# Patient Record
Sex: FEMALE | Race: WHITE | Employment: FULL TIME | URBAN - METROPOLITAN AREA
[De-identification: names, ages, dates, MRNs, and addresses within clinical notes are randomized per-mention and may not be internally consistent; named-entity substitution may affect disease eponyms.]

---

## 2017-05-04 ENCOUNTER — OFFICE VISIT (OUTPATIENT)
Dept: INTERNAL MEDICINE CLINIC | Age: 57
End: 2017-05-04

## 2017-05-04 VITALS
RESPIRATION RATE: 16 BRPM | DIASTOLIC BLOOD PRESSURE: 76 MMHG | TEMPERATURE: 97.9 F | HEART RATE: 77 BPM | HEIGHT: 71 IN | WEIGHT: 205 LBS | SYSTOLIC BLOOD PRESSURE: 134 MMHG | BODY MASS INDEX: 28.7 KG/M2 | OXYGEN SATURATION: 97 %

## 2017-05-04 DIAGNOSIS — I95.9 HYPOTENSION, UNSPECIFIED HYPOTENSION TYPE: Primary | ICD-10-CM

## 2017-05-04 DIAGNOSIS — G60.9 IDIOPATHIC PERIPHERAL NEUROPATHY: ICD-10-CM

## 2017-05-04 DIAGNOSIS — I95.9 HYPOTENSION, UNSPECIFIED HYPOTENSION TYPE: ICD-10-CM

## 2017-05-04 DIAGNOSIS — F98.8 ADD (ATTENTION DEFICIT DISORDER): ICD-10-CM

## 2017-05-04 RX ORDER — LANOLIN ALCOHOL/MO/W.PET/CERES
CREAM (GRAM) TOPICAL DAILY
COMMUNITY
End: 2022-03-30

## 2017-05-04 NOTE — MR AVS SNAPSHOT
Visit Information Date & Time Provider Department Dept. Phone Encounter #  
 5/4/2017 12:45 PM Edvin Garcia MD [facility] INTERNAL MEDICINE OF [place] [id number] Upcoming Health Maintenance Date Due Hepatitis C Screening 1960 DTaP/Tdap/Td series (1 - Tdap) 3/14/1983 PAP AKA CERVICAL CYTOLOGY 3/14/1983 FOBT Q 1 YEAR AGE 50-75 3/14/2012 INFLUENZA AGE 9 TO ADULT 8/1/2017 BREAST CANCER SCRN MAMMOGRAM 8/15/2018 Allergies as of 5/4/2017  Review Complete On: 5/4/2017 By: Kristina Durbin LPN Severity Noted Reaction Type Reactions Celebrex [Celecoxib]  07/24/2014    Hives Sulfa (Sulfonamide Antibiotics)  09/09/2015    Other (comments) Patient states she is not allergic Current Immunizations  Never Reviewed Name Date  
 TB Skin Test (PPD) 8/29/2013, 2/27/2007 Not reviewed this visit You Were Diagnosed With   
  
 Codes Comments Hypotension, unspecified hypotension type    -  Primary ICD-10-CM: I95.9 ICD-9-CM: 693. 9 Vitals BP Pulse Temp Resp Height(growth percentile) 134/76 (BP 1 Location: Left arm, BP Patient Position: Sitting) 77 97.9 °F (36.6 °C) (Tympanic) 16 5' 11\" (1.803 m) Weight(growth percentile) SpO2 BMI OB Status Smoking Status 205 lb (93 kg) 97% 28.59 kg/m2 Postmenopausal Never Smoker Vitals History BMI and BSA Data Body Mass Index Body Surface Area 28.59 kg/m 2 2.16 m 2 Preferred Pharmacy Pharmacy Name Phone [phone], [address] AT [address] [phone] Your Updated Medication List  
  
   
This list is accurate as of: 5/4/17  2:16 PM.  Always use your most recent med list.  
  
  
  
  
 diclofenac 1 % Gel Commonly known as:  VOLTAREN  
APPLY 4 GRAMS TO AFFECTED AREA FOUR TIMES DAILY  
  
 multivitamin tablet Commonly known as:  ONE A DAY Take 1 Tab by mouth daily. VITAMIN B-1 100 mg tablet Generic drug:  thiamine Take  by mouth daily. VITAMIN B-12 1,000 mcg tablet Generic drug:  cyanocobalamin Take 1,000 mcg by mouth daily. VITAMIN D3 1,000 unit tablet Generic drug:  cholecalciferol Take  by mouth daily. To-Do List   
 05/04/2017 Lab:  CORTISOL   
  
 05/04/2017 Lab:  METABOLIC PANEL, COMPREHENSIVE [facility]! Dear Lore More: Thank you for requesting a [employer] account. Our records indicate that you already have an active [employer]fl[employer] account. You can access your account anytime at [email or web address] Employee Benefit [email or web address]) Did you know that you can access your hospital and ER discharge instructions at any time in [employer]? You can also review all of your test results from your hospital stay or ER visit. Additional Information If you have questions, please visit the Frequently Asked Questions section of the [employer] website at [email or web address])/. Remember, [employer] is NOT to be used for urgent needs. For medical emergencies, dial 911. Now available from your iPhone and Android! Please provide this summary of care documentation to your next provider. Your primary care clinician is listed as Barb Reece. If you have any questions after today's visit, please call [phone].

## 2017-05-04 NOTE — PROGRESS NOTES
1. Have you been to the ER, urgent care clinic since your last visit? Hospitalized since your last visit? No    2. Have you seen or consulted any other health care providers outside of the [address] [place] [address] since your last visit? Include any pap smears or colon screening.  No

## 2017-05-05 LAB
ALBUMIN SERPL-MCNC: 4.4 G/DL (ref 3.5–5.5)
ALBUMIN/GLOB SERPL: 2 {RATIO} (ref 1.2–2.2)
ALP SERPL-CCNC: 69 IU/L (ref 39–117)
ALT SERPL-CCNC: 32 IU/L (ref 0–32)
AST SERPL-CCNC: 44 IU/L (ref 0–40)
BILIRUB SERPL-MCNC: 0.4 MG/DL (ref 0–1.2)
BUN SERPL-MCNC: 20 MG/DL (ref 6–24)
BUN/CREAT SERPL: 22 (ref 9–23)
CALCIUM SERPL-MCNC: 9.7 MG/DL (ref 8.7–10.2)
CHLORIDE SERPL-SCNC: 102 MMOL/L (ref 96–106)
CO2 SERPL-SCNC: 25 MMOL/L (ref 18–29)
CORTIS SERPL-MCNC: 13.7 UG/DL
CREAT SERPL-MCNC: 0.91 MG/DL (ref 0.57–1)
GLOBULIN SER CALC-MCNC: 2.2 G/DL (ref 1.5–4.5)
GLUCOSE SERPL-MCNC: 133 MG/DL (ref 65–99)
POTASSIUM SERPL-SCNC: 4.1 MMOL/L (ref 3.5–5.2)
PROT SERPL-MCNC: 6.6 G/DL (ref 6–8.5)
SODIUM SERPL-SCNC: 143 MMOL/L (ref 134–144)
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
## 2017-05-12 DIAGNOSIS — G60.9 IDIOPATHIC PERIPHERAL NEUROPATHY: Primary | ICD-10-CM

## 2017-08-09 ENCOUNTER — CLINICAL SUPPORT (OUTPATIENT)
Dept: INTERNAL MEDICINE CLINIC | Age: 57
End: 2017-08-09

## 2017-08-09 DIAGNOSIS — Z11.1 PPD SCREENING TEST: Primary | ICD-10-CM

## 2017-08-09 NOTE — PROGRESS NOTES
Patient came in to office for a ppd test.allergies reviewed. Ppd test given in left arm . instructed to come back to office Friday to have it read.  Patient left office ambulatory

## 2017-08-16 ENCOUNTER — HOSPITAL ENCOUNTER (OUTPATIENT)
Dept: MAMMOGRAPHY | Age: 57
Discharge: HOME | End: 2017-08-16
Attending: INTERNAL MEDICINE
Payer: COMMERCIAL

## 2017-08-16 DIAGNOSIS — Z12.31 VISIT FOR SCREENING MAMMOGRAM: ICD-10-CM

## 2017-08-16 PROCEDURE — 77063 BREAST TOMOSYNTHESIS BI: CPT

## 2017-08-18 ENCOUNTER — LAB ONLY (OUTPATIENT)
Dept: INTERNAL MEDICINE CLINIC | Age: 57
End: 2017-08-18

## 2017-08-18 DIAGNOSIS — G60.9 IDIOPATHIC PERIPHERAL NEUROPATHY: ICD-10-CM

## 2017-08-18 DIAGNOSIS — Z00.00 ANNUAL PHYSICAL EXAM: Primary | ICD-10-CM

## 2017-08-18 DIAGNOSIS — R63.5 WEIGHT GAIN: ICD-10-CM

## 2017-08-21 ENCOUNTER — OFFICE VISIT (OUTPATIENT)
Dept: INTERNAL MEDICINE CLINIC | Age: 57
End: 2017-08-21

## 2017-08-21 VITALS
HEART RATE: 71 BPM | OXYGEN SATURATION: 98 % | DIASTOLIC BLOOD PRESSURE: 84 MMHG | WEIGHT: 205 LBS | SYSTOLIC BLOOD PRESSURE: 124 MMHG | RESPIRATION RATE: 16 BRPM | TEMPERATURE: 99.1 F | BODY MASS INDEX: 28.7 KG/M2 | HEIGHT: 71 IN

## 2017-08-21 DIAGNOSIS — G60.9 IDIOPATHIC PERIPHERAL NEUROPATHY: ICD-10-CM

## 2017-08-21 DIAGNOSIS — Z00.00 ANNUAL PHYSICAL EXAM: Primary | ICD-10-CM

## 2017-08-21 DIAGNOSIS — M79.89 LEG SWELLING: ICD-10-CM

## 2017-08-21 NOTE — PROGRESS NOTES
1. Have you been to the ER, urgent care clinic since your last visit? Hospitalized since your last visit? No    2. Have you seen or consulted any other health care providers outside of the [address] [place] [address] since your last visit? Include any pap smears or colon screening.  Dr. Bowden Duty - GYN

## 2017-08-21 NOTE — PATIENT INSTRUCTIONS
Health Maintenance Due   Topic Date Due    Hepatitis C Test  1960    DTaP/Tdap/Td  (1 - Tdap) 03/14/1983    Cervical Cancer Screening  03/14/1983    Stool testing for trace blood  03/14/2012    Flu Vaccine  08/01/2017

## 2017-08-22 LAB
ALBUMIN SERPL-MCNC: 4.3 G/DL (ref 3.5–5.5)
ALBUMIN/GLOB SERPL: 1.7 {RATIO} (ref 1.2–2.2)
ALP SERPL-CCNC: 68 IU/L (ref 39–117)
ALT SERPL-CCNC: 15 IU/L (ref 0–32)
AST SERPL-CCNC: 17 IU/L (ref 0–40)
BASOPHILS # BLD AUTO: 0 X10E3/UL (ref 0–0.2)
BASOPHILS NFR BLD AUTO: 0 %
BILIRUB SERPL-MCNC: 0.6 MG/DL (ref 0–1.2)
BUN SERPL-MCNC: 33 MG/DL (ref 6–24)
BUN/CREAT SERPL: 29 (ref 9–23)
CALCIUM SERPL-MCNC: 9.3 MG/DL (ref 8.7–10.2)
CHLORIDE SERPL-SCNC: 100 MMOL/L (ref 96–106)
CHOLEST SERPL-MCNC: 215 MG/DL (ref 100–199)
CO2 SERPL-SCNC: 26 MMOL/L (ref 18–29)
CREAT SERPL-MCNC: 1.13 MG/DL (ref 0.57–1)
EOSINOPHIL # BLD AUTO: 0.1 X10E3/UL (ref 0–0.4)
EOSINOPHIL NFR BLD AUTO: 2 %
ERYTHROCYTE [DISTWIDTH] IN BLOOD BY AUTOMATED COUNT: 13.2 % (ref 12.3–15.4)
GLOBULIN SER CALC-MCNC: 2.5 G/DL (ref 1.5–4.5)
GLUCOSE SERPL-MCNC: 85 MG/DL (ref 65–99)
HCT VFR BLD AUTO: 41.3 % (ref 34–46.6)
HDLC SERPL-MCNC: 74 MG/DL
HGB BLD-MCNC: 13.4 G/DL (ref 11.1–15.9)
IMM GRANULOCYTES # BLD: 0 X10E3/UL (ref 0–0.1)
IMM GRANULOCYTES NFR BLD: 0 %
LDLC SERPL CALC-MCNC: 127 MG/DL (ref 0–99)
LYMPHOCYTES # BLD AUTO: 1.6 X10E3/UL (ref 0.7–3.1)
LYMPHOCYTES NFR BLD AUTO: 33 %
MCH RBC QN AUTO: 30.5 PG (ref 26.6–33)
MCHC RBC AUTO-ENTMCNC: 32.4 G/DL (ref 31.5–35.7)
MCV RBC AUTO: 94 FL (ref 79–97)
MONOCYTES # BLD AUTO: 0.4 X10E3/UL (ref 0.1–0.9)
MONOCYTES NFR BLD AUTO: 9 %
NEUTROPHILS # BLD AUTO: 2.7 X10E3/UL (ref 1.4–7)
NEUTROPHILS NFR BLD AUTO: 56 %
PLATELET # BLD AUTO: 216 X10E3/UL (ref 150–379)
POTASSIUM SERPL-SCNC: 4.5 MMOL/L (ref 3.5–5.2)
PROT SERPL-MCNC: 6.8 G/DL (ref 6–8.5)
RBC # BLD AUTO: 4.39 X10E6/UL (ref 3.77–5.28)
SODIUM SERPL-SCNC: 141 MMOL/L (ref 134–144)
SPECIMEN STATUS REPORT, ROLRST: NORMAL
TRIGL SERPL-MCNC: 70 MG/DL (ref 0–149)
TSH SERPL DL<=0.005 MIU/L-ACNC: 1.38 UIU/ML (ref 0.45–4.5)
VIT B1 BLD-SCNC: 252.7 NMOL/L (ref 66.5–200)
VIT B12 SERPL-MCNC: >2000 PG/ML (ref 211–946)
VLDLC SERPL CALC-MCNC: 14 MG/DL (ref 5–40)
WBC # BLD AUTO: 4.9 X10E3/UL (ref 3.4–10.8)

## 2017-08-24 PROBLEM — M79.89 LEG SWELLING: Status: ACTIVE | Noted: 2017-08-24

## 2017-08-24 NOTE — PROGRESS NOTES
The patient presents to the office today for a check up    HPI    The patient presents to the office today for a check up. The patient remains on vitamins for a peripheral neuropathy. The patient has continued complaints of numness with tingling in her feet. This is unchanged. The patient notes some tightness and swelling in her ankles. She has been taking in a lot of fluids for her biking. Review of Systems   Respiratory: Negative for shortness of breath. Cardiovascular: Positive for leg swelling. Allergies   Allergen Reactions    Celebrex [Celecoxib] Hives    Sulfa (Sulfonamide Antibiotics) Other (comments)     Patient states she is not allergic       Current Outpatient Prescriptions   Medication Sig Dispense Refill    thiamine (VITAMIN B-1) 100 mg tablet Take  by mouth daily.  diclofenac (VOLTAREN) 1 % gel APPLY 4 GRAMS TO AFFECTED AREA FOUR TIMES DAILY 100 g 0    cholecalciferol (VITAMIN D3) 1,000 unit tablet Take  by mouth daily.  cyanocobalamin (VITAMIN B-12) 1,000 mcg tablet Take 1,000 mcg by mouth daily.  multivitamin (ONE A DAY) tablet Take 1 Tab by mouth daily. Past Medical History:   Diagnosis Date    Attention deficit disorder without mention of hyperactivity     DUB (dysfunctional uterine bleeding)     Generalized osteoarthrosis, involving multiple sites     Grief reaction     Mother  recently - Celexa    Memory changes     Adderall for this    Unspecified tinnitus        Past Surgical History:   Procedure Laterality Date    HX KNEE ARTHROSCOPY Right     Meniscus repair    HX KNEE ARTHROSCOPY Right     Removed piece of bone (FB)    HX KNEE ARTHROSCOPY Right     ACL repair       Social History     Social History    Marital status: UNKNOWN     Spouse name: N/A    Number of children: N/A    Years of education: N/A     Occupational History    Not on file.      Social History Main Topics    Smoking status: Never Smoker    Smokeless tobacco: Never Used    Alcohol use No    Drug use: No    Sexual activity: Not Currently     Other Topics Concern    Not on file     Social History Narrative       Patient does have an advanced directive on file    Visit Vitals    /84 (BP 1 Location: Left arm, BP Patient Position: Sitting)    Pulse 71    Temp 99.1 °F (37.3 °C) (Tympanic)    Resp 16    Ht 5' 11\" (1.803 m)    Wt 205 lb (93 kg)    SpO2 98%    BMI 28.59 kg/m2       Physical Exam   No Cervical Lymphadenopathy  No Supraclavicular Lymphadenopathy  Thyroid is Normal  Lungs are clear to ausculation and percussion  Heart:  S1 S2 are normal, No gallops, No mummers  No Carotid Bruits  Abdomen:  Normal Bowel Sounds. No tenderness. No masses. No Hepatomegaly or Splenomegly  LE:  Strong Pedal Pulses. 1+ Edema    BMI:  Slightly high but some of weight is fluid    Lab Only on 08/18/2017   Component Date Value Ref Range Status    WBC 08/18/2017 4.9  3.4 - 10.8 x10E3/uL Final    RBC 08/18/2017 4.39  3.77 - 5.28 x10E6/uL Final    HGB 08/18/2017 13.4  11.1 - 15.9 g/dL Final    HCT 08/18/2017 41.3  34.0 - 46.6 % Final    MCV 08/18/2017 94  79 - 97 fL Final    MCH 08/18/2017 30.5  26.6 - 33.0 pg Final    MCHC 08/18/2017 32.4  31.5 - 35.7 g/dL Final    RDW 08/18/2017 13.2  12.3 - 15.4 % Final    PLATELET 79/70/4812 677  150 - 379 x10E3/uL Final    NEUTROPHILS 08/18/2017 56  % Final    Lymphocytes 08/18/2017 33  % Final    MONOCYTES 08/18/2017 9  % Final    EOSINOPHILS 08/18/2017 2  % Final    BASOPHILS 08/18/2017 0  % Final    ABS. NEUTROPHILS 08/18/2017 2.7  1.4 - 7.0 x10E3/uL Final    Abs Lymphocytes 08/18/2017 1.6  0.7 - 3.1 x10E3/uL Final    ABS. MONOCYTES 08/18/2017 0.4  0.1 - 0.9 x10E3/uL Final    ABS. EOSINOPHILS 08/18/2017 0.1  0.0 - 0.4 x10E3/uL Final    ABS. BASOPHILS 08/18/2017 0.0  0.0 - 0.2 x10E3/uL Final    IMMATURE GRANULOCYTES 08/18/2017 0  % Final    ABS. IMM.  GRANS. 08/18/2017 0.0  0.0 - 0.1 x10E3/uL Final    Glucose 08/18/2017 85  65 - 99 mg/dL Final    BUN 08/18/2017 33* 6 - 24 mg/dL Final    Creatinine 08/18/2017 1.13* 0.57 - 1.00 mg/dL Final    GFR est non-AA 08/18/2017 55* >59 mL/min/1.73 Final    GFR est AA 08/18/2017 63  >59 mL/min/1.73 Final    BUN/Creatinine ratio 08/18/2017 29* 9 - 23 Final    Sodium 08/18/2017 141  134 - 144 mmol/L Final    Potassium 08/18/2017 4.5  3.5 - 5.2 mmol/L Final    Chloride 08/18/2017 100  96 - 106 mmol/L Final    CO2 08/18/2017 26  18 - 29 mmol/L Final    Calcium 08/18/2017 9.3  8.7 - 10.2 mg/dL Final    Protein, total 08/18/2017 6.8  6.0 - 8.5 g/dL Final    Albumin 08/18/2017 4.3  3.5 - 5.5 g/dL Final    GLOBULIN, TOTAL 08/18/2017 2.5  1.5 - 4.5 g/dL Final    A-G Ratio 08/18/2017 1.7  1.2 - 2.2 Final    Bilirubin, total 08/18/2017 0.6  0.0 - 1.2 mg/dL Final    Alk. phosphatase 08/18/2017 68  39 - 117 IU/L Final    AST (SGOT) 08/18/2017 17  0 - 40 IU/L Final    ALT (SGPT) 08/18/2017 15  0 - 32 IU/L Final    Cholesterol, total 08/18/2017 215* 100 - 199 mg/dL Final    Triglyceride 08/18/2017 70  0 - 149 mg/dL Final    HDL Cholesterol 08/18/2017 74  >39 mg/dL Final    VLDL, calculated 08/18/2017 14  5 - 40 mg/dL Final    LDL, calculated 08/18/2017 127* 0 - 99 mg/dL Final    Vitamin B1 08/18/2017 252.7* 66.5 - 200.0 nmol/L Final    TSH 08/18/2017 1.380  0.450 - 4.500 uIU/mL Final    Vitamin B12 08/18/2017 >2000* 211 - 946 pg/mL Final    SPECIMEN STATUS REPORT 08/18/2017 COMMENT   Final    Comment: One Specimen Identifier  One Specimen Identifier  The specimen received included only one patient identifier on the  primary collection container. Our laboratory accrediting agency  states \"All primary specimen containers must be labeled with 2  identifiers at the time of collection. \"         . No results found for any visits on 08/21/17. Assessment / Plan      ICD-10-CM ICD-9-CM    1. Annual physical exam Z00.00 V70.0    2.  Idiopathic peripheral neuropathy G60.9 356.9 3. Leg swelling M79.89 729.81      Discussed issues of fluid and salt use  I advised the patient to continue good health habits  she was advised to continue her maintenance medications    Follow-up Disposition: Not on File    I asked Ruth Ann Teixeira if she has any questions and I answered the questions.   Ruth Ann Teixeira states that she understands the treatment plan and agrees with the treatment plan

## 2017-08-25 LAB — VIT B6 SERPL-MCNC: 13.3 UG/L (ref 2–32.8)

## 2017-08-31 ENCOUNTER — OFFICE VISIT (OUTPATIENT)
Dept: INTERNAL MEDICINE CLINIC | Age: 57
End: 2017-08-31

## 2017-08-31 VITALS
HEART RATE: 63 BPM | TEMPERATURE: 98.8 F | HEIGHT: 71 IN | RESPIRATION RATE: 16 BRPM | WEIGHT: 213 LBS | BODY MASS INDEX: 29.82 KG/M2 | SYSTOLIC BLOOD PRESSURE: 150 MMHG | DIASTOLIC BLOOD PRESSURE: 98 MMHG | OXYGEN SATURATION: 98 %

## 2017-08-31 DIAGNOSIS — J02.9 SORE THROAT: ICD-10-CM

## 2017-08-31 DIAGNOSIS — R19.4 BOWEL HABIT CHANGES: Primary | ICD-10-CM

## 2017-08-31 LAB
S PYO AG THROAT QL: NEGATIVE
VALID INTERNAL CONTROL?: YES

## 2017-08-31 RX ORDER — DICLOFENAC SODIUM 10 MG/G
4 GEL TOPICAL 4 TIMES DAILY
Qty: 100 G | Refills: 0 | Status: SHIPPED | OUTPATIENT
Start: 2017-08-31 | End: 2017-12-22 | Stop reason: SDUPTHER

## 2017-08-31 RX ORDER — AMOXICILLIN AND CLAVULANATE POTASSIUM 500; 125 MG/1; MG/1
1 TABLET, FILM COATED ORAL 2 TIMES DAILY
Qty: 20 TAB | Refills: 0 | Status: SHIPPED | OUTPATIENT
Start: 2017-08-31 | End: 2017-09-10

## 2017-08-31 NOTE — PROGRESS NOTES
Jono Ridley is a 56 y.o. female presenting today for Sore Throat (x1 1/2 weeks)  . HPI:  Jono Ridley presents to the office today for sore throat x 1.5 weeks. Patient denies nasal congestion, cough, fever or chills. Pateint is also complaining of increased episodes of diarrhea after meals. She noted her diarrhea always occurred after eating food with MSG but now the episodes are more frequent and with various meals and food choices. Review of Systems   HENT: Positive for sore throat. Negative for congestion, ear pain and sinus pain. Respiratory: Negative for cough and hemoptysis. Cardiovascular: Negative for chest pain and palpitations. Gastrointestinal: Positive for diarrhea. Negative for abdominal pain. Allergies   Allergen Reactions    Celebrex [Celecoxib] Hives    Sulfa (Sulfonamide Antibiotics) Other (comments)     Patient states she is not allergic       Current Outpatient Prescriptions   Medication Sig Dispense Refill    diclofenac (VOLTAREN) 1 % gel Apply 4 g to affected area four (4) times daily. 100 g 0    amoxicillin-clavulanate (AUGMENTIN) 500-125 mg per tablet Take 1 Tab by mouth two (2) times a day for 10 days. 20 Tab 0    thiamine (VITAMIN B-1) 100 mg tablet Take  by mouth daily.  cholecalciferol (VITAMIN D3) 1,000 unit tablet Take  by mouth daily.  cyanocobalamin (VITAMIN B-12) 1,000 mcg tablet Take 1,000 mcg by mouth daily.  multivitamin (ONE A DAY) tablet Take 1 Tab by mouth daily.          Past Medical History:   Diagnosis Date    Attention deficit disorder without mention of hyperactivity     DUB (dysfunctional uterine bleeding)     Generalized osteoarthrosis, involving multiple sites     Grief reaction     Mother  recently - Celexa    Memory changes     Adderall for this    Unspecified tinnitus        Past Surgical History:   Procedure Laterality Date    HX KNEE ARTHROSCOPY Right     Meniscus repair    HX KNEE ARTHROSCOPY Right Removed piece of bone (FB)    HX KNEE ARTHROSCOPY Right     ACL repair       Social History     Social History    Marital status: UNKNOWN     Spouse name: N/A    Number of children: N/A    Years of education: N/A     Occupational History    Not on file. Social History Main Topics    Smoking status: Never Smoker    Smokeless tobacco: Never Used    Alcohol use No    Drug use: No    Sexual activity: Not Currently     Other Topics Concern    Not on file     Social History Narrative       Patient does not have an advanced directive on file    Vitals:    08/31/17 1455   BP: (!) 150/98   Pulse: 63   Resp: 16   Temp: 98.8 °F (37.1 °C)   TempSrc: Tympanic   SpO2: 98%   Weight: 213 lb (96.6 kg)   Height: 5' 11\" (1.803 m)   PainSc:   2   PainLoc: Throat       Physical Exam   Constitutional: No distress. HENT:   Mouth/Throat: Oropharynx is clear and moist. No oropharyngeal exudate. Cardiovascular: Normal rate, regular rhythm and normal heart sounds. No murmur heard. Pulmonary/Chest: Effort normal and breath sounds normal.   Abdominal: Soft. Nursing note and vitals reviewed.       Office Visit on 08/31/2017   Component Date Value Ref Range Status    VALID INTERNAL CONTROL POC 08/31/2017 Yes   Final    Group A Strep Ag 08/31/2017 Negative  Negative Final   Lab Only on 08/18/2017   Component Date Value Ref Range Status    WBC 08/18/2017 4.9  3.4 - 10.8 x10E3/uL Final    RBC 08/18/2017 4.39  3.77 - 5.28 x10E6/uL Final    HGB 08/18/2017 13.4  11.1 - 15.9 g/dL Final    HCT 08/18/2017 41.3  34.0 - 46.6 % Final    MCV 08/18/2017 94  79 - 97 fL Final    MCH 08/18/2017 30.5  26.6 - 33.0 pg Final    MCHC 08/18/2017 32.4  31.5 - 35.7 g/dL Final    RDW 08/18/2017 13.2  12.3 - 15.4 % Final    PLATELET 74/26/5458 988  150 - 379 x10E3/uL Final    NEUTROPHILS 08/18/2017 56  % Final    Lymphocytes 08/18/2017 33  % Final    MONOCYTES 08/18/2017 9  % Final    EOSINOPHILS 08/18/2017 2  % Final    BASOPHILS 08/18/2017 0  % Final    ABS. NEUTROPHILS 08/18/2017 2.7  1.4 - 7.0 x10E3/uL Final    Abs Lymphocytes 08/18/2017 1.6  0.7 - 3.1 x10E3/uL Final    ABS. MONOCYTES 08/18/2017 0.4  0.1 - 0.9 x10E3/uL Final    ABS. EOSINOPHILS 08/18/2017 0.1  0.0 - 0.4 x10E3/uL Final    ABS. BASOPHILS 08/18/2017 0.0  0.0 - 0.2 x10E3/uL Final    IMMATURE GRANULOCYTES 08/18/2017 0  % Final    ABS. IMM. GRANS. 08/18/2017 0.0  0.0 - 0.1 x10E3/uL Final    Glucose 08/18/2017 85  65 - 99 mg/dL Final    BUN 08/18/2017 33* 6 - 24 mg/dL Final    Creatinine 08/18/2017 1.13* 0.57 - 1.00 mg/dL Final    GFR est non-AA 08/18/2017 55* >59 mL/min/1.73 Final    GFR est AA 08/18/2017 63  >59 mL/min/1.73 Final    BUN/Creatinine ratio 08/18/2017 29* 9 - 23 Final    Sodium 08/18/2017 141  134 - 144 mmol/L Final    Potassium 08/18/2017 4.5  3.5 - 5.2 mmol/L Final    Chloride 08/18/2017 100  96 - 106 mmol/L Final    CO2 08/18/2017 26  18 - 29 mmol/L Final    Calcium 08/18/2017 9.3  8.7 - 10.2 mg/dL Final    Protein, total 08/18/2017 6.8  6.0 - 8.5 g/dL Final    Albumin 08/18/2017 4.3  3.5 - 5.5 g/dL Final    GLOBULIN, TOTAL 08/18/2017 2.5  1.5 - 4.5 g/dL Final    A-G Ratio 08/18/2017 1.7  1.2 - 2.2 Final    Bilirubin, total 08/18/2017 0.6  0.0 - 1.2 mg/dL Final    Alk.  phosphatase 08/18/2017 68  39 - 117 IU/L Final    AST (SGOT) 08/18/2017 17  0 - 40 IU/L Final    ALT (SGPT) 08/18/2017 15  0 - 32 IU/L Final    Cholesterol, total 08/18/2017 215* 100 - 199 mg/dL Final    Triglyceride 08/18/2017 70  0 - 149 mg/dL Final    HDL Cholesterol 08/18/2017 74  >39 mg/dL Final    VLDL, calculated 08/18/2017 14  5 - 40 mg/dL Final    LDL, calculated 08/18/2017 127* 0 - 99 mg/dL Final    Vitamin B1 08/18/2017 252.7* 66.5 - 200.0 nmol/L Final    TSH 08/18/2017 1.380  0.450 - 4.500 uIU/mL Final    Vitamin B12 08/18/2017 >2000* 211 - 946 pg/mL Final    SPECIMEN STATUS REPORT 08/18/2017 COMMENT   Final    Comment: One Specimen Identifier  One Specimen Identifier  The specimen received included only one patient identifier on the  primary collection container. Our laboratory accrediting agency  states \"All primary specimen containers must be labeled with 2  identifiers at the time of collection. \"      Vitamin B6 08/21/2017 13.3  2.0 - 32.8 ug/L Final       .  Results for orders placed or performed in visit on 08/31/17   AMB POC RAPID STREP A   Result Value Ref Range    VALID INTERNAL CONTROL POC Yes     Group A Strep Ag Negative Negative       Assessment / Plan:      ICD-10-CM ICD-9-CM    1. Bowel habit changes R19.4 787.99 REFERRAL TO GASTROENTEROLOGY   2. Sore throat J02.9 462 AMB POC RAPID STREP A      amoxicillin-clavulanate (AUGMENTIN) 500-125 mg per tablet     Patient noted sore throat was improving. Patient given printed rx to fill if symptoms consistent or pain increased  Rapid Strep negative  Referral to GI  F/u prn    Follow-up Disposition:  Return if symptoms worsen or fail to improve. I asked the patient if she  had any questions and answered her  questions.   The patient stated that she understands the treatment plan and agrees with the treatment plan

## 2017-08-31 NOTE — PROGRESS NOTES
Patient presents for   Chief Complaint   Patient presents with    Sore Throat     x1 1/2 weeks     Fall risk assessment was not indicated. Depression screening was not indicated Follow up questions were not indicated. 1. Have you been to the ER, urgent care clinic since your last visit? Hospitalized since your last visit? No    2. Have you seen or consulted any other health care providers outside of the [address] [place] [address] since your last visit? Include any pap smears or colon screening.  No     POC rapid strep performed per provider order, provider made aware of results

## 2017-09-14 ENCOUNTER — OFFICE VISIT (OUTPATIENT)
Dept: INTERNAL MEDICINE CLINIC | Age: 57
End: 2017-09-14

## 2017-09-14 VITALS
HEART RATE: 62 BPM | OXYGEN SATURATION: 99 % | WEIGHT: 212 LBS | SYSTOLIC BLOOD PRESSURE: 140 MMHG | TEMPERATURE: 98.7 F | RESPIRATION RATE: 16 BRPM | HEIGHT: 71 IN | DIASTOLIC BLOOD PRESSURE: 80 MMHG | BODY MASS INDEX: 29.68 KG/M2

## 2017-09-14 DIAGNOSIS — R06.09 DYSPNEA ON EXERTION: ICD-10-CM

## 2017-09-14 DIAGNOSIS — L73.9 FOLLICULITIS OF PERINEUM: Primary | ICD-10-CM

## 2017-09-14 DIAGNOSIS — R60.0 BILATERAL LEG EDEMA: ICD-10-CM

## 2017-09-14 DIAGNOSIS — R63.5 WEIGHT GAIN: ICD-10-CM

## 2017-09-14 RX ORDER — CEPHALEXIN 250 MG/1
250 CAPSULE ORAL 4 TIMES DAILY
Qty: 40 CAP | Refills: 0 | Status: SHIPPED | OUTPATIENT
Start: 2017-09-14 | End: 2017-09-24

## 2017-09-14 NOTE — PROGRESS NOTES
Lilo Barcenas is a 56 y.o. female presenting today for Mass (pelvic region x2 weeks)  . HPI:  Lilo Barcenas presents to the office today for pelvic mass x 2 weeks with pustula drainage. She noted the site is tender to touch. She is negative for fever. The patient is also concerned with her lower extremity edema, weight gain and dyspnea when climbing stairs. She is negative for chest pain or palpitations. Review of Systems   Respiratory: Positive for shortness of breath (climbing stairs). Negative for cough. Cardiovascular: Positive for leg swelling. Negative for chest pain and palpitations. Allergies   Allergen Reactions    Celebrex [Celecoxib] Hives    Sulfa (Sulfonamide Antibiotics) Other (comments)     Patient states she is not allergic       Current Outpatient Prescriptions   Medication Sig Dispense Refill    cephALEXin (KEFLEX) 250 mg capsule Take 1 Cap by mouth four (4) times daily for 10 days. 40 Cap 0    diclofenac (VOLTAREN) 1 % gel Apply 4 g to affected area four (4) times daily. 100 g 0    thiamine (VITAMIN B-1) 100 mg tablet Take  by mouth daily.  cholecalciferol (VITAMIN D3) 1,000 unit tablet Take  by mouth daily.  multivitamin (ONE A DAY) tablet Take 1 Tab by mouth daily.  cyanocobalamin (VITAMIN B-12) 1,000 mcg tablet Take 1,000 mcg by mouth daily.          Past Medical History:   Diagnosis Date    Attention deficit disorder without mention of hyperactivity     DUB (dysfunctional uterine bleeding)     Generalized osteoarthrosis, involving multiple sites     Grief reaction     Mother  recently - Celexa    Memory changes     Adderall for this    Unspecified tinnitus        Past Surgical History:   Procedure Laterality Date    HX KNEE ARTHROSCOPY Right     Meniscus repair    HX KNEE ARTHROSCOPY Right     Removed piece of bone (FB)    HX KNEE ARTHROSCOPY Right     ACL repair       Social History     Social History    Marital status: UNKNOWN Spouse name: N/A    Number of children: N/A    Years of education: N/A     Occupational History    Not on file. Social History Main Topics    Smoking status: Never Smoker    Smokeless tobacco: Never Used    Alcohol use No    Drug use: No    Sexual activity: Not Currently     Other Topics Concern    Not on file     Social History Narrative       Patient does not have an advanced directive on file    Vitals:    09/14/17 1528   BP: 140/80   Pulse: 62   Resp: 16   Temp: 98.7 °F (37.1 °C)   TempSrc: Tympanic   SpO2: 99%   Weight: 212 lb (96.2 kg)   Height: 5' 11\" (1.803 m)   PainSc:   0 - No pain       Physical Exam   Constitutional: No distress. Cardiovascular: Normal rate, regular rhythm and normal heart sounds. Pulmonary/Chest: Effort normal and breath sounds normal. She has no rales. Genitourinary:   Genitourinary Comments: Perineum noted with inflamed follicular. No discharge noted   Nursing note and vitals reviewed. Office Visit on 08/31/2017   Component Date Value Ref Range Status    VALID INTERNAL CONTROL POC 08/31/2017 Yes   Final    Group A Strep Ag 08/31/2017 Negative  Negative Final   Lab Only on 08/18/2017   Component Date Value Ref Range Status    WBC 08/18/2017 4.9  3.4 - 10.8 x10E3/uL Final    RBC 08/18/2017 4.39  3.77 - 5.28 x10E6/uL Final    HGB 08/18/2017 13.4  11.1 - 15.9 g/dL Final    HCT 08/18/2017 41.3  34.0 - 46.6 % Final    MCV 08/18/2017 94  79 - 97 fL Final    MCH 08/18/2017 30.5  26.6 - 33.0 pg Final    MCHC 08/18/2017 32.4  31.5 - 35.7 g/dL Final    RDW 08/18/2017 13.2  12.3 - 15.4 % Final    PLATELET 78/45/6477 336  150 - 379 x10E3/uL Final    NEUTROPHILS 08/18/2017 56  % Final    Lymphocytes 08/18/2017 33  % Final    MONOCYTES 08/18/2017 9  % Final    EOSINOPHILS 08/18/2017 2  % Final    BASOPHILS 08/18/2017 0  % Final    ABS. NEUTROPHILS 08/18/2017 2.7  1.4 - 7.0 x10E3/uL Final    Abs Lymphocytes 08/18/2017 1.6  0.7 - 3.1 x10E3/uL Final    ABS. MONOCYTES 08/18/2017 0.4  0.1 - 0.9 x10E3/uL Final    ABS. EOSINOPHILS 08/18/2017 0.1  0.0 - 0.4 x10E3/uL Final    ABS. BASOPHILS 08/18/2017 0.0  0.0 - 0.2 x10E3/uL Final    IMMATURE GRANULOCYTES 08/18/2017 0  % Final    ABS. IMM. GRANS. 08/18/2017 0.0  0.0 - 0.1 x10E3/uL Final    Glucose 08/18/2017 85  65 - 99 mg/dL Final    BUN 08/18/2017 33* 6 - 24 mg/dL Final    Creatinine 08/18/2017 1.13* 0.57 - 1.00 mg/dL Final    GFR est non-AA 08/18/2017 55* >59 mL/min/1.73 Final    GFR est AA 08/18/2017 63  >59 mL/min/1.73 Final    BUN/Creatinine ratio 08/18/2017 29* 9 - 23 Final    Sodium 08/18/2017 141  134 - 144 mmol/L Final    Potassium 08/18/2017 4.5  3.5 - 5.2 mmol/L Final    Chloride 08/18/2017 100  96 - 106 mmol/L Final    CO2 08/18/2017 26  18 - 29 mmol/L Final    Calcium 08/18/2017 9.3  8.7 - 10.2 mg/dL Final    Protein, total 08/18/2017 6.8  6.0 - 8.5 g/dL Final    Albumin 08/18/2017 4.3  3.5 - 5.5 g/dL Final    GLOBULIN, TOTAL 08/18/2017 2.5  1.5 - 4.5 g/dL Final    A-G Ratio 08/18/2017 1.7  1.2 - 2.2 Final    Bilirubin, total 08/18/2017 0.6  0.0 - 1.2 mg/dL Final    Alk. phosphatase 08/18/2017 68  39 - 117 IU/L Final    AST (SGOT) 08/18/2017 17  0 - 40 IU/L Final    ALT (SGPT) 08/18/2017 15  0 - 32 IU/L Final    Cholesterol, total 08/18/2017 215* 100 - 199 mg/dL Final    Triglyceride 08/18/2017 70  0 - 149 mg/dL Final    HDL Cholesterol 08/18/2017 74  >39 mg/dL Final    VLDL, calculated 08/18/2017 14  5 - 40 mg/dL Final    LDL, calculated 08/18/2017 127* 0 - 99 mg/dL Final    Vitamin B1 08/18/2017 252.7* 66.5 - 200.0 nmol/L Final    TSH 08/18/2017 1.380  0.450 - 4.500 uIU/mL Final    Vitamin B12 08/18/2017 >2000* 211 - 946 pg/mL Final    SPECIMEN STATUS REPORT 08/18/2017 COMMENT   Final    Comment: One Specimen Identifier  One Specimen Identifier  The specimen received included only one patient identifier on the  primary collection container.   Our laboratory accrediting agency  states \"All primary specimen containers must be labeled with 2  identifiers at the time of collection. \"      Vitamin B6 08/21/2017 13.3  2.0 - 32.8 ug/L Final       .No results found for any visits on 09/14/17. Assessment / Plan:      ICD-10-CM ICD-9-CM    1. Folliculitis of perineum L73.9 704.8 cephALEXin (KEFLEX) 250 mg capsule   2. Bilateral leg edema R60.0 782.3 2D ECHO COMPLETE ADULT (TTE) W OR WO CONTR   3. Dyspnea on exertion R06.09 786.09 2D ECHO COMPLETE ADULT (TTE) W OR WO CONTR   4. Weight gain R63.5 783.1 2D ECHO COMPLETE ADULT (TTE) W OR WO CONTR     Keflex rx  F/u in 2 weeks  2D Echo ordered      Follow-up Disposition:  Return in about 10 days (around 9/24/2017). I asked the patient if she  had any questions and answered her  questions.   The patient stated that she understands the treatment plan and agrees with the treatment plan

## 2017-09-14 NOTE — MR AVS SNAPSHOT
Visit Information Date & Time Provider Department Dept. Phone Encounter #  
 9/14/2017  3:30 PM Sasha Padgett NP [facility] INTERNAL MEDICINE OF [address] [id number] Upcoming Health Maintenance Date Due Hepatitis C Screening 1960 DTaP/Tdap/Td series (1 - Tdap) 3/14/1983 PAP AKA CERVICAL CYTOLOGY 3/14/1983 FOBT Q 1 YEAR AGE 50-75 3/14/2012 INFLUENZA AGE 9 TO ADULT 8/1/2017 BREAST CANCER SCRN MAMMOGRAM 8/16/2019 Allergies as of 9/14/2017  Review Complete On: 9/14/2017 By: Sasha Padgett NP Severity Noted Reaction Type Reactions Celebrex [Celecoxib]  07/24/2014    Hives Sulfa (Sulfonamide Antibiotics)  09/09/2015    Other (comments) Patient states she is not allergic Current Immunizations  Never Reviewed Name Date  
 TB Skin Test (PPD) 8/29/2013, 2/27/2007 TB Skin Test (PPD) Intradermal 8/9/2017 Not reviewed this visit You Were Diagnosed With   
  
 Codes Comments Folliculitis of perineum    -  Primary ICD-10-CM: L73.9 ICD-9-CM: 704.8 Bilateral leg edema     ICD-10-CM: R60.0 ICD-9-CM: 054. 3 Dyspnea on exertion     ICD-10-CM: R06.09 
ICD-9-CM: 786.09 Weight gain     ICD-10-CM: R63.5 ICD-9-CM: 783.1 Vitals BP Pulse Temp Resp Height(growth percentile) 140/80 (BP 1 Location: Left arm, BP Patient Position: Sitting) 62 98.7 °F (37.1 °C) (Tympanic) 16 5' 11\" (1.803 m) Weight(growth percentile) SpO2 BMI OB Status Smoking Status 212 lb (96.2 kg) 99% 29.57 kg/m2 Postmenopausal Never Smoker BMI and BSA Data Body Mass Index Body Surface Area  
 29.57 kg/m 2 2.2 m 2 Preferred Pharmacy Pharm[facility] Name Phone [facility] [phone], [address] AT [address] [phone] Your Updated Medication List  
  
   
This list is accurate as of: 9/14/17  4:46 PM.  Always use your most recent med list.  
  
  
  
  
 cephALEXin 250 mg capsule Commonly known as:  [facility] Take 1 Cap by mouth four (4) times daily for 10 days. diclofenac 1 % Gel Commonly known as:  VOLTAREN Apply 4 g to affected area four (4) times daily. multivitamin tablet Commonly known as:  ONE A DAY Take 1 Tab by mouth daily. VITAMIN B-1 100 mg tablet Generic drug:  thiamine Take  by mouth daily. VITAMIN B-12 1,000 mcg tablet Generic drug:  cyanocobalamin Take 1,000 mcg by mouth daily. VITAMIN D3 1,000 unit tablet Generic drug:  cholecalciferol Take  by mouth daily. Prescriptions Sent to Pharmacy Refills  
 cephALEXin (KEFLEX) 250 mg capsule 0 Sig: Take 1 Cap by mouth four (4) times daily for 10 days. Class: Normal  
 Pharmacy: [email or web address] [address] [place] [address] AT [address] Ph #: [phone] Route: Oral  
  
To-Do List   
 09/14/2017 ECHO:  2D ECHO COMPLETE ADULT (TTE) W OR WO CONTR Introducing [facility]! Dear Claire Opitz: Thank you for requesting a [employer] account. Our records indicate that you already have an active [employer] account. You can access your account anytime at [email or web address]. Swag Of The Month/[employer] Did you know that you can access your hospital and ER discharge instructions at any time in [employer]? You can also review all of your test results from your hospital stay or ER visit. Additional Information If you have questions, please visit the Frequently Asked Questions section of the [employer] website at [email or web address] Remember, [employer] is NOT to be used for urgent needs. For medical emergencies, dial 911. Now available from your iPhone and Android! Please provide this summary of care documentation to your next provider. Your primary care clinician is listed as Mark[facility]. If you have any questions after today's visit, please call [phone].

## 2017-09-14 NOTE — PROGRESS NOTES
Patient presents for   Chief Complaint   Patient presents with    Mass     pelvic region x2 weeks     Fall risk assessment was not indicated. Depression screening was not indicated Follow up questions were not indicated. 1. Have you been to the ER, urgent care clinic since your last visit? Hospitalized since your last visit? No    2. Have you seen or consulted any other health care providers outside of the [address] [place] [address] since your last visit? Include any pap smears or colon screening.  No

## 2017-09-27 ENCOUNTER — HOSPITAL ENCOUNTER (OUTPATIENT)
Dept: NON INVASIVE DIAGNOSTICS | Age: 57
Discharge: HOME | End: 2017-09-27
Attending: NURSE PRACTITIONER
Payer: COMMERCIAL

## 2017-09-27 ENCOUNTER — DOCUMENTATION ONLY (OUTPATIENT)
Dept: CARDIOLOGY | Age: 57
End: 2017-09-27

## 2017-09-27 DIAGNOSIS — R06.09 DYSPNEA ON EXERTION: ICD-10-CM

## 2017-09-27 DIAGNOSIS — R63.5 WEIGHT GAIN: ICD-10-CM

## 2017-09-27 DIAGNOSIS — R60.0 BILATERAL LEG EDEMA: ICD-10-CM

## 2017-09-27 PROCEDURE — 93306 TTE W/DOPPLER COMPLETE: CPT

## 2017-09-27 NOTE — PROGRESS NOTES
Patient assessed since at time of echo, found to have A.fib, controlled rate. I discussed with her that she should see a cardiologist formally and she told me she is seeing Dr. Randy Ibanez tomorrow. If Dr. Randy Ibanez agrees, we would be more than happy to see patient in the office for a formal assessment of new A.fib. Patient denies a history of A.fib. She has only felt fatigued, no chest pain, syncope.

## 2017-09-28 ENCOUNTER — OFFICE VISIT (OUTPATIENT)
Dept: INTERNAL MEDICINE CLINIC | Age: 57
End: 2017-09-28

## 2017-09-28 VITALS
HEIGHT: 71 IN | SYSTOLIC BLOOD PRESSURE: 120 MMHG | OXYGEN SATURATION: 99 % | DIASTOLIC BLOOD PRESSURE: 84 MMHG | HEART RATE: 58 BPM | BODY MASS INDEX: 29.4 KG/M2 | TEMPERATURE: 98.8 F | RESPIRATION RATE: 16 BRPM | WEIGHT: 210 LBS

## 2017-09-28 DIAGNOSIS — R60.9 PERIPHERAL EDEMA: ICD-10-CM

## 2017-09-28 DIAGNOSIS — I49.9 IRREGULAR CARDIAC RHYTHM: Primary | ICD-10-CM

## 2017-09-28 NOTE — PROGRESS NOTES
1. Have you been to the ER, urgent care clinic since your last visit? Hospitalized since your last visit? No    2. Have you seen or consulted any other health care providers outside of the [address] [place] [address] since your last visit? Include any pap smears or colon screening.  No

## 2017-09-28 NOTE — PATIENT INSTRUCTIONS
Health Maintenance Due   Topic    Hepatitis C Screening     DTaP/Tdap/Td series (1 - Tdap)    PAP AKA CERVICAL CYTOLOGY     FOBT Q 1 YEAR AGE 50-75     INFLUENZA AGE 9 TO ADULT

## 2017-09-28 NOTE — PROGRESS NOTES
Fabricio Nieves is a 56 y.o. female presenting today for Results  . HPI:  Fabricio Nieves presents to the office today for follow-up on folliculitis of perineum and ECHO results. Patient noted the inflamed follicle has resolved. She went for her Echo and was told during the exam her rhythm was concerning for atrial fibrillation. Patient denied any chest pain or palpitation. ECHO results were reviewed with the patient. Review of Systems   Respiratory: Negative for cough. Shortness of breath: occasional.    Cardiovascular: Positive for leg swelling. Negative for chest pain and palpitations. Allergies   Allergen Reactions    Celebrex [Celecoxib] Hives    Sulfa (Sulfonamide Antibiotics) Other (comments)     Patient states she is not allergic       Current Outpatient Prescriptions   Medication Sig Dispense Refill    diclofenac (VOLTAREN) 1 % gel Apply 4 g to affected area four (4) times daily. 100 g 0    thiamine (VITAMIN B-1) 100 mg tablet Take  by mouth daily.  cholecalciferol (VITAMIN D3) 1,000 unit tablet Take  by mouth daily.  cyanocobalamin (VITAMIN B-12) 1,000 mcg tablet Take 1,000 mcg by mouth daily.  multivitamin (ONE A DAY) tablet Take 1 Tab by mouth daily. Past Medical History:   Diagnosis Date    Attention deficit disorder without mention of hyperactivity     DUB (dysfunctional uterine bleeding)     Generalized osteoarthrosis, involving multiple sites     Grief reaction     Mother  recently - Celexa    Memory changes     Adderall for this    Unspecified tinnitus        Past Surgical History:   Procedure Laterality Date    HX KNEE ARTHROSCOPY Right     Meniscus repair    HX KNEE ARTHROSCOPY Right     Removed piece of bone (FB)    HX KNEE ARTHROSCOPY Right     ACL repair       Social History     Social History    Marital status: UNKNOWN     Spouse name: N/A    Number of children: N/A    Years of education: N/A     Occupational History    Not on file. Social History Main Topics    Smoking status: Never Smoker    Smokeless tobacco: Never Used    Alcohol use No    Drug use: No    Sexual activity: Not Currently     Other Topics Concern    Not on file     Social History Narrative       Patient does not have an advanced directive on file    Vitals:    09/28/17 1549   BP: 120/84   Pulse: (!) 58   Resp: 16   Temp: 98.8 °F (37.1 °C)   TempSrc: Tympanic   SpO2: 99%   Weight: 210 lb (95.3 kg)   Height: 5' 11\" (1.803 m)   PainSc:   0 - No pain       Physical Exam   Constitutional: No distress. Cardiovascular: Regular rhythm and normal heart sounds. Bradycardia present. Pulmonary/Chest: Effort normal and breath sounds normal.   Genitourinary:   Genitourinary Comments: Vulvar without follicultis   Musculoskeletal: She exhibits edema. Office Visit on 08/31/2017   Component Date Value Ref Range Status    VALID INTERNAL CONTROL POC 08/31/2017 Yes   Final    Group A Strep Ag 08/31/2017 Negative  Negative Final   Lab Only on 08/18/2017   Component Date Value Ref Range Status    WBC 08/18/2017 4.9  3.4 - 10.8 x10E3/uL Final    RBC 08/18/2017 4.39  3.77 - 5.28 x10E6/uL Final    HGB 08/18/2017 13.4  11.1 - 15.9 g/dL Final    HCT 08/18/2017 41.3  34.0 - 46.6 % Final    MCV 08/18/2017 94  79 - 97 fL Final    MCH 08/18/2017 30.5  26.6 - 33.0 pg Final    MCHC 08/18/2017 32.4  31.5 - 35.7 g/dL Final    RDW 08/18/2017 13.2  12.3 - 15.4 % Final    PLATELET 27/42/4454 427  150 - 379 x10E3/uL Final    NEUTROPHILS 08/18/2017 56  % Final    Lymphocytes 08/18/2017 33  % Final    MONOCYTES 08/18/2017 9  % Final    EOSINOPHILS 08/18/2017 2  % Final    BASOPHILS 08/18/2017 0  % Final    ABS. NEUTROPHILS 08/18/2017 2.7  1.4 - 7.0 x10E3/uL Final    Abs Lymphocytes 08/18/2017 1.6  0.7 - 3.1 x10E3/uL Final    ABS. MONOCYTES 08/18/2017 0.4  0.1 - 0.9 x10E3/uL Final    ABS. EOSINOPHILS 08/18/2017 0.1  0.0 - 0.4 x10E3/uL Final    ABS.  BASOPHILS 08/18/2017 0.0 0.0 - 0.2 x10E3/uL Final    IMMATURE GRANULOCYTES 08/18/2017 0  % Final    ABS. IMM. GRANS. 08/18/2017 0.0  0.0 - 0.1 x10E3/uL Final    Glucose 08/18/2017 85  65 - 99 mg/dL Final    BUN 08/18/2017 33* 6 - 24 mg/dL Final    Creatinine 08/18/2017 1.13* 0.57 - 1.00 mg/dL Final    GFR est non-AA 08/18/2017 55* >59 mL/min/1.73 Final    GFR est AA 08/18/2017 63  >59 mL/min/1.73 Final    BUN/Creatinine ratio 08/18/2017 29* 9 - 23 Final    Sodium 08/18/2017 141  134 - 144 mmol/L Final    Potassium 08/18/2017 4.5  3.5 - 5.2 mmol/L Final    Chloride 08/18/2017 100  96 - 106 mmol/L Final    CO2 08/18/2017 26  18 - 29 mmol/L Final    Calcium 08/18/2017 9.3  8.7 - 10.2 mg/dL Final    Protein, total 08/18/2017 6.8  6.0 - 8.5 g/dL Final    Albumin 08/18/2017 4.3  3.5 - 5.5 g/dL Final    GLOBULIN, TOTAL 08/18/2017 2.5  1.5 - 4.5 g/dL Final    A-G Ratio 08/18/2017 1.7  1.2 - 2.2 Final    Bilirubin, total 08/18/2017 0.6  0.0 - 1.2 mg/dL Final    Alk. phosphatase 08/18/2017 68  39 - 117 IU/L Final    AST (SGOT) 08/18/2017 17  0 - 40 IU/L Final    ALT (SGPT) 08/18/2017 15  0 - 32 IU/L Final    Cholesterol, total 08/18/2017 215* 100 - 199 mg/dL Final    Triglyceride 08/18/2017 70  0 - 149 mg/dL Final    HDL Cholesterol 08/18/2017 74  >39 mg/dL Final    VLDL, calculated 08/18/2017 14  5 - 40 mg/dL Final    LDL, calculated 08/18/2017 127* 0 - 99 mg/dL Final    Vitamin B1 08/18/2017 252.7* 66.5 - 200.0 nmol/L Final    TSH 08/18/2017 1.380  0.450 - 4.500 uIU/mL Final    Vitamin B12 08/18/2017 >2000* 211 - 946 pg/mL Final    SPECIMEN STATUS REPORT 08/18/2017 COMMENT   Final    Comment: One Specimen Identifier  One Specimen Identifier  The specimen received included only one patient identifier on the  primary collection container. Our laboratory accrediting agency  states \"All primary specimen containers must be labeled with 2  identifiers at the time of collection. \"      Vitamin B6 08/21/2017 13.3  2.0 - 32.8 ug/L Final       .No results found for any visits on 09/28/17. Assessment / Plan:      ICD-10-CM ICD-9-CM    1. Irregular cardiac rhythm I49.9 427.9 AMB POC EKG ROUTINE W/ 12 LEADS, INTER & REP      ECG HOLTER MONITOR, UP TO 48 HRS   2. Peripheral edema R60.9 782.3              Follow-up Disposition:  Return in about 1 week (around 10/5/2017), or if symptoms worsen or fail to improve. I asked the patient if she  had any questions and answered her  questions.   The patient stated that she understands the treatment plan and agrees with the treatment plan

## 2017-10-02 ENCOUNTER — TELEPHONE (OUTPATIENT)
Dept: INTERNAL MEDICINE CLINIC | Age: 57
End: 2017-10-02

## 2017-10-03 ENCOUNTER — HOSPITAL ENCOUNTER (OUTPATIENT)
Dept: NON INVASIVE DIAGNOSTICS | Age: 57
Discharge: HOME | End: 2017-10-03
Attending: NURSE PRACTITIONER
Payer: COMMERCIAL

## 2017-10-03 DIAGNOSIS — I49.9 IRREGULAR CARDIAC RHYTHM: ICD-10-CM

## 2017-10-03 PROCEDURE — 93225 XTRNL ECG REC<48 HRS REC: CPT

## 2017-10-05 ENCOUNTER — TELEPHONE (OUTPATIENT)
Dept: CARDIOLOGY CLINIC | Age: 57
End: 2017-10-05

## 2017-10-05 NOTE — TELEPHONE ENCOUNTER
LMOM (3rd time) for patient to contact our office if she is interested in setting up a NP appointment. Letter sent. Message  Received: 3 days ago       Sharath RONQUILLO Dermanis  Shyla Persons Dermanis       Caller: Unspecified (1 week ago)                     LMOM again for patient to contact office if she wishes to set up NP appointment.            Previous Messages       ----- Message -----   Blue White From: Augusto Flores      Sent: 9/29/2017  11:19 AM        To: Augusto Flores      LMOM for patient to contact office to set up a NP appointment . .. If that it what she and Dr. Jamar Huntley agreed on.     ----- Message -----      From: Gilda Gooden MD      Sent: 9/27/2017   3:34 PM        To: Augusto Flores     ----- Message from Gilda Gooden MD sent at 9/27/2017  3:34 PM EDT -----   Can we get referral from Dr. Jamar Huntley for new a.fib found today during echo?  Patient can see any provider in the office within the next week.    thx

## 2017-10-12 RX ORDER — METOPROLOL SUCCINATE 50 MG/1
TABLET, EXTENDED RELEASE ORAL
Qty: 30 TAB | Refills: 2 | Status: SHIPPED | OUTPATIENT
Start: 2017-10-12 | End: 2017-11-28

## 2017-10-13 ENCOUNTER — TELEPHONE (OUTPATIENT)
Dept: INTERNAL MEDICINE CLINIC | Age: 57
End: 2017-10-13

## 2017-10-13 DIAGNOSIS — I48.0 PAROXYSMAL ATRIAL FIBRILLATION (HCC): Primary | ICD-10-CM

## 2017-10-13 NOTE — TELEPHONE ENCOUNTER
New onset atrial fibrillation - intermittent. Discussed with the patient.   Will refer to cardiology

## 2017-10-16 ENCOUNTER — TELEPHONE (OUTPATIENT)
Dept: CARDIOLOGY CLINIC | Age: 57
End: 2017-10-16

## 2017-10-16 NOTE — TELEPHONE ENCOUNTER
Patient called in concerning the appointment that she has scheduled with Dr. Melia Cardoso on 10/19/17. She states that she just started a new job in [place] and was not sure how long the appointment with him will take . Armando Pierce So she had to take the whole day off. She is asking that Dr. Melia Cardoso look over her chart and try to get any testing that he may want done  . .. Also to be on the 19th . Armando Pierce So that she will not have to take more time off from work. I told her that I was not sure that would be possible . Armando Pierce But that I would relay the message to his nurse. She would like someone to call her about whether he is willing to do it or not.

## 2017-10-19 ENCOUNTER — OFFICE VISIT (OUTPATIENT)
Dept: CARDIOLOGY CLINIC | Age: 57
End: 2017-10-19

## 2017-10-19 VITALS
DIASTOLIC BLOOD PRESSURE: 76 MMHG | WEIGHT: 209 LBS | SYSTOLIC BLOOD PRESSURE: 148 MMHG | HEIGHT: 71 IN | BODY MASS INDEX: 29.26 KG/M2 | OXYGEN SATURATION: 98 % | HEART RATE: 58 BPM

## 2017-10-19 DIAGNOSIS — G47.30 SLEEP APNEA, UNSPECIFIED TYPE: ICD-10-CM

## 2017-10-19 DIAGNOSIS — R06.09 DYSPNEA ON EXERTION: ICD-10-CM

## 2017-10-19 DIAGNOSIS — M79.89 LEG SWELLING: ICD-10-CM

## 2017-10-19 DIAGNOSIS — I48.91 NEW ONSET ATRIAL FIBRILLATION (HCC): Primary | ICD-10-CM

## 2017-10-19 NOTE — PROGRESS NOTES
History of Present Illness: The patient is a 51-year-old female referred by Dr. Donna Long for new atrial fibrillation. She has noticed increasing edema, which prompted an echocardiogram.  She was found to have atrial fibrillation during the examination. 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 She has not had any syncope. No chest pain. She gets a little shortness of breath from time to time, but again, her major complaint is leg swelling. She was told that she may be a snorer. She has never been evaluated for sleep apnea. She is here to discuss options. She has been started on Toprol, as well as Xarelto. Impression and Plan:      1. New diagnosis of paroxysmal atrial fibrillation with symptoms of swelling and occasional dyspnea. 2. Quite active, exercising regularly and riding a bike. 3. Possible sleep apnea with heavy snoring. 4. Recent echocardiogram with normal function and mild left atrial enlargement. 5. Dyslipidemia. 6. BMI of 29. At this point, I would obtain a sleep study. I discussed the diagnosis of atrial fibrillation at length, including rate control vs. rhythm control vs. possible ablation. At this point, she is leaning more toward ablation as she has done extensive research. This will tentatively be scheduled for the end of November. In the interim, I am going to evaluate with a pharmacological cardiac nuclear stress test and start her on flecainide 50 mg twice per day as long as there is no ischemia. She does have a tachy-brokos component and it will be difficult to control her rates without a pacemaker. We will try to avoid that if at all possible. All questions were answered.         Total care time spent in reviewing the case, multiple EMR databases, physician notes, procedure notes, examining the patient, and documentation, is 60 minutes.      Discussed in details with patient in the office. All questions were answered to their full satisfaction. Risk, benefit and alternatives were discussed. More than 50% time spent in counseling and coordination of care. Past Medical History:   Diagnosis Date    Attention deficit disorder without mention of hyperactivity     DUB (dysfunctional uterine bleeding)     Generalized osteoarthrosis, involving multiple sites     Grief reaction     Mother  recently - Celexa    Memory changes     Adderall for this    Unspecified tinnitus        Current Outpatient Prescriptions   Medication Sig Dispense Refill    rivaroxaban (XARELTO) 20 mg tab tablet 1 tablet daily 30 Tab 2    metoprolol succinate (TOPROL-XL) 50 mg XL tablet 1 tablet daily 30 Tab 2    diclofenac (VOLTAREN) 1 % gel Apply 4 g to affected area four (4) times daily. 100 g 0    thiamine (VITAMIN B-1) 100 mg tablet Take  by mouth daily.  cholecalciferol (VITAMIN D3) 1,000 unit tablet Take  by mouth daily.  cyanocobalamin (VITAMIN B-12) 1,000 mcg tablet Take 1,000 mcg by mouth daily.  multivitamin (ONE A DAY) tablet Take 1 Tab by mouth daily. Social History   reports that she has never smoked. She has never used smokeless tobacco.   reports that she does not drink alcohol. Family History  family history includes Diabetes in her maternal grandmother and mother; Heart Disease in her father and mother; Hypertension in her father and mother. Review of Systems  Except as stated above include:  Constitutional: Negative for fever, chills and malaise/fatigue. HEENT: No congestion or recent URI. Gastrointestinal: No nausea, vomiting, abdominal pain, bloody stools. Pulmonary:  Negative except as stated above. Cardiac:  Negative except as stated above. Musculoskeletal: Negative except as stated above. Neurological:  No localized symptoms.   Skin:  Negative except as stated above.  Psych:  No mood swings. Endocrine:  No heat/cold intolerance. PHYSICAL EXAM  BP Readings from Last 3 Encounters:   10/19/17 148/76   09/28/17 120/84   09/14/17 140/80     Pulse Readings from Last 3 Encounters:   10/19/17 (!) 58   09/28/17 (!) 58   09/14/17 62     Wt Readings from Last 3 Encounters:   10/19/17 94.8 kg (209 lb)   09/28/17 95.3 kg (210 lb)   09/14/17 96.2 kg (212 lb)     General:   Well developed, well groomed. Head/Neck:   No jugular venous distention     No carotid bruits. No evidence of xanthelasma. Lungs:   No respiratory distress. Clear bilaterally. Heart:    Mikle Fuelling, regular. Normal S1/S2. Palpation of heart with normal point of maximum impulse. No significant murmurs, rubs or gallops. Abdomen:   Soft and nontender. No palpable abdominal mass or bruits. Extremities:   Intact peripheral pulses. + edema. Neurological:   Alert and oriented to person, place, time. No focal neurological deficit visually.     Blood Pressure Metric:  Normal

## 2017-10-19 NOTE — PROGRESS NOTES
1. Have you been to the ER, urgent care clinic since your last visit? Hospitalized since your last visit? No     2. Have you seen or consulted any other health care providers outside of the [address] [place] [address] since your last visit? Include any pap smears or colon screening.   No

## 2017-10-23 DIAGNOSIS — Z91.89 AT RISK FOR SLEEP APNEA: Primary | ICD-10-CM

## 2017-10-25 ENCOUNTER — HOSPITAL ENCOUNTER (OUTPATIENT)
Dept: NON INVASIVE DIAGNOSTICS | Age: 57
Discharge: HOME | End: 2017-10-25
Attending: INTERNAL MEDICINE
Payer: COMMERCIAL

## 2017-10-25 DIAGNOSIS — I48.91 NEW ONSET ATRIAL FIBRILLATION (HCC): ICD-10-CM

## 2017-10-25 DIAGNOSIS — R06.09 DYSPNEA ON EXERTION: ICD-10-CM

## 2017-10-25 LAB
ATTENDING PHYSICIAN, CST07: NORMAL
DIAGNOSIS, 93000: NORMAL
DUKE TM SCORE RESULT, CST14: NORMAL
DUKE TREADMILL SCORE, CST13: NORMAL
ECG INTERP BEFORE EX, CST11: NORMAL
ECG INTERP DURING EX, CST12: NORMAL
FUNCTIONAL CAPACITY, CST17: NORMAL
KNOWN CARDIAC CONDITION, CST08: NORMAL
MAX. DIASTOLIC BP, CST04: 70 MMHG
MAX. HEART RATE, CST05: 176 BPM
MAX. SYSTOLIC BP, CST03: 148 MMHG
OVERALL BP RESPONSE TO EXERCISE, CST16: NORMAL
OVERALL HR RESPONSE TO EXERCISE, CST15: NORMAL
PEAK EX METS, CST10: 7 METS
PROTOCOL NAME, CST01: NORMAL
TEST INDICATION, CST09: NORMAL

## 2017-10-25 PROCEDURE — A9500 TC99M SESTAMIBI: HCPCS

## 2017-10-25 PROCEDURE — 78452 HT MUSCLE IMAGE SPECT MULT: CPT | Performed by: INTERNAL MEDICINE

## 2017-10-25 PROCEDURE — 93017 CV STRESS TEST TRACING ONLY: CPT | Performed by: INTERNAL MEDICINE

## 2017-10-25 RX ORDER — SODIUM CHLORIDE 0.9 % (FLUSH) 0.9 %
10 SYRINGE (ML) INJECTION AS NEEDED
Status: COMPLETED | OUTPATIENT
Start: 2017-10-25 | End: 2017-10-25

## 2017-10-25 RX ORDER — SODIUM CHLORIDE 9 MG/ML
100 INJECTION, SOLUTION INTRAVENOUS ONCE
Status: DISPENSED | OUTPATIENT
Start: 2017-10-25 | End: 2017-10-25

## 2017-10-25 RX ADMIN — Medication 10 ML: at 08:00

## 2017-10-25 NOTE — PROGRESS NOTES
Patient was injected with 58.36 millicuries 54WBT Sestamibi on 10/25/17 at 0800. Patient was injected with 02.1 millicuries 78TBR Sestamibi on 10/25/17 at 0940. Patient's armbands were removed and placed in shred-it box.     Patient had a Nuclear Treadmill Stress Test.

## 2017-10-26 DIAGNOSIS — G47.30 SLEEP APNEA, UNSPECIFIED TYPE: Primary | ICD-10-CM

## 2017-10-26 NOTE — PROCEDURES
[address] STRESS    Name:  Melita Reyes  MR#:  [id number]  :  1960  Account #:  [de-identified]  Date of Adm:  10/25/2017  Date of Service:  10/25/2017      ORDERING PHYSICIAN: Dr. Randy Florentino. INDICATIONS: Atrial fibrillation, history of family history of coronary  artery disease. Resting heart rate was 64, blood pressure 122/80. Baseline EKG  showed atrial fibrillation with nonspecific ST abnormality. EXERCISE PORTION: The patient exercised using standard Julio  protocol for 6 minutes and 0 seconds, achieving a maximum heart of  164 beats per minute corresponding to 99% of maximum predicted  heart rate. Maximum blood pressure achieved was 148/70. Total  workload was 7.0 METS. The patient had appropriate heart and blood  pressure response to exertion. She remained in atrial fibrillation  throughout the test. She continued to have the nonspecific ST segment  changes, making this a nondiagnostic EKG response to exercise. PERFUSION IMAGING: The patient received intravenous technetium-  99m sestamibi 10.4 mg intravenously per protocol via the left arm IV  for rest images and 32.9 mCi at the same IV an hour and a half later  after she exercised for stress images. Tomographic views of left  ventricle obtained and compared. Cavity size was normal during both  rest and stress imaging. There is no transient ischemic dilatation  present. There is fairly uniform radiotracer uptake throughout the left  anterior myocardium during both stress and rest imaging with no  significant perfusion imaging abnormalities concerning for ischemia or infarction. Gated analysis showed  normal LV size, normal systolic function, normal ejection fraction  calculated at 64%. CONCLUSIONS:  1. Normal and low risk exercise nuclear stress test.  2. No perfusion imaging abnormalities concerning for ischemia or  infarction.   3. Normal left ventricular size and systolic function, ejection fraction  calculated at 64%.         Rich Monte MD     / [facility], [facility].  D:  10/25/2017   17:28  T:  10/26/2017   06:14  Job #:  [id number]

## 2017-11-02 ENCOUNTER — HOSPITAL ENCOUNTER (EMERGENCY)
Age: 57
Discharge: HOME | End: 2017-11-02
Attending: EMERGENCY MEDICINE
Payer: COMMERCIAL

## 2017-11-02 ENCOUNTER — HOSPITAL ENCOUNTER (OUTPATIENT)
Dept: CT IMAGING | Age: 57
Discharge: HOME | End: 2017-11-02
Attending: INTERNAL MEDICINE
Payer: COMMERCIAL

## 2017-11-02 ENCOUNTER — TELEPHONE (OUTPATIENT)
Dept: CARDIOLOGY CLINIC | Age: 57
End: 2017-11-02

## 2017-11-02 VITALS
BODY MASS INDEX: 28.7 KG/M2 | RESPIRATION RATE: 18 BRPM | DIASTOLIC BLOOD PRESSURE: 76 MMHG | OXYGEN SATURATION: 90 % | HEART RATE: 53 BPM | HEIGHT: 71 IN | WEIGHT: 205 LBS | TEMPERATURE: 97.9 F | SYSTOLIC BLOOD PRESSURE: 138 MMHG

## 2017-11-02 DIAGNOSIS — I48.91 NEW ONSET ATRIAL FIBRILLATION (HCC): ICD-10-CM

## 2017-11-02 DIAGNOSIS — R03.0 ELEVATED BLOOD PRESSURE READING: ICD-10-CM

## 2017-11-02 DIAGNOSIS — T78.40XA ALLERGIC REACTION, INITIAL ENCOUNTER: Primary | ICD-10-CM

## 2017-11-02 LAB — CREAT UR-MCNC: 1.1 MG/DL (ref 0.6–1.3)

## 2017-11-02 PROCEDURE — 74011250636 HC RX REV CODE- 250/636: Performed by: NURSE PRACTITIONER

## 2017-11-02 PROCEDURE — 96375 TX/PRO/DX INJ NEW DRUG ADDON: CPT

## 2017-11-02 PROCEDURE — 96374 THER/PROPH/DIAG INJ IV PUSH: CPT

## 2017-11-02 PROCEDURE — 74011636320 HC RX REV CODE- 636/320: Performed by: INTERNAL MEDICINE

## 2017-11-02 PROCEDURE — 74011000250 HC RX REV CODE- 250: Performed by: NURSE PRACTITIONER

## 2017-11-02 PROCEDURE — 96361 HYDRATE IV INFUSION ADD-ON: CPT

## 2017-11-02 PROCEDURE — 82565 ASSAY OF CREATININE: CPT

## 2017-11-02 PROCEDURE — 99282 EMERGENCY DEPT VISIT SF MDM: CPT

## 2017-11-02 RX ORDER — DIPHENHYDRAMINE HCL 25 MG
25 CAPSULE ORAL
Qty: 12 CAP | Refills: 0 | Status: SHIPPED | OUTPATIENT
Start: 2017-11-02 | End: 2017-11-12

## 2017-11-02 RX ORDER — PREDNISONE 10 MG/1
TABLET ORAL
Qty: 21 TAB | Refills: 0 | Status: SHIPPED | OUTPATIENT
Start: 2017-11-03 | End: 2017-11-03 | Stop reason: ALTCHOICE

## 2017-11-02 RX ORDER — DIPHENHYDRAMINE HYDROCHLORIDE 50 MG/ML
25 INJECTION, SOLUTION INTRAMUSCULAR; INTRAVENOUS
Status: COMPLETED | OUTPATIENT
Start: 2017-11-02 | End: 2017-11-02

## 2017-11-02 RX ORDER — FAMOTIDINE 10 MG/ML
20 INJECTION INTRAVENOUS
Status: COMPLETED | OUTPATIENT
Start: 2017-11-02 | End: 2017-11-02

## 2017-11-02 RX ORDER — FAMOTIDINE 20 MG/1
20 TABLET, FILM COATED ORAL 2 TIMES DAILY
Qty: 10 TAB | Refills: 0 | Status: SHIPPED | OUTPATIENT
Start: 2017-11-02 | End: 2017-11-04 | Stop reason: ALTCHOICE

## 2017-11-02 RX ADMIN — DIPHENHYDRAMINE HYDROCHLORIDE 25 MG: 50 INJECTION, SOLUTION INTRAMUSCULAR; INTRAVENOUS at 16:02

## 2017-11-02 RX ADMIN — FAMOTIDINE 20 MG: 10 INJECTION, SOLUTION INTRAVENOUS at 15:57

## 2017-11-02 RX ADMIN — IOPAMIDOL 20 ML: 755 INJECTION, SOLUTION INTRAVENOUS at 16:00

## 2017-11-02 RX ADMIN — SODIUM CHLORIDE 1000 ML: 900 INJECTION, SOLUTION INTRAVENOUS at 15:57

## 2017-11-02 RX ADMIN — METHYLPREDNISOLONE SODIUM SUCCINATE 125 MG: 125 INJECTION, POWDER, FOR SOLUTION INTRAMUSCULAR; INTRAVENOUS at 15:57

## 2017-11-02 NOTE — ED NOTES
Hourly rounding completed:     Patient resting comfortably. Son at bedside. VS stable. Call bell within reach. Gave patient some diet gingerale per her request.  No other needs at this time. Awaiting further provider orders.

## 2017-11-02 NOTE — TELEPHONE ENCOUNTER
Received a phone call from [facility] in Radiology at [facility] that patient had an allergic reaction to contrast given for CT of Heart Vein Mapping. [facility] states patient was only given a small amount of the contrast and she developed hives around her neck and chest. Patient is currently in the ED. Dr. Ulysses Peraza made aware.  Dora Romo LPN

## 2017-11-02 NOTE — DISCHARGE INSTRUCTIONS
Allergic Reaction: Care Instructions  Your Care Instructions    An allergic reaction is an excessive response from your immune system to a medicine, chemical, food, insect bite, or other substance. A reaction can range from mild to life-threatening. Some people have a mild rash, hives, and itching or stomach cramps. In severe reactions, swelling of your tongue and throat can close up your airway so that you cannot breathe. Follow-up care is a key part of your treatment and safety. Be sure to make and go to all appointments, and call your doctor if you are having problems. It's also a good idea to know your test results and keep a list of the medicines you take. How can you care for yourself at home? · If you know what caused your allergic reaction, be sure to avoid it. Your allergy may become more severe each time you have a reaction. · Take an over-the-counter antihistamine, such as cetirizine (Zyrtec) or loratadine (Claritin), to treat mild symptoms. Read and follow directions on the label. Some antihistamines can make you feel sleepy. Do not give antihistamines to a child unless you have checked with your doctor first. Mild symptoms include sneezing or an itchy or runny nose; an itchy mouth; a few hives or mild itching; and mild nausea or stomach discomfort. · Do not scratch hives or a rash. Put a cold, moist towel on them or take cool baths to relieve itching. Put ice packs on hives, swelling, or insect stings for 10 to 15 minutes at a time. Put a thin cloth between the ice pack and your skin. Do not take hot baths or showers. They will make the itching worse. · Your doctor may prescribe a shot of epinephrine to carry with you in case you have a severe reaction. Learn how to give yourself the shot and keep it with you at all times. Make sure it is not . · Go to the emergency room every time you have a severe reaction, even if you have used your shot of epinephrine and are feeling better. Symptoms can come back after a shot. · Wear medical alert jewelry that lists your allergies. You can buy this at most drugstores. · If your child has a severe allergy, make sure that his or her teachers, babysitters, coaches, and other caregivers know about the allergy. They should have an epinephrine shot, know how and when to give it, and have a plan to take your child to the hospital.  When should you call for help? Give an epinephrine shot if:  ? · You think you are having a severe allergic reaction. ? · You have symptoms in more than one body area, such as mild nausea and an itchy mouth. ? After giving an epinephrine shot call 911, even if you feel better. ?Call 911 if:  ? · You have symptoms of a severe allergic reaction. These may include:  ¨ Sudden raised, red areas (hives) all over your body. ¨ Swelling of the throat, mouth, lips, or tongue. ¨ Trouble breathing. ¨ Passing out (losing consciousness). Or you may feel very lightheaded or suddenly feel weak, confused, or restless. ? · You have been given an epinephrine shot, even if you feel better. ?Call your doctor now or seek immediate medical care if:  ? · You have symptoms of an allergic reaction, such as:  ¨ A rash or hives (raised, red areas on the skin). ¨ Itching. ¨ Swelling. ¨ Belly pain, nausea, or vomiting. ? Watch closely for changes in your health, and be sure to contact your doctor if:  ? · You do not get better as expected. Where can you learn more? Go to http://edison-dean.info/. Enter K010 in the search box to learn more about \"Allergic Reaction: Care Instructions. \"  Current as of: September 29, 2016  Content Version: 11.4  © 1377-1643 [employer]. Care instructions adapted under license by [employer] (which disclaims liability or warranty for this information).  If you have questions about a medical condition or this instruction, always ask your healthcare professional. [email or web address], [facility] disclaims any warranty or liability for your use of this information. Elevated Blood Pressure: Care Instructions  Your Care Instructions    Blood pressure is a measure of how hard the blood pushes against the walls of your arteries. It's normal for blood pressure to go up and down throughout the day. But if it stays up over time, you have high blood pressure. Two numbers tell you your blood pressure. The first number is the systolic pressure. It shows how hard the blood pushes when your heart is pumping. The second number is the diastolic pressure. It shows how hard the blood pushes between heartbeats, when your heart is relaxed and filling with blood. An ideal blood pressure in adults is less than 120/80 (say \"120 over 80\"). High blood pressure is 140/90 or higher. You have high blood pressure if your top number is 140 or higher or your bottom number is 90 or higher, or both. The main test for high blood pressure is simple, fast, and painless. To diagnose high blood pressure, your doctor will test your blood pressure at different times. After testing your blood pressure, your doctor may ask you to test it again when you are home. If you are diagnosed with high blood pressure, you can work with your doctor to make a long-term plan to manage it. Follow-up care is a key part of your treatment and safety. Be sure to make and go to all appointments, and call your doctor if you are having problems. It's also a good idea to know your test results and keep a list of the medicines you take. How can you care for yourself at home? · Do not smoke. Smoking increases your risk for heart attack and stroke. If you need help quitting, talk to your doctor about stop-smoking programs and medicines. These can increase your chances of quitting for good. · Stay at a healthy weight. · Try to limit how much sodium you eat to less than 2,300 milligrams (mg) a day.  Your doctor may ask you to try to eat less than 1,500 mg a day. · Be physically active. Get at least 30 minutes of exercise on most days of the week. Walking is a good choice. You also may want to do other activities, such as running, swimming, cycling, or playing tennis or team sports. · Avoid or limit alcohol. Talk to your doctor about whether you can drink any alcohol. · Eat plenty of fruits, vegetables, and low-fat dairy products. Eat less saturated and total fats. · Learn how to check your blood pressure at home. When should you call for help? Call your doctor now or seek immediate medical care if:  ? · Your blood pressure is much higher than normal (such as 180/110 or higher). ? · You think high blood pressure is causing symptoms such as:  ¨ Severe headache. ¨ Blurry vision. ? Watch closely for changes in your health, and be sure to contact your doctor if:  ? · You do not get better as expected. Where can you learn more? Go to http://edison-dean.info/. Enter V221 in the search box to learn more about \"Elevated Blood Pressure: Care Instructions. \"  Current as of: September 21, 2016  Content Version: 11.4  © 6617-0748 [employer]. Care instructions adapted under license by [employer] (which disclaims liability or warranty for this information). If you have questions about a medical condition or this instruction, always ask your healthcare professional. [address] any warranty or liability for your use of this information. [employer] Activation    Thank you for requesting access to [employer]. Please follow the instructions below to securely access and download your online medical record. [employer] allows you to send messages to your doctor, view your test results, renew your prescriptions, schedule appointments, and more. How Do I Sign Up? 1. In your internet browser, go to [email or web address]  2. Click on the First Time User? Click Here link in the Sign In box. You will be redirect to the New Member Sign Up page. 3. Enter your Wildfangt Access Code exactly as it appears below. You will not need to use this code after youve completed the sign-up process. If you do not sign up before the expiration date, you must request a new code. MyChart Access Code: Activation code not generated  Current CloudOn Status: Active (This is the date your MyChart access code will )    4. Enter the last four digits of your Social Security Number (xxxx) and Date of Birth (mm/dd/yyyy) as indicated and click Submit. You will be taken to the next sign-up page. 5. Create a Wildfangt ID. This will be your CloudOn login ID and cannot be changed, so think of one that is secure and easy to remember. 6. Create a CloudOn password. You can change your password at any time. 7. Enter your Password Reset Question and Answer. This can be used at a later time if you forget your password. 8. Enter your e-mail address. You will receive e-mail notification when new information is available in [address] 9. Click Sign Up. You can now view and download portions of your medical record. 10. Click the Download Summary menu link to download a portable copy of your medical information. Additional Information    If you have questions, please visit the Frequently Asked Questions section of the CloudOn website at [email or web address] Remember, CloudOn is NOT to be used for urgent needs. For medical emergencies, dial 911.

## 2017-11-02 NOTE — ED NOTES
Lakhwinder Perez is a 56 y.o. female that was discharged in stable condition. The patients diagnosis, condition and treatment were explained to  patient and aftercare instructions were given. The patient verbalized understanding. Patient armband removed and shredded.

## 2017-11-02 NOTE — ED PROVIDER NOTES
HPI Comments: Libby Nuñez. [place] is a 56year old female with a PMHX Memory Changes, DUB, Attention Deficit Disorder without mention of hyperactivity, Unspecified Tinnitus, A-fib, and Osteoarthritis arrived to ER for medical evaluation. Patient reports her Cardiologist ordered for her to have a CT of heart due to A-Fib. Patient verbalizes when she got the IV dye she immediately broke out in hives and began to itch. Onset of symptoms started at 15:35 today. Patient denies sore throat, difficulty breathing, wheezing, Cp,SOB. Patient denies headache, dizziness, abdominal pain, N/V/D, flank pain, back pain, urinary sx's, or any other concerns. Patient is a 56 y.o. female presenting with allergic reaction. The history is provided by the patient. 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     DUB (dysfunctional uterine bleeding)     Generalized osteoarthrosis, involving multiple sites     Grief reaction     Mother  recently - Celexa    Memory changes     Adderall for this    Unspecified tinnitus        Past Surgical History:   Procedure Laterality Date    HX KNEE ARTHROSCOPY Right     Meniscus repair    HX KNEE ARTHROSCOPY Right     Removed piece of bone (FB)    HX KNEE ARTHROSCOPY Right     ACL repair         Family History:   Problem Relation Age of Onset    Diabetes Mother     Heart Disease Mother     Hypertension Mother     Heart Disease Father     Hypertension Father     Diabetes Maternal Grandmother        Social History     Social History    Marital status:      Spouse name: N/A    Number of children: N/A    Years of education: N/A     Occupational History    Not on file.      Social History Main Topics    Smoking status: Never Smoker    Smokeless tobacco: Never Used    Alcohol use No    Drug use: No    Sexual activity: Not Currently     Other Topics Concern    Not on file     Social History Narrative         ALLERGIES: Celebrex [celecoxib]; Iodinated contrast- oral and iv dye; and Sulfa (sulfonamide antibiotics)    Review of Systems   Constitutional: Negative. HENT: Negative. Respiratory: Negative. Cardiovascular: Negative. Gastrointestinal: Negative. Genitourinary: Negative. Musculoskeletal: Negative. Skin: Positive for rash. C/O itching, hives   Neurological: Negative. Vitals:    11/02/17 1552   BP: 148/78   Pulse: (!) 53   Resp: 18   Temp: 97.9 °F (36.6 °C)   SpO2: 100%   Weight: 93 kg (205 lb)   Height: 5' 11\" (1.803 m)            Physical Exam   Constitutional: She is oriented to person, place, and time. She appears well-developed and well-nourished. No distress. HENT:   Right Ear: Hearing, tympanic membrane, external ear and ear canal normal.   Left Ear: Hearing, tympanic membrane, external ear and ear canal normal.   Nose: Nose normal.   Mouth/Throat: Uvula is midline, oropharynx is clear and moist and mucous membranes are normal. No uvula swelling. No oropharyngeal exudate. No facial edema   Neck: Normal range of motion. Neck supple. Cardiovascular: Normal rate, regular rhythm and normal heart sounds. Exam reveals no gallop and no friction rub. No murmur heard. Pulmonary/Chest: Effort normal and breath sounds normal. No respiratory distress. She has no wheezes. She has no rales. She exhibits no tenderness. Abdominal: Soft. Bowel sounds are normal. She exhibits no distension and no mass. There is no tenderness. There is no rebound and no guarding. Musculoskeletal: Normal range of motion. Neurological: She is alert and oriented to person, place, and time. Skin: Skin is warm and dry. She is not diaphoretic. Psychiatric: She has a normal mood and affect. Her behavior is normal. Judgment and thought content normal.   Nursing note and vitals reviewed.        MDM  Number of Diagnoses or Management Options  Allergic reaction, initial encounter:   Elevated blood pressure reading:   Diagnosis management comments: DDX:    Anaphylaxis  Allergic Reaction to Medication    Clinical Impression/Plan:    16:15 PM:  Patient stable condition. No further urticaria to upper back. Mild urticaria to neck. 17:20 PM:  Patient stable,she verbalizes feeling much better. Denies further itching. Very mild urticaria to neck. Will prescribe Prednisone, Pepcid, and Benadryl. Encourage patient to avoid IV DYE. Follow up with cardiologist and PCP in 2-4 days. Follow up with PCP for re-evaluation of elevated blood pressure. Case has been discussed with Dr. Andrés Valderrama. Dr. Andrés Valderrama has cleared patient to be d/c home. Return to ER immediately for any worsening symptoms or concerns. Patient verbalizes d/c instructions.         Amount and/or Complexity of Data Reviewed  Review and summarize past medical records: yes  Discuss the patient with other providers: yes    Risk of Complications, Morbidity, and/or Mortality  Presenting problems: moderate  Diagnostic procedures: moderate  Management options: moderate      ED Course       Procedures

## 2017-11-03 ENCOUNTER — OFFICE VISIT (OUTPATIENT)
Dept: INTERNAL MEDICINE CLINIC | Age: 57
End: 2017-11-03

## 2017-11-03 ENCOUNTER — TELEPHONE (OUTPATIENT)
Dept: CARDIOLOGY CLINIC | Age: 57
End: 2017-11-03

## 2017-11-03 VITALS
BODY MASS INDEX: 29.26 KG/M2 | DIASTOLIC BLOOD PRESSURE: 80 MMHG | WEIGHT: 209 LBS | RESPIRATION RATE: 16 BRPM | HEIGHT: 71 IN | OXYGEN SATURATION: 98 % | TEMPERATURE: 99.1 F | HEART RATE: 59 BPM | SYSTOLIC BLOOD PRESSURE: 142 MMHG

## 2017-11-03 DIAGNOSIS — T50.8X5A ALLERGIC REACTION TO CONTRAST MATERIAL, INITIAL ENCOUNTER: Primary | ICD-10-CM

## 2017-11-03 DIAGNOSIS — I48.0 INTERMITTENT ATRIAL FIBRILLATION (HCC): ICD-10-CM

## 2017-11-03 DIAGNOSIS — G60.9 IDIOPATHIC PERIPHERAL NEUROPATHY: ICD-10-CM

## 2017-11-03 DIAGNOSIS — Z91.89 AT RISK FOR SLEEP APNEA: ICD-10-CM

## 2017-11-03 RX ORDER — PREDNISONE 20 MG/1
TABLET ORAL
Qty: 8 TAB | Refills: 0 | Status: SHIPPED | OUTPATIENT
Start: 2017-11-03 | End: 2017-11-17

## 2017-11-03 NOTE — TELEPHONE ENCOUNTER
LMOM for patient to call to get appt. scheduled for 11/9/17. From: Wilbur Chaudhry   Sent: Friday, November 03, 2017 1:28 PM  To: Babs Draper Subject: Re: question your pt    Im not sure, ill have to review her echo again and will think about it over the weekend, will make decision next week. Schedule her to see me next thursday to discuss, thx  Sent from my iPhone    On Nov 3, 2017, at 12:25 PM, [email or web address] wrote:  What do you want to do with Mrs Natalie Mcmanus since she had a reaction to IVP dye during the CT for venous mapping prior to afib ablation. She is calling asking what is next.

## 2017-11-03 NOTE — MR AVS SNAPSHOT
Visit Information Date & Time Provider Department Dept. Phone Encounter #  
 11/3/2017  1:00 PM Etienne Peguero MD [facility]IC[facility] OF [place] [phone] [id number] Your Appointments 11/14/2017  3:30 PM  
PRE PROCEDURE with [facility] [place] ([address]) Appt Note: PRATIK/AFib Ablation on 11/21 [place] [id number] [address] [address] [phone] Zhu Bettina  
  
    
 11/17/2017  2:15 PM  
PRE OP with Mariana Lopez NP  
[facility] OF [place] ([address]) Appt Note: Dr Pamelia Fleischer [address] [facility], [address] [facility] [address]  
  
   
 [address] [facility], [address] [facility] [address] Upcoming Health Maintenance Date Due Hepatitis C Screening 1960 DTaP/Tdap/Td series (1 - Tdap) 3/14/1983 PAP AKA CERVICAL CYTOLOGY 3/14/1983 FOBT Q 1 YEAR AGE 50-75 3/14/2012 INFLUENZA AGE 9 TO ADULT 8/1/2017 BREAST CANCER SCRN MAMMOGRAM 8/16/2019 Allergies as of 11/3/2017  Review Complete On: 11/3/2017 By: Carlin Gonzáles LPN Severity Noted Reaction Type Reactions Celebrex [Celecoxib]  07/24/2014    Hives Iodinated Contrast- Oral And Iv Dye  11/02/2017    Hives Patient was in CT for a cardiac scan. A test bolus of 20cc was given and patient broke out into hives immediately after. Cancelled the rest of the exam and escorted the patient to the ED for furhter evaluation. Sulfa (Sulfonamide Antibiotics)  09/09/2015    Other (comments) Patient states she is not allergic Current Immunizations  Never Reviewed Name Date  
 TB Skin Test (PPD) 8/29/2013, 2/27/2007 TB Skin Test (PPD) Intradermal 8/9/2017 Not reviewed this visit Vitals BP Pulse Temp Resp Height(growth percentile)  142/80 (BP 1 Location: Left arm, BP Patient Position: Sitting) (!) 59 99.1 °F (37.3 °C) (Tympanic) 16 5' 11\" (1.803 m) Weight(growth percentile) SpO2 BMI OB Status Smoking Status 209 lb (94.8 kg) 98% 29.15 kg/m2 Postmenopausal Never Smoker BMI and BSA Data Body Mass Index Body Surface Area  
 29.15 kg/m 2 2.18 m 2 Preferred Pharmacy Pharmacy Name Phone [id number] [place], [address] AT [address] [phone] Your Updated Medication List  
  
   
This list is accurate as of: 11/3/17  3:49 PM.  Always use your most recent med list.  
  
  
  
  
 diclofenac 1 % Gel Commonly known as:  VOLTAREN Apply 4 g to affected area four (4) times daily. diphenhydrAMINE 25 mg capsule Commonly known as:  BENADRYL Take 1 Cap by mouth every six (6) hours as needed for up to 10 days. famotidine 20 mg tablet Commonly known as:  PEPCID Take 1 Tab by mouth two (2) times a day for 5 days. flecainide 50 mg tablet Commonly known as:  TAMBOCOR Take 1 Tab by mouth two (2) times a day. metoprolol succinate 50 mg XL tablet Commonly known as:  TOPROL-XL  
1 tablet daily  
  
 multivitamin tablet Commonly known as:  ONE A DAY Take 1 Tab by mouth daily. predniSONE 20 mg tablet Commonly known as:  DELTASONE  
2 tabs daily x 2 days, 1 tab daily x 2 days, 1/2 tab daily x 4 days  
  
 rivaroxaban 20 mg Tab tablet Commonly known as:  XARELTO  
1 tablet daily VITAMIN B-1 100 mg tablet Generic drug:  thiamine Take  by mouth daily. VITAMIN B-12 1,000 mcg tablet Generic drug:  cyanocobalamin Take 1,000 mcg by mouth daily. VITAMIN D3 1,000 unit tablet Generic drug:  cholecalciferol Take  by mouth daily. Prescriptions Sent to Pharmacy Refills  
 predniSONE (DELTASONE) 20 mg tablet 0 Si tabs daily x 2 days, 1 tab daily x 2 days, 1/2 tab daily x 4 days  Class: Normal  
 Pharmacy: [facility] [address]  #: [phone] To-Do List   
 11/21/2017 8:00 AM  
  Appointment with [facility] ANESTHESIA 2; [facility] CATH SPECIAL PROCEDURE ROOM; [facility] CATH HOLDING; [facility] [address] [facility] [address] at [facility] NON-INVASIVE CARD ([phone]) Patient Instructions Health Maintenance Due Topic Date Due  
 Hepatitis C Test  1960  DTaP/Tdap/Td  (1 - Tdap) 03/14/1983  Cervical Cancer Screening  03/14/1983  Stool testing for trace blood  03/14/2012  Flu Vaccine  08/01/2017 Introducing \[facility]\"" & [facility] SERVICES! Dear Kevin Saavedra: Thank you for requesting a [employer] account. Our records indicate that you already have an active [employer] account. You can access your account anytime at [email or web address] Did you know that you can access your hospital and ER discharge instructions at any time in [employer]? You can also review all of your test results from your hospital stay or ER visit. Additional Information If you have questions, please visit the Frequently Asked Questions section of the [employer] website at [email or web address] Remember, [employer] is NOT to be used for urgent needs. For medical emergencies, dial 911. Now available from your iPhone and Android! Please provide this summary of care documentation to your next provider. Your primary care clinician is listed as Nataliia Smith. If you have any questions after today's visit, please call [phone].

## 2017-11-03 NOTE — PROGRESS NOTES
1. Have you been to the ER, urgent care clinic since your last visit? Hospitalized since your last visit? HV - Allergic reaction to IV dye    2. Have you seen or consulted any other health care providers outside of the [address] [place] [address] since your last visit? Include any pap smears or colon screening.  No

## 2017-11-04 PROBLEM — I48.0 INTERMITTENT ATRIAL FIBRILLATION (HCC): Status: ACTIVE | Noted: 2017-11-04

## 2017-11-04 NOTE — PROGRESS NOTES
The patient presents to the office today with the chief complaint of allergic reaction    HPI    The patient is undergoing an evaluation for intermittent atrial fibrillation. As part of the evaluation a heart CT was performed. The patient was given IV contrast.  She developed itching - both on her face and body. She then developed hives in several areas of her body. She was seen in the ER - given steroids and Benadryl. The symptoms improved. The patient was given Prednisone, Benadryl, and H2 Blocker for the allergic reaction. She has not started the medications yet. The patient complains of flushing of her face and paresthesia. The patient is going to begin a work up for sleep apnea      Review of Systems   Respiratory: Negative for shortness of breath. Cardiovascular: Negative for chest pain and leg swelling. Allergies   Allergen Reactions    Celebrex [Celecoxib] Hives    Iodinated Contrast- Oral And Iv Dye Hives     Patient was in CT for a cardiac scan. A test bolus of 20cc was given and patient broke out into hives immediately after. Cancelled the rest of the exam and escorted the patient to the ED for furhter evaluation.  Sulfa (Sulfonamide Antibiotics) Other (comments)     Patient states she is not allergic       Current Outpatient Prescriptions   Medication Sig Dispense Refill    predniSONE (DELTASONE) 20 mg tablet 2 tabs daily x 2 days, 1 tab daily x 2 days, 1/2 tab daily x 4 days 8 Tab 0    diphenhydrAMINE (BENADRYL) 25 mg capsule Take 1 Cap by mouth every six (6) hours as needed for up to 10 days. 12 Cap 0    flecainide (TAMBOCOR) 50 mg tablet Take 1 Tab by mouth two (2) times a day. 60 Tab 6    rivaroxaban (XARELTO) 20 mg tab tablet 1 tablet daily 30 Tab 6    metoprolol succinate (TOPROL-XL) 50 mg XL tablet 1 tablet daily 30 Tab 2    diclofenac (VOLTAREN) 1 % gel Apply 4 g to affected area four (4) times daily.  100 g 0    thiamine (VITAMIN B-1) 100 mg tablet Take  by mouth daily.      cholecalciferol (VITAMIN D3) 1,000 unit tablet Take  by mouth daily.  cyanocobalamin (VITAMIN B-12) 1,000 mcg tablet Take 1,000 mcg by mouth daily.  multivitamin (ONE A DAY) tablet Take 1 Tab by mouth daily. Past Medical History:   Diagnosis Date    Atrial fibrillation [facility])     Attention deficit disorder without mention of hyperactivity     DUB (dysfunctional uterine bleeding)     Generalized osteoarthrosis, involving multiple sites     Grief reaction     Mother  recently - Celexa    Memory changes     Adderall for this    Unspecified tinnitus        Past Surgical History:   Procedure Laterality Date    HX KNEE ARTHROSCOPY Right     Meniscus repair    HX KNEE ARTHROSCOPY Right     Removed piece of bone (FB)    HX KNEE ARTHROSCOPY Right     ACL repair       Social History     Social History    Marital status:      Spouse name: N/A    Number of children: N/A    Years of education: N/A     Occupational History    Not on file. Social History Main Topics    Smoking status: Never Smoker    Smokeless tobacco: Never Used    Alcohol use No    Drug use: No    Sexual activity: Not Currently     Other Topics Concern    Not on file     Social History Narrative       Patient does have an advanced directive on file    Visit Vitals    /80 (BP 1 Location: Left arm, BP Patient Position: Sitting)    Pulse (!) 59    Temp 99.1 °F (37.3 °C) (Tympanic)    Resp 16    Ht 5' 11\" (1.803 m)    Wt 209 lb (94.8 kg)    SpO2 98%    BMI 29.15 kg/m2       Physical Exam   Skin:  Face was erythematous, scattered hives on her lower torso  No Cervical Lymphadenopathy  No Supraclavicular Lymphadenopathy  Thyroid is Normal  Lungs are normal to percussion. Clear to auscultation   Heart:  S1 S2 are normal, No gallops, No mummers  No Carotid Bruits  Abdomen:  Normal Bowel Sounds. No tenderness. No masses. No Hepatomegaly or Splenomegly  LE:  Strong Pedal Pulses.   No Edema      BMI:  BMI is high but it was not addressed during this visit due to an acute illness        Hospital Outpatient Visit on 11/02/2017   Component Date Value Ref Range Status    Creatinine, POC 11/02/2017 1.1  0.6 - 1.3 MG/DL Final    GFRAA, POC 11/02/2017 >60  >60 ml/min/1.73m2 Final    GFRNA, POC 11/02/2017 52* >60 ml/min/1.73m2 Final    Comment: Estimated GFR is calculated using the IDMS-traceable Modification of Diet in Renal Disease (MDRD) Study equation, reported for both  Americans (GFRAA) and non- Americans (GFRNA), and normalized to 1.73m2 body surface area. The physician must decide which value applies to the patient. The MDRD study equation should only be used in individuals age 25 or older. It has not been validated for the following: pregnant women, patients with serious comorbid conditions, or on certain medications, or persons with extremes of body size, muscle mass, or nutritional status. Hospital Outpatient Visit on 10/25/2017   Component Date Value Ref Range Status    Test indication 10/25/2017 AFIB   Preliminary    Functional capacity 10/25/2017 Normal   Preliminary    ECG Interp. Before Exercise 10/25/2017    Preliminary                    Value:afib  Rightward Axis  NS ST  Changes      ECG Interp. During Exercise 10/25/2017 none   Preliminary    Max. Systolic BP 58/70/0501 891  mmHg Preliminary    Max. Diastolic BP 33/45/4100 70  mmHg Preliminary    Max.  Heart rate 10/25/2017 176  BPM Preliminary    Peak Ex METs 10/25/2017 7.0  METS Preliminary    Protocol name 10/25/2017 [facility]              Preliminary   Office Visit on 08/31/2017   Component Date Value Ref Range Status    VALID INTERNAL CONTROL POC 08/31/2017 Yes   Final    Group A Strep Ag 08/31/2017 Negative  Negative Final   Lab Only on 08/18/2017   Component Date Value Ref Range Status    WBC 08/18/2017 4.9  3.4 - 10.8 x10E3/uL Final    RBC 08/18/2017 4.39  3.77 - 5.28 x10E6/uL Final    HGB 08/18/2017 13.4  11.1 - 15.9 g/dL Final    HCT 08/18/2017 41.3  34.0 - 46.6 % Final    MCV 08/18/2017 94  79 - 97 fL Final    MCH 08/18/2017 30.5  26.6 - 33.0 pg Final    MCHC 08/18/2017 32.4  31.5 - 35.7 g/dL Final    RDW 08/18/2017 13.2  12.3 - 15.4 % Final    PLATELET 16/82/3309 886  150 - 379 x10E3/uL Final    NEUTROPHILS 08/18/2017 56  % Final    Lymphocytes 08/18/2017 33  % Final    MONOCYTES 08/18/2017 9  % Final    EOSINOPHILS 08/18/2017 2  % Final    BASOPHILS 08/18/2017 0  % Final    ABS. NEUTROPHILS 08/18/2017 2.7  1.4 - 7.0 x10E3/uL Final    Abs Lymphocytes 08/18/2017 1.6  0.7 - 3.1 x10E3/uL Final    ABS. MONOCYTES 08/18/2017 0.4  0.1 - 0.9 x10E3/uL Final    ABS. EOSINOPHILS 08/18/2017 0.1  0.0 - 0.4 x10E3/uL Final    ABS. BASOPHILS 08/18/2017 0.0  0.0 - 0.2 x10E3/uL Final    IMMATURE GRANULOCYTES 08/18/2017 0  % Final    ABS. IMM. GRANS. 08/18/2017 0.0  0.0 - 0.1 x10E3/uL Final    Glucose 08/18/2017 85  65 - 99 mg/dL Final    BUN 08/18/2017 33* 6 - 24 mg/dL Final    Creatinine 08/18/2017 1.13* 0.57 - 1.00 mg/dL Final    GFR est non-AA 08/18/2017 55* >59 mL/min/1.73 Final    GFR est AA 08/18/2017 63  >59 mL/min/1.73 Final    BUN/Creatinine ratio 08/18/2017 29* 9 - 23 Final    Sodium 08/18/2017 141  134 - 144 mmol/L Final    Potassium 08/18/2017 4.5  3.5 - 5.2 mmol/L Final    Chloride 08/18/2017 100  96 - 106 mmol/L Final    CO2 08/18/2017 26  18 - 29 mmol/L Final    Calcium 08/18/2017 9.3  8.7 - 10.2 mg/dL Final    Protein, total 08/18/2017 6.8  6.0 - 8.5 g/dL Final    Albumin 08/18/2017 4.3  3.5 - 5.5 g/dL Final    GLOBULIN, TOTAL 08/18/2017 2.5  1.5 - 4.5 g/dL Final    A-G Ratio 08/18/2017 1.7  1.2 - 2.2 Final    Bilirubin, total 08/18/2017 0.6  0.0 - 1.2 mg/dL Final    Alk.  phosphatase 08/18/2017 68  39 - 117 IU/L Final    AST (SGOT) 08/18/2017 17  0 - 40 IU/L Final    ALT (SGPT) 08/18/2017 15  0 - 32 IU/L Final    Cholesterol, total 08/18/2017 215* 100 - 199 mg/dL Final    Triglyceride 08/18/2017 70  0 - 149 mg/dL Final    HDL Cholesterol 08/18/2017 74  >39 mg/dL Final    VLDL, calculated 08/18/2017 14  5 - 40 mg/dL Final    LDL, calculated 08/18/2017 127* 0 - 99 mg/dL Final    Vitamin B1 08/18/2017 252.7* 66.5 - 200.0 nmol/L Final    TSH 08/18/2017 1.380  0.450 - 4.500 uIU/mL Final    Vitamin B12 08/18/2017 >2000* 211 - 946 pg/mL Final    SPECIMEN STATUS REPORT 08/18/2017 COMMENT   Final    Comment: One Specimen Identifier  One Specimen Identifier  The specimen received included only one patient identifier on the  primary collection container. Our laboratory accrediting agency  states \"All primary specimen containers must be labeled with 2  identifiers at the time of collection. \"      Vitamin B6 08/21/2017 13.3  2.0 - 32.8 ug/L Final       .No results found for any visits on 11/03/17. Assessment / Plan      ICD-10-CM ICD-9-CM    1. Allergic reaction to contrast material, initial encounter T50.8X5A 995.27    2. At risk for sleep apnea Z91.89 V49.89    3. Idiopathic peripheral neuropathy G60.9 356.9    4. Intermittent atrial fibrillation (HCC) I48.0 427.31      Prednisone - new prescription  PRN Benadryl  she was advised to continue her maintenance medications  Sleep Study      Follow-up Disposition:  Return in about 4 months (around 3/3/2018).

## 2017-11-09 ENCOUNTER — OFFICE VISIT (OUTPATIENT)
Dept: CARDIOLOGY CLINIC | Age: 57
End: 2017-11-09

## 2017-11-09 DIAGNOSIS — G47.30 SLEEP APNEA, UNSPECIFIED TYPE: ICD-10-CM

## 2017-11-09 DIAGNOSIS — R60.0 BILATERAL LEG EDEMA: ICD-10-CM

## 2017-11-09 DIAGNOSIS — I50.32 DIASTOLIC CHF, CHRONIC (HCC): Primary | ICD-10-CM

## 2017-11-09 DIAGNOSIS — I48.0 INTERMITTENT ATRIAL FIBRILLATION (HCC): ICD-10-CM

## 2017-11-09 DIAGNOSIS — R06.09 DYSPNEA ON EXERTION: ICD-10-CM

## 2017-11-09 NOTE — PROGRESS NOTES
1. Have you been to the ER, urgent care clinic since your last visit? Hospitalized since your last visit? No    2. Have you seen or consulted any other health care providers outside of the [address] [place] [address] since your last visit? Include any pap smears or colon screening.  No

## 2017-11-09 NOTE — PROGRESS NOTES
History of Present Illness: The patient is a 82-year-old female here for followup. She was initially referred by Dr. Donna Long for new atrial fibrillation. This was diagnosed in the setting of increased edema, which prompted an echocardiogram and she was found to have atrial fibrillation. 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 She did not have any syncope. I was planning atrial fibrillation ablation and she received IV contrast for the CT scan, but she had a significant reaction, including hives, and it was, therefore, not completed. She gets a little short of breath at times, but she is actually quite active and rides her bike regularly (up to 60 miles). She would like to have a goal of riding 100 miles in April. She is scheduled to get her sleep study on November 12th. She is accompanied by her family. Impression and Plan:      1. New diagnosis of paroxysmal atrial fibrillation with symptoms of swelling and occasional dyspnea. She was at 30% by recent monitor. 2. Underlying sick sinus syndrome limiting up-titration of AV blocking agents. 3. Recent initiation of flecainide. 4. Quite active, exercising regularly and riding her bike up to  miles. 5. Possible sleep apnea with heavy snoring. Sleep study is scheduled for November 12th.                  6. IV contrast allergy with hives. 7. Recent echocardiogram with normal function and mild left atrial enlargement. 8. Dyslipidemia. 9. BMI of 29. At this point, the patient is scheduled to get her sleep study, which needs to be done. She was started on low-dose flecainide but she has not noticed any significant change.   She has resting bradycardia limiting up-titration of AV blockers and she has wildly fluctuating rates for her atrial fibrillation. I discussed proceeding with the atrial fibrillation ablation point-by-point and using the 3D mapping system, as she does have an IV contrast allergy. We will plan to perform this on .  She is already on Xarelto. We discussed the long-term ability to try to get off of medications, but given her underlying tachy-brooks syndrome and unknown pulmonary vein anatomy, I could not give her an answer at this point. All questions were answered. Past Medical History:   Diagnosis Date    Atrial fibrillation ([address]     Attention deficit disorder without mention of hyperactivity     DUB (dysfunctional uterine bleeding)     Generalized osteoarthrosis, involving multiple sites     Grief reaction     Mother  recently - Celexa    Memory changes     Adderall for this    Unspecified tinnitus        Current Outpatient Prescriptions   Medication Sig Dispense Refill    predniSONE (DELTASONE) 20 mg tablet 2 tabs daily x 2 days, 1 tab daily x 2 days, 1/2 tab daily x 4 days 8 Tab 0    diphenhydrAMINE (BENADRYL) 25 mg capsule Take 1 Cap by mouth every six (6) hours as needed for up to 10 days. 12 Cap 0    flecainide (TAMBOCOR) 50 mg tablet Take 1 Tab by mouth two (2) times a day. 60 Tab 6    rivaroxaban (XARELTO) 20 mg tab tablet 1 tablet daily 30 Tab 6    metoprolol succinate (TOPROL-XL) 50 mg XL tablet 1 tablet daily 30 Tab 2    diclofenac (VOLTAREN) 1 % gel Apply 4 g to affected area four (4) times daily. 100 g 0    thiamine (VITAMIN B-1) 100 mg tablet Take  by mouth daily.  cholecalciferol (VITAMIN D3) 1,000 unit tablet Take  by mouth daily.  cyanocobalamin (VITAMIN B-12) 1,000 mcg tablet Take 1,000 mcg by mouth daily.  multivitamin (ONE A DAY) tablet Take 1 Tab by mouth daily. Social History   reports that she has never smoked. She has never used smokeless tobacco.   reports that she does not drink alcohol.     Family History  family history includes Diabetes in her maternal grandmother and mother; Heart Disease in her father and mother; Hypertension in her father and mother. Review of Systems  Except as stated above include:  Constitutional: Negative for fever, chills and malaise/fatigue. HEENT: No congestion or recent URI. Gastrointestinal: No nausea, vomiting, abdominal pain, bloody stools. Pulmonary:  Negative except as stated above. Cardiac:  Negative except as stated above. Musculoskeletal: Negative except as stated above. Neurological:  No localized symptoms. Skin:  Negative except as stated above. Psych:  No mood swings. Endocrine:  No heat/cold intolerance. PHYSICAL EXAM  BP Readings from Last 3 Encounters:   11/03/17 142/80   11/02/17 138/76   10/19/17 148/76     Pulse Readings from Last 3 Encounters:   11/03/17 (!) 59   11/02/17 (!) 53   10/19/17 (!) 58     Wt Readings from Last 3 Encounters:   11/03/17 94.8 kg (209 lb)   11/02/17 93 kg (205 lb)   10/19/17 94.8 kg (209 lb)     General:   Well developed, well groomed. Head/Neck:   No jugular venous distention     No carotid bruits. No evidence of xanthelasma. Lungs:   No respiratory distress. Clear bilaterally. Heart:    [place], regular. Normal S1/S2. Palpation of heart with normal point of maximum impulse. No significant murmurs, rubs or gallops. Abdomen:   Soft and nontender. No palpable abdominal mass or bruits. Extremities:   Intact peripheral pulses. Mild edema. Neurological:   Alert and oriented to person, place, time. No focal neurological deficit visually.     Blood Pressure Metric:  controlled

## 2017-11-09 NOTE — MR AVS SNAPSHOT
Visit Information Date & Time Provider Department Dept. Phone Encounter #  
 11/9/2017  2:20 PM Diallo John MD [facility] [id number] Your Appointments 11/14/2017  3:30 PM  
PRE PROCEDURE with [facility] ([facility]) Appt Note: PRATIK/AFib Ablation on 11/21 Tim Gooden [phone] Marko Dunbar  
  
    
 11/17/2017  2:15 PM  
PRE OP with Brian Preston NP  
[facility]IC[facility] OF [place] ([facility]) Appt Note: Dr Vaughn Salinas [address], [address] [facility] [address]  
  
   
 [address] [facility], [address] [facility] [address] Upcoming Health Maintenance Date Due Hepatitis C Screening 1960 DTaP/Tdap/Td series (1 - Tdap) 3/14/1983 PAP AKA CERVICAL CYTOLOGY 3/14/1983 FOBT Q 1 YEAR AGE 50-75 3/14/2012 Influenza Age 5 to Adult 8/1/2017 BREAST CANCER SCRN MAMMOGRAM 8/16/2019 Allergies as of 11/9/2017  Review Complete On: 11/9/2017 By: Diallo John MD  
  
 Severity Noted Reaction Type Reactions Celebrex [Celecoxib]  07/24/2014    Hives Iodinated Contrast- Oral And Iv Dye  11/02/2017    Hives Patient was in CT for a cardiac scan. A test bolus of 20cc was given and patient broke out into hives immediately after. Cancelled the rest of the exam and escorted the patient to the ED for furhter evaluation. Sulfa (Sulfonamide Antibiotics)  09/09/2015    Other (comments) Patient states she is not allergic Current Immunizations  Never Reviewed Name Date  
 TB Skin Test (PPD) 8/29/2013, 2/27/2007 TB Skin Test (PPD) Intradermal 8/9/2017 Not reviewed this visit You Were Diagnosed With   
  
 Codes Comments Intermittent atrial fibrillation (HCC)    -  Primary ICD-10-CM: I48.0 ICD-9-CM: 427.31   
 Sleep apnea, unspecified type     ICD-10-CM: G47.30 ICD-9-CM: 780.57 Dyspnea on exertion     ICD-10-CM: R06.09 
ICD-9-CM: 786.09 Vitals OB Status Smoking Status Postmenopausal Never Smoker Preferred Pharmacy Pharmacy Name Phone [phone] [address], [address] AT [address] [phone] Your Updated Medication List  
  
   
This list is accurate as of: 11/9/17  3:10 PM.  Always use your most recent med list.  
  
  
  
  
 diclofenac 1 % Gel Commonly known as:  VOLTAREN Apply 4 g to affected area four (4) times daily. diphenhydrAMINE 25 mg capsule Commonly known as:  BENADRYL Take 1 Cap by mouth every six (6) hours as needed for up to 10 days. flecainide 50 mg tablet Commonly known as:  TAMBOCOR Take 1 Tab by mouth two (2) times a day. metoprolol succinate 50 mg XL tablet Commonly known as:  TOPROL-XL  
1 tablet daily  
  
 multivitamin tablet Commonly known as:  ONE A DAY Take 1 Tab by mouth daily. predniSONE 20 mg tablet Commonly known as:  DELTASONE  
2 tabs daily x 2 days, 1 tab daily x 2 days, 1/2 tab daily x 4 days  
  
 rivaroxaban 20 mg Tab tablet Commonly known as:  XARELTO  
1 tablet daily VITAMIN B-1 100 mg tablet Generic drug:  thiamine Take  by mouth daily. VITAMIN B-12 1,000 mcg tablet Generic drug:  cyanocobalamin Take 1,000 mcg by mouth daily. VITAMIN D3 1,000 unit tablet Generic drug:  cholecalciferol Take  by mouth daily. We Performed the Following AMB POC EKG ROUTINE W/ 12 LEADS, INTER & REP [38699 CPT(R)] To-Do List   
 11/14/2017 Lab:  CBC WITH AUTOMATED DIFF   
  
 11/14/2017 Lab:  METABOLIC PANEL, COMPREHENSIVE   
  
 11/14/2017   Lab:  PROTHROMBIN TIME + INR   
  
 11/21/2017 8:00 AM  
  Appointment with [facility] ANESTHESIA 2; William King 1277; [facility] CATH HOLDING; [facility] [address] LAB RM 1 at [facility] NON-INVASIVE CARD ([phone]) Patient Instructions [facility] Patient  EP Instructions 1. You are scheduled to have a PRATIK and Possible Afib Ablation on  November 21, 2017 , at 0800 am.    Please check in at 0700 am. 
 
2. Please go to DR[facility] and park in the outpatient parking lot that is located around to the back of the hospital and enter through the [employer]. Once you enter through the [facility] check in with the  there. The  will either give you directions or assist you in getting to the cath holding area. 3.         You are not to eat or drink anything after midnight the morning of the  
            procedure. 4. Please continue to take your medications with a small sip of water on the morning of the procedure with the following exceptions:  Hold Xarelto starting November 19, 2017. 5. If you are diabetic, do not take your insulin/sugar pill the morning of the procedure. 6. We encourage families to wait in the waiting room on the first floor while the procedure is being done. The Doctor will come out and talk with you as soon as the procedure is over. 7. There is the possibility that you may spend the night in the hospital, depending on the results of the procedure. This will be determined after the procedure is done. 8.   If you or your family have any questions, please call our office Monday-Friday 9:00am  
      -4:30 pm , at [phone], and ask to speak to one of the nurses. Introducing hospitals & HEALTH SERVICES! Dear Jose Estes: Thank you for requesting a [employer] account. Our records indicate that you already have an active [employer]imity account. You can access your account anytime at [email or web address] Did you know that you can access your hospital and ER discharge instructions at any time in [employer]? You can also review all of your test results from your hospital stay or ER visit. Additional Information If you have questions, please visit the Frequently Asked Questions section of the [employer] website at [email or web address] Remember, [employer] is NOT to be used for urgent needs. For medical emergencies, dial 911. Now available from your iPhone and Android! Please provide this summary of care documentation to your next provider. Your primary care clinician is listed as Delmer Segovia. If you have any questions after today's visit, please call [phone].

## 2017-11-09 NOTE — PATIENT INSTRUCTIONS
[facility]          Patient  EP Instructions                  1. You are scheduled to have a PRATIK and Possible Afib Ablation on  November 21, 2017 , at 0800 am.    Please check in at 0700 am.    2. Please go to [facility] and chito in the outpatient parking lot that is located around to the back of the hospital and enter through the [place]. Once you enter through the [facility] check in with the  there. The  will either give you directions or assist you in getting to the cath holding area. 3.  [x]       You are not to eat or drink anything after midnight the morning of the               procedure. 4. Please continue to take your medications with a small sip of water on the morning of the procedure with the following exceptions:  Hold Xarelto starting November 19, 2017. 5. If you are diabetic, do not take your insulin/sugar pill the morning of the procedure. 6. We encourage families to wait in the waiting room on the first floor while the procedure is being done. The Doctor will come out and talk with you as soon as the procedure is over. 7. There is the possibility that you may spend the night in the hospital, depending on the results of the procedure. This will be determined after the procedure is done. 8.   If you or your family have any questions, please call our office Monday-Friday 9:00am         -4:30 pm , at [phone], and ask to speak to one of the nurses.

## 2017-11-17 ENCOUNTER — HOSPITAL ENCOUNTER (OUTPATIENT)
Dept: PREADMISSION TESTING | Age: 57
Discharge: HOME | End: 2017-11-17
Payer: COMMERCIAL

## 2017-11-17 ENCOUNTER — OFFICE VISIT (OUTPATIENT)
Dept: INTERNAL MEDICINE CLINIC | Age: 57
End: 2017-11-17

## 2017-11-17 VITALS
HEART RATE: 60 BPM | TEMPERATURE: 98.7 F | SYSTOLIC BLOOD PRESSURE: 120 MMHG | DIASTOLIC BLOOD PRESSURE: 68 MMHG | HEIGHT: 71 IN | OXYGEN SATURATION: 99 % | BODY MASS INDEX: 29.26 KG/M2 | RESPIRATION RATE: 16 BRPM | WEIGHT: 209 LBS

## 2017-11-17 DIAGNOSIS — G47.30 SLEEP APNEA, UNSPECIFIED TYPE: ICD-10-CM

## 2017-11-17 DIAGNOSIS — Z79.01 ON ANTICOAGULANT THERAPY: ICD-10-CM

## 2017-11-17 DIAGNOSIS — R06.09 DYSPNEA ON EXERTION: ICD-10-CM

## 2017-11-17 DIAGNOSIS — Z01.818 PREOPERATIVE EXAMINATION: Primary | ICD-10-CM

## 2017-11-17 DIAGNOSIS — I48.0 INTERMITTENT ATRIAL FIBRILLATION (HCC): ICD-10-CM

## 2017-11-17 DIAGNOSIS — I89.0 LYMPHEDEMA OF BOTH LOWER EXTREMITIES: ICD-10-CM

## 2017-11-17 LAB
ALBUMIN SERPL-MCNC: 3.6 G/DL (ref 3.4–5)
ALBUMIN/GLOB SERPL: 1.1 {RATIO} (ref 0.8–1.7)
ALP SERPL-CCNC: 66 U/L (ref 45–117)
ALT SERPL-CCNC: 25 U/L (ref 13–56)
ANION GAP SERPL CALC-SCNC: 6 MMOL/L (ref 3–18)
AST SERPL-CCNC: 18 U/L (ref 15–37)
BASOPHILS # BLD: 0 K/UL (ref 0–0.06)
BASOPHILS NFR BLD: 0 % (ref 0–2)
BILIRUB SERPL-MCNC: 0.6 MG/DL (ref 0.2–1)
BUN SERPL-MCNC: 20 MG/DL (ref 7–18)
BUN/CREAT SERPL: 21 (ref 12–20)
CALCIUM SERPL-MCNC: 9 MG/DL (ref 8.5–10.1)
CHLORIDE SERPL-SCNC: 102 MMOL/L (ref 100–108)
CO2 SERPL-SCNC: 32 MMOL/L (ref 21–32)
CREAT SERPL-MCNC: 0.96 MG/DL (ref 0.6–1.3)
DIFFERENTIAL METHOD BLD: ABNORMAL
EOSINOPHIL # BLD: 0.2 K/UL (ref 0–0.4)
EOSINOPHIL NFR BLD: 4 % (ref 0–5)
ERYTHROCYTE [DISTWIDTH] IN BLOOD BY AUTOMATED COUNT: 13.3 % (ref 11.6–14.5)
GLOBULIN SER CALC-MCNC: 3.4 G/DL (ref 2–4)
GLUCOSE SERPL-MCNC: 74 MG/DL (ref 74–99)
HCT VFR BLD AUTO: 40.2 % (ref 35–45)
HGB BLD-MCNC: 12.9 G/DL (ref 12–16)
INR PPP: 1.2 (ref 0.8–1.2)
LYMPHOCYTES # BLD: 1.6 K/UL (ref 0.9–3.6)
LYMPHOCYTES NFR BLD: 24 % (ref 21–52)
MCH RBC QN AUTO: 30.4 PG (ref 24–34)
MCHC RBC AUTO-ENTMCNC: 32.1 G/DL (ref 31–37)
MCV RBC AUTO: 94.6 FL (ref 74–97)
MONOCYTES # BLD: 0.6 K/UL (ref 0.05–1.2)
MONOCYTES NFR BLD: 9 % (ref 3–10)
NEUTS SEG # BLD: 4.1 K/UL (ref 1.8–8)
NEUTS SEG NFR BLD: 63 % (ref 40–73)
PLATELET # BLD AUTO: 140 K/UL (ref 135–420)
PMV BLD AUTO: 12.3 FL (ref 9.2–11.8)
POTASSIUM SERPL-SCNC: 4 MMOL/L (ref 3.5–5.5)
PROT SERPL-MCNC: 7 G/DL (ref 6.4–8.2)
PROTHROMBIN TIME: 14.9 SEC (ref 11.5–15.2)
RBC # BLD AUTO: 4.25 M/UL (ref 4.2–5.3)
SODIUM SERPL-SCNC: 140 MMOL/L (ref 136–145)
WBC # BLD AUTO: 6.6 K/UL (ref 4.6–13.2)

## 2017-11-17 PROCEDURE — 85610 PROTHROMBIN TIME: CPT | Performed by: INTERNAL MEDICINE

## 2017-11-17 PROCEDURE — 36415 COLL VENOUS BLD VENIPUNCTURE: CPT | Performed by: INTERNAL MEDICINE

## 2017-11-17 PROCEDURE — 85025 COMPLETE CBC W/AUTO DIFF WBC: CPT | Performed by: INTERNAL MEDICINE

## 2017-11-17 PROCEDURE — 80053 COMPREHEN METABOLIC PANEL: CPT | Performed by: INTERNAL MEDICINE

## 2017-11-17 NOTE — PATIENT INSTRUCTIONS
Body Mass Index: Care Instructions  Your Care Instructions    Body mass index (BMI) can help you see if your weight is raising your risk for health problems. It uses a formula to compare how much you weigh with how tall you are. · A BMI lower than 18.5 is considered underweight. · A BMI between 18.5 and 24.9 is considered healthy. · A BMI between 25 and 29.9 is considered overweight. A BMI of 30 or higher is considered obese. If your BMI is in the normal range, it means that you have a lower risk for weight-related health problems. If your BMI is in the overweight or obese range, you may be at increased risk for weight-related health problems, such as high blood pressure, heart disease, stroke, arthritis or joint pain, and diabetes. If your BMI is in the underweight range, you may be at increased risk for health problems such as fatigue, lower protection (immunity) against illness, muscle loss, bone loss, hair loss, and hormone problems. BMI is just one measure of your risk for weight-related health problems. You may be at higher risk for health problems if you are not active, you eat an unhealthy diet, or you drink too much alcohol or use tobacco products. Follow-up care is a key part of your treatment and safety. Be sure to make and go to all appointments, and call your doctor if you are having problems. It's also a good idea to know your test results and keep a list of the medicines you take. How can you care for yourself at home? · Practice healthy eating habits. This includes eating plenty of fruits, vegetables, whole grains, lean protein, and low-fat dairy. · If your doctor recommends it, get more exercise. Walking is a good choice. Bit by bit, increase the amount you walk every day. Try for at least 30 minutes on most days of the week. · Do not smoke. Smoking can increase your risk for health problems. If you need help quitting, talk to your doctor about stop-smoking programs and medicines. These can increase your chances of quitting for good. · Limit alcohol to 2 drinks a day for men and 1 drink a day for women. Too much alcohol can cause health problems. If you have a BMI higher than 25  · Your doctor may do other tests to check your risk for weight-related health problems. This may include measuring the distance around your waist. A waist measurement of more than 40 inches in men or 35 inches in women can increase the risk of weight-related health problems. · Talk with your doctor about steps you can take to stay healthy or improve your health. You may need to make lifestyle changes to lose weight and stay healthy, such as changing your diet and getting regular exercise. If you have a BMI lower than 18.5  · Your doctor may do other tests to check your risk for health problems. · Talk with your doctor about steps you can take to stay healthy or improve your health. You may need to make lifestyle changes to gain or maintain weight and stay healthy, such as getting more healthy foods in your diet and doing exercises to build muscle. Where can you learn more? Go to http://edison-dean.info/. Enter S176 in the search box to learn more about \"Body Mass Index: Care Instructions. \"  Current as of: October 13, 2016  Content Version: 11.4  © 8605-5738 Healthwise, Incorporated. Care instructions adapted under license by [employer] (which disclaims liability or warranty for this information). If you have questions about a medical condition or this instruction, always ask your healthcare professional. [address] any warranty or liability for your use of this information.

## 2017-11-17 NOTE — PROGRESS NOTES
Jessica Tobar is a 56 y.o. female presenting today for Pre-op Exam (PRATIK and Possible Afib Ablation)  . HPI:  Jessica Tobar presents to the office today for preoperative examination. Patient is scheduled for Transesophageal Echocardiogram and Possible Atrial Ablation. Patient denies any chest pain, palpitations or dyspnea. Review of Systems   Constitutional: Positive for malaise/fatigue. Negative for fever. HENT: Negative for congestion. Respiratory: Negative for cough, sputum production and shortness of breath. Cardiovascular: Positive for leg swelling. Negative for chest pain and palpitations. Gastrointestinal: Negative for abdominal pain, nausea and vomiting. Genitourinary: Negative for dysuria, frequency and urgency. Neurological: Positive for headaches. Allergies   Allergen Reactions    Celebrex [Celecoxib] Hives    Iodinated Contrast- Oral And Iv Dye Hives     Patient was in CT for a cardiac scan. A test bolus of 20cc was given and patient broke out into hives immediately after. Cancelled the rest of the exam and escorted the patient to the ED for furhter evaluation.  Sulfa (Sulfonamide Antibiotics) Other (comments)     Patient states she is not allergic       Current Outpatient Prescriptions   Medication Sig Dispense Refill    flecainide (TAMBOCOR) 50 mg tablet Take 1 Tab by mouth two (2) times a day. 60 Tab 6    rivaroxaban (XARELTO) 20 mg tab tablet 1 tablet daily 30 Tab 6    metoprolol succinate (TOPROL-XL) 50 mg XL tablet 1 tablet daily 30 Tab 2    diclofenac (VOLTAREN) 1 % gel Apply 4 g to affected area four (4) times daily. 100 g 0    thiamine (VITAMIN B-1) 100 mg tablet Take  by mouth daily.  cholecalciferol (VITAMIN D3) 1,000 unit tablet Take  by mouth daily.  cyanocobalamin (VITAMIN B-12) 1,000 mcg tablet Take 1,000 mcg by mouth daily.  multivitamin (ONE A DAY) tablet Take 1 Tab by mouth daily.          Past Medical History:   Diagnosis Date    Atrial fibrillation [facility])     Attention deficit disorder without mention of hyperactivity     DUB (dysfunctional uterine bleeding)     Generalized osteoarthrosis, involving multiple sites     Grief reaction     Mother  recently - Celexa    Memory changes     Adderall for this    Unspecified tinnitus        Past Surgical History:   Procedure Laterality Date    HX KNEE ARTHROSCOPY Right     Meniscus repair    HX KNEE ARTHROSCOPY Right     Removed piece of bone (FB)    HX KNEE ARTHROSCOPY Right     ACL repair       Social History     Social History    Marital status:      Spouse name: N/A    Number of children: N/A    Years of education: N/A     Occupational History    Not on file. Social History Main Topics    Smoking status: Never Smoker    Smokeless tobacco: Never Used    Alcohol use No    Drug use: No    Sexual activity: Not Currently     Other Topics Concern    Not on file     Social History Narrative       Patient does not have an advanced directive on file    Vitals:    17 1428   BP: 120/68   Pulse: 60   Resp: 16   Temp: 98.7 °F (37.1 °C)   TempSrc: Tympanic   SpO2: 99%   Weight: 209 lb (94.8 kg)   Height: 5' 11\" (1.803 m)   PainSc:   7   PainLoc: Foot       Physical Exam   Constitutional: No distress. HENT:   Head: Normocephalic. Cardiovascular: Normal rate, regular rhythm and normal heart sounds. Pulmonary/Chest: Effort normal and breath sounds normal.   Abdominal: Soft. Bowel sounds are normal.   Musculoskeletal: Normal range of motion. She exhibits edema (lymphedema). Lymphadenopathy:     She has no cervical adenopathy. Neurological: She is alert. Gait normal.   Skin: Skin is warm. Nursing note and vitals reviewed.       Hospital Outpatient Visit on 2017   Component Date Value Ref Range Status    Creatinine, POC 2017 1.1  0.6 - 1.3 MG/DL Final    GFRAA, POC 2017 >60  >60 ml/min/1.73m2 Final    GFRNA, POC 2017 52* >60 ml/min/1.73m2 Final    Comment: Estimated GFR is calculated using the IDMS-traceable Modification of Diet in Renal Disease (MDRD) Study equation, reported for both  Americans (GFRAA) and non- Americans (GFRNA), and normalized to 1.73m2 body surface area. The physician must decide which value applies to the patient. The MDRD study equation should only be used in individuals age 25 or older. It has not been validated for the following: pregnant women, patients with serious comorbid conditions, or on certain medications, or persons with extremes of body size, muscle mass, or nutritional status. Hospital Outpatient Visit on 10/25/2017   Component Date Value Ref Range Status    Test indication 10/25/2017 AFIB   Preliminary    Functional capacity 10/25/2017 Normal   Preliminary    ECG Interp. Before Exercise 10/25/2017    Preliminary                    Value:afib  Rightward Axis  NS ST  Changes      ECG Interp. During Exercise 10/25/2017 none   Preliminary    Max. Systolic BP 73/04/0269 047  mmHg Preliminary    Max. Diastolic BP 49/51/3409 70  mmHg Preliminary    Max.  Heart rate 10/25/2017 176  BPM Preliminary    Peak Ex METs 10/25/2017 7.0  METS Preliminary    Protocol name 10/25/2017 [facility]              Preliminary   Office Visit on 08/31/2017   Component Date Value Ref Range Status    VALID INTERNAL CONTROL POC 08/31/2017 Yes   Final    Group A Strep Ag 08/31/2017 Negative  Negative Final   Lab Only on 08/18/2017   Component Date Value Ref Range Status    WBC 08/18/2017 4.9  3.4 - 10.8 x10E3/uL Final    RBC 08/18/2017 4.39  3.77 - 5.28 x10E6/uL Final    HGB 08/18/2017 13.4  11.1 - 15.9 g/dL Final    HCT 08/18/2017 41.3  34.0 - 46.6 % Final    MCV 08/18/2017 94  79 - 97 fL Final    MCH 08/18/2017 30.5  26.6 - 33.0 pg Final    MCHC 08/18/2017 32.4  31.5 - 35.7 g/dL Final    RDW 08/18/2017 13.2  12.3 - 15.4 % Final    PLATELET 07/31/6357 788  150 - 379 x10E3/uL Final    NEUTROPHILS 08/18/2017 56  % Final    Lymphocytes 08/18/2017 33  % Final    MONOCYTES 08/18/2017 9  % Final    EOSINOPHILS 08/18/2017 2  % Final    BASOPHILS 08/18/2017 0  % Final    ABS. NEUTROPHILS 08/18/2017 2.7  1.4 - 7.0 x10E3/uL Final    Abs Lymphocytes 08/18/2017 1.6  0.7 - 3.1 x10E3/uL Final    ABS. MONOCYTES 08/18/2017 0.4  0.1 - 0.9 x10E3/uL Final    ABS. EOSINOPHILS 08/18/2017 0.1  0.0 - 0.4 x10E3/uL Final    ABS. BASOPHILS 08/18/2017 0.0  0.0 - 0.2 x10E3/uL Final    IMMATURE GRANULOCYTES 08/18/2017 0  % Final    ABS. IMM. GRANS. 08/18/2017 0.0  0.0 - 0.1 x10E3/uL Final    Glucose 08/18/2017 85  65 - 99 mg/dL Final    BUN 08/18/2017 33* 6 - 24 mg/dL Final    Creatinine 08/18/2017 1.13* 0.57 - 1.00 mg/dL Final    GFR est non-AA 08/18/2017 55* >59 mL/min/1.73 Final    GFR est AA 08/18/2017 63  >59 mL/min/1.73 Final    BUN/Creatinine ratio 08/18/2017 29* 9 - 23 Final    Sodium 08/18/2017 141  134 - 144 mmol/L Final    Potassium 08/18/2017 4.5  3.5 - 5.2 mmol/L Final    Chloride 08/18/2017 100  96 - 106 mmol/L Final    CO2 08/18/2017 26  18 - 29 mmol/L Final    Calcium 08/18/2017 9.3  8.7 - 10.2 mg/dL Final    Protein, total 08/18/2017 6.8  6.0 - 8.5 g/dL Final    Albumin 08/18/2017 4.3  3.5 - 5.5 g/dL Final    GLOBULIN, TOTAL 08/18/2017 2.5  1.5 - 4.5 g/dL Final    A-G Ratio 08/18/2017 1.7  1.2 - 2.2 Final    Bilirubin, total 08/18/2017 0.6  0.0 - 1.2 mg/dL Final    Alk.  phosphatase 08/18/2017 68  39 - 117 IU/L Final    AST (SGOT) 08/18/2017 17  0 - 40 IU/L Final    ALT (SGPT) 08/18/2017 15  0 - 32 IU/L Final    Cholesterol, total 08/18/2017 215* 100 - 199 mg/dL Final    Triglyceride 08/18/2017 70  0 - 149 mg/dL Final    HDL Cholesterol 08/18/2017 74  >39 mg/dL Final    VLDL, calculated 08/18/2017 14  5 - 40 mg/dL Final    LDL, calculated 08/18/2017 127* 0 - 99 mg/dL Final    Vitamin B1 08/18/2017 252.7* 66.5 - 200.0 nmol/L Final    TSH 08/18/2017 1.380  0.450 - 4.500 uIU/mL Final  Vitamin B12 08/18/2017 >2000* 211 - 946 pg/mL Final    SPECIMEN STATUS REPORT 08/18/2017 COMMENT   Final    Comment: One Specimen Identifier  One Specimen Identifier  The specimen received included only one patient identifier on the  primary collection container. Our laboratory accrediting agency  states \"All primary specimen containers must be labeled with 2  identifiers at the time of collection. \"      Vitamin B6 08/21/2017 13.3  2.0 - 32.8 ug/L Final       .No results found for any visits on 11/17/17. Assessment / Plan:      ICD-10-CM ICD-9-CM    1. Preoperative examination Z01.818 V72.84    2. Intermittent atrial fibrillation (HCC) I48.0 427.31    3. Lymphedema of both lower extremities I89.0 457.1    4. On anticoagulant therapy Z79.01 V58.61      Patient is optimized for Transesophageal Echocardiogram and Possible Atrial Ablation  Blood pressure- controlled  Lymphedema- improved  Patient noted she will hold Xarelto on 11/19/2017    Follow-up Disposition:  Return if symptoms worsen or fail to improve. I asked the patient if she  had any questions and answered her  questions. The patient stated that she understands the treatment plan and agrees with the treatment plan    Discussed the patient's BMI with her. The BMI follow up plan is as follows:     dietary management education, guidance, and counseling  encourage exercise  monitor weight      An After Visit Summary was printed and given to the patient.

## 2017-11-20 NOTE — H&P
Seen & examined. See full H&P/notes for details. Plan PRATIK & atrial fibrillation ablation with transeptal puncture x 2, using radiofrequency, point-by-point. I re-explained procedure to friend/family at bedside with patient present and discussed risks including 1% of death, stroke, perforation, bleeding. All questions answered. Risks included but are not limited to pain, infection, bleeding, stroke, perforation, valve damage, nerve damage, diaphragmatic paralysis, rupture of esophagus and possible fistula from heart to esophagus, deep venous thrombosis, emergent open heart surgery, and death both during procedure and post-operatively. History of Present Illness: The patient is a 53-year-old female here for followup. She was initially referred by Dr. Walt Garcia for new atrial fibrillation. This was diagnosed in the setting of increased edema, which prompted an echocardiogram and she was found to have atrial fibrillation. 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 She did not have any syncope. I was planning atrial fibrillation ablation and she received IV contrast for the CT scan, but she had a significant reaction, including hives, and it was, therefore, not completed. She gets a little short of breath at times, but she is actually quite active and rides her bike regularly (up to 60 miles). She would like to have a goal of riding 100 miles in April. She is scheduled to get her sleep study on November 12th. She is accompanied by her family.       Impression and Plan:      1. New diagnosis of paroxysmal atrial fibrillation with symptoms of swelling and occasional dyspnea. She was at 30% by recent monitor. 2. Underlying sick sinus syndrome limiting up-titration of AV blocking agents. 3. Recent initiation of flecainide.                    4. Quite active, exercising regularly and riding her bike up to  miles. 5. Possible sleep apnea with heavy snoring. Sleep study is scheduled for .                  6. IV contrast allergy with hives. 7. Recent echocardiogram with normal function and mild left atrial enlargement. 8. Dyslipidemia. 9. BMI of 29.       At this point, the patient is scheduled to get her sleep study, which needs to be done. She was started on low-dose flecainide but she has not noticed any significant change. She has resting bradycardia limiting up-titration of AV blockers and she has wildly fluctuating rates for her atrial fibrillation. I discussed proceeding with the atrial fibrillation ablation point-by-point and using the 3D mapping system, as she does have an IV contrast allergy. We will plan to perform this on .  She is already on Xarelto. We discussed the long-term ability to try to get off of medications, but given her underlying tachy-brooks syndrome and unknown pulmonary vein anatomy, I could not give her an answer at this point. All questions were answered.                 Past Medical History:   Diagnosis Date    Atrial fibrillation (HCC)      Attention deficit disorder without mention of hyperactivity      DUB (dysfunctional uterine bleeding)      Generalized osteoarthrosis, involving multiple sites      Grief reaction       Mother  recently - Celexa    Memory changes       Adderall for this    Unspecified tinnitus                  Current Outpatient Prescriptions   Medication Sig Dispense Refill    predniSONE (DELTASONE) 20 mg tablet 2 tabs daily x 2 days, 1 tab daily x 2 days, 1/2 tab daily x 4 days 8 Tab 0    diphenhydrAMINE (BENADRYL) 25 mg capsule Take 1 Cap by mouth every six (6) hours as needed for up to 10 days. 12 Cap 0    flecainide (TAMBOCOR) 50 mg tablet Take 1 Tab by mouth two (2) times a day.  60 Tab 6    rivaroxaban (XARELTO) 20 mg tab tablet 1 tablet daily 30 Tab 6    metoprolol succinate (TOPROL-XL) 50 mg XL tablet 1 tablet daily 30 Tab 2    diclofenac (VOLTAREN) 1 % gel Apply 4 g to affected area four (4) times daily. 100 g 0    thiamine (VITAMIN B-1) 100 mg tablet Take  by mouth daily.        cholecalciferol (VITAMIN D3) 1,000 unit tablet Take  by mouth daily.        cyanocobalamin (VITAMIN B-12) 1,000 mcg tablet Take 1,000 mcg by mouth daily.        multivitamin (ONE A DAY) tablet Take 1 Tab by mouth daily.             Social History   reports that she has never smoked. She has never used smokeless tobacco.   reports that she does not drink alcohol.     Family History  family history includes Diabetes in her maternal grandmother and mother; Heart Disease in her father and mother; Hypertension in her father and mother.     Review of Systems  Except as stated above include:  Constitutional: Negative for fever, chills and malaise/fatigue. HEENT: No congestion or recent URI. Gastrointestinal: No nausea, vomiting, abdominal pain, bloody stools. Pulmonary:  Negative except as stated above. Cardiac:  Negative except as stated above. Musculoskeletal: Negative except as stated above. Neurological:  No localized symptoms. Skin:  Negative except as stated above. Psych:  No mood swings. Endocrine:  No heat/cold intolerance.     PHYSICAL EXAM      BP Readings from Last 3 Encounters:   11/03/17 142/80   11/02/17 138/76   10/19/17 148/76          Pulse Readings from Last 3 Encounters:   11/03/17 (!) 59   11/02/17 (!) 53   10/19/17 (!) 58          Wt Readings from Last 3 Encounters:   11/03/17 94.8 kg (209 lb)   11/02/17 93 kg (205 lb)   10/19/17 94.8 kg (209 lb)      General:                    Well developed, well groomed. Head/Neck:               No jugular venous distention                                               No carotid bruits. No evidence of xanthelasma.   Lungs: No respiratory distress. Clear bilaterally. Heart:                                              Davonna Gabi, regular. Normal S1/S2. Palpation of heart with normal point of maximum impulse. No significant murmurs, rubs or gallops. Abdomen:                  Soft and nontender. No palpable abdominal mass or bruits. Extremities:               Intact peripheral pulses. Mild edema. Neurological:             Alert and oriented to person, place, time.                                                 No focal neurological deficit visually.     Blood Pressure Metric:  controlled         Electronically signed by Toby Hung MD at 11/09/17 0497

## 2017-11-21 ENCOUNTER — HOSPITAL ENCOUNTER (OUTPATIENT)
Dept: NON INVASIVE DIAGNOSTICS | Age: 57
Setting detail: OBSERVATION
Discharge: HOME | End: 2017-11-22
Attending: INTERNAL MEDICINE | Admitting: INTERNAL MEDICINE
Payer: COMMERCIAL

## 2017-11-21 ENCOUNTER — ANESTHESIA EVENT (OUTPATIENT)
Dept: CARDIAC CATH/INVASIVE PROCEDURES | Age: 57
End: 2017-11-21
Payer: COMMERCIAL

## 2017-11-21 ENCOUNTER — ANESTHESIA (OUTPATIENT)
Dept: CARDIAC CATH/INVASIVE PROCEDURES | Age: 57
End: 2017-11-21
Payer: COMMERCIAL

## 2017-11-21 PROBLEM — Z98.890 S/P ABLATION OF ATRIAL FIBRILLATION: Status: ACTIVE | Noted: 2017-11-21

## 2017-11-21 PROBLEM — I48.91 ATRIAL FIBRILLATION (HCC): Status: ACTIVE | Noted: 2017-11-21

## 2017-11-21 PROBLEM — Z86.79 S/P ABLATION OPERATION FOR ARRHYTHMIA: Status: ACTIVE | Noted: 2017-11-21

## 2017-11-21 PROBLEM — Z86.79 S/P ABLATION OF ATRIAL FIBRILLATION: Status: ACTIVE | Noted: 2017-11-21

## 2017-11-21 PROBLEM — Z98.890 S/P ABLATION OPERATION FOR ARRHYTHMIA: Status: ACTIVE | Noted: 2017-11-21

## 2017-11-21 LAB
ACT BLD: 136 SECS (ref 79–138)
ACT BLD: 219 SECS (ref 79–138)
ACT BLD: 230 SECS (ref 79–138)
ACT BLD: 318 SECS (ref 79–138)
ACT BLD: 318 SECS (ref 79–138)
ATRIAL RATE: 79 BPM
CALCULATED P AXIS, ECG09: 63 DEGREES
CALCULATED R AXIS, ECG10: -58 DEGREES
CALCULATED T AXIS, ECG11: 60 DEGREES
DIAGNOSIS, 93000: NORMAL
P-R INTERVAL, ECG05: 210 MS
Q-T INTERVAL, ECG07: 404 MS
QRS DURATION, ECG06: 94 MS
QTC CALCULATION (BEZET), ECG08: 463 MS
VENTRICULAR RATE, ECG03: 79 BPM

## 2017-11-21 PROCEDURE — 93662 INTRACARDIAC ECG (ICE): CPT

## 2017-11-21 PROCEDURE — 77030011640 HC PAD GRND REM COVD -A

## 2017-11-21 PROCEDURE — 99218 HC RM OBSERVATION: CPT

## 2017-11-21 PROCEDURE — 77030020506 HC NDL TRNSPTL NRG BAYL -F

## 2017-11-21 PROCEDURE — 77030027107 HC PTCH EXT REF CARTO3 J&J -F

## 2017-11-21 PROCEDURE — 76060000038 HC ANESTHESIA 3.5 TO 4 HR

## 2017-11-21 PROCEDURE — C1759 CATH, INTRA ECHOCARDIOGRAPHY: HCPCS

## 2017-11-21 PROCEDURE — 74011000250 HC RX REV CODE- 250

## 2017-11-21 PROCEDURE — C1887 CATHETER, GUIDING: HCPCS

## 2017-11-21 PROCEDURE — 74011250636 HC RX REV CODE- 250/636

## 2017-11-21 PROCEDURE — G0378 HOSPITAL OBSERVATION PER HR: HCPCS

## 2017-11-21 PROCEDURE — 96374 THER/PROPH/DIAG INJ IV PUSH: CPT

## 2017-11-21 PROCEDURE — 93613 INTRACARDIAC EPHYS 3D MAPG: CPT

## 2017-11-21 PROCEDURE — C1893 INTRO/SHEATH, FIXED,NON-PEEL: HCPCS

## 2017-11-21 PROCEDURE — C1730 CATH, EP, 19 OR FEW ELECT: HCPCS

## 2017-11-21 PROCEDURE — 77030026438 HC STYL ET INTUB CARD -A: Performed by: ANESTHESIOLOGY

## 2017-11-21 PROCEDURE — 77030019896 HC KT ARTERIAL LN TELE -B

## 2017-11-21 PROCEDURE — 77030002996 HC SUT SLK J&J -A

## 2017-11-21 PROCEDURE — 77030019700

## 2017-11-21 PROCEDURE — C1894 INTRO/SHEATH, NON-LASER: HCPCS

## 2017-11-21 PROCEDURE — 77030005401 HC CATH RAD ARRO -A: Performed by: ANESTHESIOLOGY

## 2017-11-21 PROCEDURE — 74011250637 HC RX REV CODE- 250/637: Performed by: INTERNAL MEDICINE

## 2017-11-21 PROCEDURE — 77030008683 HC TU ET CUF COVD -A: Performed by: ANESTHESIOLOGY

## 2017-11-21 PROCEDURE — 77030013797 HC KT TRNSDUC PRSSR EDWD -A

## 2017-11-21 PROCEDURE — 85347 COAGULATION TIME ACTIVATED: CPT

## 2017-11-21 PROCEDURE — C1769 GUIDE WIRE: HCPCS

## 2017-11-21 PROCEDURE — 93656 COMPRE EP EVAL ABLTJ ATR FIB: CPT

## 2017-11-21 PROCEDURE — 77030018729 HC ELECTRD DEFIB PAD CARD -B

## 2017-11-21 PROCEDURE — 93005 ELECTROCARDIOGRAM TRACING: CPT

## 2017-11-21 PROCEDURE — 77030016570 HC BLNKT BAIR HGGR 3M -B

## 2017-11-21 PROCEDURE — C1732 CATH, EP, DIAG/ABL, 3D/VECT: HCPCS

## 2017-11-21 PROCEDURE — 74011250636 HC RX REV CODE- 250/636: Performed by: INTERNAL MEDICINE

## 2017-11-21 PROCEDURE — 77030005515 HC CATH URETH FOL14 BARD -B

## 2017-11-21 PROCEDURE — 74011000250 HC RX REV CODE- 250: Performed by: INTERNAL MEDICINE

## 2017-11-21 RX ORDER — MIDAZOLAM HYDROCHLORIDE 1 MG/ML
INJECTION, SOLUTION INTRAMUSCULAR; INTRAVENOUS AS NEEDED
Status: DISCONTINUED | OUTPATIENT
Start: 2017-11-21 | End: 2017-11-21 | Stop reason: HOSPADM

## 2017-11-21 RX ORDER — ZOLPIDEM TARTRATE 5 MG/1
5 TABLET ORAL
Status: DISCONTINUED | OUTPATIENT
Start: 2017-11-21 | End: 2017-11-22 | Stop reason: HOSPADM

## 2017-11-21 RX ORDER — OXYCODONE AND ACETAMINOPHEN 5; 325 MG/1; MG/1
1 TABLET ORAL
Status: DISCONTINUED | OUTPATIENT
Start: 2017-11-21 | End: 2017-11-22 | Stop reason: HOSPADM

## 2017-11-21 RX ORDER — SODIUM CHLORIDE 0.9 % (FLUSH) 0.9 %
5-10 SYRINGE (ML) INJECTION AS NEEDED
Status: DISCONTINUED | OUTPATIENT
Start: 2017-11-21 | End: 2017-11-21

## 2017-11-21 RX ORDER — FENTANYL CITRATE 50 UG/ML
25 INJECTION, SOLUTION INTRAMUSCULAR; INTRAVENOUS
Status: DISCONTINUED | OUTPATIENT
Start: 2017-11-21 | End: 2017-11-21

## 2017-11-21 RX ORDER — DICLOFENAC SODIUM 10 MG/G
4 GEL TOPICAL 4 TIMES DAILY
Status: DISCONTINUED | OUTPATIENT
Start: 2017-11-21 | End: 2017-11-22 | Stop reason: HOSPADM

## 2017-11-21 RX ORDER — SODIUM CHLORIDE, SODIUM LACTATE, POTASSIUM CHLORIDE, CALCIUM CHLORIDE 600; 310; 30; 20 MG/100ML; MG/100ML; MG/100ML; MG/100ML
INJECTION, SOLUTION INTRAVENOUS
Status: DISCONTINUED | OUTPATIENT
Start: 2017-11-21 | End: 2017-11-21 | Stop reason: HOSPADM

## 2017-11-21 RX ORDER — FENTANYL CITRATE 50 UG/ML
25 INJECTION, SOLUTION INTRAMUSCULAR; INTRAVENOUS
Status: DISCONTINUED | OUTPATIENT
Start: 2017-11-21 | End: 2017-11-21 | Stop reason: HOSPADM

## 2017-11-21 RX ORDER — HEPARIN SODIUM 200 [USP'U]/100ML
500 INJECTION, SOLUTION INTRAVENOUS ONCE
Status: COMPLETED | OUTPATIENT
Start: 2017-11-21 | End: 2017-11-21

## 2017-11-21 RX ORDER — THERA TABS 400 MCG
1 TAB ORAL DAILY
Status: DISCONTINUED | OUTPATIENT
Start: 2017-11-22 | End: 2017-11-22 | Stop reason: HOSPADM

## 2017-11-21 RX ORDER — PROTAMINE SULFATE 10 MG/ML
50 INJECTION, SOLUTION INTRAVENOUS
Status: COMPLETED | OUTPATIENT
Start: 2017-11-21 | End: 2017-11-21

## 2017-11-21 RX ORDER — HEPARIN SODIUM 1000 [USP'U]/ML
INJECTION, SOLUTION INTRAVENOUS; SUBCUTANEOUS AS NEEDED
Status: DISCONTINUED | OUTPATIENT
Start: 2017-11-21 | End: 2017-11-21 | Stop reason: HOSPADM

## 2017-11-21 RX ORDER — SODIUM CHLORIDE 900 MG/100ML
INJECTION INTRAVENOUS
Status: DISPENSED
Start: 2017-11-21 | End: 2017-11-22

## 2017-11-21 RX ORDER — SUCCINYLCHOLINE CHLORIDE 20 MG/ML
INJECTION INTRAMUSCULAR; INTRAVENOUS AS NEEDED
Status: DISCONTINUED | OUTPATIENT
Start: 2017-11-21 | End: 2017-11-21 | Stop reason: HOSPADM

## 2017-11-21 RX ORDER — MELATONIN
1000 DAILY
Status: DISCONTINUED | OUTPATIENT
Start: 2017-11-22 | End: 2017-11-22 | Stop reason: HOSPADM

## 2017-11-21 RX ORDER — SODIUM CHLORIDE 0.9 % (FLUSH) 0.9 %
5-10 SYRINGE (ML) INJECTION EVERY 8 HOURS
Status: DISCONTINUED | OUTPATIENT
Start: 2017-11-21 | End: 2017-11-22 | Stop reason: HOSPADM

## 2017-11-21 RX ORDER — ACETAMINOPHEN 325 MG/1
650 TABLET ORAL
Status: DISCONTINUED | OUTPATIENT
Start: 2017-11-21 | End: 2017-11-22 | Stop reason: HOSPADM

## 2017-11-21 RX ORDER — FENTANYL CITRATE 50 UG/ML
INJECTION, SOLUTION INTRAMUSCULAR; INTRAVENOUS AS NEEDED
Status: DISCONTINUED | OUTPATIENT
Start: 2017-11-21 | End: 2017-11-21 | Stop reason: HOSPADM

## 2017-11-21 RX ORDER — PROPOFOL 10 MG/ML
INJECTION, EMULSION INTRAVENOUS AS NEEDED
Status: DISCONTINUED | OUTPATIENT
Start: 2017-11-21 | End: 2017-11-21 | Stop reason: HOSPADM

## 2017-11-21 RX ORDER — ONDANSETRON 2 MG/ML
8 INJECTION INTRAMUSCULAR; INTRAVENOUS
Status: DISCONTINUED | OUTPATIENT
Start: 2017-11-21 | End: 2017-11-22 | Stop reason: HOSPADM

## 2017-11-21 RX ORDER — DIPHENHYDRAMINE HYDROCHLORIDE 50 MG/ML
12.5 INJECTION, SOLUTION INTRAMUSCULAR; INTRAVENOUS
Status: DISCONTINUED | OUTPATIENT
Start: 2017-11-21 | End: 2017-11-21

## 2017-11-21 RX ORDER — SODIUM CHLORIDE, SODIUM LACTATE, POTASSIUM CHLORIDE, CALCIUM CHLORIDE 600; 310; 30; 20 MG/100ML; MG/100ML; MG/100ML; MG/100ML
75 INJECTION, SOLUTION INTRAVENOUS CONTINUOUS
Status: DISCONTINUED | OUTPATIENT
Start: 2017-11-21 | End: 2017-11-21

## 2017-11-21 RX ORDER — ONDANSETRON 2 MG/ML
4 INJECTION INTRAMUSCULAR; INTRAVENOUS ONCE
Status: DISCONTINUED | OUTPATIENT
Start: 2017-11-21 | End: 2017-11-21

## 2017-11-21 RX ORDER — ONDANSETRON 2 MG/ML
INJECTION INTRAMUSCULAR; INTRAVENOUS AS NEEDED
Status: DISCONTINUED | OUTPATIENT
Start: 2017-11-21 | End: 2017-11-21 | Stop reason: HOSPADM

## 2017-11-21 RX ORDER — LIDOCAINE HYDROCHLORIDE 10 MG/ML
1-90 INJECTION, SOLUTION EPIDURAL; INFILTRATION; INTRACAUDAL; PERINEURAL
Status: DISCONTINUED | OUTPATIENT
Start: 2017-11-21 | End: 2017-11-21 | Stop reason: HOSPADM

## 2017-11-21 RX ORDER — LANOLIN ALCOHOL/MO/W.PET/CERES
1000 CREAM (GRAM) TOPICAL DAILY
Status: DISCONTINUED | OUTPATIENT
Start: 2017-11-22 | End: 2017-11-22 | Stop reason: HOSPADM

## 2017-11-21 RX ORDER — NALBUPHINE HYDROCHLORIDE 10 MG/ML
5 INJECTION, SOLUTION INTRAMUSCULAR; INTRAVENOUS; SUBCUTANEOUS
Status: DISCONTINUED | OUTPATIENT
Start: 2017-11-21 | End: 2017-11-21

## 2017-11-21 RX ORDER — SODIUM CHLORIDE 0.9 % (FLUSH) 0.9 %
5-10 SYRINGE (ML) INJECTION AS NEEDED
Status: DISCONTINUED | OUTPATIENT
Start: 2017-11-21 | End: 2017-11-22 | Stop reason: HOSPADM

## 2017-11-21 RX ORDER — LANOLIN ALCOHOL/MO/W.PET/CERES
100 CREAM (GRAM) TOPICAL DAILY
Status: DISCONTINUED | OUTPATIENT
Start: 2017-11-22 | End: 2017-11-22 | Stop reason: HOSPADM

## 2017-11-21 RX ORDER — FLECAINIDE ACETATE 100 MG/1
50 TABLET ORAL 2 TIMES DAILY
Status: DISCONTINUED | OUTPATIENT
Start: 2017-11-21 | End: 2017-11-22 | Stop reason: HOSPADM

## 2017-11-21 RX ORDER — LIDOCAINE HYDROCHLORIDE 20 MG/ML
INJECTION, SOLUTION EPIDURAL; INFILTRATION; INTRACAUDAL; PERINEURAL AS NEEDED
Status: DISCONTINUED | OUTPATIENT
Start: 2017-11-21 | End: 2017-11-21 | Stop reason: HOSPADM

## 2017-11-21 RX ORDER — PROTAMINE SULFATE 10 MG/ML
INJECTION, SOLUTION INTRAVENOUS AS NEEDED
Status: DISCONTINUED | OUTPATIENT
Start: 2017-11-21 | End: 2017-11-21 | Stop reason: HOSPADM

## 2017-11-21 RX ORDER — METOPROLOL SUCCINATE 50 MG/1
50 TABLET, EXTENDED RELEASE ORAL DAILY
Status: DISCONTINUED | OUTPATIENT
Start: 2017-11-22 | End: 2017-11-22 | Stop reason: HOSPADM

## 2017-11-21 RX ADMIN — PROPOFOL 80 MG: 10 INJECTION, EMULSION INTRAVENOUS at 08:15

## 2017-11-21 RX ADMIN — SUCCINYLCHOLINE CHLORIDE 100 MG: 20 INJECTION INTRAMUSCULAR; INTRAVENOUS at 08:15

## 2017-11-21 RX ADMIN — Medication 10 ML: at 22:55

## 2017-11-21 RX ADMIN — PROTAMINE SULFATE 50 MG: 10 INJECTION, SOLUTION INTRAVENOUS at 13:55

## 2017-11-21 RX ADMIN — FENTANYL CITRATE 25 MCG: 50 INJECTION INTRAMUSCULAR; INTRAVENOUS at 14:40

## 2017-11-21 RX ADMIN — HEPARIN SODIUM 1000 UNITS: 200 INJECTION, SOLUTION INTRAVENOUS at 08:37

## 2017-11-21 RX ADMIN — RIVAROXABAN 20 MG: 20 TABLET, FILM COATED ORAL at 18:01

## 2017-11-21 RX ADMIN — SODIUM CHLORIDE, SODIUM LACTATE, POTASSIUM CHLORIDE, CALCIUM CHLORIDE: 600; 310; 30; 20 INJECTION, SOLUTION INTRAVENOUS at 07:58

## 2017-11-21 RX ADMIN — LIDOCAINE HYDROCHLORIDE 20 ML: 10 INJECTION, SOLUTION EPIDURAL; INFILTRATION; INTRACAUDAL; PERINEURAL at 08:55

## 2017-11-21 RX ADMIN — HEPARIN SODIUM 15000 UNITS: 1000 INJECTION, SOLUTION INTRAVENOUS; SUBCUTANEOUS at 09:54

## 2017-11-21 RX ADMIN — HEPARIN SODIUM 8000 UNITS: 1000 INJECTION, SOLUTION INTRAVENOUS; SUBCUTANEOUS at 10:23

## 2017-11-21 RX ADMIN — FLECAINIDE ACETATE 50 MG: 100 TABLET ORAL at 18:01

## 2017-11-21 RX ADMIN — HEPARIN SODIUM 1000 UNITS: 200 INJECTION, SOLUTION INTRAVENOUS at 08:38

## 2017-11-21 RX ADMIN — FENTANYL CITRATE 100 MCG: 50 INJECTION, SOLUTION INTRAMUSCULAR; INTRAVENOUS at 09:14

## 2017-11-21 RX ADMIN — SODIUM CHLORIDE, SODIUM LACTATE, POTASSIUM CHLORIDE, CALCIUM CHLORIDE: 600; 310; 30; 20 INJECTION, SOLUTION INTRAVENOUS at 09:29

## 2017-11-21 RX ADMIN — LIDOCAINE HYDROCHLORIDE 20 MG: 20 INJECTION, SOLUTION EPIDURAL; INFILTRATION; INTRACAUDAL; PERINEURAL at 08:15

## 2017-11-21 RX ADMIN — FENTANYL CITRATE 200 MCG: 50 INJECTION, SOLUTION INTRAMUSCULAR; INTRAVENOUS at 08:15

## 2017-11-21 RX ADMIN — PROTAMINE SULFATE 50 MG: 10 INJECTION, SOLUTION INTRAVENOUS at 11:30

## 2017-11-21 RX ADMIN — ONDANSETRON 4 MG: 2 INJECTION INTRAMUSCULAR; INTRAVENOUS at 08:15

## 2017-11-21 RX ADMIN — MIDAZOLAM HYDROCHLORIDE 2 MG: 1 INJECTION, SOLUTION INTRAMUSCULAR; INTRAVENOUS at 08:04

## 2017-11-21 RX ADMIN — HEPARIN SODIUM 5000 UNITS: 1000 INJECTION, SOLUTION INTRAVENOUS; SUBCUTANEOUS at 10:11

## 2017-11-21 NOTE — ROUTINE PROCESS
1150: Pt received from EP Lab, alert and oriented x4 denies pain. 6 & 8 Fr venous sheaths, c/d/i to right femoral vein. 4Fr arterial sheath c/d/i to right femoral artery. 7 Fr and 11 Fr sheath c/d/i to left femoral vein. Pt's ACT in lab at 1108 was 318; delisa Alvarez,  stated that 50 mg of protamine was administered at 1130. Repeat ACT at 1202 was 230. Family at bedside, call light within reach. Pt verbalizes understanding of immobolizing legs and maintaining supine position to prevent bleeding. Will continue to monitor. 1300:   1400: Administered protamine per Dr. Tom Mccarthy order. Bedside report given to Francis Klein RN.

## 2017-11-21 NOTE — PROCEDURES
PROCEDURE   1. Cardiac electrophysiology study. 2. Atrial fibrillation radiofrequency ablation using a left-sided wide circumferential pulmonary vein isolation and Smart-Touch ablation catheter. 3. Transseptal puncture. 4. Intracardiac echocardiography. 5. Three-dimensional electroanatomical map of the left atrium using CARTO, including CartoSound. .  6. Continuous arterial monitoring with placement of right femoral arterial   sheath. 7. Pacing and sensing from the right atrium and left atrium via the coronary sinus. 8. Placement of coronary sinus and His catheters. 9. Direct current cardioversion. 10. General endotracheal intubation with anesthesia. 11. Continuous esophageal temperature monitoring. DIAGNOSES   1. New diagnosis of paroxysmal atrial fibrillation with symptoms of swelling and occasional dyspnea.  She was at 30% by recent monitor.                   2. Underlying sick sinus syndrome limiting up-titration of AV blocking agents.                  3. Recent initiation of flecainide.                   4. Quite active, exercising regularly and riding her bike up to  miles.                  5. Possible sleep apnea with heavy snoring.  Sleep study is scheduled for November 12th.                  6. IV contrast allergy with hives.                  7. Recent echocardiogram with normal function and mild left atrial enlargement.                8. Dyslipidemia.                 9. BMI of 29.      IV Contrast:  None  EBL:  15 cc  Fluoroscopy:  23 mGray  IVF:  1600 cc    PROCEDURE: After informed consent was obtained, the patient was brought to electrophysiology lab in a fasting and nonsedated state. The patient was given general anesthesia and intubated. Both groins were prepped and draped in the usual sterile fashion. Anesthesia attempted to place left radial A-line, was unsuccessful. In the right femoral artery was inserted a 4-[place]ese sheath for continuous arterial monitoring.  In the right femoral vein was inserted a 7/8 [place] sheath. In the left femoral vein was inserted an 11/6 Western Liya sheath. An intracardiographic catheter was placed through the 11-[place] sheath into the right atrium with continuous monitoring. A decapolar deflectable catheter was placed in the coronary sinus. A CRD-2 catheter was placed along the HIS. Initial rhythm was sinus to sinus bradycardia with an AA and RR interval of 1200 ms. HV interval (unable) ms. MA interval 180 msec ms. QRS duration 90 ms. A [place] sheath was placed over a wire into the SVC. Under fluoroscopic and intraechocardiographic guidance, a [place] needle was placed in the OULU sheath and the septum punctured. Heparin with goal ACT greater than 300, was given immediately after puncture and confirmation of no bleeding or pericardial effusion. ACT was monitored during the case. The patient did develop bradycardia down to heart rate 30's soon after intubation and during catheter manipulation, finally resolving. Over a long Amplatz super stiff wire was placed through the OULU sheath and exchanged for a Mobi sheath. A Biosense [place] D/F SmartTouch irrigation ablation catheter was placed through a lamp sheath after ACT was greater than 300. A 3-D map of the left atrium was created with Carto mapping system along with CartoSound. Radiofrequency ablation was performed circumferentially around each set of veins with isolation confirmed by placing the ablation catheter in each vein and differential pacing. There was  no evidence of pericardial effusion postprocedure. The long Mobi sheath was downsized to a short 10 [place] sheath. All catheters were removed. The patient was given protamine and awoke without difficulty or focal neurological deficits. She left the lab in stable condition after removal of all catheters.     IMPRESSION:   -Successful atrial fibrillation ablation using wide circumferential pulmonary vein isolation.   -Restart Xarelto 2 hours after sheath removal.  -I will continue previous antiarrhythmics, including flecainide, at this time. -Monitor overnight and plan discharge tomorrow.  -If recurs, will consider epicardial A.fib ablation by Dr. Mulugeta Salcedo.  -Patient was prone to bradycardia during procedure. Low threshold for pacemaker if needed. Thank you kindly for allowing me to participate in this patient's care. Please do not hesitate to contact me if any questions.

## 2017-11-21 NOTE — ANESTHESIA PREPROCEDURE EVALUATION
Anesthetic History   No history of anesthetic complications            Review of Systems / Medical History  Patient summary reviewed and pertinent labs reviewed    Pulmonary  Within defined limits                 Neuro/Psych   Within defined limits           Cardiovascular            Dysrhythmias : atrial fibrillation      Exercise tolerance: >4 METS     GI/Hepatic/Renal  Within defined limits              Endo/Other  Within defined limits           Other Findings   Comments:   Risk Factors for Postoperative nausea/vomiting:       History of postoperative nausea/vomiting? NO       Female? YES       Motion sickness? NO       Intended opioid administration for postoperative analgesia? NO      Smoking Abstinence  Current Smoker? NO  Elective Surgery? YES  Seen preoperatively by anesthesiologist or proxy prior to day of surgery? YES  Pt abstained from smoking 24 hours prior to anesthesia?  N/A         Physical Exam    Airway  Mallampati: II  TM Distance: 4 - 6 cm  Neck ROM: normal range of motion   Mouth opening: Normal     Cardiovascular  Regular rate and rhythm,  S1 and S2 normal,  no murmur, click, rub, or gallop  Rhythm: regular  Rate: normal         Dental    Dentition: Lower dentition intact and Upper dentition intact     Pulmonary  Breath sounds clear to auscultation               Abdominal  GI exam deferred       Other Findings            Anesthetic Plan    ASA: 3  Anesthesia type: general    Monitoring Plan: Arterial line      Induction: Intravenous  Anesthetic plan and risks discussed with: Patient

## 2017-11-21 NOTE — IP AVS SNAPSHOT
Jaky Stiles 
 
 
 [address] [facility] [address] 
[phone] Patient: Christina Elizondo MRN: [id number] My Medications TAKE these medications as instructed Instructions Each Dose to Equal  
 Morning Noon Evening Bedtime  
 diclofenac 1 % Gel Commonly known as:  VOLTAREN Your last dose was: Your next dose is:    
   
   
 Apply 4 g to affected area four (4) times daily. 4 g  
    
   
   
   
  
 metoprolol succinate 50 mg XL tablet Commonly known as:  TOPROL-XL Your last dose was: Your next dose is:    
   
   
 1 tablet daily  
     
   
   
   
  
 multivitamin tablet Commonly known as:  ONE A DAY Your last dose was: Your next dose is: Take 1 Tab by mouth daily. 1 Tab  
    
   
   
   
  
 rivaroxaban 20 mg Tab tablet Commonly known as:  Nadeen Lees Your last dose was: Your next dose is:    
   
   
 1 tablet daily VITAMIN B-1 100 mg tablet Generic drug:  thiamine Your last dose was: Your next dose is: Take  by mouth daily. VITAMIN B-12 1,000 mcg tablet Generic drug:  cyanocobalamin Your last dose was: Your next dose is: Take 1,000 mcg by mouth daily. 1000 mcg VITAMIN D3 1,000 unit tablet Generic drug:  cholecalciferol Your last dose was: Your next dose is: Take  by mouth daily. ASK your physician about these medications Instructions Each Dose to Equal  
 Morning Noon Evening Bedtime  
 flecainide 50 mg tablet Commonly known as:  TAMBOCOR Your last dose was: Your next dose is: Take 1 Tab by mouth two (2) times a day.   
 50 mg

## 2017-11-21 NOTE — PROGRESS NOTES
1315:  TRANSFER - IN REPORT:    Verbal report received from MATT Nixon(name) on Javy Soliman  being received from [facility](unit) for routine progression of care      Report consisted of patients Situation, Background, Assessment and   Recommendations(SBAR). Information from the following report(s) SBAR, Kardex, Procedure Summary, Intake/Output, MAR, Accordion, Recent Results, Cardiac Rhythm NSR. and Alarm Parameters  was reviewed with the receiving nurse. Opportunity for questions and clarification was provided. Assessment completed upon patients arrival to unit and care assumed. 1600:  Received pt into CVT SD room 2314 from cardiac cath holding s/p PRATIK and Ablation. Complete admit assessment performed. AAOx4. NSR. VSS. Denies pain, sob or discomforts. Reinforced post cath instructions and bedrest until 1900; pt verbalizes understanding all info. Bilateral groins with dressings clean, dry, intact, no oozing, no hematoma. Pedal pulses palpable, pulses marked. Sons in to visit. 1930:  Status without acute change. Bedside and Verbal shift change report given to MATT Hoskins (oncoming nurse) by Ferdinand Carlson RN (offgoing nurse). Report included the following information SBAR, Kardex, Procedure Summary, Intake/Output, MAR, Accordion, Recent Results, Cardiac Rhythm NSR. and Alarm Parameters .

## 2017-11-21 NOTE — DISCHARGE INSTRUCTIONS
Atrial Fibrillation: Care Instructions  Your Care Instructions    Atrial fibrillation is an irregular and often fast heartbeat. Treating this condition is important for several reasons. It can cause blood clots, which can travel from your heart to your brain and cause a stroke. If you have a fast heartbeat, you may feel lightheaded, dizzy, and weak. An irregular heartbeat can also increase your risk for heart failure. Atrial fibrillation is often the result of another heart condition, such as high blood pressure or coronary artery disease. Making changes to improve your heart condition will help you stay healthy and active. Follow-up care is a key part of your treatment and safety. Be sure to make and go to all appointments, and call your doctor if you are having problems. It's also a good idea to know your test results and keep a list of the medicines you take. How can you care for yourself at home? Medicines  ? · Take your medicines exactly as prescribed. Call your doctor if you think you are having a problem with your medicine. You will get more details on the specific medicines your doctor prescribes. ? · If your doctor has given you a blood thinner to prevent a stroke, be sure you get instructions about how to take your medicine safely. Blood thinners can cause serious bleeding problems. ? · Do not take any vitamins, over-the-counter drugs, or herbal products without talking to your doctor first.   ? Lifestyle changes  ? · Do not smoke. Smoking can increase your chance of a stroke and heart attack. If you need help quitting, talk to your doctor about stop-smoking programs and medicines. These can increase your chances of quitting for good. ? · Eat a heart-healthy diet. ? · Stay at a healthy weight. Lose weight if you need to.   ? · Limit alcohol to 2 drinks a day for men and 1 drink a day for women. Too much alcohol can cause health problems. ? · Avoid colds and flu.  Get a pneumococcal vaccine shot. If you have had one before, ask your doctor whether you need another dose. Get a flu shot every year. If you must be around people with colds or flu, wash your hands often. Activity  ? · If your doctor recommends it, get more exercise. Walking is a good choice. Bit by bit, increase the amount you walk every day. Try for at least 30 minutes on most days of the week. You also may want to swim, bike, or do other activities. Your doctor may suggest that you join a cardiac rehabilitation program so that you can have help increasing your physical activity safely. ? · Start light exercise if your doctor says it is okay. Even a small amount will help you get stronger, have more energy, and manage stress. Walking is an easy way to get exercise. Start out by walking a little more than you did in the hospital. Gradually increase the amount you walk. ? · When you exercise, watch for signs that your heart is working too hard. You are pushing too hard if you cannot talk while you are exercising. If you become short of breath or dizzy or have chest pain, sit down and rest immediately. ? · Check your pulse regularly. Place two fingers on the artery at the palm side of your wrist, in line with your thumb. If your heartbeat seems uneven or fast, talk to your doctor. When should you call for help? Call 911 anytime you think you may need emergency care. For example, call if:  ? · You have symptoms of a heart attack. These may include:  ¨ Chest pain or pressure, or a strange feeling in the chest.  ¨ Sweating. ¨ Shortness of breath. ¨ Nausea or vomiting. ¨ Pain, pressure, or a strange feeling in the back, neck, jaw, or upper belly or in one or both shoulders or arms. ¨ Lightheadedness or sudden weakness. ¨ A fast or irregular heartbeat. After you call 911, the  may tell you to chew 1 adult-strength or 2 to 4 low-dose aspirin. Wait for an ambulance. Do not try to drive yourself.    ? · You have symptoms of a stroke. These may include:  ¨ Sudden numbness, tingling, weakness, or loss of movement in your face, arm, or leg, especially on only one side of your body. ¨ Sudden vision changes. ¨ Sudden trouble speaking. ¨ Sudden confusion or trouble understanding simple statements. ¨ Sudden problems with walking or balance. ¨ A sudden, severe headache that is different from past headaches. ? · You passed out (lost consciousness). ?Call your doctor now or seek immediate medical care if:  ? · You have new or increased shortness of breath. ? · You feel dizzy or lightheaded, or you feel like you may faint. ? · Your heart rate becomes irregular. ? · You can feel your heart flutter in your chest or skip heartbeats. Tell your doctor if these symptoms are new or worse. ? Watch closely for changes in your health, and be sure to contact your doctor if you have any problems. Where can you learn more? Go to http://edisonVitrinadean.info/. Enter U020 in the search box to learn more about \"Atrial Fibrillation: Care Instructions. \"  Current as of: September 21, 2016  Content Version: 11.4  © 9198-4388 [employer]. Care instructions adapted under license by [employer] (which disclaims liability or warranty for this information). If you have questions about a medical condition or this instruction, always ask your healthcare professional. [address] any warranty or liability for your use of this information. Chemical Cardioversion: Care Instructions  Your Care Instructions    Cardioversion resets your heart's rhythm to its normal pattern. It treats heart rhythm problems like atrial fibrillation or supraventricular tachycardia. Chemical cardioversion uses rhythm-control medicines to reset your heart. They can also help keep your heart in a normal rhythm after it has been reset. You may take this medicine as pills.  Or you may get it in your arm through a tube called an IV. If you have an IV, it will be done in the hospital. If you use the pills, you might start them in the hospital. Or you might start the pills at home. Your doctor may ask you to take other medicines before your cardioversion. They can help keep blood clots from forming. And they can prevent the heart-rate problem from coming back. Sometimes the heart rate doesn't go back to normal. Or it may reset for a while and then go back to an uneven rate. If this happens, you may need electrical cardioversion. Follow-up care is a key part of your treatment and safety. Be sure to make and go to all appointments, and call your doctor if you are having problems. It's also a good idea to know your test results and keep a list of the medicines you take. How can you care for yourself at home? · Talk to your doctor about the benefits and risks of these medicines. They might cause serious side effects. Your doctor will want to see you often. Be sure to go to all of your doctor visits. · Take your medicines exactly as prescribed. Call your doctor if you think you are having a problem with your medicine. · Check with your doctor or pharmacist before you use any other medicines. This includes over-the-counter medicines. Make sure your doctor knows all of the medicines, vitamins, herbal products, and supplements you take. Taking some medicines together can cause problems. When should you call for help? Call 911 anytime you think you may need emergency care. For example, call if:  ? · You passed out (lost consciousness). ? · You have chest pain or pressure. This may occur with:  ¨ Sweating. ¨ Shortness of breath. ¨ Nausea or vomiting. ¨ Pain, pressure, or a strange feeling in your back, neck, jaw, or upper belly or in one or both shoulders or arms. ¨ Lightheadedness or sudden weakness. ¨ A fast or irregular heartbeat. ? After you call 911, the  may tell you to chew 1 adult-strength or 2 to 4 low-dose aspirin. Wait for an ambulance. Do not try to drive yourself. ? · You have symptoms of a stroke. These may include:  ¨ Sudden numbness, tingling, weakness, or loss of movement in your face, arm, or leg, especially on only one side of your body. ¨ Sudden vision changes. ¨ Sudden trouble speaking. ¨ Sudden confusion or trouble understanding simple statements. ¨ Sudden problems with walking or balance. ¨ A sudden, severe headache that is different from past headaches. ?Call your doctor now or seek immediate medical care if:  ? · You are dizzy or lightheaded, or you feel like you may faint. ? · You have a fast or irregular heartbeat. ? Watch closely for changes in your health, and be sure to contact your doctor if you are having any problems. Where can you learn more? Go to http://edisonEdusondean.info/. Enter L671 in the search box to learn more about \"Chemical Cardioversion: Care Instructions. \"  Current as of: September 21, 2016  Content Version: 11.4  © 5090-0775 [employer]. Care instructions adapted under license by [employer] (which disclaims liability or warranty for this information). If you have questions about a medical condition or this instruction, always ask your healthcare professional. [address] any warranty or liability for your use of this information. Disposition:  When discharged to home will need follow-up in my office 4 weeks. Please call my office at [phone] if any questions or concerns. Restrictions:  No driving for at least 24 hours after surgery. No heavy lifting > 10 lbs or prolonged standing, walking, or running x 1 week. OK to shower today over incision and remove dressing. Monitor groin for any signs of bleeding and please contact office at [phone] if any issues.   There may be some mild bruising which is not unusual.  If any new symptoms develop, such as chest pain, dyspnea, dizziness, please contact office at [phone] immediately. ABLATION DISCHARGE INSTRUCTIONS    It is normal to feel tired the first couple days. Take it easy and follow the physicians instructions. CHECK THE CATHETER INSERTION SITE DAILY:  You may shower 24 hours after the procedure, remove the bandage during showering. Wash with soap and water and pat dry. Gentle cleaning of the site with soap and water is sufficient, cover with a dry clean dressing or bandage. Do not apply creams or powders to the area. Do not sit in a bathtub or pool of water for 7 days or until wound has completely healed. Temporary bruising and discomfort is normal and may last a few weeks. You may have a  formation of a small lump at the site which may last up to 6 weeks. CALL THE PHYSICIAN:  If the site becomes red, swollen or feels warm to the touch  If there is bleeding or drainage or if there is unusual pain at the groin or down the leg. If there is any bleeding, lie down, apply pressure or have someone apply pressure with a clean cloth until the bleeding stops. If the bleeding continues, call 911 to be transported to the hospital.  DO NOT DRIVE YOURSELF, [facility]. Activity:      For the first 24-48 hours or as instructed by the physician:  No lifting, pushing or pulling over 10 pounds and no straining the insertion site. Do not life grocery bags or the garbage can, do not run the vacuum  or  for 7 days. Start with short walks as in the hospital and gradually increase as tolerated each day. It is recommended to walk 30 minutes 5-7 days per week. Follow your physicians instructions on activity. Avoid walking outside in extremes of heat or cold. Walk inside when it is cold and windy or hot and humid. Things to keep in mind:  No driving for at least 24 hours, or as designated by your physician.   Limit the number of times you go up and down the stairs  Take rests and pace yourself with activity. Be careful and do not strain with bowel movements. Medications: Take all medications as prescribed  Call your physician if you have any questions  Keep an updated list of your medications with you at all times and give a list to your physician and pharmacist    Signs and Symptoms:  Be cautious of symptoms of angina or recurrent symptoms such as chest discomfort, unusual shortness of breath or fatigue, palpitations. After Care: Follow up with your physician as instructed. Follow a heart healthy diet with proper portion control, daily stress management, daily exercise, blood pressure and cholesterol control , and smoking cessation.

## 2017-11-21 NOTE — DISCHARGE SUMMARY
[facility]  -  Progress Note      Discharge Summary     Patient: Worley Holter MRN: [id number]  SSN: [id number]    YOB: 1960  Age: 56 y.o. Sex: female       Admit Date: 11/21/2017    Discharge Date: 11/21/2017      Admission Diagnoses: I48.91  Atrial fibrillation ([facility]  S/P ablation of atrial fibrillation    Discharge Diagnoses:   Problem List as of 11/21/2017  Date Reviewed: 11/17/2017          Codes Class Noted - Resolved    Atrial fibrillation ([facility] ICD-10-CM: I48.91  ICD-9-CM: 427.31  11/21/2017 - Present        * (Principal)S/P ablation operation for arrhythmia ICD-10-CM: Z98.890, Z86.79  ICD-9-CM: V45.89  11/21/2017 - Present    Overview Signed 11/21/2017 11:45 AM by MD STEPHANY Harveyfib ablation 11/21/17             S/P ablation of atrial fibrillation ICD-10-CM: Z98.890, Z86.79  ICD-9-CM: V45.89  11/21/2017 - Present        Intermittent atrial fibrillation ([facility] ICD-10-CM: I48.0  ICD-9-CM: 427.31  11/4/2017 - Present        Leg swelling ICD-10-CM: M79.89  ICD-9-CM: 729.81  8/24/2017 - Present        Idiopathic peripheral neuropathy ICD-10-CM: G60.9  ICD-9-CM: 356.9  6/4/2016 - Present        ADD (attention deficit disorder) ICD-10-CM: F98.8  ICD-9-CM: 314.00  12/26/2015 - Present        At risk for sleep apnea ICD-10-CM: Z91.89  ICD-9-CM: V49.89  12/26/2015 - Present        Generalized osteoarthrosis, involving multiple sites ICD-10-CM: M15.9  ICD-9-CM: 715.09  Unknown - Present        Unspecified tinnitus ICD-10-CM: H93.19  ICD-9-CM: 388.30  Unknown - Present               Discharge Condition: stable    Hospital Course:  Pt presented for elective atrial fibrillation ablation with Dr Kayleigh Stroud. Patient had successful ablation. She was observed overnight and remained stable. In NSR this morning. No chest pain or palpitations or dizziness. Pt was restarted on Xarelto last night.  She will continue this along with her other prior to admission medications, including Flecainide. DIAGNOSES   1. New diagnosis of paroxysmal atrial fibrillation with symptoms of swelling and occasional dyspnea.  She was at 30% by recent monitor.                   2. Underlying sick sinus syndrome limiting up-titration of AV blocking agents.                  3. Recent initiation of flecainide.                   4. Quite active, exercising regularly and riding her bike up to  miles.                  5. Possible sleep apnea with heavy snoring.  Sleep study is scheduled for November 12th.                  6. IV contrast allergy with hives.                  7. Recent echocardiogram with normal function and mild left atrial enlargement.                8. Dyslipidemia.                 9. BMI of 29.      Consults: none    Significant Diagnostic Studies:   PROCEDURE   1. Cardiac electrophysiology study. 2. Atrial fibrillation radiofrequency ablation using a left-sided wide circumferential pulmonary vein isolation and Smart-Touch ablation catheter. 3. Transseptal puncture. 4. Intracardiac echocardiography. 5. Three-dimensional electroanatomical map of the left atrium using CARTO, including CartoSound. .  6. Continuous arterial monitoring with placement of right femoral arterial   sheath. 7. Pacing and sensing from the right atrium and left atrium via the coronary sinus. 8. Placement of coronary sinus and His catheters. 9. Direct current cardioversion. 10. General endotracheal intubation with anesthesia. 11. Continuous esophageal temperature monitoring. Disposition: home    Discharge Medications:   Current Discharge Medication List      CONTINUE these medications which have NOT CHANGED    Details   flecainide (TAMBOCOR) 50 mg tablet Take 1 Tab by mouth two (2) times a day. Qty: 60 Tab, Refills: 6      metoprolol succinate (TOPROL-XL) 50 mg XL tablet 1 tablet daily  Qty: 30 Tab, Refills: 2      thiamine (VITAMIN B-1) 100 mg tablet Take  by mouth daily.       cholecalciferol (VITAMIN D3) 1,000 unit tablet Take  by mouth daily. cyanocobalamin (VITAMIN B-12) 1,000 mcg tablet Take 1,000 mcg by mouth daily. multivitamin (ONE A DAY) tablet Take 1 Tab by mouth daily. rivaroxaban (XARELTO) 20 mg tab tablet 1 tablet daily  Qty: 30 Tab, Refills: 6      diclofenac (VOLTAREN) 1 % gel Apply 4 g to affected area four (4) times daily. Qty: 100 g, Refills: 0             Activity: No driving for at least 24 hours after surgery. No heavy lifting > 10 lbs or prolonged standing, walking, or running x 1 week. Diet: cardiac    Wound Care: OK to shower today over incision and remove dressing. Monitor groin for any signs of bleeding and please contact office at [phone] if any issues. There may be some mild bruising which is not unusual.    Follow-up Appointments   Procedures    FOLLOW UP VISIT Appointment in: One Month Follow up with Dr Cathy Seo in 3-4 weeks. Follow up with Dr Cathy Seo in 3-4 weeks.      Standing Status:   Standing     Number of Occurrences:   1     Order Specific Question:   Appointment in     Answer:   One Month       Signed By: Nazia Puga     November 21, 2017

## 2017-11-21 NOTE — ROUTINE PROCESS
TRANSFER - OUT REPORT:    Verbal report given to Kevin Maurer RN (name) on Rosita Flores  being transferred to [facility] ([facility]) for routine progression of care       Report consisted of patients Situation, Background, Assessment and   Recommendations(SBAR). Information from the following report(s) SBAR, Kardex, Procedure Summary and MAR was reviewed with the receiving nurse. Lines:   Peripheral IV 11/21/17 Right Antecubital (Active)   Site Assessment Clean, dry, & intact 11/21/2017  7:21 AM   Phlebitis Assessment 0 11/21/2017  7:21 AM   Dressing Status Clean, dry, & intact 11/21/2017  7:21 AM   Dressing Type Transparent 11/21/2017  7:21 AM   Hub Color/Line Status Pink;Patent; Flushed 11/21/2017  7:21 AM   Alcohol Cap Used No 11/21/2017  7:21 AM       Peripheral IV 11/21/17 Left Antecubital (Active)   Site Assessment Clean, dry, & intact 11/21/2017  7:21 AM   Phlebitis Assessment 0 11/21/2017  7:21 AM   Infiltration Assessment 0 11/21/2017  7:21 AM   Dressing Status Clean, dry, & intact 11/21/2017  7:21 AM   Dressing Type Transparent 11/21/2017  7:21 AM   Hub Color/Line Status Blue;Flushed;Patent 11/21/2017  7:21 AM   Alcohol Cap Used No 11/21/2017  7:21 AM        Opportunity for questions and clarification was provided.       Patient transported with:   [employer]

## 2017-11-21 NOTE — ANESTHESIA POSTPROCEDURE EVALUATION
Post-Anesthesia Evaluation and Assessment    Patient: Dontae Longoria MRN: [id number]  SSN: [id number]    YOB: 1960  Age: 56 y.o. Sex: female       Cardiovascular Function/Vital Signs  Visit Vitals    /84    Pulse 76    Temp 36.8 °C (98.3 °F)    Resp 11    Ht 5' 11\" (1.803 m)    Wt 93 kg (205 lb)    SpO2 96%    BMI 28.59 kg/m2       Patient is status post general anesthesia for * No procedures listed *. Nausea/Vomiting: None    Postoperative hydration reviewed and adequate. Pain:  Pain Scale 1: Numeric (0 - 10) (11/21/17 1150)  Pain Intensity 1: 0 (11/21/17 1150)   Managed    Neurological Status: At baseline    Mental Status and Level of Consciousness: Alert and oriented     Pulmonary Status:   O2 Device: Room air (11/21/17 1150)   Adequate oxygenation and airway patent    Complications related to anesthesia: None    Post-anesthesia assessment completed.  No concerns      Signed By: Guera Farias CRNA     November 21, 2017

## 2017-11-21 NOTE — IP AVS SNAPSHOT
Summary of Care Report The Summary of Care report has been created to help improve care coordination. Users with access to [email or web address] or [facility] [address] (Web-based application) may access additional patient information including the Discharge Summary. If you are not currently a [address] user and need more information, please call the number listed below in the [place] [address] section and ask to be connected with Medical Records. Facility Information Name Address Phone [place] [id number] [facility] [address] [phone] Patient Information Patient Name Sex  Cliff Hare ([id number]) Female 1960 Discharge Information Admitting Provider Service Area Unit David Burgess MD / [facility] / [phone] Discharge Provider Discharge Date/Time Discharge Disposition Destination (none) 2017 Morning (Pending) AHR (none) Patient Language Language ENGLISH [13] Hospital Problems as of 2017  Reviewed: 2017  2:53 PM by Mariana Lopez NP Class Noted - Resolved Last Modified POA Active Problems Atrial fibrillation ([address]  2017 - Present 2017 by David Burgess MD Unknown Entered by David Burgess MD  
  * (Principal)S/P ablation operation for arrhythmia  2017 - Present 2017 by David Burgess MD Unknown Entered by aDvid Burgess MD  
  Overview Signed 2017 11:45 AM by David Burgess MD  
   A.fib ablation 17 S/P ablation of atrial fibrillation  2017 - Present 2017 by David Burgess MD Unknown Entered by David Burgess MD  
  
Non-Hospital Problems as of 2017  Reviewed: 2017  2:53 PM by Mariana Lopez NP Class Noted - Resolved Last Modified Active Problems Generalized osteoarthrosis, involving multiple sites  Unknown - Present 9/9/2015 by Yulisa Melgoza Entered by Yulisa Melgoza Unspecified tinnitus  Unknown - Present 9/9/2015 by Yulisa Melgoza Entered by Yulisa Melgoza ADD (attention deficit disorder)  12/26/2015 - Present 12/26/2015 by Rubén Fajardo MD  
  Entered by Rubén Fajardo MD  
  At risk for sleep apnea  12/26/2015 - Present 12/26/2015 by Rubén Fajardo MD  
  Entered by Rubén Fajardo MD  
  Idiopathic peripheral neuropathy  6/4/2016 - Present 6/4/2016 by Rubén Fajardo MD  
  Entered by Rubén Fajardo MD  
  Leg swelling  8/24/2017 - Present 8/24/2017 by Rubén Fajardo MD  
  Entered by Rubén Fajardo MD  
  Intermittent atrial fibrillation [facility])  11/4/2017 - Present 11/4/2017 by Rubén Fajardo MD  
  Entered by Rubén Fajardo MD  
  
You are allergic to the following Allergen Reactions Celebrex (Celecoxib) Hives Iodinated Contrast- Oral And Iv Dye Hives Patient was in CT for a cardiac scan. A test bolus of 20cc was given and patient broke out into hives immediately after. Cancelled the rest of the exam and escorted the patient to the ED for furhter evaluation. Sulfa (Sulfonamide Antibiotics) Other (comments) Patient states she is not allergic Current Discharge Medication List  
  
CONTINUE these medications which have NOT CHANGED Dose & Instructions Dispensing Information Comments  
 diclofenac 1 % Gel Commonly known as:  VOLTAREN Dose:  4 g Apply 4 g to affected area four (4) times daily. Quantity:  100 g Refills:  0  
   
 metoprolol succinate 50 mg XL tablet Commonly known as:  TOPROL-XL  
 1 tablet daily Quantity:  30 Tab Refills:  2  
   
 multivitamin tablet Commonly known as:  ONE A DAY Dose:  1 Tab Take 1 Tab by mouth daily. Refills:  0  
   
 rivaroxaban 20 mg Tab tablet Commonly known as:  XARELTO  
 1 tablet daily Quantity:  30 Tab Refills:  6 VITAMIN B-1 100 mg tablet Generic drug:  thiamine Take  by mouth daily. Refills:  0  
   
 VITAMIN B-12 1,000 mcg tablet Generic drug:  cyanocobalamin Dose:  1000 mcg Take 1,000 mcg by mouth daily. Refills:  0  
   
 VITAMIN D3 1,000 unit tablet Generic drug:  cholecalciferol Take  by mouth daily. Refills:  0 ASK your doctor about these medications Dose & Instructions Dispensing Information Comments  
 flecainide 50 mg tablet Commonly known as:  TAMBOCOR Dose:  50 mg Take 1 Tab by mouth two (2) times a day. Quantity:  60 Tab Refills:  6 Current Immunizations Name Date  
 TB Skin Test (PPD) 8/29/2013, 2/27/2007 TB Skin Test (PPD) Intradermal 8/9/2017 Surgery Information ID Date/Time Status Primary Surgeon All Procedures Location [id number] 11/21/2017 Complete   ZZANESTHESIA SO DAISY [facility] - DO NOT SCHEDULE Follow-up Information Follow up With Details Comments Contact Info Damaris Bryson MD On 11/28/2017 @ 2:15 [address] [address] 
[phone] Basil Evans MD On 12/28/2017 @ 4 o,clock [address] [facility] [address] [facility] [address] [place] [address] 
[phone] Discharge Instructions Atrial Fibrillation: Care Instructions Your Care Instructions Atrial fibrillation is an irregular and often fast heartbeat. Treating this condition is important for several reasons. It can cause blood clots, which can travel from your heart to your brain and cause a stroke. If you have a fast heartbeat, you may feel lightheaded, dizzy, and weak. An irregular heartbeat can also increase your risk for heart failure. Atrial fibrillation is often the result of another heart condition, such as high blood pressure or coronary artery disease. Making changes to improve your heart condition will help you stay healthy and active. Follow-up care is a key part of your treatment and safety. Be sure to make and go to all appointments, and call your doctor if you are having problems. It's also a good idea to know your test results and keep a list of the medicines you take. How can you care for yourself at home? Medicines ? · Take your medicines exactly as prescribed. Call your doctor if you think you are having a problem with your medicine. You will get more details on the specific medicines your doctor prescribes. ? · If your doctor has given you a blood thinner to prevent a stroke, be sure you get instructions about how to take your medicine safely. Blood thinners can cause serious bleeding problems. ? · Do not take any vitamins, over-the-counter drugs, or herbal products without talking to your doctor first. ? Lifestyle changes ? · Do not smoke. Smoking can increase your chance of a stroke and heart attack. If you need help quitting, talk to your doctor about stop-smoking programs and medicines. These can increase your chances of quitting for good. ? · Eat a heart-healthy diet. ? · Stay at a healthy weight. Lose weight if you need to.  
? · Limit alcohol to 2 drinks a day for men and 1 drink a day for women. Too much alcohol can cause health problems. ? · Avoid colds and flu. Get a pneumococcal vaccine shot. If you have had one before, ask your doctor whether you need another dose. Get a flu shot every year. If you must be around people with colds or flu, wash your hands often. Activity ? · If your doctor recommends it, get more exercise. Walking is a good choice. Bit by bit, increase the amount you walk every day. Try for at least 30 minutes on most days of the week. You also may want to swim, bike, or do other activities. Your doctor may suggest that you join a cardiac rehabilitation program so that you can have help increasing your physical activity safely. ? · Start light exercise if your doctor says it is okay. Even a small amount will help you get stronger, have more energy, and manage stress. Walking is an easy way to get exercise. Start out by walking a little more than you did in the hospital. Gradually increase the amount you walk. ? · When you exercise, watch for signs that your heart is working too hard. You are pushing too hard if you cannot talk while you are exercising. If you become short of breath or dizzy or have chest pain, sit down and rest immediately. ? · Check your pulse regularly. Place two fingers on the artery at the palm side of your wrist, in line with your thumb. If your heartbeat seems uneven or fast, talk to your doctor. When should you call for help? Call 911 anytime you think you may need emergency care. For example, call if: 
? · You have symptoms of a heart attack. These may include: ¨ Chest pain or pressure, or a strange feeling in the chest. 
¨ Sweating. ¨ Shortness of breath. ¨ Nausea or vomiting. ¨ Pain, pressure, or a strange feeling in the back, neck, jaw, or upper belly or in one or both shoulders or arms. ¨ Lightheadedness or sudden weakness. ¨ A fast or irregular heartbeat. After you call 911, the  may tell you to chew 1 adult-strength or 2 to 4 low-dose aspirin. Wait for an ambulance. Do not try to drive yourself. ? · You have symptoms of a stroke. These may include: 
¨ Sudden numbness, tingling, weakness, or loss of movement in your face, arm, or leg, especially on only one side of your body. ¨ Sudden vision changes. ¨ Sudden trouble speaking. ¨ Sudden confusion or trouble understanding simple statements. ¨ Sudden problems with walking or balance. ¨ A sudden, severe headache that is different from past headaches. ? · You passed out (lost consciousness). ?Call your doctor now or seek immediate medical care if: 
? · You have new or increased shortness of breath. ? · You feel dizzy or lightheaded, or you feel like you may faint. ? · Your heart rate becomes irregular. ? · You can feel your heart flutter in your chest or skip heartbeats. Tell your doctor if these symptoms are new or worse. ? Watch closely for changes in your health, and be sure to contact your doctor if you have any problems. Where can you learn more? Go to http://edison-dean.info/. Enter U020 in the search box to learn more about \"Atrial Fibrillation: Care Instructions. \" Current as of: September 21, 2016 Content Version: 11.4 © 1570-8615 [employer]. Care instructions adapted under license by [employer] (which disclaims liability or warranty for this information). If you have questions about a medical condition or this instruction, always ask your healthcare professional. [address] any warranty or liability for your use of this information. Chemical Cardioversion: Care Instructions Your Care Instructions Cardioversion resets your heart's rhythm to its normal pattern. It treats heart rhythm problems like atrial fibrillation or supraventricular tachycardia. Chemical cardioversion uses rhythm-control medicines to reset your heart. They can also help keep your heart in a normal rhythm after it has been reset. You may take this medicine as pills. Or you may get it in your arm through a tube called an IV. If you have an IV, it will be done in the hospital. If you use the pills, you might start them in the hospital. Or you might start the pills at home. Your doctor may ask you to take other medicines before your cardioversion. They can help keep blood clots from forming. And they can prevent the heart-rate problem from coming back. Sometimes the heart rate doesn't go back to normal. Or it may reset for a while and then go back to an uneven rate. If this happens, you may need electrical cardioversion. Follow-up care is a key part of your treatment and safety. Be sure to make and go to all appointments, and call your doctor if you are having problems. It's also a good idea to know your test results and keep a list of the medicines you take. How can you care for yourself at home? · Talk to your doctor about the benefits and risks of these medicines. They might cause serious side effects. Your doctor will want to see you often. Be sure to go to all of your doctor visits. · Take your medicines exactly as prescribed. Call your doctor if you think you are having a problem with your medicine. · Check with your doctor or pharmacist before you use any other medicines. This includes over-the-counter medicines. Make sure your doctor knows all of the medicines, vitamins, herbal products, and supplements you take. Taking some medicines together can cause problems. When should you call for help? Call 911 anytime you think you may need emergency care. For example, call if: 
? · You passed out (lost consciousness). ? · You have chest pain or pressure. This may occur with: ¨ Sweating. ¨ Shortness of breath. ¨ Nausea or vomiting. ¨ Pain, pressure, or a strange feeling in your back, neck, jaw, or upper belly or in one or both shoulders or arms. ¨ Lightheadedness or sudden weakness. ¨ A fast or irregular heartbeat. ? After you call 911, the  may tell you to chew 1 adult-strength or 2 to 4 low-dose aspirin. Wait for an ambulance. Do not try to drive yourself. ? · You have symptoms of a stroke. These may include: 
¨ Sudden numbness, tingling, weakness, or loss of movement in your face, arm, or leg, especially on only one side of your body. ¨ Sudden vision changes. ¨ Sudden trouble speaking. ¨ Sudden confusion or trouble understanding simple statements. ¨ Sudden problems with walking or balance. ¨ A sudden, severe headache that is different from past headaches. ?Call your doctor now or seek immediate medical care if: 
? · You are dizzy or lightheaded, or you feel like you may faint. ? · You have a fast or irregular heartbeat. ? Watch closely for changes in your health, and be sure to contact your doctor if you are having any problems. Where can you learn more? Go to http://edison-dean.info/. Enter W009 in the search box to learn more about \"Chemical Cardioversion: Care Instructions. \" Current as of: September 21, 2016 Content Version: 11.4 © 6391-8624 [employer]. Care instructions adapted under license by [facility] (which disclaims liability or warranty for this information). If you have questions about a medical condition or this instruction, always ask your healthcare professional. [address] any warranty or liability for your use of this information. Disposition: When discharged to home will need follow-up in my office 4 weeks. Please call my office at [phone] if any questions or concerns. Restrictions: 
No driving for at least 24 hours after surgery. No heavy lifting > 10 lbs or prolonged standing, walking, or running x 1 week. OK to shower today over incision and remove dressing. Monitor groin for any signs of bleeding and please contact office at [phone] if any issues. There may be some mild bruising which is not unusual. 
If any new symptoms develop, such as chest pain, dyspnea, dizziness, please contact office at [phone] immediately. ABLATION DISCHARGE INSTRUCTIONS It is normal to feel tired the first couple days. Take it easy and follow the physicians instructions. CHECK THE CATHETER INSERTION SITE DAILY: 
You may shower 24 hours after the procedure, remove the bandage during showering. Wash with soap and water and pat dry.  
Gentle cleaning of the site with soap and water is sufficient, cover with a dry clean dressing or bandage. Do not apply creams or powders to the area. Do not sit in a bathtub or pool of water for 7 days or until wound has completely healed. Temporary bruising and discomfort is normal and may last a few weeks. You may have a  formation of a small lump at the site which may last up to 6 weeks. CALL THE PHYSICIAN: If the site becomes red, swollen or feels warm to the touch If there is bleeding or drainage or if there is unusual pain at the groin or down the leg. If there is any bleeding, lie down, apply pressure or have someone apply pressure with a clean cloth until the bleeding stops. If the bleeding continues, call 911 to be transported to the hospital. 
DO  [facility] [address] Activity: For the first 24-48 hours or as instructed by the physician: No lifting, pushing or pulling over 10 pounds and no straining the insertion site. Do not life grocery bags or the garbage can, do not run the vacuum  or  for 7 days. Start with short walks as in the hospital and gradually increase as tolerated each day. It is recommended to walk 30 minutes 5-7 days per week. Follow your physicians instructions on activity. Avoid walking outside in extremes of heat or cold. Walk inside when it is cold and windy or hot and humid. Things to keep in mind: 
No driving for at least 24 hours, or as designated by your physician. Limit the number of times you go up and down the stairs Take rests and pace yourself with activity. Be careful and do not strain with bowel movements. Medications: Take all medications as prescribed Call your physician if you have any questions Keep an updated list of your medications with you at all times and give a list to your physician and pharmacist 
 
Signs and Symptoms: 
Be cautious of symptoms of angina or recurrent symptoms such as chest discomfort, unusual shortness of breath or fatigue, palpitations. After Care: Follow up with your physician as instructed. Follow a heart healthy diet with proper portion control, daily stress management, daily exercise, blood pressure and cholesterol control , and smoking cessation. Chart Review Routing History Recipient Method Report Sent By Fernanda Felix MD  
Phone: [phone] In [facility] EKG CUSTOM REPORT Pablo Henao MD [[id number]] 9/27/2017  3:52 PM 9/27/2017 Feliz Peres NP Phone: [phone] In [facility] EKG CUSTOM REPORT Pablo Henao MD [[id number]] 9/27/2017  3:52 PM 9/27/2017 Feliz Peres NP Phone: [phone] In [facility] EKG CUSTOM REPORT Wiley Valerio [[id number]] 10/10/2017 11:00 AM 10/3/2017 Carlos Enrique Felix MD  
Phone: [phone]  In [facility] [[id number]] 10/12/2017  3:52 PM

## 2017-11-21 NOTE — IP AVS SNAPSHOT
[address] [address] 
[phone] Patient: Simeon Garcia MRN: [id number] SFR:[id number] About your hospitalization You were admitted on:  November 21, 2017 You last received care in the:  [facility] 2 CV STEPDOWN You were discharged on:  November 22, 2017 Why you were hospitalized Your primary diagnosis was:  S/P Ablation Operation For Arrhythmia Your diagnoses also included:  Atrial Fibrillation (Hcc), S/P Ablation Of Atrial Fibrillation Things You Need To Do (next 8 weeks) Tuesday Nov 28, 2017 HOSPITAL FOLLOW-UP with Dariana New NP at  2:15 PM  
Where:  [facility] ([address]) Follow up with Olive Bell MD  
@ 2:15 Phone:  [phone] Where:  [address], [address] [facility] Thursday Dec 28, 2017 POST HOSPITAL with Hadley Diamond MD at  4:00 PM  
Where:  [facility] ([address] [place] [address]) Discharge Orders None A check jonelle indicates which time of day the medication should be taken. My Medications TAKE these medications as instructed Instructions Each Dose to Equal  
 Morning Noon Evening Bedtime  
 diclofenac 1 % Gel Commonly known as:  VOLTAREN Your last dose was: Your next dose is:    
   
   
 Apply 4 g to affected area four (4) times daily. 4 g  
    
   
   
   
  
 metoprolol succinate 50 mg XL tablet Commonly known as:  TOPROL-XL Your last dose was: Your next dose is:    
   
   
 1 tablet daily  
     
   
   
   
  
 multivitamin tablet Commonly known as:  ONE A DAY Your last dose was: Your next dose is: Take 1 Tab by mouth daily. 1 Tab  
    
   
   
   
  
 rivaroxaban 20 mg Tab tablet Commonly known as:  Rogena Crigler Your last dose was: Your next dose is: 1 tablet daily VITAMIN B-1 100 mg tablet Generic drug:  thiamine Your last dose was: Your next dose is: Take  by mouth daily. VITAMIN B-12 1,000 mcg tablet Generic drug:  cyanocobalamin Your last dose was: Your next dose is: Take 1,000 mcg by mouth daily. 1000 mcg VITAMIN D3 1,000 unit tablet Generic drug:  cholecalciferol Your last dose was: Your next dose is: Take  by mouth daily. ASK your physician about these medications Instructions Each Dose to Equal  
 Morning Noon Evening Bedtime  
 flecainide 50 mg tablet Commonly known as:  TAMBOCOR Your last dose was: Your next dose is: Take 1 Tab by mouth two (2) times a day. 50 mg Discharge Instructions Atrial Fibrillation: Care Instructions Your Care Instructions Atrial fibrillation is an irregular and often fast heartbeat. Treating this condition is important for several reasons. It can cause blood clots, which can travel from your heart to your brain and cause a stroke. If you have a fast heartbeat, you may feel lightheaded, dizzy, and weak. An irregular heartbeat can also increase your risk for heart failure. Atrial fibrillation is often the result of another heart condition, such as high blood pressure or coronary artery disease. Making changes to improve your heart condition will help you stay healthy and active. Follow-up care is a key part of your treatment and safety. Be sure to make and go to all appointments, and call your doctor if you are having problems. It's also a good idea to know your test results and keep a list of the medicines you take. How can you care for yourself at home? Medicines ? · Take your medicines exactly as prescribed.  Call your doctor if you think you are having a problem with your medicine. You will get more details on the specific medicines your doctor prescribes. ? · If your doctor has given you a blood thinner to prevent a stroke, be sure you get instructions about how to take your medicine safely. Blood thinners can cause serious bleeding problems. ? · Do not take any vitamins, over-the-counter drugs, or herbal products without talking to your doctor first. ? Lifestyle changes ? · Do not smoke. Smoking can increase your chance of a stroke and heart attack. If you need help quitting, talk to your doctor about stop-smoking programs and medicines. These can increase your chances of quitting for good. ? · Eat a heart-healthy diet. ? · Stay at a healthy weight. Lose weight if you need to.  
? · Limit alcohol to 2 drinks a day for men and 1 drink a day for women. Too much alcohol can cause health problems. ? · Avoid colds and flu. Get a pneumococcal vaccine shot. If you have had one before, ask your doctor whether you need another dose. Get a flu shot every year. If you must be around people with colds or flu, wash your hands often. Activity ? · If your doctor recommends it, get more exercise. Walking is a good choice. Bit by bit, increase the amount you walk every day. Try for at least 30 minutes on most days of the week. You also may want to swim, bike, or do other activities. Your doctor may suggest that you join a cardiac rehabilitation program so that you can have help increasing your physical activity safely. ? · Start light exercise if your doctor says it is okay. Even a small amount will help you get stronger, have more energy, and manage stress. Walking is an easy way to get exercise. Start out by walking a little more than you did in the hospital. Gradually increase the amount you walk. ? · When you exercise, watch for signs that your heart is working too hard.  You are pushing too hard if you cannot talk while you are exercising. If you become short of breath or dizzy or have chest pain, sit down and rest immediately. ? · Check your pulse regularly. Place two fingers on the artery at the palm side of your wrist, in line with your thumb. If your heartbeat seems uneven or fast, talk to your doctor. When should you call for help? Call 911 anytime you think you may need emergency care. For example, call if: 
? · You have symptoms of a heart attack. These may include: ¨ Chest pain or pressure, or a strange feeling in the chest. 
¨ Sweating. ¨ Shortness of breath. ¨ Nausea or vomiting. ¨ Pain, pressure, or a strange feeling in the back, neck, jaw, or upper belly or in one or both shoulders or arms. ¨ Lightheadedness or sudden weakness. ¨ A fast or irregular heartbeat. After you call 911, the  may tell you to chew 1 adult-strength or 2 to 4 low-dose aspirin. Wait for an ambulance. Do not try to drive yourself. ? · You have symptoms of a stroke. These may include: 
¨ Sudden numbness, tingling, weakness, or loss of movement in your face, arm, or leg, especially on only one side of your body. ¨ Sudden vision changes. ¨ Sudden trouble speaking. ¨ Sudden confusion or trouble understanding simple statements. ¨ Sudden problems with walking or balance. ¨ A sudden, severe headache that is different from past headaches. ? · You passed out (lost consciousness). ?Call your doctor now or seek immediate medical care if: 
? · You have new or increased shortness of breath. ? · You feel dizzy or lightheaded, or you feel like you may faint. ? · Your heart rate becomes irregular. ? · You can feel your heart flutter in your chest or skip heartbeats. Tell your doctor if these symptoms are new or worse. ? Watch closely for changes in your health, and be sure to contact your doctor if you have any problems. Where can you learn more? Go to http://dane.info/. Enter U020 in the search box to learn more about \"Atrial Fibrillation: Care Instructions. \" Current as of: September 21, 2016 Content Version: 11.4 © 9632-0643 [employer]der. Care instructions adapted under license by [facility] (which disclaims liability or warranty for this information). If you have questions about a medical condition or this instruction, always ask your healthcare professional. [address] any warranty or liability for your use of this information. Chemical Cardioversion: Care Instructions Your Care Instructions Cardioversion resets your heart's rhythm to its normal pattern. It treats heart rhythm problems like atrial fibrillation or supraventricular tachycardia. Chemical cardioversion uses rhythm-control medicines to reset your heart. They can also help keep your heart in a normal rhythm after it has been reset. You may take this medicine as pills. Or you may get it in your arm through a tube called an IV. If you have an IV, it will be done in the hospital. If you use the pills, you might start them in the hospital. Or you might start the pills at home. Your doctor may ask you to take other medicines before your cardioversion. They can help keep blood clots from forming. And they can prevent the heart-rate problem from coming back. Sometimes the heart rate doesn't go back to normal. Or it may reset for a while and then go back to an uneven rate. If this happens, you may need electrical cardioversion. Follow-up care is a key part of your treatment and safety. Be sure to make and go to all appointments, and call your doctor if you are having problems. It's also a good idea to know your test results and keep a list of the medicines you take. How can you care for yourself at home? · Talk to your doctor about the benefits and risks of these medicines. They might cause serious side effects.  Your doctor will want to see you often. Be sure to go to all of your doctor visits. · Take your medicines exactly as prescribed. Call your doctor if you think you are having a problem with your medicine. · Check with your doctor or pharmacist before you use any other medicines. This includes over-the-counter medicines. Make sure your doctor knows all of the medicines, vitamins, herbal products, and supplements you take. Taking some medicines together can cause problems. When should you call for help? Call 911 anytime you think you may need emergency care. For example, call if: 
? · You passed out (lost consciousness). ? · You have chest pain or pressure. This may occur with: ¨ Sweating. ¨ Shortness of breath. ¨ Nausea or vomiting. ¨ Pain, pressure, or a strange feeling in your back, neck, jaw, or upper belly or in one or both shoulders or arms. ¨ Lightheadedness or sudden weakness. ¨ A fast or irregular heartbeat. ? After you call 911, the  may tell you to chew 1 adult-strength or 2 to 4 low-dose aspirin. Wait for an ambulance. Do not try to drive yourself. ? · You have symptoms of a stroke. These may include: 
¨ Sudden numbness, tingling, weakness, or loss of movement in your face, arm, or leg, especially on only one side of your body. ¨ Sudden vision changes. ¨ Sudden trouble speaking. ¨ Sudden confusion or trouble understanding simple statements. ¨ Sudden problems with walking or balance. ¨ A sudden, severe headache that is different from past headaches. ?Call your doctor now or seek immediate medical care if: 
? · You are dizzy or lightheaded, or you feel like you may faint. ? · You have a fast or irregular heartbeat. ? Watch closely for changes in your health, and be sure to contact your doctor if you are having any problems. Where can you learn more? Go to http://edison-dean.info/. Enter Z765 in the search box to learn more about \"Chemical Cardioversion: Care Instructions. \" 
 Current as of: September 21, 2016 Content Version: 11.4 © 7368-6393 Envestnet. Care instructions adapted under license by [employer] (which disclaims liability or warranty for this information). If you have questions about a medical condition or this instruction, always ask your healthcare professional. [address] any warranty or liability for your use of this information. Disposition: When discharged to home will need follow-up in my office 4 weeks. Please call my office at [phone] if any questions or concerns. Restrictions: 
No driving for at least 24 hours after surgery. No heavy lifting > 10 lbs or prolonged standing, walking, or running x 1 week. OK to shower today over incision and remove dressing. Monitor groin for any signs of bleeding and please contact office at [phone] if any issues. There may be some mild bruising which is not unusual. 
If any new symptoms develop, such as chest pain, dyspnea, dizziness, please contact office at [phone] immediately. ABLATION DISCHARGE INSTRUCTIONS It is normal to feel tired the first couple days. Take it easy and follow the physicians instructions. CHECK THE CATHETER INSERTION SITE DAILY: 
You may shower 24 hours after the procedure, remove the bandage during showering. Wash with soap and water and pat dry. Gentle cleaning of the site with soap and water is sufficient, cover with a dry clean dressing or bandage. Do not apply creams or powders to the area. Do not sit in a bathtub or pool of water for 7 days or until wound has completely healed. Temporary bruising and discomfort is normal and may last a few weeks. You may have a  formation of a small lump at the site which may last up to 6 weeks. CALL THE PHYSICIAN: If the site becomes red, swollen or feels warm to the touch If there is bleeding or drainage or if there is unusual pain at the groin or down the leg. If there is any bleeding, lie down, apply pressure or have someone apply pressure with a clean cloth until the bleeding stops. If the bleeding continues, call 911 to be transported to the hospital. 
DO  [address] Activity: For the first 24-48 hours or as instructed by the physician: No lifting, pushing or pulling over 10 pounds and no straining the insertion site. Do not life grocery bags or the garbage can, do not run the vacuum  or  for 7 days. Start with short walks as in the hospital and gradually increase as tolerated each day. It is recommended to walk 30 minutes 5-7 days per week. Follow your physicians instructions on activity. Avoid walking outside in extremes of heat or cold. Walk inside when it is cold and windy or hot and humid. Things to keep in mind: 
No driving for at least 24 hours, or as designated by your physician. Limit the number of times you go up and down the stairs Take rests and pace yourself with activity. Be careful and do not strain with bowel movements. Medications: Take all medications as prescribed Call your physician if you have any questions Keep an updated list of your medications with you at all times and give a list to your physician and pharmacist 
 
Signs and Symptoms: 
Be cautious of symptoms of angina or recurrent symptoms such as chest discomfort, unusual shortness of breath or fatigue, palpitations. After Care: Follow up with your physician as instructed. Follow a heart healthy diet with proper portion control, daily stress management, daily exercise, blood pressure and cholesterol control , and smoking cessation. [employer] Announcement We are excited to announce that we are making your provider's discharge notes available to you in [facility].   You will see these notes when they are completed and signed by the physician that discharged you from your recent hospital stay. If you have any questions or concerns about any information you see in Secret [employer], please call the Health Information Department where you were seen or reach out to your Primary Care Provider for more information about your plan of care. Introducing [facility]! Dear Raf Gonzalez: Thank you for requesting a [employer] Rec[employer] account. Our records indicate that you already have an active [employer] account. You can access your account anytime at [email or web address] Did you know that you can access your hospital and ER discharge instructions at any time in Secret Recipe? You can also review all of your test results from your hospital stay or ER visit. Additional Information If you have questions, please visit the Frequently Asked Questions section of the Secret Recipe website at [email or web address] Remember, Secret Recipe is NOT to be used for urgent needs. For medical emergencies, dial 911. Now available from your iPhone and Android! Providers Seen During Your Hospitalization Provider Specialty Primary office phone Dontae Anderson MD Cardiology [phone] Your Primary Care Physician (PCP) Primary Care Physician Office Phone Office Fax [address], [address] [place] [address] 472 [phone] You are allergic to the following Allergen Reactions Celebrex (Celecoxib) Hives Iodinated Contrast- Oral And Iv Dye Hives Patient was in CT for a cardiac scan. A test bolus of 20cc was given and patient broke out into hives immediately after. Cancelled the rest of the exam and escorted the patient to the ED for furhter evaluation. Sulfa (Sulfonamide Antibiotics) Other (comments) Patient states she is not allergic Recent Documentation Height Weight Breastfeeding? BMI OB Status Smoking Status 1.803 m 95.4 kg No 29.33 kg/m2 Postmenopausal Never Smoker Emergency Contacts Name Discharge Info Relation Home Work Mobile 1 [facility] Robbie CAREGIVER [3] Daughter [21]   [phone] Patient Belongings The following personal items are in your possession at time of discharge: 
  Dental Appliances: None  Visual Aid: None      Home Medications: None   Jewelry: None  Clothing: None    Other Valuables: None  Personal Items Sent to Safe: none Please provide this summary of care documentation to your next provider. Signatures-by signing, you are acknowledging that this After Visit Summary has been reviewed with you and you have received a copy. Patient Signature:  ____________________________________________________________ Date:  ____________________________________________________________  
  
[facility] Provider Signature:  ____________________________________________________________ Date:  ____________________________________________________________

## 2017-11-21 NOTE — PROGRESS NOTES
Right FAS x  and FVS x 2, aspirated and pulled @ 1425. Pressure held for 20 min. Sterile hemostatic dressing applied. No bleeding or swelling. Left FVS x 2 aspirated and pulled @ 1450. Pressure held for 20 min. Sterile hemostatic dressing applied. No bleeding or swelling. Safety instructions reviewed with the patient.

## 2017-11-21 NOTE — ROUTINE PROCESS
TRANSFER - IN REPORT:    Verbal report received from Nick Haq RN (name) on Zita Mahoney  being received from [facility] ([facility]) for routine progression of care      Report consisted of patients Situation, Background, Assessment and   Recommendations(SBAR). Information from the following report(s) SBAR, Kardex, Procedure Summary and MAR was reviewed with the receiving nurse. Opportunity for questions and clarification was provided. Assessment completed upon patients arrival to unit and care assumed.

## 2017-11-22 ENCOUNTER — TELEPHONE (OUTPATIENT)
Dept: CARDIOLOGY CLINIC | Age: 57
End: 2017-11-22

## 2017-11-22 VITALS
TEMPERATURE: 97.9 F | RESPIRATION RATE: 21 BRPM | SYSTOLIC BLOOD PRESSURE: 128 MMHG | BODY MASS INDEX: 29.44 KG/M2 | HEIGHT: 71 IN | WEIGHT: 210.3 LBS | DIASTOLIC BLOOD PRESSURE: 80 MMHG | OXYGEN SATURATION: 96 % | HEART RATE: 75 BPM

## 2017-11-22 LAB
ACT BLD: 235 SECS (ref 79–138)
ACT BLD: 252 SECS (ref 79–138)

## 2017-11-22 PROCEDURE — 74011250637 HC RX REV CODE- 250/637: Performed by: INTERNAL MEDICINE

## 2017-11-22 PROCEDURE — G0378 HOSPITAL OBSERVATION PER HR: HCPCS

## 2017-11-22 PROCEDURE — 99218 HC RM OBSERVATION: CPT

## 2017-11-22 RX ADMIN — METOPROLOL SUCCINATE 50 MG: 50 TABLET, EXTENDED RELEASE ORAL at 08:59

## 2017-11-22 RX ADMIN — Medication 10 ML: at 07:00

## 2017-11-22 RX ADMIN — Medication 100 MG: at 09:00

## 2017-11-22 RX ADMIN — ACETAMINOPHEN 650 MG: 325 TABLET ORAL at 08:59

## 2017-11-22 RX ADMIN — CYANOCOBALAMIN TAB 1000 MCG 1000 MCG: 1000 TAB at 08:59

## 2017-11-22 RX ADMIN — CHOLECALCIFEROL TAB 25 MCG (1000 UNIT) 1000 UNITS: 25 TAB at 09:00

## 2017-11-22 RX ADMIN — THERA TABS 1 TABLET: TAB at 08:59

## 2017-11-22 NOTE — PROGRESS NOTES
Care Management Interventions  PCP Verified by CM: Yes  Current Support Network: Lives Alone, Family Lives Nearby  Confirm Follow Up Transport: Family  Discharge Location  Discharge Placement: Home     Pt is a 56year old female admitted for atrial fibrillation, S/P ablation of atrial fibrillation. Pt is alert and oriented in room. Pt's plan is to return home and family will assist her with transportation. Pt is independent with ADLs and ambulation. She denies any needs for DMEs at this time.

## 2017-11-22 NOTE — PROGRESS NOTES
I have reviewed discharge instructions with the patient and the patient verbalized understanding. Armband and labels shredded at this time.

## 2017-11-22 NOTE — PROGRESS NOTES
conducted an initial consultation and Spiritual Assessment for Antoinette Santiago, who is a 56 y.o.,female. Patients Primary Language is: [place]. According to the patients EMR Restoration Affiliation is: Rodolfo Luis.     The reason the Patient came to the hospital is:   Patient Active Problem List    Diagnosis Date Noted    Atrial fibrillation ([facility] [address] 11/21/2017    S/P ablation operation for arrhythmia 11/21/2017    S/P ablation of atrial fibrillation 11/21/2017    Intermittent atrial fibrillation ([facility] [address] 11/04/2017    Leg swelling 08/24/2017    Idiopathic peripheral neuropathy 06/04/2016    ADD (attention deficit disorder) 12/26/2015    At risk for sleep apnea 12/26/2015    Generalized osteoarthrosis, involving multiple sites     Unspecified tinnitus         The  provided the following Interventions:  Initiated a relationship of care and support. Explored issues of candie, belief, spirituality and Catholic/ritual needs while hospitalized. Listened empathically kashif of the patient that her A-Fib is corrected and she can go home for holiday. Provided information about Spiritual Care Services. Offered prayer and assurance of continued prayers on patient's behalf. The following outcomes where achieved:  Patient shared limited information about both their medical narrative and spiritual journey/beliefs.  confirmed Patient's Restoration Affiliation: Lyssa Haynes: Rev. Varghese Burgess  Patient processed feeling about current hospitalization. Patient expressed gratitude for 's visit. Assessment:  Patient does not have any Catholic/cultural needs that will affect patients preferences in health care. There are no spiritual or Catholic issues which require intervention at this time. Plan:  Chaplains will continue to follow and will provide pastoral care on an as needed/requested basis.    recommends bedside caregivers page  on duty if patient shows signs of acute spiritual or emotional distress.       Shanda Snow, [address] [place] [address]  Spiritual Care  [phone]

## 2017-11-22 NOTE — TELEPHONE ENCOUNTER
Patient called today states she was discharged from the hospital today and Dr Jermaine Muhammad instructed her to continue taking her Flecanide 50 mg twice a day, but the nurse told her not to take Flecanide. Verbal order and read back per Pablo Henao MD   Continue taking Flecanide 50 mg twice a day   Along with the rest of her medications. Make sure pt is taking the Xarelto 20 mg daily and the importance of taking the meds. Patient aware and verbalized understanding.   Joselin Rubin

## 2017-11-25 ENCOUNTER — TELEPHONE (OUTPATIENT)
Dept: INTERNAL MEDICINE CLINIC | Age: 57
End: 2017-11-25

## 2017-11-26 NOTE — TELEPHONE ENCOUNTER
The patient has been hypotensive all day with systolic BP in the 85 range at times. The patient is mildly nauseated but she has not other complaints. The pulse was 109 this AM and running 78 tonight - regular rhythm. I advised the patient to hold the Metoprolol and Flecanide until her BP increases.

## 2017-11-27 ENCOUNTER — OFFICE VISIT (OUTPATIENT)
Dept: INTERNAL MEDICINE CLINIC | Age: 57
End: 2017-11-27

## 2017-11-27 VITALS — SYSTOLIC BLOOD PRESSURE: 122 MMHG | DIASTOLIC BLOOD PRESSURE: 72 MMHG

## 2017-11-27 DIAGNOSIS — I48.91 ATRIAL FIBRILLATION, UNSPECIFIED TYPE (HCC): ICD-10-CM

## 2017-11-27 DIAGNOSIS — Z86.79 S/P ABLATION OF ATRIAL FIBRILLATION: ICD-10-CM

## 2017-11-27 DIAGNOSIS — Z98.890 S/P ABLATION OF ATRIAL FIBRILLATION: ICD-10-CM

## 2017-11-27 DIAGNOSIS — I48.91 ATRIAL FIBRILLATION, UNSPECIFIED TYPE (HCC): Primary | ICD-10-CM

## 2017-11-27 DIAGNOSIS — I95.89 OTHER SPECIFIED HYPOTENSION: ICD-10-CM

## 2017-11-27 RX ORDER — METOPROLOL TARTRATE 25 MG/1
TABLET, FILM COATED ORAL
Qty: 60 TAB | Refills: 2 | Status: SHIPPED | OUTPATIENT
Start: 2017-11-27 | End: 2017-11-28

## 2017-11-27 RX ORDER — DIGOXIN 125 MCG
TABLET ORAL
Qty: 30 TAB | Refills: 1 | Status: SHIPPED | OUTPATIENT
Start: 2017-11-27 | End: 2018-01-11 | Stop reason: SDUPTHER

## 2017-11-28 ENCOUNTER — HOSPITAL ENCOUNTER (EMERGENCY)
Age: 57
Discharge: HOME | End: 2017-11-28
Attending: EMERGENCY MEDICINE
Payer: COMMERCIAL

## 2017-11-28 ENCOUNTER — APPOINTMENT (OUTPATIENT)
Dept: CT IMAGING | Age: 57
End: 2017-11-28
Attending: EMERGENCY MEDICINE
Payer: COMMERCIAL

## 2017-11-28 ENCOUNTER — APPOINTMENT (OUTPATIENT)
Dept: GENERAL RADIOLOGY | Age: 57
End: 2017-11-28
Attending: EMERGENCY MEDICINE
Payer: COMMERCIAL

## 2017-11-28 ENCOUNTER — CLINICAL SUPPORT (OUTPATIENT)
Dept: CARDIOLOGY CLINIC | Age: 57
End: 2017-11-28

## 2017-11-28 VITALS
SYSTOLIC BLOOD PRESSURE: 72 MMHG | DIASTOLIC BLOOD PRESSURE: 50 MMHG | BODY MASS INDEX: 27.48 KG/M2 | OXYGEN SATURATION: 96 % | HEART RATE: 94 BPM | WEIGHT: 197 LBS

## 2017-11-28 VITALS
RESPIRATION RATE: 20 BRPM | BODY MASS INDEX: 27.48 KG/M2 | HEART RATE: 85 BPM | DIASTOLIC BLOOD PRESSURE: 61 MMHG | OXYGEN SATURATION: 99 % | TEMPERATURE: 99 F | WEIGHT: 197 LBS | SYSTOLIC BLOOD PRESSURE: 107 MMHG

## 2017-11-28 DIAGNOSIS — I48.91 NEW ONSET ATRIAL FIBRILLATION (HCC): Primary | ICD-10-CM

## 2017-11-28 DIAGNOSIS — I48.91 ATRIAL FIBRILLATION, UNSPECIFIED TYPE (HCC): ICD-10-CM

## 2017-11-28 DIAGNOSIS — N17.9 ACUTE KIDNEY INJURY (HCC): ICD-10-CM

## 2017-11-28 DIAGNOSIS — E86.0 DEHYDRATION: Primary | ICD-10-CM

## 2017-11-28 LAB
A-G RATIO,AGRAT: 1.3 RATIO (ref 1.1–2.6)
ALBUMIN SERPL-MCNC: 2.8 G/DL (ref 3.4–5)
ALBUMIN SERPL-MCNC: 3.8 G/DL (ref 3.5–5)
ALBUMIN/GLOB SERPL: 0.6 {RATIO} (ref 0.8–1.7)
ALP SERPL-CCNC: 73 U/L (ref 45–117)
ALP SERPL-CCNC: 74 U/L (ref 25–115)
ALT SERPL-CCNC: 12 U/L (ref 5–40)
ALT SERPL-CCNC: 25 U/L (ref 13–56)
ANION GAP SERPL CALC-SCNC: 12 MMOL/L (ref 3–18)
ANION GAP SERPL CALC-SCNC: 17 MMOL/L
AST SERPL W P-5'-P-CCNC: 18 U/L (ref 10–37)
AST SERPL-CCNC: 21 U/L (ref 15–37)
ATRIAL RATE: 357 BPM
BASOPHILS # BLD: 0 K/UL (ref 0–0.06)
BASOPHILS NFR BLD: 0 % (ref 0–2)
BILIRUB SERPL-MCNC: 0.6 MG/DL (ref 0.2–1.2)
BILIRUB SERPL-MCNC: 1.1 MG/DL (ref 0.2–1)
BUN SERPL-MCNC: 22 MG/DL (ref 6–22)
BUN SERPL-MCNC: 23 MG/DL (ref 7–18)
BUN/CREAT SERPL: 16 (ref 12–20)
BURR CELLS-DIF,2045: ABNORMAL
CALCIUM SERPL-MCNC: 8.9 MG/DL (ref 8.4–10.5)
CALCIUM SERPL-MCNC: 9.2 MG/DL (ref 8.5–10.1)
CALCULATED R AXIS, ECG10: -53 DEGREES
CALCULATED T AXIS, ECG11: 59 DEGREES
CHLORIDE SERPL-SCNC: 93 MMOL/L (ref 98–110)
CHLORIDE SERPL-SCNC: 95 MMOL/L (ref 100–108)
CO2 SERPL-SCNC: 24 MMOL/L (ref 20–32)
CO2 SERPL-SCNC: 25 MMOL/L (ref 21–32)
CREAT SERPL-MCNC: 1 MG/DL (ref 0.5–1.2)
CREAT SERPL-MCNC: 1.42 MG/DL (ref 0.6–1.3)
DIAGNOSIS, 93000: NORMAL
DIFFERENTIAL METHOD BLD: ABNORMAL
EOSINOPHIL # BLD: 0 K/UL (ref 0–0.4)
EOSINOPHIL NFR BLD: 0 % (ref 0–5)
ERYTHROCYTE [DISTWIDTH] IN BLOOD BY AUTOMATED COUNT: 13.1 % (ref 11.6–14.5)
ERYTHROCYTE [DISTWIDTH] IN BLOOD BY AUTOMATED COUNT: 13.9 % (ref 10–16)
GFRAA, 66117: >60
GFRNA, 66118: 55
GLOBULIN SER CALC-MCNC: 4.4 G/DL (ref 2–4)
GLOBULIN,GLOB: 2.9 G/DL (ref 2–4)
GLUCOSE SERPL-MCNC: 140 MG/DL (ref 74–99)
GLUCOSE SERPL-MCNC: 158 MG/DL (ref 70–99)
HCT VFR BLD AUTO: 39.2 % (ref 35.1–48)
HCT VFR BLD AUTO: 39.4 % (ref 35–45)
HGB BLD-MCNC: 12.4 G/DL (ref 11.7–16)
HGB BLD-MCNC: 12.7 G/DL (ref 12–16)
LYMPHOCYTES # BLD: 1.4 K/UL (ref 0.9–3.6)
LYMPHOCYTES C MAN (DIFF), 1065: 10 % (ref 27–45)
LYMPHOCYTES NFR BLD: 12 % (ref 21–52)
LYMPHS ABS-DIF,2105: 1.1 K/UL (ref 1–4.8)
MACROCYTOSIS,MACRO: ABNORMAL
MAGNESIUM SERPL-MCNC: 1.7 MG/DL (ref 1.6–2.6)
MCH RBC QN AUTO: 29.7 PG (ref 24–34)
MCH RBC QN AUTO: 30 PG (ref 26–34)
MCHC RBC AUTO-ENTMCNC: 32 G/DL (ref 32–36)
MCHC RBC AUTO-ENTMCNC: 32.2 G/DL (ref 31–37)
MCV RBC AUTO: 92.3 FL (ref 74–97)
MCV RBC AUTO: 96 FL (ref 80–95)
MONOCYTES # BLD: 2.3 K/UL (ref 0.05–1.2)
MONOCYTES ABS-DIF,2141: 2.2 K/UL (ref 0.1–0.9)
MONOCYTES C MAN (DIFF), 1066: 22 % (ref 3–9)
MONOCYTES NFR BLD: 20 % (ref 3–10)
NEUTROPHILS ABS,2156: 7.1 K/UL (ref 1.8–7.7)
NEUTROPHILS C MAN (DIFF), 1064: 68 % (ref 40–75)
NEUTS SEG # BLD: 7.7 K/UL (ref 1.8–8)
NEUTS SEG NFR BLD: 68 % (ref 40–73)
NORMOCHROMIC CELLAVISION, 1078: ABNORMAL
PLATELET # BLD AUTO: 175 K/UL (ref 140–440)
PLATELET # BLD AUTO: 206 K/UL (ref 135–420)
PLATELET COMMENTS,PCOM: ABNORMAL
PMV BLD AUTO: 11.2 FL (ref 9.2–11.8)
PMV BLD AUTO: 12.1 FL (ref 6–10.8)
POTASSIUM SERPL-SCNC: 3.5 MMOL/L (ref 3.5–5.5)
POTASSIUM SERPL-SCNC: 3.5 MMOL/L (ref 3.5–5.5)
PROT SERPL-MCNC: 6.7 G/DL (ref 6.4–8.3)
PROT SERPL-MCNC: 7.2 G/DL (ref 6.4–8.2)
Q-T INTERVAL, ECG07: 360 MS
QRS DURATION, ECG06: 84 MS
QTC CALCULATION (BEZET), ECG08: 452 MS
RBC # BLD AUTO: 4.08 M/UL (ref 3.8–5.2)
RBC # BLD AUTO: 4.27 M/UL (ref 4.2–5.3)
RBC MORPH BLD: ABNORMAL
SMEAR EVAL, 1131: ABNORMAL
SODIUM SERPL-SCNC: 132 MMOL/L (ref 136–145)
SODIUM SERPL-SCNC: 134 MMOL/L (ref 133–145)
TROPONIN I SERPL-MCNC: <0.02 NG/ML (ref 0–0.06)
TSH SERPL DL<=0.005 MIU/L-ACNC: 0.66 MCU/ML (ref 0.27–4.2)
VENTRICULAR RATE, ECG03: 95 BPM
WBC # BLD AUTO: 11.4 K/UL (ref 4.6–13.2)
WBC # BLD AUTO: 9.8 K/UL (ref 4–11)

## 2017-11-28 PROCEDURE — 93005 ELECTROCARDIOGRAM TRACING: CPT

## 2017-11-28 PROCEDURE — 71020 XR CHEST PA LAT: CPT

## 2017-11-28 PROCEDURE — 80053 COMPREHEN METABOLIC PANEL: CPT | Performed by: EMERGENCY MEDICINE

## 2017-11-28 PROCEDURE — 70450 CT HEAD/BRAIN W/O DYE: CPT

## 2017-11-28 PROCEDURE — 74011250636 HC RX REV CODE- 250/636: Performed by: EMERGENCY MEDICINE

## 2017-11-28 PROCEDURE — 96360 HYDRATION IV INFUSION INIT: CPT

## 2017-11-28 PROCEDURE — 83735 ASSAY OF MAGNESIUM: CPT | Performed by: EMERGENCY MEDICINE

## 2017-11-28 PROCEDURE — 96361 HYDRATE IV INFUSION ADD-ON: CPT

## 2017-11-28 PROCEDURE — 99284 EMERGENCY DEPT VISIT MOD MDM: CPT

## 2017-11-28 PROCEDURE — 84484 ASSAY OF TROPONIN QUANT: CPT | Performed by: EMERGENCY MEDICINE

## 2017-11-28 PROCEDURE — 85025 COMPLETE CBC W/AUTO DIFF WBC: CPT | Performed by: EMERGENCY MEDICINE

## 2017-11-28 RX ORDER — SODIUM CHLORIDE 9 MG/ML
125 INJECTION, SOLUTION INTRAVENOUS CONTINUOUS
Status: DISCONTINUED | OUTPATIENT
Start: 2017-11-28 | End: 2017-11-28 | Stop reason: HOSPADM

## 2017-11-28 RX ADMIN — SODIUM CHLORIDE 500 ML: 900 INJECTION, SOLUTION INTRAVENOUS at 15:15

## 2017-11-28 RX ADMIN — SODIUM CHLORIDE 1000 ML: 900 INJECTION, SOLUTION INTRAVENOUS at 17:33

## 2017-11-28 NOTE — ED TRIAGE NOTES
Pt had ablation done 1 week ago at [facility] and states that fatigue and weakness started this past Friday. Pt states minimal diarrhea.

## 2017-11-28 NOTE — DISCHARGE INSTRUCTIONS
Atrial Fibrillation: Care Instructions  Your Care Instructions    Atrial fibrillation is an irregular and often fast heartbeat. Treating this condition is important for several reasons. It can cause blood clots, which can travel from your heart to your brain and cause a stroke. If you have a fast heartbeat, you may feel lightheaded, dizzy, and weak. An irregular heartbeat can also increase your risk for heart failure. Atrial fibrillation is often the result of another heart condition, such as high blood pressure or coronary artery disease. Making changes to improve your heart condition will help you stay healthy and active. Follow-up care is a key part of your treatment and safety. Be sure to make and go to all appointments, and call your doctor if you are having problems. It's also a good idea to know your test results and keep a list of the medicines you take. How can you care for yourself at home? Medicines  ? · Take your medicines exactly as prescribed. Call your doctor if you think you are having a problem with your medicine. You will get more details on the specific medicines your doctor prescribes. ? · If your doctor has given you a blood thinner to prevent a stroke, be sure you get instructions about how to take your medicine safely. Blood thinners can cause serious bleeding problems. ? · Do not take any vitamins, over-the-counter drugs, or herbal products without talking to your doctor first.   ? Lifestyle changes  ? · Do not smoke. Smoking can increase your chance of a stroke and heart attack. If you need help quitting, talk to your doctor about stop-smoking programs and medicines. These can increase your chances of quitting for good. ? · Eat a heart-healthy diet. ? · Stay at a healthy weight. Lose weight if you need to.   ? · Limit alcohol to 2 drinks a day for men and 1 drink a day for women. Too much alcohol can cause health problems. ? · Avoid colds and flu.  Get a pneumococcal vaccine shot. If you have had one before, ask your doctor whether you need another dose. Get a flu shot every year. If you must be around people with colds or flu, wash your hands often. Activity  ? · If your doctor recommends it, get more exercise. Walking is a good choice. Bit by bit, increase the amount you walk every day. Try for at least 30 minutes on most days of the week. You also may want to swim, bike, or do other activities. Your doctor may suggest that you join a cardiac rehabilitation program so that you can have help increasing your physical activity safely. ? · Start light exercise if your doctor says it is okay. Even a small amount will help you get stronger, have more energy, and manage stress. Walking is an easy way to get exercise. Start out by walking a little more than you did in the hospital. Gradually increase the amount you walk. ? · When you exercise, watch for signs that your heart is working too hard. You are pushing too hard if you cannot talk while you are exercising. If you become short of breath or dizzy or have chest pain, sit down and rest immediately. ? · Check your pulse regularly. Place two fingers on the artery at the palm side of your wrist, in line with your thumb. If your heartbeat seems uneven or fast, talk to your doctor. When should you call for help? Call 911 anytime you think you may need emergency care. For example, call if:  ? · You have symptoms of a heart attack. These may include:  ¨ Chest pain or pressure, or a strange feeling in the chest.  ¨ Sweating. ¨ Shortness of breath. ¨ Nausea or vomiting. ¨ Pain, pressure, or a strange feeling in the back, neck, jaw, or upper belly or in one or both shoulders or arms. ¨ Lightheadedness or sudden weakness. ¨ A fast or irregular heartbeat. After you call 911, the  may tell you to chew 1 adult-strength or 2 to 4 low-dose aspirin. Wait for an ambulance. Do not try to drive yourself.    ? · You have symptoms of a stroke. These may include:  ¨ Sudden numbness, tingling, weakness, or loss of movement in your face, arm, or leg, especially on only one side of your body. ¨ Sudden vision changes. ¨ Sudden trouble speaking. ¨ Sudden confusion or trouble understanding simple statements. ¨ Sudden problems with walking or balance. ¨ A sudden, severe headache that is different from past headaches. ? · You passed out (lost consciousness). ?Call your doctor now or seek immediate medical care if:  ? · You have new or increased shortness of breath. ? · You feel dizzy or lightheaded, or you feel like you may faint. ? · Your heart rate becomes irregular. ? · You can feel your heart flutter in your chest or skip heartbeats. Tell your doctor if these symptoms are new or worse. ? Watch closely for changes in your health, and be sure to contact your doctor if you have any problems. Where can you learn more? Go to http://edison-dean.info/. Enter U020 in the search box to learn more about \"Atrial Fibrillation: Care Instructions. \"  Current as of: September 21, 2016  Content Version: 11.4  © 9700-7866 [employer]. Care instructions adapted under license by [employer] (which disclaims liability or warranty for this information). If you have questions about a medical condition or this instruction, always ask your healthcare professional. [address] any warranty or liability for your use of this information.

## 2017-11-28 NOTE — PROCEDURES
PROCEDURE   1. Cardiac electrophysiology study. 2. Atrial fibrillation radiofrequency ablation using a left-sided wide circumferential pulmonary vein isolation and Smart-Touch ablation catheter. 3. Transseptal puncture. 4. Intracardiac echocardiography. 5. Three-dimensional electroanatomical map of the left atrium using CARTO, including CartoSound. .  6. Continuous arterial monitoring with placement of right femoral arterial   sheath. 7. Pacing and sensing from the right atrium and left atrium via the coronary sinus. 8. Placement of coronary sinus and His catheters. 9. Direct current cardioversion. 10. General endotracheal intubation with anesthesia. 11. Continuous esophageal temperature monitoring.      DIAGNOSES   1. New diagnosis of paroxysmal atrial fibrillation with symptoms of swelling and occasional dyspnea.  She was at 30% by recent monitor.                   2. Underlying sick sinus syndrome limiting up-titration of AV blocking agents.                  3. Recent initiation of flecainide.                   4. Quite active, exercising regularly and riding her bike up to  miles.                  5. Possible sleep apnea with heavy snoring.  Sleep study is scheduled for November 12th.                  6. IV contrast allergy with hives.                  7. Recent echocardiogram with normal function and mild left atrial enlargement.                8. Dyslipidemia.                 9. BMI of 29.       IV Contrast:  None  EBL:  15 cc  Fluoroscopy:  23 mGray  IVF:  1600 cc     PROCEDURE: After informed consent was obtained, the patient was brought to electrophysiology lab in a fasting and nonsedated state. The patient was given general anesthesia and intubated. Both groins were prepped and draped in the usual sterile fashion. Anesthesia attempted to place left radial A-line, was unsuccessful. In the right femoral artery was inserted a 4-Japanese sheath for continuous arterial monitoring.  In the right femoral vein was inserted a 7/8 Western Liya sheath. In the left femoral vein was inserted an 11/6 Western Liya sheath. An intracardiographic catheter was placed through the 11-Croatian sheath into the right atrium with continuous monitoring. A decapolar deflectable catheter was placed in the coronary sinus. A CRD-2 catheter was placed along the HIS.      Initial rhythm was sinus to sinus bradycardia with an AA and RR interval of 1200 ms. HV interval (unable) ms. TX interval 180 msec ms. QRS duration 90 ms.      A Colton sheath was placed over a wire into the SVC. Under fluoroscopic and intraechocardiographic guidance, a Colton needle was placed in the OULU sheath and the septum punctured. Heparin with goal ACT greater than 300, was given immediately after puncture and confirmation of no bleeding or pericardial effusion. ACT was monitored during the case. The patient did develop bradycardia down to heart rate 30's soon after intubation and during catheter manipulation, finally resolving.     Over a long Amplatz super stiff wire was placed through the OULU sheath and exchanged for a Mobi sheath. A Biosense [place] D/F SmartTouch irrigation ablation catheter was placed through a lamp sheath after ACT was greater than 300. A 3-D map of the left atrium was created with Carto mapping system along with CartoSound.     Radiofrequency ablation was performed circumferentially around each set of veins with isolation confirmed by placing the ablation catheter in each vein and differential pacing. There was  no evidence of pericardial effusion postprocedure. The long Mobi sheath was downsized to a short 10 Western Liya sheath. All catheters were removed. The patient was given protamine and awoke without difficulty or focal neurological deficits. She left the lab in stable condition after removal of all catheters.     IMPRESSION:   -Successful atrial fibrillation ablation using wide circumferential pulmonary vein isolation. -Restart Xarelto 2 hours after sheath removal.  -I will continue previous antiarrhythmics, including flecainide, at this time. -Monitor overnight and plan discharge tomorrow.  -If recurs, will consider epicardial A.fib ablation by Dr. Estuardo Johnson.  -Patient was prone to bradycardia during procedure. Low threshold for pacemaker if needed.     Thank you kindly for allowing me to participate in this patient's care.   Please do not hesitate to contact me if any questions.

## 2017-11-28 NOTE — ED PROVIDER NOTES
EMERGENCY DEPARTMENT HISTORY AND PHYSICAL EXAM    3:50 PM      Date: 11/28/2017  Patient Name: Dontae Longoria    History of Presenting Illness     Chief Complaint   Patient presents with    Fatigue         History Provided By: Patient    Chief Complaint: Fatigue   Duration:  Weeks  Timing:  Constant, Progressive and Worsening  Location: n/a  Quality: n/a  Severity: Mild  Modifying Factors: worse when in a-fib   Associated Symptoms: headache, nausea, dehydrated       Additional History (Context):  Dontae Longoria is a 56 y.o. female with a history of a-fib, memory changes presents to ED c/o constant mild fatigue onset 1 week ago and progressively worsened. Associated symptoms include dehydration, headache, nausea, low blood pressure. Pt tried Tylenol extra strength, some relief. Pt states that she had an ablation 1 week ago and states she went into a-fib and was admitted to the hospital. She was placed on (Digoxin) to lower her heart rate. Since the ablation she states that she is dehydrated. Pt is on blood thinners. No hx of strokes in the past. Denies cp, lightheadedness, slurred speech, cough, congestion, rhinorrhea. Denies any further symptoms or complaints at the moment. PCP: Rommel Jacinto MD    Current Facility-Administered Medications   Medication Dose Route Frequency Provider Last Rate Last Dose    0.9% sodium chloride infusion  125 mL/hr IntraVENous CONTINUOUS Josefa Coates MD        sodium chloride 0.9 % bolus infusion 1,000 mL  1,000 mL IntraVENous ONCE Josefa Coates MD 1,000 mL/hr at 11/28/17 1733 1,000 mL at 11/28/17 1733     Current Outpatient Prescriptions   Medication Sig Dispense Refill    metoprolol tartrate (LOPRESSOR) 25 mg tablet 1 tablet twice per day (stop \"old metoprolol') 60 Tab 2    digoxin (LANOXIN) 0.125 mg tablet 2 tablets today then 1 tablet daily 30 Tab 1    flecainide (TAMBOCOR) 50 mg tablet Take 1 Tab by mouth two (2) times a day.  (Patient taking differently: Take 50 mg by mouth two (2) times a day. On hold) 60 Tab 6    rivaroxaban (XARELTO) 20 mg tab tablet 1 tablet daily 30 Tab 6    metoprolol succinate (TOPROL-XL) 50 mg XL tablet 1 tablet daily (Patient taking differently: On hold, taking lopressor) 30 Tab 2    diclofenac (VOLTAREN) 1 % gel Apply 4 g to affected area four (4) times daily. (Patient taking differently: Apply 4 g to affected area DIALYSIS PRN. ) 100 g 0    thiamine (VITAMIN B-1) 100 mg tablet Take  by mouth daily.  cholecalciferol (VITAMIN D3) 1,000 unit tablet Take  by mouth daily.  cyanocobalamin (VITAMIN B-12) 1,000 mcg tablet Take 1,000 mcg by mouth daily.  multivitamin (ONE A DAY) tablet Take 1 Tab by mouth daily. Past History     Past Medical History:  Past Medical History:   Diagnosis Date    Atrial fibrillation ([address]     Attention deficit disorder without mention of hyperactivity     DUB (dysfunctional uterine bleeding)     Generalized osteoarthrosis, involving multiple sites     Grief reaction     Mother  recently - Celexa    Memory changes     Adderall for this    Unspecified tinnitus        Past Surgical History:  Past Surgical History:   Procedure Laterality Date    HX KNEE ARTHROSCOPY Right     Meniscus repair    HX KNEE ARTHROSCOPY Right     Removed piece of bone (FB)    HX KNEE ARTHROSCOPY Right     ACL repair       Family History:  Family History   Problem Relation Age of Onset    Diabetes Mother     Heart Disease Mother     Hypertension Mother     Heart Disease Father     Hypertension Father     Diabetes Maternal Grandmother        Social History:  Social History   Substance Use Topics    Smoking status: Never Smoker    Smokeless tobacco: Never Used    Alcohol use No       Allergies: Allergies   Allergen Reactions    Celebrex [Celecoxib] Hives    Iodinated Contrast- Oral And Iv Dye Hives     Patient was in CT for a cardiac scan.  A test bolus of 20cc was given and patient broke out into hives immediately after. Cancelled the rest of the exam and escorted the patient to the ED for furhter evaluation.  Sulfa (Sulfonamide Antibiotics) Other (comments)     Patient states she is not allergic         Review of Systems       Review of Systems   Constitutional: Positive for fatigue. Negative for activity change and fever. Positive for dehydration    HENT: Negative for congestion and rhinorrhea. Eyes: Negative for visual disturbance. Respiratory: Negative for cough and shortness of breath. Cardiovascular: Negative for chest pain and palpitations. Gastrointestinal: Positive for nausea. Negative for abdominal pain, diarrhea and vomiting. Genitourinary: Negative for dysuria and hematuria. Musculoskeletal: Negative for back pain. Skin: Negative for rash. Neurological: Positive for headaches. Negative for dizziness, speech difficulty, weakness and light-headedness. All other systems reviewed and are negative. Physical Exam     Visit Vitals    /64    Pulse 86    Temp 99 °F (37.2 °C)    Resp 23    Wt 89.4 kg (197 lb)    SpO2 95%    BMI 27.48 kg/m2       Physical Exam   Constitutional: She is oriented to person, place, and time. She appears well-developed and well-nourished. No distress. HENT:   Head: Normocephalic and atraumatic. Right Ear: External ear normal.   Left Ear: External ear normal.   Nose: Nose normal.   Dry oral mucosa    Eyes: Conjunctivae and EOM are normal. Pupils are equal, round, and reactive to light. No scleral icterus. Neck: Normal range of motion. Neck supple. No JVD present. No tracheal deviation present. No thyromegaly present. Cardiovascular: Regular rhythm, normal heart sounds and intact distal pulses. Exam reveals no gallop and no friction rub. No murmur heard. Irregular, POCUS notes no pericardial effusion    Pulmonary/Chest: Effort normal and breath sounds normal. She exhibits no tenderness.    Abdominal: Soft. Bowel sounds are normal. She exhibits no distension. There is no tenderness. There is no rebound and no guarding. Musculoskeletal: Normal range of motion. She exhibits no edema or tenderness. Lymphadenopathy:     She has no cervical adenopathy. Neurological: She is alert and oriented to person, place, and time. No cranial nerve deficit. Coordination normal.   Mild global weakness, no arm or leg drift, gait not observed    Skin: Skin is dry. Cool    Psychiatric: She has a normal mood and affect. Her behavior is normal. Judgment and thought content normal.   Nursing note and vitals reviewed. Diagnostic Study Results     Labs -  Recent Results (from the past 12 hour(s))   EKG, 12 LEAD, INITIAL    Collection Time: 11/28/17  3:08 PM   Result Value Ref Range    Ventricular Rate 95 BPM    Atrial Rate 357 BPM    QRS Duration 84 ms    Q-T Interval 360 ms    QTC Calculation (Bezet) 452 ms    Calculated R Axis -53 degrees    Calculated T Axis 59 degrees    Diagnosis       Atrial fibrillation  Left axis deviation  RSR' or QR pattern in V1 suggests right ventricular conduction delay  Nonspecific ST abnormality , probably digitalis effect  Abnormal ECG  When compared with ECG of 21-NOV-2017 13:02,  Atrial fibrillation has replaced Sinus rhythm  ST now depressed in Inferior leads  Confirmed by Enrico West MD, Mecca Marroquin ([id number]) on 11/28/2017 3:44:06 PM     CBC WITH AUTOMATED DIFF    Collection Time: 11/28/17  3:30 PM   Result Value Ref Range    WBC 11.4 4.6 - 13.2 K/uL    RBC 4.27 4.20 - 5.30 M/uL    HGB 12.7 12.0 - 16.0 g/dL    HCT 39.4 35.0 - 45.0 %    MCV 92.3 74.0 - 97.0 FL    MCH 29.7 24.0 - 34.0 PG    MCHC 32.2 31.0 - 37.0 g/dL    RDW 13.1 11.6 - 14.5 %    PLATELET 350 885 - 429 K/uL    MPV 11.2 9.2 - 11.8 FL    NEUTROPHILS 68 40 - 73 %    LYMPHOCYTES 12 (L) 21 - 52 %    MONOCYTES 20 (H) 3 - 10 %    EOSINOPHILS 0 0 - 5 %    BASOPHILS 0 0 - 2 %    ABS. NEUTROPHILS 7.7 1.8 - 8.0 K/UL    ABS.  LYMPHOCYTES 1.4 0.9 - 3.6 K/UL    ABS. MONOCYTES 2.3 (H) 0.05 - 1.2 K/UL    ABS. EOSINOPHILS 0.0 0.0 - 0.4 K/UL    ABS. BASOPHILS 0.0 0.0 - 0.06 K/UL    DF MANUAL      PLATELET COMMENTS ADEQUATE PLATELETS      RBC COMMENTS NORMOCYTIC, NORMOCHROMIC     METABOLIC PANEL, COMPREHENSIVE    Collection Time: 11/28/17  3:30 PM   Result Value Ref Range    Sodium 132 (L) 136 - 145 mmol/L    Potassium 3.5 3.5 - 5.5 mmol/L    Chloride 95 (L) 100 - 108 mmol/L    CO2 25 21 - 32 mmol/L    Anion gap 12 3.0 - 18 mmol/L    Glucose 140 (H) 74 - 99 mg/dL    BUN 23 (H) 7.0 - 18 MG/DL    Creatinine 1.42 (H) 0.6 - 1.3 MG/DL    BUN/Creatinine ratio 16 12 - 20      GFR est AA 47 (L) >60 ml/min/1.73m2    GFR est non-AA 38 (L) >60 ml/min/1.73m2    Calcium 9.2 8.5 - 10.1 MG/DL    Bilirubin, total 1.1 (H) 0.2 - 1.0 MG/DL    ALT (SGPT) 25 13 - 56 U/L    AST (SGOT) 21 15 - 37 U/L    Alk. phosphatase 73 45 - 117 U/L    Protein, total 7.2 6.4 - 8.2 g/dL    Albumin 2.8 (L) 3.4 - 5.0 g/dL    Globulin 4.4 (H) 2.0 - 4.0 g/dL    A-G Ratio 0.6 (L) 0.8 - 1.7     MAGNESIUM    Collection Time: 11/28/17  3:30 PM   Result Value Ref Range    Magnesium 1.7 1.6 - 2.6 mg/dL   TROPONIN I    Collection Time: 11/28/17  3:30 PM   Result Value Ref Range    Troponin-I, Qt. <0.02 0.00 - 0.06 NG/ML       Radiologic Studies -   CT HEAD WO CONT   Final Result      XR CHEST PA LAT   Final Result      CXR  Impression:   I see no evidence of active pulmonary disease. CT Head  IMPRESSION: Unremarkable noncontrast head CT. Medical Decision Making   I am the first provider for this patient. I reviewed the vital signs, available nursing notes, past medical history, past surgical history, family history and social history. Vital Signs-Reviewed the patient's vital signs. Pulse Oximetry Analysis -  96 % on RA, normal       EKG: Interpreted by the EP.    Time Interpreted: 15:08   Rate: 95 bpm   Rhythm: Atrial fibrillation    Interpretation: A-fib with ventricular conduction delay Records Reviewed: Nursing Notes and Old Medical Records (Time of Review: 3:50 PM)    ED Course: Progress Notes, Reevaluation, and Consults:    4:55 PM- Point of care ultrasound was done and showed no pericardial effusion. Labs suggested with dehydration. Dr. Mario Van states pt should be hydrated. If feeling better she will receive Eliquis 5 mg x 2 a day. Stop beta blocker and continue with Digoxin. 6:07 PM- pt feeling better. Will see Dr. Mario Van in 2 days for a follow up visit. Provider Notes (Medical Decision Making):  Pt is a 53yo female with a hx of atrial fibrillation with ablation one week ago presents with increasing fatigue. After the ablation she was found to revert back to AF. Pt was started on digoxin yesterday by her PMD and was sent by Dr. Samuel Cervantes for evaluation of her fatigue. She has not been eating or drinking well. She is on [facility] with HA so CT head obtained but presentation not suggestive of SAH. Labs reviewed and appears she is dehdyrated as noted by her hypotension. Will hydrate then reevaluate. Constance Viramontes DO 5:14 PM        Diagnosis     Clinical Impression:   1. Dehydration    2. Acute kidney injury ([facility] [address]    3. Atrial fibrillation, unspecified type ([address]        Disposition: discharged. Follow-up Information     None           Patient's Medications   Start Taking    No medications on file   Continue Taking    CHOLECALCIFEROL (VITAMIN D3) 1,000 UNIT TABLET    Take  by mouth daily. CYANOCOBALAMIN (VITAMIN B-12) 1,000 MCG TABLET    Take 1,000 mcg by mouth daily. DICLOFENAC (VOLTAREN) 1 % GEL    Apply 4 g to affected area four (4) times daily. DIGOXIN (LANOXIN) 0.125 MG TABLET    2 tablets today then 1 tablet daily    FLECAINIDE (TAMBOCOR) 50 MG TABLET    Take 1 Tab by mouth two (2) times a day.     METOPROLOL SUCCINATE (TOPROL-XL) 50 MG XL TABLET    1 tablet daily    METOPROLOL TARTRATE (LOPRESSOR) 25 MG TABLET    1 tablet twice per day (stop \"old metoprolol') MULTIVITAMIN (ONE A DAY) TABLET    Take 1 Tab by mouth daily. RIVAROXABAN (XARELTO) 20 MG TAB TABLET    1 tablet daily    THIAMINE (VITAMIN B-1) 100 MG TABLET    Take  by mouth daily. These Medications have changed    No medications on file   Stop Taking    No medications on file     _______________________________    Attestations:  Elaina Harper (Aj) acting as a scribe for and in the presence of Cristi Yepez MD      November 28, 2017 at 3:50 PM       Provider Attestation:      I personally performed the services described in the documentation, reviewed the documentation, as recorded by the scribe in my presence, and it accurately and completely records my words and actions.  November 28, 2017 at 3:50 PM - Cristi Yepez MD    _______________________________

## 2017-11-28 NOTE — ED NOTES
Burt Manjarrez is a 56 y.o. female that was discharged in stable condition. The patients diagnosis, condition and treatment were explained to  patient and aftercare instructions were given. The patient verbalized understanding. Patient armband removed and shredded.

## 2017-11-28 NOTE — PROGRESS NOTES
The patient presents to the office today with the chief complaint of hypotension    HPI    The patient was OK after discharge on Wednesday but on Friday evening the patient developed a feeling of ill being. This developed fairly abruptly. The patient felt weak and nauseated. The BP was in the 80 - 85 systolic range. Her pulse was 60 bpm (apparrently regular). This persisted into Saturday. The patient took her Toprol and Flecanide in the AM.  I was contacted later for Saturday and recommended that both of the medications be held. By midday Duglas the patient's systolic BP was in the 376 range with a pulse in the 80s and regular. The patient felt weak, nauseated and with a headache      Review of Systems   Respiratory: Negative for shortness of breath. Cardiovascular: Negative for chest pain and leg swelling. Allergies   Allergen Reactions    Celebrex [Celecoxib] Hives    Iodinated Contrast- Oral And Iv Dye Hives     Patient was in CT for a cardiac scan. A test bolus of 20cc was given and patient broke out into hives immediately after. Cancelled the rest of the exam and escorted the patient to the ED for furhter evaluation.  Sulfa (Sulfonamide Antibiotics) Other (comments)     Patient states she is not allergic       Current Outpatient Prescriptions   Medication Sig Dispense Refill    metoprolol tartrate (LOPRESSOR) 25 mg tablet 1 tablet twice per day (stop \"old metoprolol') 60 Tab 2    digoxin (LANOXIN) 0.125 mg tablet 2 tablets today then 1 tablet daily 30 Tab 1    rivaroxaban (XARELTO) 20 mg tab tablet 1 tablet daily 30 Tab 6    flecainide (TAMBOCOR) 50 mg tablet Take 1 Tab by mouth two (2) times a day. 60 Tab 6    metoprolol succinate (TOPROL-XL) 50 mg XL tablet 1 tablet daily 30 Tab 2    diclofenac (VOLTAREN) 1 % gel Apply 4 g to affected area four (4) times daily. 100 g 0    thiamine (VITAMIN B-1) 100 mg tablet Take  by mouth daily.       cholecalciferol (VITAMIN D3) 1,000 unit tablet Take  by mouth daily.  cyanocobalamin (VITAMIN B-12) 1,000 mcg tablet Take 1,000 mcg by mouth daily.  multivitamin (ONE A DAY) tablet Take 1 Tab by mouth daily. Past Medical History:   Diagnosis Date    Atrial fibrillation [facility])     Attention deficit disorder without mention of hyperactivity     DUB (dysfunctional uterine bleeding)     Generalized osteoarthrosis, involving multiple sites     Grief reaction     Mother  recently - Celexa    Memory changes     Adderall for this    Unspecified tinnitus        Past Surgical History:   Procedure Laterality Date    HX KNEE ARTHROSCOPY Right     Meniscus repair    HX KNEE ARTHROSCOPY Right     Removed piece of bone (FB)    HX KNEE ARTHROSCOPY Right     ACL repair       Social History     Social History    Marital status:      Spouse name: N/A    Number of children: N/A    Years of education: N/A     Occupational History    Not on file. Social History Main Topics    Smoking status: Never Smoker    Smokeless tobacco: Never Used    Alcohol use No    Drug use: No    Sexual activity: Not Currently     Other Topics Concern    Not on file     Social History Narrative       Patient does have an advanced directive on file    Visit Vitals    /72 (BP 1 Location: Left arm, BP Patient Position: Supine)       Physical Exam   Neck: Carotid bruit is not present. Cardiovascular: Normal rate and regular rhythm. Exam reveals no gallop. No murmur heard. Pulmonary/Chest: She has no wheezes. She has no rales. Abdominal: Soft. She exhibits no distension. There is no tenderness. Musculoskeletal: She exhibits no edema.        BMI:  BMI is high but it was not addressed during this visit due to an acute illness      Admission on 2017, Discharged on 2017   Component Date Value Ref Range Status    Activated Clotting Time (POC) 2017 235* 79 - 138 SECS Final    SEE REPEAT RESULT    Activated Clotting Time (POC) 11/21/2017 252* 79 - 138 SECS Final    SEE REPEAT RESULT    Activated Clotting Time (POC) 11/21/2017 318* 79 - 138 SECS Final    Activated Clotting Time (POC) 11/21/2017 318* 79 - 138 SECS Final    Ventricular Rate 11/21/2017 79  BPM Final    Atrial Rate 11/21/2017 79  BPM Final    P-R Interval 11/21/2017 210  ms Final    QRS Duration 11/21/2017 94  ms Final    Q-T Interval 11/21/2017 404  ms Final    QTC Calculation (Bezet) 11/21/2017 463  ms Final    Calculated P Axis 11/21/2017 63  degrees Final    Calculated R Axis 11/21/2017 -58  degrees Final    Calculated T Axis 11/21/2017 60  degrees Final    Diagnosis 11/21/2017    Final                    Value:Sinus rhythm with 1st degree AV block  Left axis deviation  Incomplete right bundle branch block    Confirmed by Vero Mendoza MD, Genaro Castillo ([id number]) on 11/21/2017 4:07:25 PM      Activated Clotting Time (POC) 11/21/2017 230* 79 - 138 SECS Final    Activated Clotting Time (POC) 11/21/2017 219* 79 - 138 SECS Final    Activated Clotting Time (POC) 11/21/2017 136  79 - [address] Final   Hospital Outpatient Visit on 11/17/2017   Component Date Value Ref Range Status    WBC 11/17/2017 6.6  4.6 - 13.2 K/uL Final    RBC 11/17/2017 4.25  4.20 - 5.30 M/uL Final    HGB 11/17/2017 12.9  12.0 - 16.0 g/dL Final    HCT 11/17/2017 40.2  35.0 - 45.0 % Final    MCV 11/17/2017 94.6  74.0 - 97.0 FL Final    MCH 11/17/2017 30.4  24.0 - 34.0 PG Final    MCHC 11/17/2017 32.1  31.0 - 37.0 g/dL Final    RDW 11/17/2017 13.3  11.6 - 14.5 % Final    PLATELET 93/84/7300 435  135 - 420 K/uL Final    MPV 11/17/2017 12.3* 9.2 - 11.8 FL Final    NEUTROPHILS 11/17/2017 63  40 - 73 % Final    LYMPHOCYTES 11/17/2017 24  21 - 52 % Final    MONOCYTES 11/17/2017 9  3 - 10 % Final    EOSINOPHILS 11/17/2017 4  0 - 5 % Final    BASOPHILS 11/17/2017 0  0 - 2 % Final    ABS. NEUTROPHILS 11/17/2017 4.1  1.8 - 8.0 K/UL Final    ABS.  LYMPHOCYTES 11/17/2017 1.6  0.9 - 3.6 K/UL Final    ABS. MONOCYTES 11/17/2017 0.6  0.05 - 1.2 K/UL Final    ABS. EOSINOPHILS 11/17/2017 0.2  0.0 - 0.4 K/UL Final    ABS. BASOPHILS 11/17/2017 0.0  0.0 - 0.06 K/UL Final    DF 11/17/2017 AUTOMATED    Final    Prothrombin time 11/17/2017 14.9  11.5 - 15.2 sec Final    INR 11/17/2017 1.2  0.8 - 1.2   Final    Comment:            INR Therapeutic Ranges         (on stable oral anticoagulant):     INDICATION                INR  DVT/PE/Atrial Fib          2.0-3.0  MI/Mechanical Heart Valve  2.5-3.5      Sodium 11/17/2017 140  136 - 145 mmol/L Final    Potassium 11/17/2017 4.0  3.5 - 5.5 mmol/L Final    Chloride 11/17/2017 102  100 - 108 mmol/L Final    CO2 11/17/2017 32  21 - 32 mmol/L Final    Anion gap 11/17/2017 6  3.0 - 18 mmol/L Final    Glucose 11/17/2017 74  74 - 99 mg/dL Final    BUN 11/17/2017 20* 7.0 - 18 MG/DL Final    Creatinine 11/17/2017 0.96  0.6 - 1.3 MG/DL Final    BUN/Creatinine ratio 11/17/2017 21* 12 - 20   Final    GFR est AA 11/17/2017 >60  >60 ml/min/1.73m2 Final    GFR est non-AA 11/17/2017 >60  >60 ml/min/1.73m2 Final    Comment: (NOTE)  Estimated GFR is calculated using the Modification of Diet in Renal   Disease (MDRD) Study equation, reported for both  Americans   (GFRAA) and non- Americans (GFRNA), and normalized to 1.73m2   body surface area. The physician must decide which value applies to   the patient. The MDRD study equation should only be used in   individuals age 25 or older. It has not been validated for the   following: pregnant women, patients with serious comorbid conditions,   or on certain medications, or persons with extremes of body size,   muscle mass, or nutritional status.  Calcium 11/17/2017 9.0  8.5 - 10.1 MG/DL Final    Bilirubin, total 11/17/2017 0.6  0.2 - 1.0 MG/DL Final    ALT (SGPT) 11/17/2017 25  13 - 56 U/L Final    AST (SGOT) 11/17/2017 18  15 - 37 U/L Final    Alk.  phosphatase 11/17/2017 66  45 - 117 U/L Final    Protein, total 11/17/2017 7.0  6.4 - 8.2 g/dL Final    Albumin 11/17/2017 3.6  3.4 - 5.0 g/dL Final    Globulin 11/17/2017 3.4  2.0 - 4.0 g/dL Final    A-G Ratio 11/17/2017 1.1  0.8 - 1.7   Final   Hospital Outpatient Visit on 11/02/2017   Component Date Value Ref Range Status    Creatinine, POC 11/02/2017 1.1  0.6 - 1.3 MG/DL Final    GFRAA, POC 11/02/2017 >60  >60 ml/min/1.73m2 Final    GFRNA, POC 11/02/2017 52* >60 ml/min/1.73m2 Final    Comment: Estimated GFR is calculated using the IDMS-traceable Modification of Diet in Renal Disease (MDRD) Study equation, reported for both  Americans (GFRAA) and non- Americans (GFRNA), and normalized to 1.73m2 body surface area. The physician must decide which value applies to the patient. The MDRD study equation should only be used in individuals age 25 or older. It has not been validated for the following: pregnant women, patients with serious comorbid conditions, or on certain medications, or persons with extremes of body size, muscle mass, or nutritional status. Hospital Outpatient Visit on 10/25/2017   Component Date Value Ref Range Status    Test indication 10/25/2017 AFIB   Preliminary    Functional capacity 10/25/2017 Normal   Preliminary    ECG Interp. Before Exercise 10/25/2017    Preliminary                    Value:afib  Rightward Axis  NS ST  Changes      ECG Interp. During Exercise 10/25/2017 none   Preliminary    Max. Systolic BP 89/76/3118 316  mmHg Preliminary    Max. Diastolic BP 42/55/2264 70  mmHg Preliminary    Max.  Heart rate 10/25/2017 176  BPM Preliminary    Peak Ex METs 10/25/2017 7.0  METS Preliminary    Protocol name 10/25/2017 [facility]              Preliminary   Office Visit on 08/31/2017   Component Date Value Ref Range Status    VALID INTERNAL CONTROL POC 08/31/2017 Yes   Final    Group A Strep Ag 08/31/2017 Negative  Negative Final       .  Results for orders placed or performed in visit on 11/27/17   AMB POC EKG ROUTINE W/ 12 LEADS, INTER & REP    Impression    Atrial Fibrillation with rapid ventricular response. Left Anterior Fascicular Block. Non Specific ST-T wave abnormalities       Assessment / Plan      ICD-10-CM ICD-9-CM    1. Atrial fibrillation, unspecified type (HCC) I48.91 427.31 AMB POC EKG ROUTINE W/ 12 LEADS, INTER & REP      CBC WITH AUTOMATED DIFF      METABOLIC PANEL, COMPREHENSIVE      TSH 3RD GENERATION      MA COLLECTION VENOUS BLOOD,VENIPUNCTURE   2. S/P ablation of atrial fibrillation Z98.890 V45.89 AMB POC EKG ROUTINE W/ 12 LEADS, INTER & REP    Z86.79  CBC WITH AUTOMATED DIFF      METABOLIC PANEL, COMPREHENSIVE      TSH 3RD GENERATION      MA COLLECTION VENOUS BLOOD,VENIPUNCTURE   3. Other specified hypotension I95.89 458.8        Go to Lopressor 25 mg BID  Add Digoxin  Hold Toprol and Flecanide  Labs  she was advised to continue her other maintenance medications      Follow-up Disposition:  Return in about 2 days (around 11/29/2017). I asked Zhanna Gonzalez if she has any questions and I answered the questions. Zhanna Gonzalez states that she understands the treatment plan and agrees with the treatment plan

## 2017-11-28 NOTE — PROGRESS NOTES
Gaviota James is a 56 y.o. female that is here for a blood pressure check. Her current medications are listed below. Current Outpatient Prescriptions   Medication Sig    metoprolol tartrate (LOPRESSOR) 25 mg tablet 1 tablet twice per day (stop \"old metoprolol')    digoxin (LANOXIN) 0.125 mg tablet 2 tablets today then 1 tablet daily    rivaroxaban (XARELTO) 20 mg tab tablet 1 tablet daily    diclofenac (VOLTAREN) 1 % gel Apply 4 g to affected area four (4) times daily. (Patient taking differently: Apply 4 g to affected area DIALYSIS PRN.)    cholecalciferol (VITAMIN D3) 1,000 unit tablet Take  by mouth daily.  cyanocobalamin (VITAMIN B-12) 1,000 mcg tablet Take 1,000 mcg by mouth daily.  multivitamin (ONE A DAY) tablet Take 1 Tab by mouth daily.  flecainide (TAMBOCOR) 50 mg tablet Take 1 Tab by mouth two (2) times a day. (Patient taking differently: Take 50 mg by mouth two (2) times a day. On hold)    metoprolol succinate (TOPROL-XL) 50 mg XL tablet 1 tablet daily (Patient taking differently: On hold, taking lopressor)    thiamine (VITAMIN B-1) 100 mg tablet Take  by mouth daily. No current facility-administered medications for this visit. Her   Visit Vitals    BP (!) 72/50 (BP 1 Location: Right arm, BP Patient Position: Standing)    Pulse 94    Wt 89.4 kg (197 lb)    SpO2 96%    BMI 27.48 kg/m2         Patient seen today for blood pressure check. Patient states she feel horrible. She states she is lightheaded, dizzy, nauseated, and weak for the past several days. She also reports occasional headaches. Blood pressure lying was 90/62 and standing was 72/50. Patient states she has never experienced these symptoms when in atrial fibrillation before. Patient is visibly weak and clammy. I discussed with Dr. Reg Roger. Dr. Reg Roger spoke with the patient and patient advised to go to ED.  Pratima Martin LPN

## 2017-11-28 NOTE — PROGRESS NOTES
Discussed with Dr. Lucia Blackwell. Sent to ER for symptomatic hypotension with SBP 70's at office. Patient stating she is not eating well, decreased appetite. Hgb stable. CXR clear. No pericardial effusion by limited ultrasound per Dr. Lucia Blackwell. Had A.fib ablation last week, no contrast was used. She is in controlled. A.fib. Has had a day of digoxin and stopped lopressor/flecainide yesterday when seen by Dr. Fatemeh Khan. Unclear cause for poor appetite with ARF. Plan IVF, normal EF historically so should tolerate. Stop flecainide/beta-blockers. Continue digoxin 0.125mg daily and switch from Xarelto to Eliquis in case Xarelto causing nausea/decreased appetite. If does not feel better with hydration, will need admission for overnight observation by hospitalists, will need to exclude infection, thyroid, adrenal causes, otherwise could discharge and I can see in office on Thursday.

## 2017-11-29 LAB — PATH REVIEW OF SMEAR, 12050: NORMAL

## 2017-11-30 ENCOUNTER — OFFICE VISIT (OUTPATIENT)
Dept: CARDIOLOGY CLINIC | Age: 57
End: 2017-11-30

## 2017-11-30 ENCOUNTER — TELEPHONE (OUTPATIENT)
Dept: CARDIOLOGY CLINIC | Age: 57
End: 2017-11-30

## 2017-11-30 VITALS
SYSTOLIC BLOOD PRESSURE: 100 MMHG | BODY MASS INDEX: 28.42 KG/M2 | WEIGHT: 203 LBS | DIASTOLIC BLOOD PRESSURE: 60 MMHG | HEIGHT: 71 IN | HEART RATE: 96 BPM | OXYGEN SATURATION: 96 %

## 2017-11-30 DIAGNOSIS — I95.9 HYPOTENSION, UNSPECIFIED HYPOTENSION TYPE: Primary | ICD-10-CM

## 2017-11-30 DIAGNOSIS — Z86.79 S/P ABLATION OPERATION FOR ARRHYTHMIA: ICD-10-CM

## 2017-11-30 DIAGNOSIS — Z98.890 S/P ABLATION OPERATION FOR ARRHYTHMIA: ICD-10-CM

## 2017-11-30 DIAGNOSIS — I48.91 ATRIAL FIBRILLATION, UNSPECIFIED TYPE (HCC): ICD-10-CM

## 2017-11-30 DIAGNOSIS — R53.83 FATIGUE, UNSPECIFIED TYPE: ICD-10-CM

## 2017-11-30 DIAGNOSIS — M79.89 LEG SWELLING: ICD-10-CM

## 2017-11-30 NOTE — PROGRESS NOTES
History of Present Illness: The patient is a 45-year-old female here for followup. She underwent pulmonary vein isolation with radiofrequency method 11/21/17. She did well for a few days and was discharged from the hospital. Last Friday she started to develop fatigue and was in bed all day. She was seen in the office on Tuesday and systolic blood pressure was down to the 70's when she got up. Historically, her blood pressure has been on the low side but not quite this low. She denies any chest pain, dyspnea, syncope, PND, orthopnea, edema. She does admit to not eating or drinking too well. Her major complaint is simply that of profound fatigue. Her systolic blood pressure today is 100 mmHg. She was seen in the ER. Her hemoglobin was stable. There was no obvious effusion by ultrasound by the ER physician. She is now on Digoxin as well as Xarelto. I recommended we try to switch her to Eliquis.      Impression and Plan:      Hypotension of unclear etiology, pericardial effusion ruled out in ER. Hemoglobin was stable. Both groins were okay. Chronic hypotension. Intolerance to AV blocking agents including beta-blocker. IV contrast allergy. Of note, she did not get IV contrast during ablation. Historically normal left ventricular function. I have taken her off Flecainide and she is in atrial fibrillation but her rate is controlled. She has been in atrial fibrillation many times in the past and she has never felt this bad. I discussed that I do not have a good cause for the severe fatigue at this time. I am going to check an echocardiogram and event monitor for a week because she did have some bradycardia during the procedure. I also will check a BMP and cortisol level. She also has some diarrhea and I will try to switch her from Xarelto to Eliquis. I will see her back within a few weeks. All questions answered.      Past Medical History:   Diagnosis Date    Atrial fibrillation [facility])     Attention deficit disorder without mention of hyperactivity     DUB (dysfunctional uterine bleeding)     Generalized osteoarthrosis, involving multiple sites     Grief reaction     Mother  recently - Celexa    Memory changes     Adderall for this    Unspecified tinnitus        Current Outpatient Prescriptions   Medication Sig Dispense Refill    apixaban (ELIQUIS) 5 mg tablet Take 1 Tab by mouth two (2) times a day. 60 Tab 0    digoxin (LANOXIN) 0.125 mg tablet 2 tablets today then 1 tablet daily 30 Tab 1    thiamine (VITAMIN B-1) 100 mg tablet Take  by mouth daily.  cholecalciferol (VITAMIN D3) 1,000 unit tablet Take  by mouth daily.  cyanocobalamin (VITAMIN B-12) 1,000 mcg tablet Take 1,000 mcg by mouth daily.  multivitamin (ONE A DAY) tablet Take 1 Tab by mouth daily.  diclofenac (VOLTAREN) 1 % gel Apply 4 g to affected area four (4) times daily. (Patient taking differently: Apply 4 g to affected area.) 100 g 0       Social History   reports that she has never smoked. She has never used smokeless tobacco.   reports that she does not drink alcohol. Family History  family history includes Diabetes in her maternal grandmother and mother; Heart Disease in her father and mother; Hypertension in her father and mother. Review of Systems  Except as stated above include:  Constitutional: Negative for fever, chills and malaise/fatigue. HEENT: No congestion or recent URI. Gastrointestinal: No nausea, vomiting, abdominal pain, bloody stools. Pulmonary:  Negative except as stated above. Cardiac:  Negative except as stated above. Musculoskeletal: Negative except as stated above. Neurological:  No localized symptoms. Skin:  Negative except as stated above. Psych:  No mood swings. Endocrine:  No heat/cold intolerance.     PHYSICAL EXAM  BP Readings from Last 3 Encounters:   17 100/60   17 107/61   17 (!) 72/50     Pulse Readings from Last 3 Encounters:   17 96   17 85 11/28/17 94     Wt Readings from Last 3 Encounters:   11/30/17 92.1 kg (203 lb)   11/28/17 89.4 kg (197 lb)   11/28/17 89.4 kg (197 lb)     General:   Well developed, well groomed. Head/Neck:   No jugular venous distention     No carotid bruits. No evidence of xanthelasma. Lungs:   No respiratory distress. Clear bilaterally. Heart:    Irreg irreg. Normal S1/S2. Palpation of heart with normal point of maximum impulse. No significant murmurs, rubs or gallops. Abdomen:   Soft and nontender. No palpable abdominal mass or bruits. Extremities:   Intact peripheral pulses. No significant edema. Neurological:   Alert and oriented to person, place, time. No focal neurological deficit visually.     Blood Pressure Metric:  low

## 2017-11-30 NOTE — MR AVS SNAPSHOT
Visit Information Date & Time Provider Department Dept. Phone Encounter #  
 11/30/2017  2:20 PM Denys Garduno MD [facility] [id number] Follow-up Instructions Follow-up and Disposition History Your Appointments 12/28/2017  4:00 PM  
POST HOSPITAL with Denys Garduno MD  
[facility] [employer] ([place] [facility]) Appt Note: 1 month post hospital  
 [address] [id number] [address] [address] [phone] [address] Upcoming Health Maintenance Date Due Hepatitis C Screening 1960 Pneumococcal 19-64 Medium Risk (1 of 1 - PPSV23) 3/14/1981 DTaP/Tdap/Td series (1 - Tdap) 3/14/1983 PAP AKA CERVICAL CYTOLOGY 3/14/1983 FOBT Q 1 YEAR AGE 50-75 3/14/2012 BREAST CANCER SCRN MAMMOGRAM 8/16/2019 Allergies as of 11/30/2017  Review Complete On: 11/30/2017 By: Denys Garduno MD  
  
 Severity Noted Reaction Type Reactions Celebrex [Celecoxib]  07/24/2014    Hives Iodinated Contrast- Oral And Iv Dye  11/02/2017    Hives Patient was in CT for a cardiac scan. A test bolus of 20cc was given and patient broke out into hives immediately after. Cancelled the rest of the exam and escorted the patient to the ED for furhter evaluation. Sulfa (Sulfonamide Antibiotics)  09/09/2015    Other (comments) Patient states she is not allergic Current Immunizations  Never Reviewed Name Date  
 TB Skin Test (PPD) 8/29/2013, 2/27/2007 TB Skin Test (PPD) Intradermal 8/9/2017 Not reviewed this visit You Were Diagnosed With   
  
 Codes Comments Hypotension, unspecified hypotension type    -  Primary ICD-10-CM: I95.9 ICD-9-CM: 204. 9 Atrial fibrillation, unspecified type ([address]     ICD-10-CM: I48.91 
ICD-9-CM: 427.31  S/P ablation operation for arrhythmia     ICD-10-CM: Z98.890, Z86.79 
ICD-9-CM: V45.89   
 Leg swelling     ICD-10-CM: M79.89 ICD-9-CM: 729.81 Fatigue, unspecified type     ICD-10-CM: R53.83 ICD-9-CM: 780.79 Vitals BP Pulse Height(growth percentile) Weight(growth percentile) SpO2 BMI  
 100/60 96 5' 11\" (1.803 m) 203 lb (92.1 kg) 96% 28.31 kg/m2 OB Status Smoking Status Postmenopausal Never Smoker Vitals History BMI and BSA Data Body Mass Index Body Surface Area  
 28.31 kg/m 2 2.15 m 2 Preferred Pharmacy Pharmacy Name Phone [phone] [place], [address] AT [address] [phone] Your Updated Medication List  
  
   
This list is accurate as of: 11/30/17  3:07 PM.  Always use your most recent med list.  
  
  
  
  
 apixaban 5 mg tablet Commonly known as:  Ester Scripture Take 1 Tab by mouth two (2) times a day. diclofenac 1 % Gel Commonly known as:  VOLTAREN Apply 4 g to affected area four (4) times daily. digoxin 0.125 mg tablet Commonly known as:  LANOXIN  
2 tablets today then 1 tablet daily  
  
 multivitamin tablet Commonly known as:  ONE A DAY Take 1 Tab by mouth daily. VITAMIN B-1 100 mg tablet Generic drug:  thiamine Take  by mouth daily. VITAMIN B-12 1,000 mcg tablet Generic drug:  cyanocobalamin Take 1,000 mcg by mouth daily. VITAMIN D3 1,000 unit tablet Generic drug:  cholecalciferol Take  by mouth daily. We Performed the Following AMB POC EKG ROUTINE W/ 12 LEADS, INTER & REP [13602 CPT(R)] CORTISOL, SERUM LCMS [[id number] Custom] ECG EVENT RECORD MONITORING SET-UP [id number] CPT(R)] To-Do List   
 11/30/2017 ECHO:  2D ECHO COMPLETE ADULT (TTE) W OR WO CONTR   
  
 12/01/2017 Lab:  METABOLIC PANEL, COMPREHENSIVE   
  
 12/19/2017 2:30 PM  
  Appointment with [id number] MACHINE (WT ) at [facility] NON-INVASIVE CARD ([phone]) Age Limit for ALL Heart procedures @ all [facility] facilities: 18 yrs and older only. Under the age of 25, refer to [facility] ([phone]). Wt Limit: 350lbs. This study requires patient to bring a written physician's order or MD office may fax the order to Central Scheduling at [phone]. Patient needs to bring a current list of all medications. No preparation is required for this study. Patients should report 15 minutes prior to their appointment time to the [facility], [address] [place] [address]/[address]. Introducing [facility] & HEALTH SERVICES! Dear Hai Stanley: Thank you for requesting a [employer] account. Our records indicate that you already have an active [employer] account. You can access your account anytime at [email or web address]. [employer] Did you know that you can access your hospital and ER discharge instructions at any time in [employer]? You can also review all of your test results from your hospital stay or ER visit. Additional Information If you have questions, please visit the Frequently Asked Questions section of the [employer] website at [email or web address]. [employer]/. Remember, [employer] is NOT to be used for urgent needs. For medical emergencies, dial 911. Now available from your iPhone and Android! Please provide this summary of care documentation to your next provider. Your primary care clinician is listed as Earl November. If you have any questions after today's visit, please call [phone].

## 2017-11-30 NOTE — PROGRESS NOTES
1. Have you been to the ER, urgent care clinic since your last visit? Hospitalized since your last visit? Yes, 11/21/17 for ablation     2. Have you seen or consulted any other health care providers outside of the [address] [place] [address] since your last visit? Include any pap smears or colon screening.   No

## 2017-11-30 NOTE — TELEPHONE ENCOUNTER
[employer] was contacted for prior authorization. Prior authorization approved for one year. [facility] was contacted, pharmacist processed medication and it was approved. Patient was made aware.  Roel Espinal LPN

## 2017-12-01 ENCOUNTER — HOSPITAL ENCOUNTER (OUTPATIENT)
Dept: LAB | Age: 57
Discharge: HOME | End: 2017-12-01

## 2017-12-01 PROCEDURE — 99001 SPECIMEN HANDLING PT-LAB: CPT | Performed by: INTERNAL MEDICINE

## 2017-12-02 LAB
ALBUMIN SERPL-MCNC: 2.9 G/DL (ref 3.5–5.5)
ALBUMIN/GLOB SERPL: 1 {RATIO} (ref 1.2–2.2)
ALP SERPL-CCNC: 87 IU/L (ref 39–117)
ALT SERPL-CCNC: 51 IU/L (ref 0–32)
AST SERPL-CCNC: 45 IU/L (ref 0–40)
BILIRUB SERPL-MCNC: 0.6 MG/DL (ref 0–1.2)
BUN SERPL-MCNC: 10 MG/DL (ref 6–24)
BUN/CREAT SERPL: 9 (ref 9–23)
CALCIUM SERPL-MCNC: 8.3 MG/DL (ref 8.7–10.2)
CHLORIDE SERPL-SCNC: 89 MMOL/L (ref 96–106)
CO2 SERPL-SCNC: 23 MMOL/L (ref 18–29)
CORTIS SERPL-MCNC: 29.2 UG/DL
CREAT SERPL-MCNC: 1.1 MG/DL (ref 0.57–1)
GFR SERPLBLD CREATININE-BSD FMLA CKD-EPI: 57 ML/MIN/1.73
GFR SERPLBLD CREATININE-BSD FMLA CKD-EPI: 65 ML/MIN/1.73
GLOBULIN SER CALC-MCNC: 2.8 G/DL (ref 1.5–4.5)
GLUCOSE SERPL-MCNC: 113 MG/DL (ref 65–99)
POTASSIUM SERPL-SCNC: 3.9 MMOL/L (ref 3.5–5.2)
PROT SERPL-MCNC: 5.7 G/DL (ref 6–8.5)
SODIUM SERPL-SCNC: 129 MMOL/L (ref 134–144)

## 2017-12-04 ENCOUNTER — DOCUMENTATION ONLY (OUTPATIENT)
Dept: CARDIOLOGY CLINIC | Age: 57
End: 2017-12-04

## 2017-12-04 ENCOUNTER — TELEPHONE (OUTPATIENT)
Dept: CARDIOLOGY CLINIC | Age: 57
End: 2017-12-04

## 2017-12-04 NOTE — PROGRESS NOTES
I called patient to review labs from Friday. 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 Stopped by office and SBP down to 116 mmHg. I asked patient to monitor and if SBP > 120 mmHg, take lopressor 12.5. If continues to feel fatigued, will need to see Dr. Walt Garcia for workup for hyponatremia.

## 2017-12-04 NOTE — TELEPHONE ENCOUNTER
Patient never received her event monitor. She states that someone called to preventice from this office and told her that she already has event monitor. THere is no documentation. We have some event monitors for back up. Enrolled her in the [employer] system ([id number]). She will stop by and pick it up today.

## 2017-12-06 ENCOUNTER — TELEPHONE (OUTPATIENT)
Dept: CARDIOLOGY CLINIC | Age: 57
End: 2017-12-06

## 2017-12-06 NOTE — TELEPHONE ENCOUNTER
From: Diane Andrew   Sent: Wednesday, December 06, 2017 12:26 PM  To: Alvaro Painter  Subject: RE: [facility]    agree, just continue to monitor for now. I would not take the Lopressor since the BP seems to be improving as you stated. thx       From: Alvaro Painter  Sent: Wednesday, December 06, 2017 11:47 AM  To: Diane Andrew  Subject: [facility]  Dr Kelvin Rothman,   Gonzalo Vega, 56 y.o., 1960     Patient is having more A flutter on event monitor (she has 5 more days until end of report)  She is calling to make sure she doesnt need to take Lopressor. Her BP was (she hasnt been taking Lopressor for last 3-4 days. 12/4/2017 180/101  12/5/2017 130/80  12/6/2017 120/64  All those checked around noon by a school nurse  I advised that she doesnt need to take Lopressor today since looks like her BP is trending down and also since she reports her BP automatied at home first thing in the AM was 101/60. Would you like to adjust anything else? V/R     [employer] [facility]  ([facility])  [id number] [facility] [place], [address]  [phone]     Good Help to those in need. 

## 2017-12-11 ENCOUNTER — OFFICE VISIT (OUTPATIENT)
Dept: INTERNAL MEDICINE CLINIC | Age: 57
End: 2017-12-11

## 2017-12-11 VITALS
WEIGHT: 202 LBS | TEMPERATURE: 99.1 F | RESPIRATION RATE: 16 BRPM | DIASTOLIC BLOOD PRESSURE: 78 MMHG | OXYGEN SATURATION: 100 % | SYSTOLIC BLOOD PRESSURE: 120 MMHG | BODY MASS INDEX: 28.28 KG/M2 | HEIGHT: 71 IN | HEART RATE: 82 BPM

## 2017-12-11 DIAGNOSIS — I48.0 INTERMITTENT ATRIAL FIBRILLATION (HCC): Primary | ICD-10-CM

## 2017-12-11 DIAGNOSIS — Z86.79 S/P ABLATION OF ATRIAL FIBRILLATION: ICD-10-CM

## 2017-12-11 DIAGNOSIS — G60.9 IDIOPATHIC PERIPHERAL NEUROPATHY: ICD-10-CM

## 2017-12-11 DIAGNOSIS — Z98.890 S/P ABLATION OF ATRIAL FIBRILLATION: ICD-10-CM

## 2017-12-11 DIAGNOSIS — I10 ESSENTIAL HYPERTENSION: ICD-10-CM

## 2017-12-11 RX ORDER — METOPROLOL TARTRATE 25 MG/1
25 TABLET, FILM COATED ORAL 2 TIMES DAILY
COMMUNITY
End: 2018-02-19 | Stop reason: ALTCHOICE

## 2017-12-11 NOTE — PATIENT INSTRUCTIONS
Health Maintenance Due   Topic    Hepatitis C Screening     Pneumococcal 19-64 Medium Risk (1 of 1 - PPSV23)    DTaP/Tdap/Td series (1 - Tdap)    PAP AKA CERVICAL CYTOLOGY     FOBT Q 1 YEAR AGE 50-75

## 2017-12-11 NOTE — PROGRESS NOTES
1. Have you been to the ER, urgent care clinic since your last visit? Hospitalized since your last visit? HV - dehydration - 11/28    2. Have you seen or consulted any other health care providers outside of the [address] [place] [address] since your last visit? Include any pap smears or colon screening.  No

## 2017-12-14 ENCOUNTER — OFFICE VISIT (OUTPATIENT)
Dept: CARDIOLOGY CLINIC | Age: 57
End: 2017-12-14

## 2017-12-14 VITALS
HEART RATE: 85 BPM | DIASTOLIC BLOOD PRESSURE: 82 MMHG | BODY MASS INDEX: 28.14 KG/M2 | WEIGHT: 201 LBS | SYSTOLIC BLOOD PRESSURE: 120 MMHG | OXYGEN SATURATION: 98 % | HEIGHT: 71 IN

## 2017-12-14 DIAGNOSIS — R06.09 DYSPNEA ON EXERTION: ICD-10-CM

## 2017-12-14 DIAGNOSIS — R53.83 FATIGUE, UNSPECIFIED TYPE: ICD-10-CM

## 2017-12-14 DIAGNOSIS — Z98.890 S/P ABLATION OPERATION FOR ARRHYTHMIA: Primary | ICD-10-CM

## 2017-12-14 DIAGNOSIS — I95.9 HYPOTENSION, UNSPECIFIED HYPOTENSION TYPE: ICD-10-CM

## 2017-12-14 DIAGNOSIS — I50.32 DIASTOLIC CHF, CHRONIC (HCC): ICD-10-CM

## 2017-12-14 DIAGNOSIS — Z86.79 S/P ABLATION OPERATION FOR ARRHYTHMIA: Primary | ICD-10-CM

## 2017-12-14 PROBLEM — I10 ESSENTIAL HYPERTENSION: Status: ACTIVE | Noted: 2017-12-14

## 2017-12-14 PROBLEM — I48.91 ATRIAL FIBRILLATION (HCC): Status: RESOLVED | Noted: 2017-11-21 | Resolved: 2017-12-14

## 2017-12-14 NOTE — PROGRESS NOTES
1. Have you been to the ER, urgent care clinic since your last visit? Hospitalized since your last visit? No     2. Have you seen or consulted any other health care providers outside of the [address] [place] [address] since your last visit? Include any pap smears or colon screening.  No

## 2017-12-14 NOTE — PROGRESS NOTES
The patient presents to the office today with the chief complaint of elevated BP    HPI    The patient is status post ablation for atrial fibrillation. She remained sinus rhythm for several days then she relapsed back into atrial fibrillation. The patient then developed persistent hypotension that responded to IV fluids. The patient's sense of well being is better but her BP recently was found to be elevated to 712 systolic. The BP has now settled back down. The patient continues with symptoms from her peripheral neuropathy      Review of Systems   Respiratory: Negative for shortness of breath. Cardiovascular: Negative for chest pain, palpitations and leg swelling. Allergies   Allergen Reactions    Celebrex [Celecoxib] Hives    Iodinated Contrast- Oral And Iv Dye Hives     Patient was in CT for a cardiac scan. A test bolus of 20cc was given and patient broke out into hives immediately after. Cancelled the rest of the exam and escorted the patient to the ED for furhter evaluation.  Sulfa (Sulfonamide Antibiotics) Other (comments)     Patient states she is not allergic       Current Outpatient Prescriptions   Medication Sig Dispense Refill    metoprolol tartrate (LOPRESSOR) 25 mg tablet Take 25 mg by mouth two (2) times a day.  apixaban (ELIQUIS) 5 mg tablet Take 1 Tab by mouth two (2) times a day. 60 Tab 0    digoxin (LANOXIN) 0.125 mg tablet 2 tablets today then 1 tablet daily 30 Tab 1    diclofenac (VOLTAREN) 1 % gel Apply 4 g to affected area four (4) times daily. (Patient taking differently: Apply 4 g to affected area.) 100 g 0    thiamine (VITAMIN B-1) 100 mg tablet Take  by mouth daily.  cholecalciferol (VITAMIN D3) 1,000 unit tablet Take  by mouth daily.  cyanocobalamin (VITAMIN B-12) 1,000 mcg tablet Take 1,000 mcg by mouth daily.  multivitamin (ONE A DAY) tablet Take 1 Tab by mouth daily.          Past Medical History:   Diagnosis Date    Atrial fibrillation ([address]  Attention deficit disorder without mention of hyperactivity     DUB (dysfunctional uterine bleeding)     Generalized osteoarthrosis, involving multiple sites     Grief reaction     Mother  recently - Celexa    Memory changes     Adderall for this    Unspecified tinnitus        Past Surgical History:   Procedure Laterality Date    HX KNEE ARTHROSCOPY Right     Meniscus repair    HX KNEE ARTHROSCOPY Right     Removed piece of bone (FB)    HX KNEE ARTHROSCOPY Right     ACL repair       Social History     Social History    Marital status:      Spouse name: N/A    Number of children: N/A    Years of education: N/A     Occupational History    Not on file. Social History Main Topics    Smoking status: Never Smoker    Smokeless tobacco: Never Used    Alcohol use No    Drug use: No    Sexual activity: Not Currently     Other Topics Concern    Not on file     Social History Narrative       Patient does have an advanced directive on file    Visit Vitals    /78 (BP 1 Location: Left arm, BP Patient Position: Sitting)    Pulse 82    Temp 99.1 °F (37.3 °C) (Tympanic)    Resp 16    Ht 5' 11\" (1.803 m)    Wt 202 lb (91.6 kg)    SpO2 100%    BMI 28.17 kg/m2       Physical Exam   No Cervical Lymphadenopathy  No Supraclavicular Lymphadenopathy  Thyroid is Normal  Lungs are normal to percussion. Clear to auscultation   Heart:  S1 S2 are normal, No gallops, No mummers  No Carotid Bruits  Abdomen:  Normal Bowel Sounds. No tenderness. No masses. No Hepatomegaly or Splenomegly  LE:  Strong Pedal Pulses.   No Edema      BMI:  BMI is high but it was not addressed during this visit due to an acute illness      Office Visit on 2017   Component Date Value Ref Range Status    Glucose 2017 113* 65 - 99 mg/dL Final    BUN 2017 10  6 - 24 mg/dL Final    Creatinine 2017 1.10* 0.57 - 1.00 mg/dL Final    GFR est non-AA 2017 57* >59 mL/min/1.73 Final    GFR est AA 12/01/2017 65  >59 mL/min/1.73 Final    BUN/Creatinine ratio 12/01/2017 9  9 - 23 Final    Sodium 12/01/2017 129* 134 - 144 mmol/L Final    Potassium 12/01/2017 3.9  3.5 - 5.2 mmol/L Final    Chloride 12/01/2017 89* 96 - 106 mmol/L Final    CO2 12/01/2017 23  18 - 29 mmol/L Final    Calcium 12/01/2017 8.3* 8.7 - 10.2 mg/dL Final    Protein, total 12/01/2017 5.7* 6.0 - 8.5 g/dL Final    Albumin 12/01/2017 2.9* 3.5 - 5.5 g/dL Final    GLOBULIN, TOTAL 12/01/2017 2.8  1.5 - 4.5 g/dL Final    A-G Ratio 12/01/2017 1.0* 1.2 - 2.2 Final    Bilirubin, total 12/01/2017 0.6  0.0 - 1.2 mg/dL Final    Alk.  phosphatase 12/01/2017 87  39 - 117 IU/L Final    AST (SGOT) 12/01/2017 45* 0 - 40 IU/L Final    ALT (SGPT) 12/01/2017 51* 0 - 32 IU/L Final    Cortisol, random 12/01/2017 29.2  ug/dL Final    Comment:                         Cortisol AM         6.2 - 19.4                          Cortisol PM         2.3 - 11.9     Admission on 11/28/2017, Discharged on 11/28/2017   Component Date Value Ref Range Status    Ventricular Rate 11/28/2017 95  BPM Final    Atrial Rate 11/28/2017 357  BPM Final    QRS Duration 11/28/2017 84  ms Final    Q-T Interval 11/28/2017 360  ms Final    QTC Calculation (Bezet) 11/28/2017 452  ms Final    Calculated R Axis 11/28/2017 -53  degrees Final    Calculated T Axis 11/28/2017 59  degrees Final    Diagnosis 11/28/2017    Final                    Value:Atrial fibrillation  Left axis deviation  RSR' or QR pattern in V1 suggests right ventricular conduction delay  Nonspecific ST abnormality , probably digitalis effect  Abnormal ECG  When compared with ECG of 21-NOV-2017 13:02,  Atrial fibrillation has replaced Sinus rhythm  ST now depressed in Inferior leads  Confirmed by Ana Laura Beckman MD, Brie Lyn ([id number]) on 11/28/2017 3:44:06 PM      WBC 11/28/2017 11.4  4.6 - 13.2 K/uL Final    RBC 11/28/2017 4.27  4.20 - 5.30 M/uL Final    HGB 11/28/2017 12.7  12.0 - 16.0 g/dL Final    HCT 11/28/2017 39.4  35.0 - 45.0 % Final    MCV 11/28/2017 92.3  74.0 - 97.0 FL Final    MCH 11/28/2017 29.7  24.0 - 34.0 PG Final    MCHC 11/28/2017 32.2  31.0 - 37.0 g/dL Final    RDW 11/28/2017 13.1  11.6 - 14.5 % Final    PLATELET 34/97/7304 608  135 - 420 K/uL Final    MPV 11/28/2017 11.2  9.2 - 11.8 FL Final    NEUTROPHILS 11/28/2017 68  40 - 73 % Final    LYMPHOCYTES 11/28/2017 12* 21 - 52 % Final    MONOCYTES 11/28/2017 20* 3 - 10 % Final    EOSINOPHILS 11/28/2017 0  0 - 5 % Final    BASOPHILS 11/28/2017 0  0 - 2 % Final    ABS. NEUTROPHILS 11/28/2017 7.7  1.8 - 8.0 K/UL Final    ABS. LYMPHOCYTES 11/28/2017 1.4  0.9 - 3.6 K/UL Final    ABS. MONOCYTES 11/28/2017 2.3* 0.05 - 1.2 K/UL Final    ABS. EOSINOPHILS 11/28/2017 0.0  0.0 - 0.4 K/UL Final    ABS. BASOPHILS 11/28/2017 0.0  0.0 - 0.06 K/UL Final    DF 11/28/2017 MANUAL    Final    PLATELET COMMENTS 73/39/6838 ADEQUATE PLATELETS    Final    RBC COMMENTS 11/28/2017 NORMOCYTIC, NORMOCHROMIC    Final    Sodium 11/28/2017 132* 136 - 145 mmol/L Final    Potassium 11/28/2017 3.5  3.5 - 5.5 mmol/L Final    Chloride 11/28/2017 95* 100 - 108 mmol/L Final    CO2 11/28/2017 25  21 - 32 mmol/L Final    Anion gap 11/28/2017 12  3.0 - 18 mmol/L Final    Glucose 11/28/2017 140* 74 - 99 mg/dL Final    BUN 11/28/2017 23* 7.0 - 18 MG/DL Final    Creatinine 11/28/2017 1.42* 0.6 - 1.3 MG/DL Final    BUN/Creatinine ratio 11/28/2017 16  12 - 20   Final    GFR est AA 11/28/2017 47* >60 ml/min/1.73m2 Final    GFR est non-AA 11/28/2017 38* >60 ml/min/1.73m2 Final    Comment: (NOTE)  Estimated GFR is calculated using the Modification of Diet in Renal   Disease (MDRD) Study equation, reported for both  Americans   (GFRAA) and non- Americans (GFRNA), and normalized to 1.73m2   body surface area. The physician must decide which value applies to   the patient. The MDRD study equation should only be used in   individuals age 25 or older.  It has not been validated for the   following: pregnant women, patients with serious comorbid conditions,   or on certain medications, or persons with extremes of body size,   muscle mass, or nutritional status.  Calcium 11/28/2017 9.2  8.5 - 10.1 MG/DL Final    Bilirubin, total 11/28/2017 1.1* 0.2 - 1.0 MG/DL Final    ALT (SGPT) 11/28/2017 25  13 - 56 U/L Final    AST (SGOT) 11/28/2017 21  15 - 37 U/L Final    Alk. phosphatase 11/28/2017 73  45 - 117 U/L Final    Protein, total 11/28/2017 7.2  6.4 - 8.2 g/dL Final    Albumin 11/28/2017 2.8* 3.4 - 5.0 g/dL Final    Globulin 11/28/2017 4.4* 2.0 - 4.0 g/dL Final    A-G Ratio 11/28/2017 0.6* 0.8 - 1.7   Final    Magnesium 11/28/2017 1.7  1.6 - 2.6 mg/dL Final    Troponin-I, Qt. 11/28/2017 <0.02  0.00 - 0.06 NG/ML Final    Comment: The presence of detectable troponin above the reference range indicates myocardial injury which may be due to ischemia, myocarditis, trauma, etc.  Clinical correlation is necessary to establish the significance of this finding. Sequential testing is recommended to determine if the typical rise and fall of cTnI is demonstrated. Note:  Cardiac troponin I has a relatively long half life and may be present well after the CK MB has returned to baseline. The reference range is based on the 99th percentile of the referent population. Orders Only on 11/27/2017   Component Date Value Ref Range Status    Glucose 11/27/2017 158* 70 - 99 mg/dL Final    BUN 11/27/2017 22  6 - 22 mg/dL Final    Creatinine 11/27/2017 1.0  0.5 - 1.2 mg/dL Final    Sodium 11/27/2017 134  133 - 145 mmol/L Final    Potassium 11/27/2017 3.5  3.5 - 5.5 mmol/L Final    Chloride 11/27/2017 93* 98 - 110 mmol/L Final    CO2 11/27/2017 24  20 - 32 mmol/L Final    AST (SGOT) 11/27/2017 18  10 - 37 U/L Final    ALT (SGPT) 11/27/2017 12  5 - 40 U/L Final    Alk.  phosphatase 11/27/2017 74  25 - 115 U/L Final    Bilirubin, total 11/27/2017 0.6  0.2 - 1.2 mg/dL Final    Calcium 11/27/2017 8.9  8.4 - 10.5 mg/dL Final    Protein, total 11/27/2017 6.7  6.4 - 8.3 g/dL Final    Albumin 11/27/2017 3.8  3.5 - 5.0 g/dL Final    A-G Ratio 11/27/2017 1.3  1.1 - 2.6 ratio Final    Globulin 11/27/2017 2.9  2.0 - 4.0 g/dL Final    Anion gap 11/27/2017 17.0  mmol/L Final    Comment: Test includes Albumin, Alkaline Phosphatase, ALT, AST, BUN, Calcium, CO2,  Chloride, Creatinine, Glucose, Potassium, Sodium, Total Bilirubin and Total  Protein. Estimated GFR results are reported in mL/min/1.73 sq.m. by the MDRD equation. This eGFR is validated for stable chronic renal failure patients. This   equation  is unreliable in acute illness or patients with normal renal function.       GFRAA 11/27/2017 >60.0  >60.0 Final    GFRNA 11/27/2017 55.0* >60.0 Final    TSH 11/27/2017 0.66  0.27 - 4.20 mcU/mL Final    WBC 11/27/2017 9.8  4.0 - 11.0 K/uL Final    RBC 11/27/2017 4.08  3.80 - 5.20 M/uL Final    HGB 11/27/2017 12.4  11.7 - 16.0 g/dL Final    HCT 11/27/2017 39.2  35.1 - 48.0 % Final    MCV 11/27/2017 96* 80 - 95 fL Final    MCH 11/27/2017 30  26 - 34 pg Final    MCHC 11/27/2017 32  32 - 36 g/dL Final    RDW 11/27/2017 13.9  10.0 - 16.0 % Final    PLATELET 59/71/1332 251  140 - 440 K/uL Final    MPV 11/27/2017 12.1* 6.0 - 10.8 fL Final    Macrocytosis 11/27/2017 1+* (none) Final    McDavid cells 11/27/2017 1+* (none) Final    NEUTROPHILS C MAN (DIFF) 11/27/2017 68  40 - 75 % Final    LYMPHOCYTES C MAN (DIFF) 11/27/2017 10* 27 - 45 % Final    MONOCYTES C MAN (DIFF) 11/27/2017 22* 3 - 9 % Final    Neutrophils abs 11/27/2017 7.1  1.8 - 7.7 K/uL Final    Lymphs abs-DIF 11/27/2017 1.1  1.0 - 4.8 K/uL Final    Monocytes abs-DIF 11/27/2017 2.2* 0.1 - 0.9 K/uL Final    NORMOCHROMIC CELLAVISION 11/27/2017 Normochromic   Final    SMEAR EVAL 11/27/2017 Platelet morphology appears normal.   Final    PATH REVIEW OF SMEAR 11/27/2017 See Comment   Final    Comment: Absolute monocytosis, possibly due to drug effect, chronic infection, collagen  vascular disease, reactive processes or neoplasia. Correlate clinically.   Electronic signature: Joann Martínez M.D.,  [phone]  CPT: [id number]     Admission on 11/21/2017, Discharged on 11/22/2017   Component Date Value Ref Range Status    Activated Clotting Time (POC) 11/21/2017 235* 79 - 138 SECS Final    SEE REPEAT RESULT    Activated Clotting Time (POC) 11/21/2017 252* 79 - 138 SECS Final    SEE REPEAT RESULT    Activated Clotting Time (POC) 11/21/2017 318* 79 - 138 SECS Final    Activated Clotting Time (POC) 11/21/2017 318* 79 - 138 SECS Final    Ventricular Rate 11/21/2017 79  BPM Final    Atrial Rate 11/21/2017 79  BPM Final    P-R Interval 11/21/2017 210  ms Final    QRS Duration 11/21/2017 94  ms Final    Q-T Interval 11/21/2017 404  ms Final    QTC Calculation (Bezet) 11/21/2017 463  ms Final    Calculated P Axis 11/21/2017 63  degrees Final    Calculated R Axis 11/21/2017 -58  degrees Final    Calculated T Axis 11/21/2017 60  degrees Final    Diagnosis 11/21/2017    Final                    Value:Sinus rhythm with 1st degree AV block  Left axis deviation  Incomplete right bundle branch block    Confirmed by Sincere Warren MD, Nora Reed ([id number]) on 11/21/2017 4:07:25 PM      Activated Clotting Time (POC) 11/21/2017 230* 79 - 138 SECS Final    Activated Clotting Time (POC) 11/21/2017 219* 79 - 138 SECS Final    Activated Clotting Time (POC) 11/21/2017 136  79 - [address] Final   Hospital Outpatient Visit on 11/17/2017   Component Date Value Ref Range Status    WBC 11/17/2017 6.6  4.6 - 13.2 K/uL Final    RBC 11/17/2017 4.25  4.20 - 5.30 M/uL Final    HGB 11/17/2017 12.9  12.0 - 16.0 g/dL Final    HCT 11/17/2017 40.2  35.0 - 45.0 % Final    MCV 11/17/2017 94.6  74.0 - 97.0 FL Final    MCH 11/17/2017 30.4  24.0 - 34.0 PG Final    MCHC 11/17/2017 32.1  31.0 - 37.0 g/dL Final    RDW 11/17/2017 13.3  11.6 - 14.5 % Final    PLATELET 11/17/2017 140  135 - 420 K/uL Final    MPV 11/17/2017 12.3* 9.2 - 11.8 FL Final    NEUTROPHILS 11/17/2017 63  40 - 73 % Final    LYMPHOCYTES 11/17/2017 24  21 - 52 % Final    MONOCYTES 11/17/2017 9  3 - 10 % Final    EOSINOPHILS 11/17/2017 4  0 - 5 % Final    BASOPHILS 11/17/2017 0  0 - 2 % Final    ABS. NEUTROPHILS 11/17/2017 4.1  1.8 - 8.0 K/UL Final    ABS. LYMPHOCYTES 11/17/2017 1.6  0.9 - 3.6 K/UL Final    ABS. MONOCYTES 11/17/2017 0.6  0.05 - 1.2 K/UL Final    ABS. EOSINOPHILS 11/17/2017 0.2  0.0 - 0.4 K/UL Final    ABS. BASOPHILS 11/17/2017 0.0  0.0 - 0.06 K/UL Final    DF 11/17/2017 AUTOMATED    Final    Prothrombin time 11/17/2017 14.9  11.5 - 15.2 sec Final    INR 11/17/2017 1.2  0.8 - 1.2   Final    Comment:            INR Therapeutic Ranges         (on stable oral anticoagulant):     INDICATION                INR  DVT/PE/Atrial Fib          2.0-3.0  MI/Mechanical Heart Valve  2.5-3.5      Sodium 11/17/2017 140  136 - 145 mmol/L Final    Potassium 11/17/2017 4.0  3.5 - 5.5 mmol/L Final    Chloride 11/17/2017 102  100 - 108 mmol/L Final    CO2 11/17/2017 32  21 - 32 mmol/L Final    Anion gap 11/17/2017 6  3.0 - 18 mmol/L Final    Glucose 11/17/2017 74  74 - 99 mg/dL Final    BUN 11/17/2017 20* 7.0 - 18 MG/DL Final    Creatinine 11/17/2017 0.96  0.6 - 1.3 MG/DL Final    BUN/Creatinine ratio 11/17/2017 21* 12 - 20   Final    GFR est AA 11/17/2017 >60  >60 ml/min/1.73m2 Final    GFR est non-AA 11/17/2017 >60  >60 ml/min/1.73m2 Final    Comment: (NOTE)  Estimated GFR is calculated using the Modification of Diet in Renal   Disease (MDRD) Study equation, reported for both  Americans   (GFRAA) and non- Americans (GFRNA), and normalized to 1.73m2   body surface area. The physician must decide which value applies to   the patient. The MDRD study equation should only be used in   individuals age 25 or older.  It has not been validated for the   following: pregnant women, patients with serious comorbid conditions,   or on certain medications, or persons with extremes of body size,   muscle mass, or nutritional status.  Calcium 11/17/2017 9.0  8.5 - 10.1 MG/DL Final    Bilirubin, total 11/17/2017 0.6  0.2 - 1.0 MG/DL Final    ALT (SGPT) 11/17/2017 25  13 - 56 U/L Final    AST (SGOT) 11/17/2017 18  15 - 37 U/L Final    Alk. phosphatase 11/17/2017 66  45 - 117 U/L Final    Protein, total 11/17/2017 7.0  6.4 - 8.2 g/dL Final    Albumin 11/17/2017 3.6  3.4 - 5.0 g/dL Final    Globulin 11/17/2017 3.4  2.0 - 4.0 g/dL Final    A-G Ratio 11/17/2017 1.1  0.8 - 1.7   Final   Hospital Outpatient Visit on 11/02/2017   Component Date Value Ref Range Status    Creatinine, POC 11/02/2017 1.1  0.6 - 1.3 MG/DL Final    GFRAA, POC 11/02/2017 >60  >60 ml/min/1.73m2 Final    GFRNA, POC 11/02/2017 52* >60 ml/min/1.73m2 Final    Comment: Estimated GFR is calculated using the IDMS-traceable Modification of Diet in Renal Disease (MDRD) Study equation, reported for both  Americans (GFRAA) and non- Americans (GFRNA), and normalized to 1.73m2 body surface area. The physician must decide which value applies to the patient. The MDRD study equation should only be used in individuals age 25 or older. It has not been validated for the following: pregnant women, patients with serious comorbid conditions, or on certain medications, or persons with extremes of body size, muscle mass, or nutritional status. Hospital Outpatient Visit on 10/25/2017   Component Date Value Ref Range Status    Test indication 10/25/2017 AFIB   Preliminary    Functional capacity 10/25/2017 Normal   Preliminary    ECG Interp. Before Exercise 10/25/2017    Preliminary                    Value:afib  Rightward Axis  NS ST  Changes      ECG Interp. During Exercise 10/25/2017 none   Preliminary    Max. Systolic BP 95/97/1551 253  mmHg Preliminary    Max. Diastolic BP 74/73/8988 70  mmHg Preliminary    Max. Heart rate 10/25/2017 176  BPM Preliminary    Peak Ex METs 10/25/2017 7.0  METS Preliminary    Protocol name 10/25/2017 DEIRDRE              Preliminary       . No results found for any visits on 12/11/17. Assessment / Plan      ICD-10-CM ICD-9-CM    1. Intermittent atrial fibrillation (HCC) I48.0 427.31    2. S/P ablation of atrial fibrillation Z98.890 V45.89     Z86.79     3. Idiopathic peripheral neuropathy G60.9 356.9    4. Essential hypertension I10 401.9        she was advised to continue her maintenance medications  Will follow [facility]    Follow-up Disposition:  Return in about 3 months (around 3/11/2018). I asked Anthony Weems if she has any questions and I answered the questions. Anthony Weems states that she understands the treatment plan and agrees with the treatment plan

## 2017-12-14 NOTE — MR AVS SNAPSHOT
Visit Information Date & Time Provider Department Dept. Phone Encounter #  
 12/14/2017  3:40 PM Carlo Yadav MD [facility] [id number] Your Appointments 12/29/2017  1:00 PM  
New Patient with Roshni Welsh, DO [address] ([facility]) Appt Note: Dr Jason Jasso opinion for sleep eval; pt has sleep study results [address] [place] [address], [address] [address]  
[phone]  
  
   
 [address] [place] [address], [address] [facility] [address] Upcoming Health Maintenance Date Due Hepatitis C Screening 1960 Pneumococcal 19-64 Medium Risk (1 of 1 - PPSV23) 3/14/1981 DTaP/Tdap/Td series (1 - Tdap) 3/14/1983 PAP AKA CERVICAL CYTOLOGY 3/14/1983 FOBT Q 1 YEAR AGE 50-75 3/14/2012 Allergies as of 12/14/2017  Review Complete On: 12/14/2017 By: Carlo Yadav MD  
  
 Severity Noted Reaction Type Reactions Celebrex [Celecoxib]  07/24/2014    Hives Iodinated Contrast- Oral And Iv Dye  11/02/2017    Hives Patient was in CT for a cardiac scan. A test bolus of 20cc was given and patient broke out into hives immediately after. Cancelled the rest of the exam and escorted the patient to the ED for furhter evaluation. Sulfa (Sulfonamide Antibiotics)  09/09/2015    Other (comments) Patient states she is not allergic Current Immunizations  Never Reviewed Name Date  
 TB Skin Test (PPD) 8/29/2013, 2/27/2007 TB Skin Test (PPD) Intradermal 8/9/2017 Not reviewed this visit You Were Diagnosed With   
  
 Codes Comments S/P ablation operation for arrhythmia    -  Primary ICD-10-CM: Z98.890, Z86.79 
ICD-9-CM: V45.89 Hypotension, unspecified hypotension type     ICD-10-CM: I95.9 ICD-9-CM: 458.9 Fatigue, unspecified type     ICD-10-CM: R53.83 ICD-9-CM: 780.79  Dyspnea on exertion     ICD-10-CM: R06.09 
ICD-9-CM: 786.09   
 Diastolic CHF, chronic (HCC)     ICD-10-CM: I50.32 
ICD-9-CM: 428.32, 428.0 Vitals BP Pulse Height(growth percentile) Weight(growth percentile) SpO2 BMI  
 120/82 85 5' 11\" (1.803 m) 201 lb (91.2 kg) 98% 28.03 kg/m2 OB Status Smoking Status Postmenopausal Never Smoker Vitals History BMI and BSA Data Body Mass Index Body Surface Area 28.03 kg/m 2 2.14 m 2 Preferred Pharmacy Pharmacy Name Phone [facility] [phone] [facility] [place], [address] AT [address] [phone] Your Updated Medication List  
  
   
This list is accurate as of: 12/14/17  4:04 PM.  Always use your most recent med list.  
  
  
  
  
 apixaban 5 mg tablet Commonly known as:  Shree Reels Take 1 Tab by mouth two (2) times a day. diclofenac 1 % Gel Commonly known as:  VOLTAREN Apply 4 g to affected area four (4) times daily. digoxin 0.125 mg tablet Commonly known as:  LANOXIN  
2 tablets today then 1 tablet daily  
  
 metoprolol tartrate 25 mg tablet Commonly known as:  LOPRESSOR Take 25 mg by mouth two (2) times a day. multivitamin tablet Commonly known as:  ONE A DAY Take 1 Tab by mouth daily. VITAMIN B-1 100 mg tablet Generic drug:  thiamine Take  by mouth daily. VITAMIN B-12 1,000 mcg tablet Generic drug:  cyanocobalamin Take 1,000 mcg by mouth daily. VITAMIN D3 1,000 unit tablet Generic drug:  cholecalciferol Take  by mouth daily. We Performed the Following AMB POC EKG ROUTINE W/ 12 LEADS, INTER & REP [69516 CPT(R)] To-Do List   
 12/19/2017 2:30 PM  
  Appointment with [facility]- IE33 MACHINE (WT ) at [facility] NON-INVASIVE CARD ([phone]) Age Limit for ALL Heart procedures @ all [facility] facilities: 18 yrs and older only. Under the age of 25, refer to [facility] ([phone]). Wt Limit: 350lbs.   This study requires patient to bring a written physician's order or MD office may fax the order to Central Scheduling at [phone]. Patient needs to bring a current list of all medications. No preparation is required for this study. Patients should report 15 minutes prior to their appointment time to the [facility], [address] [place] [address]/[address]. Introducing [facility]! Dear Reyna Parikh: Thank you for requesting a eeden account. Our records indicate that you already have an active eeden account. You can access your account anytime at [email or web address] Assistant Did you know that you can access your hospital and ER discharge instructions at any time in eeden? You can also review all of your test results from your hospital stay or ER visit. Additional Information If you have questions, please visit the Frequently Asked Questions section of the eeden website at [email or web address] Remember, eeden is NOT to be used for urgent needs. For medical emergencies, dial 911. Now available from your iPhone and Android! Please provide this summary of care documentation to your next provider. Your primary care clinician is listed as Imer Hernandez. If you have any questions after today's visit, please call [phone].

## 2017-12-14 NOTE — PROGRESS NOTES
History of Present Illness:  The patient is a 71-year-old female here for followup. She underwent pulmonary vein isolation with radiofrequency method 11/21/17. She did well for a few days and was discharged from the hospital. She started to develop fatigue and hypotension with systolic blood pressure down to the 70's. Historically, her blood pressure has been on the low side. She then developed some hypertension and I have asked her to take her Lopressor as needed. Today, she actually feels a bit better but she still admits to being significantly fatigued. She has been worked up for sleep apnea and is scheduled to have titration. She needs to give me a call back since I just placed an event monitor. Over the weekend, she did exercise and she actually went for a 20-mile bike ride.      Impression and Plan:      Paroxysmal atrial fibrillation status post pulmonary vein isolation 11/21/17. She did not get any IV contrast.   Recent hypotension, now improving of unclear etiology. Her cortisol level is normal. There was no pericardial effusion in the ER. Hemoglobin was stable and both groins were okay. Transient hypertension, now improved. Intolerance to higher dose AV blocking agents due to bradycardia. IV contrast allergy. Recent normal left ventricular function. Recent diagnosis of sleep apnea awaiting treatment. She is off Flecainide, but on Digoxin for rate control as well as low dose Lopressor as needed when her systolic blood pressure is > 120. Her cortisol level, hemoglobin and echocardiogram in the emergency department really were unremarkable. I switched her from Xarelto to Eliquis to see if this would help. She is in sinus rhythm today. Her blood pressure is stable, but she still feels fatigued. I am not quite sure that the atrial fibrillation is the only cause for her symptoms. I recommended we get the sleep study evaluation sooner rather than later.  She is scheduled for an echocardiogram next week. I will tentatively see back at the end of January. All questions answered. Past Medical History:   Diagnosis Date    Atrial fibrillation [facility])     Attention deficit disorder without mention of hyperactivity     DUB (dysfunctional uterine bleeding)     Generalized osteoarthrosis, involving multiple sites     Grief reaction     Mother  recently - Celexa    Memory changes     Adderall for this    Unspecified tinnitus        Current Outpatient Prescriptions   Medication Sig Dispense Refill    metoprolol tartrate (LOPRESSOR) 25 mg tablet Take 25 mg by mouth two (2) times a day.  apixaban (ELIQUIS) 5 mg tablet Take 1 Tab by mouth two (2) times a day. 60 Tab 0    digoxin (LANOXIN) 0.125 mg tablet 2 tablets today then 1 tablet daily 30 Tab 1    diclofenac (VOLTAREN) 1 % gel Apply 4 g to affected area four (4) times daily. (Patient taking differently: Apply 4 g to affected area.) 100 g 0    thiamine (VITAMIN B-1) 100 mg tablet Take  by mouth daily.  cholecalciferol (VITAMIN D3) 1,000 unit tablet Take  by mouth daily.  cyanocobalamin (VITAMIN B-12) 1,000 mcg tablet Take 1,000 mcg by mouth daily.  multivitamin (ONE A DAY) tablet Take 1 Tab by mouth daily. Social History   reports that she has never smoked. She has never used smokeless tobacco.   reports that she does not drink alcohol. Family History  family history includes Diabetes in her maternal grandmother and mother; Heart Disease in her father and mother; Hypertension in her father and mother. Review of Systems  Except as stated above include:  Constitutional: Negative for fever, chills and malaise/fatigue. HEENT: No congestion or recent URI. Gastrointestinal: No nausea, vomiting, abdominal pain, bloody stools. Pulmonary:  Negative except as stated above. Cardiac:  Negative except as stated above. Musculoskeletal: Negative except as stated above. Neurological:  No localized symptoms.   Skin:  Negative except as stated above. Psych:  No mood swings. Endocrine:  No heat/cold intolerance. PHYSICAL EXAM  BP Readings from Last 3 Encounters:   12/14/17 120/82   12/11/17 120/78   11/30/17 100/60     Pulse Readings from Last 3 Encounters:   12/14/17 85   12/11/17 82   11/30/17 96     Wt Readings from Last 3 Encounters:   12/14/17 91.2 kg (201 lb)   12/11/17 91.6 kg (202 lb)   11/30/17 92.1 kg (203 lb)     General:   Well developed, well groomed. Head/Neck:   No jugular venous distention     No carotid bruits. No evidence of xanthelasma. Lungs:   No respiratory distress. Clear bilaterally. Heart:    Regular rate and rhythm. Normal S1/S2. Palpation of heart with normal point of maximum impulse. No significant murmurs, rubs or gallops. Abdomen:   Soft and nontender. No palpable abdominal mass or bruits. Extremities:   Intact peripheral pulses. No significant edema. Neurological:   Alert and oriented to person, place, time. No focal neurological deficit visually.     Blood Pressure Metric:  controlled

## 2017-12-19 ENCOUNTER — HOSPITAL ENCOUNTER (OUTPATIENT)
Dept: NON INVASIVE DIAGNOSTICS | Age: 57
Discharge: HOME | End: 2017-12-19
Attending: INTERNAL MEDICINE
Payer: COMMERCIAL

## 2017-12-19 DIAGNOSIS — Z86.79 S/P ABLATION OPERATION FOR ARRHYTHMIA: ICD-10-CM

## 2017-12-19 DIAGNOSIS — I48.91 ATRIAL FIBRILLATION, UNSPECIFIED TYPE (HCC): ICD-10-CM

## 2017-12-19 DIAGNOSIS — Z98.890 S/P ABLATION OPERATION FOR ARRHYTHMIA: ICD-10-CM

## 2017-12-19 PROCEDURE — 93306 TTE W/DOPPLER COMPLETE: CPT

## 2017-12-22 ENCOUNTER — OFFICE VISIT (OUTPATIENT)
Dept: INTERNAL MEDICINE CLINIC | Age: 57
End: 2017-12-22

## 2017-12-22 DIAGNOSIS — I48.91 ATRIAL FIBRILLATION, UNSPECIFIED TYPE (HCC): Primary | ICD-10-CM

## 2017-12-22 DIAGNOSIS — I10 ESSENTIAL HYPERTENSION: ICD-10-CM

## 2017-12-22 RX ORDER — DICLOFENAC SODIUM 10 MG/G
4 GEL TOPICAL 4 TIMES DAILY
Qty: 100 G | Refills: 3 | Status: SHIPPED | OUTPATIENT
Start: 2017-12-22 | End: 2018-02-19 | Stop reason: ALTCHOICE

## 2017-12-27 VITALS
BODY MASS INDEX: 28.14 KG/M2 | WEIGHT: 201 LBS | HEIGHT: 71 IN | DIASTOLIC BLOOD PRESSURE: 78 MMHG | OXYGEN SATURATION: 99 % | SYSTOLIC BLOOD PRESSURE: 128 MMHG | HEART RATE: 68 BPM

## 2017-12-27 RX ORDER — FLECAINIDE ACETATE 50 MG/1
TABLET ORAL
Refills: 6 | COMMUNITY
Start: 2017-11-19 | End: 2018-01-25 | Stop reason: ALTCHOICE

## 2017-12-28 NOTE — PROGRESS NOTES
The patient presents to the office today with the chief complaint of atrial fibrillation    HPI    The patient has paroxysmal atrial fibrillation. The patient is status post ablation. The patient developed hypotension post procedure eventually corrected with IV fluids. The patient had a relapse of atrial fibrillation which now has corrected to normal sinus rhythm. The patient remains easily fatigued. The BP has been labile but as of late it has done better. Review of Systems   Constitutional: Positive for malaise/fatigue. Cardiovascular: Negative for chest pain and leg swelling. Allergies   Allergen Reactions    Celebrex [Celecoxib] Hives    Iodinated Contrast- Oral And Iv Dye Hives     Patient was in CT for a cardiac scan. A test bolus of 20cc was given and patient broke out into hives immediately after. Cancelled the rest of the exam and escorted the patient to the ED for furhter evaluation.  Sulfa (Sulfonamide Antibiotics) Other (comments)     Patient states she is not allergic       Current Outpatient Prescriptions   Medication Sig Dispense Refill    apixaban (ELIQUIS) 5 mg tablet Take 1 Tab by mouth two (2) times a day. 180 Tab 3    flecainide (TAMBOCOR) 50 mg tablet TK 1 T BID  6    diclofenac (VOLTAREN) 1 % gel Apply 4 g to affected area four (4) times daily. 100 g 3    metoprolol tartrate (LOPRESSOR) 25 mg tablet Take 25 mg by mouth two (2) times a day.  digoxin (LANOXIN) 0.125 mg tablet 2 tablets today then 1 tablet daily 30 Tab 1    thiamine (VITAMIN B-1) 100 mg tablet Take  by mouth daily.  cholecalciferol (VITAMIN D3) 1,000 unit tablet Take  by mouth daily.  cyanocobalamin (VITAMIN B-12) 1,000 mcg tablet Take 1,000 mcg by mouth daily.  multivitamin (ONE A DAY) tablet Take 1 Tab by mouth daily.          Past Medical History:   Diagnosis Date    Atrial fibrillation [facility])     Attention deficit disorder without mention of hyperactivity     DUB (dysfunctional uterine bleeding)     Generalized osteoarthrosis, involving multiple sites     Grief reaction     Mother  recently - Celexa    Memory changes     Adderall for this    Unspecified tinnitus        Past Surgical History:   Procedure Laterality Date    HX KNEE ARTHROSCOPY Right     Meniscus repair    HX KNEE ARTHROSCOPY Right     Removed piece of bone (FB)    HX KNEE ARTHROSCOPY Right     ACL repair       Social History     Social History    Marital status:      Spouse name: N/A    Number of children: N/A    Years of education: N/A     Occupational History    Not on file. Social History Main Topics    Smoking status: Never Smoker    Smokeless tobacco: Never Used    Alcohol use No    Drug use: No    Sexual activity: Not Currently     Other Topics Concern    Not on file     Social History Narrative       Patient does have an advanced directive on file    Visit Vitals    /78 (BP 1 Location: Right arm, BP Patient Position: Sitting)    Pulse 68    Ht 5' 11\" (1.803 m)    Wt 201 lb (91.2 kg)    SpO2 99%    BMI 28.03 kg/m2       Physical Exam   No Cervical Lymphadenopathy  No Supraclavicular Lymphadenopathy  Thyroid is Normal  Lungs are normal to percussion. Clear to auscultation   Heart:  S1 S2 are normal, No gallops, No mummers  No Carotid Bruits  Abdomen:  Normal Bowel Sounds. No tenderness. No masses. No Hepatomegaly or Splenomegly  LE:  Strong Pedal Pulses.   No Edema      BMI:  OK    Office Visit on 2017   Component Date Value Ref Range Status    Glucose 2017 113* 65 - 99 mg/dL Final    BUN 2017 10  6 - 24 mg/dL Final    Creatinine 2017 1.10* 0.57 - 1.00 mg/dL Final    GFR est non-AA 2017 57* >59 mL/min/1.73 Final    GFR est AA 2017 65  >59 mL/min/1.73 Final    BUN/Creatinine ratio 2017 9  9 - 23 Final    Sodium 2017 129* 134 - 144 mmol/L Final    Potassium 2017 3.9  3.5 - 5.2 mmol/L Final    Chloride 12/01/2017 89* 96 - 106 mmol/L Final    CO2 12/01/2017 23  18 - 29 mmol/L Final    Calcium 12/01/2017 8.3* 8.7 - 10.2 mg/dL Final    Protein, total 12/01/2017 5.7* 6.0 - 8.5 g/dL Final    Albumin 12/01/2017 2.9* 3.5 - 5.5 g/dL Final    GLOBULIN, TOTAL 12/01/2017 2.8  1.5 - 4.5 g/dL Final    A-G Ratio 12/01/2017 1.0* 1.2 - 2.2 Final    Bilirubin, total 12/01/2017 0.6  0.0 - 1.2 mg/dL Final    Alk.  phosphatase 12/01/2017 87  39 - 117 IU/L Final    AST (SGOT) 12/01/2017 45* 0 - 40 IU/L Final    ALT (SGPT) 12/01/2017 51* 0 - 32 IU/L Final    Cortisol, random 12/01/2017 29.2  ug/dL Final    Comment:                         Cortisol AM         6.2 - 19.4                          Cortisol PM         2.3 - 11.9     Admission on 11/28/2017, Discharged on 11/28/2017   Component Date Value Ref Range Status    Ventricular Rate 11/28/2017 95  BPM Final    Atrial Rate 11/28/2017 357  BPM Final    QRS Duration 11/28/2017 84  ms Final    Q-T Interval 11/28/2017 360  ms Final    QTC Calculation (Bezet) 11/28/2017 452  ms Final    Calculated R Axis 11/28/2017 -53  degrees Final    Calculated T Axis 11/28/2017 59  degrees Final    Diagnosis 11/28/2017    Final                    Value:Atrial fibrillation  Left axis deviation  RSR' or QR pattern in V1 suggests right ventricular conduction delay  Nonspecific ST abnormality , probably digitalis effect  Abnormal ECG  When compared with ECG of 21-NOV-2017 13:02,  Atrial fibrillation has replaced Sinus rhythm  ST now depressed in Inferior leads  Confirmed by Patricia Germain MD, Pam Espinoza ([id number]) on 11/28/2017 3:44:06 PM      WBC 11/28/2017 11.4  4.6 - 13.2 K/uL Final    RBC 11/28/2017 4.27  4.20 - 5.30 M/uL Final    HGB 11/28/2017 12.7  12.0 - 16.0 g/dL Final    HCT 11/28/2017 39.4  35.0 - 45.0 % Final    MCV 11/28/2017 92.3  74.0 - 97.0 FL Final    MCH 11/28/2017 29.7  24.0 - 34.0 PG Final    MCHC 11/28/2017 32.2  31.0 - 37.0 g/dL Final    RDW 11/28/2017 13.1  11.6 - 14.5 % Final    PLATELET 90/78/5005 494  135 - 420 K/uL Final    MPV 11/28/2017 11.2  9.2 - 11.8 FL Final    NEUTROPHILS 11/28/2017 68  40 - 73 % Final    LYMPHOCYTES 11/28/2017 12* 21 - 52 % Final    MONOCYTES 11/28/2017 20* 3 - 10 % Final    EOSINOPHILS 11/28/2017 0  0 - 5 % Final    BASOPHILS 11/28/2017 0  0 - 2 % Final    ABS. NEUTROPHILS 11/28/2017 7.7  1.8 - 8.0 K/UL Final    ABS. LYMPHOCYTES 11/28/2017 1.4  0.9 - 3.6 K/UL Final    ABS. MONOCYTES 11/28/2017 2.3* 0.05 - 1.2 K/UL Final    ABS. EOSINOPHILS 11/28/2017 0.0  0.0 - 0.4 K/UL Final    ABS. BASOPHILS 11/28/2017 0.0  0.0 - 0.06 K/UL Final    DF 11/28/2017 MANUAL    Final    PLATELET COMMENTS 73/03/0008 ADEQUATE PLATELETS    Final    RBC COMMENTS 11/28/2017 NORMOCYTIC, NORMOCHROMIC    Final    Sodium 11/28/2017 132* 136 - 145 mmol/L Final    Potassium 11/28/2017 3.5  3.5 - 5.5 mmol/L Final    Chloride 11/28/2017 95* 100 - 108 mmol/L Final    CO2 11/28/2017 25  21 - 32 mmol/L Final    Anion gap 11/28/2017 12  3.0 - 18 mmol/L Final    Glucose 11/28/2017 140* 74 - 99 mg/dL Final    BUN 11/28/2017 23* 7.0 - 18 MG/DL Final    Creatinine 11/28/2017 1.42* 0.6 - 1.3 MG/DL Final    BUN/Creatinine ratio 11/28/2017 16  12 - 20   Final    GFR est AA 11/28/2017 47* >60 ml/min/1.73m2 Final    GFR est non-AA 11/28/2017 38* >60 ml/min/1.73m2 Final    Comment: (NOTE)  Estimated GFR is calculated using the Modification of Diet in Renal   Disease (MDRD) Study equation, reported for both  Americans   (GFRAA) and non- Americans (GFRNA), and normalized to 1.73m2   body surface area. The physician must decide which value applies to   the patient. The MDRD study equation should only be used in   individuals age 25 or older. It has not been validated for the   following: pregnant women, patients with serious comorbid conditions,   or on certain medications, or persons with extremes of body size,   muscle mass, or nutritional status.       Calcium 11/28/2017 9.2  8.5 - 10.1 MG/DL Final    Bilirubin, total 11/28/2017 1.1* 0.2 - 1.0 MG/DL Final    ALT (SGPT) 11/28/2017 25  13 - 56 U/L Final    AST (SGOT) 11/28/2017 21  15 - 37 U/L Final    Alk. phosphatase 11/28/2017 73  45 - 117 U/L Final    Protein, total 11/28/2017 7.2  6.4 - 8.2 g/dL Final    Albumin 11/28/2017 2.8* 3.4 - 5.0 g/dL Final    Globulin 11/28/2017 4.4* 2.0 - 4.0 g/dL Final    A-G Ratio 11/28/2017 0.6* 0.8 - 1.7   Final    Magnesium 11/28/2017 1.7  1.6 - 2.6 mg/dL Final    Troponin-I, Qt. 11/28/2017 <0.02  0.00 - 0.06 NG/ML Final    Comment: The presence of detectable troponin above the reference range indicates myocardial injury which may be due to ischemia, myocarditis, trauma, etc.  Clinical correlation is necessary to establish the significance of this finding. Sequential testing is recommended to determine if the typical rise and fall of cTnI is demonstrated. Note:  Cardiac troponin I has a relatively long half life and may be present well after the CK MB has returned to baseline. The reference range is based on the 99th percentile of the referent population. Orders Only on 11/27/2017   Component Date Value Ref Range Status    Glucose 11/27/2017 158* 70 - 99 mg/dL Final    BUN 11/27/2017 22  6 - 22 mg/dL Final    Creatinine 11/27/2017 1.0  0.5 - 1.2 mg/dL Final    Sodium 11/27/2017 134  133 - 145 mmol/L Final    Potassium 11/27/2017 3.5  3.5 - 5.5 mmol/L Final    Chloride 11/27/2017 93* 98 - 110 mmol/L Final    CO2 11/27/2017 24  20 - 32 mmol/L Final    AST (SGOT) 11/27/2017 18  10 - 37 U/L Final    ALT (SGPT) 11/27/2017 12  5 - 40 U/L Final    Alk.  phosphatase 11/27/2017 74  25 - 115 U/L Final    Bilirubin, total 11/27/2017 0.6  0.2 - 1.2 mg/dL Final    Calcium 11/27/2017 8.9  8.4 - 10.5 mg/dL Final    Protein, total 11/27/2017 6.7  6.4 - 8.3 g/dL Final    Albumin 11/27/2017 3.8  3.5 - 5.0 g/dL Final    A-G Ratio 11/27/2017 1.3  1.1 - 2.6 ratio Final  Globulin 11/27/2017 2.9  2.0 - 4.0 g/dL Final    Anion gap 11/27/2017 17.0  mmol/L Final    Comment: Test includes Albumin, Alkaline Phosphatase, ALT, AST, BUN, Calcium, CO2,  Chloride, Creatinine, Glucose, Potassium, Sodium, Total Bilirubin and Total  Protein. Estimated GFR results are reported in mL/min/1.73 sq.m. by the MDRD equation. This eGFR is validated for stable chronic renal failure patients. This   equation  is unreliable in acute illness or patients with normal renal function.  GFRAA 11/27/2017 >60.0  >60.0 Final    GFRNA 11/27/2017 55.0* >60.0 Final    TSH 11/27/2017 0.66  0.27 - 4.20 mcU/mL Final    WBC 11/27/2017 9.8  4.0 - 11.0 K/uL Final    RBC 11/27/2017 4.08  3.80 - 5.20 M/uL Final    HGB 11/27/2017 12.4  11.7 - 16.0 g/dL Final    HCT 11/27/2017 39.2  35.1 - 48.0 % Final    MCV 11/27/2017 96* 80 - 95 fL Final    MCH 11/27/2017 30  26 - 34 pg Final    MCHC 11/27/2017 32  32 - 36 g/dL Final    RDW 11/27/2017 13.9  10.0 - 16.0 % Final    PLATELET 97/87/7470 373  140 - 440 K/uL Final    MPV 11/27/2017 12.1* 6.0 - 10.8 fL Final    Macrocytosis 11/27/2017 1+* (none) Final    [place] cells 11/27/2017 1+* (none) Final    NEUTROPHILS C MAN (DIFF) 11/27/2017 68  40 - 75 % Final    LYMPHOCYTES C MAN (DIFF) 11/27/2017 10* 27 - 45 % Final    MONOCYTES C MAN (DIFF) 11/27/2017 22* 3 - 9 % Final    Neutrophils abs 11/27/2017 7.1  1.8 - 7.7 K/uL Final    Lymphs abs-DIF 11/27/2017 1.1  1.0 - 4.8 K/uL Final    Monocytes abs-DIF 11/27/2017 2.2* 0.1 - 0.9 K/uL Final    NORMOCHROMIC CELLAVISION 11/27/2017 Normochromic   Final    SMEAR EVAL 11/27/2017 Platelet morphology appears normal.   Final    PATH REVIEW OF SMEAR 11/27/2017 See Comment   Final    Comment: Absolute monocytosis, possibly due to drug effect, chronic infection, collagen  vascular disease, reactive processes or neoplasia. Correlate clinically.   Electronic signature: Maciej Couch M.D.,  [phone]  CPT: [id number] Admission on 11/21/2017, Discharged on 11/22/2017   Component Date Value Ref Range Status    Activated Clotting Time (POC) 11/21/2017 235* 79 - 138 SECS Final    SEE REPEAT RESULT    Activated Clotting Time (POC) 11/21/2017 252* 79 - 138 SECS Final    SEE REPEAT RESULT    Activated Clotting Time (POC) 11/21/2017 318* 79 - 138 SECS Final    Activated Clotting Time (POC) 11/21/2017 318* 79 - 138 SECS Final    Ventricular Rate 11/21/2017 79  BPM Final    Atrial Rate 11/21/2017 79  BPM Final    P-R Interval 11/21/2017 210  ms Final    QRS Duration 11/21/2017 94  ms Final    Q-T Interval 11/21/2017 404  ms Final    QTC Calculation (Bezet) 11/21/2017 463  ms Final    Calculated P Axis 11/21/2017 63  degrees Final    Calculated R Axis 11/21/2017 -58  degrees Final    Calculated T Axis 11/21/2017 60  degrees Final    Diagnosis 11/21/2017    Final                    Value:Sinus rhythm with 1st degree AV block  Left axis deviation  Incomplete right bundle branch block    Confirmed by Phillip Grace MD, [place] ([id number]) on 11/21/2017 4:07:25 PM      Activated Clotting Time (POC) 11/21/2017 230* 79 - 138 SECS Final    Activated Clotting Time (POC) 11/21/2017 219* 79 - 138 SECS Final    Activated Clotting Time (POC) 11/21/2017 136  79 - [address] Final   Hospital Outpatient Visit on 11/17/2017   Component Date Value Ref Range Status    WBC 11/17/2017 6.6  4.6 - 13.2 K/uL Final    RBC 11/17/2017 4.25  4.20 - 5.30 M/uL Final    HGB 11/17/2017 12.9  12.0 - 16.0 g/dL Final    HCT 11/17/2017 40.2  35.0 - 45.0 % Final    MCV 11/17/2017 94.6  74.0 - 97.0 FL Final    MCH 11/17/2017 30.4  24.0 - 34.0 PG Final    MCHC 11/17/2017 32.1  31.0 - 37.0 g/dL Final    RDW 11/17/2017 13.3  11.6 - 14.5 % Final    PLATELET 93/55/2482 044  135 - 420 K/uL Final    MPV 11/17/2017 12.3* 9.2 - 11.8 FL Final    NEUTROPHILS 11/17/2017 63  40 - 73 % Final    LYMPHOCYTES 11/17/2017 24  21 - 52 % Final    MONOCYTES 11/17/2017 9  3 - 10 % Final    EOSINOPHILS 11/17/2017 4  0 - 5 % Final    BASOPHILS 11/17/2017 0  0 - 2 % Final    ABS. NEUTROPHILS 11/17/2017 4.1  1.8 - 8.0 K/UL Final    ABS. LYMPHOCYTES 11/17/2017 1.6  0.9 - 3.6 K/UL Final    ABS. MONOCYTES 11/17/2017 0.6  0.05 - 1.2 K/UL Final    ABS. EOSINOPHILS 11/17/2017 0.2  0.0 - 0.4 K/UL Final    ABS. BASOPHILS 11/17/2017 0.0  0.0 - 0.06 K/UL Final    DF 11/17/2017 AUTOMATED    Final    Prothrombin time 11/17/2017 14.9  11.5 - 15.2 sec Final    INR 11/17/2017 1.2  0.8 - 1.2   Final    Comment:            INR Therapeutic Ranges         (on stable oral anticoagulant):     INDICATION                INR  DVT/PE/Atrial Fib          2.0-3.0  MI/Mechanical Heart Valve  2.5-3.5      Sodium 11/17/2017 140  136 - 145 mmol/L Final    Potassium 11/17/2017 4.0  3.5 - 5.5 mmol/L Final    Chloride 11/17/2017 102  100 - 108 mmol/L Final    CO2 11/17/2017 32  21 - 32 mmol/L Final    Anion gap 11/17/2017 6  3.0 - 18 mmol/L Final    Glucose 11/17/2017 74  74 - 99 mg/dL Final    BUN 11/17/2017 20* 7.0 - 18 MG/DL Final    Creatinine 11/17/2017 0.96  0.6 - 1.3 MG/DL Final    BUN/Creatinine ratio 11/17/2017 21* 12 - 20   Final    GFR est AA 11/17/2017 >60  >60 ml/min/1.73m2 Final    GFR est non-AA 11/17/2017 >60  >60 ml/min/1.73m2 Final    Comment: (NOTE)  Estimated GFR is calculated using the Modification of Diet in Renal   Disease (MDRD) Study equation, reported for both  Americans   (GFRAA) and non- Americans (GFRNA), and normalized to 1.73m2   body surface area. The physician must decide which value applies to   the patient. The MDRD study equation should only be used in   individuals age 25 or older. It has not been validated for the   following: pregnant women, patients with serious comorbid conditions,   or on certain medications, or persons with extremes of body size,   muscle mass, or nutritional status.       Calcium 11/17/2017 9.0  8.5 - 10.1 MG/DL Final    Bilirubin, total 11/17/2017 0.6  0.2 - 1.0 MG/DL Final    ALT (SGPT) 11/17/2017 25  13 - 56 U/L Final    AST (SGOT) 11/17/2017 18  15 - 37 U/L Final    Alk. phosphatase 11/17/2017 66  45 - 117 U/L Final    Protein, total 11/17/2017 7.0  6.4 - 8.2 g/dL Final    Albumin 11/17/2017 3.6  3.4 - 5.0 g/dL Final    Globulin 11/17/2017 3.4  2.0 - 4.0 g/dL Final    A-G Ratio 11/17/2017 1.1  0.8 - 1.7   Final   Hospital Outpatient Visit on 11/02/2017   Component Date Value Ref Range Status    Creatinine, POC 11/02/2017 1.1  0.6 - 1.3 MG/DL Final    GFRAA, POC 11/02/2017 >60  >60 ml/min/1.73m2 Final    GFRNA, POC 11/02/2017 52* >60 ml/min/1.73m2 Final    Comment: Estimated GFR is calculated using the IDMS-traceable Modification of Diet in Renal Disease (MDRD) Study equation, reported for both  Americans (GFRAA) and non- Americans (GFRNA), and normalized to 1.73m2 body surface area. The physician must decide which value applies to the patient. The MDRD study equation should only be used in individuals age 25 or older. It has not been validated for the following: pregnant women, patients with serious comorbid conditions, or on certain medications, or persons with extremes of body size, muscle mass, or nutritional status. Hospital Outpatient Visit on 10/25/2017   Component Date Value Ref Range Status    Test indication 10/25/2017 AFIB   Preliminary    Functional capacity 10/25/2017 Normal   Preliminary    ECG Interp. Before Exercise 10/25/2017    Preliminary                    Value:afib  Rightward Axis  NS ST  Changes      ECG Interp. During Exercise 10/25/2017 none   Preliminary    Max. Systolic BP 23/96/9770 571  mmHg Preliminary    Max. Diastolic BP 49/87/7285 70  mmHg Preliminary    Max. Heart rate 10/25/2017 176  BPM Preliminary    Peak Ex METs 10/25/2017 7.0  METS Preliminary    Protocol name 10/25/2017 DEIRDRE              Preliminary       .   Results for orders placed or performed in visit on 12/22/17   AMB POC EKG ROUTINE W/ 12 LEADS, INTER & REP    Impression    Normal Sinus Rhythm with First Degree AV Block. Left Axis Deviation       Assessment / Plan      ICD-10-CM ICD-9-CM    1. Atrial fibrillation, unspecified type ([facility] [address] I48.91 427.31 AMB POC EKG ROUTINE W/ 12 LEADS, INTER & REP   2. Essential hypertension I10 401.9        she was advised to continue her maintenance medications  Continue to follow with cardiology    Follow-up Disposition:  Return in about 4 months (around 4/22/2018). I asked Basia Ortega if she has any questions and I answered the questions. Basia Ortega states that she understands the treatment plan and agrees with the treatment plan

## 2018-01-11 RX ORDER — DIGOXIN 125 UG/1
TABLET ORAL
Qty: 30 TAB | Refills: 0 | Status: SHIPPED | OUTPATIENT
Start: 2018-01-11 | End: 2018-02-17 | Stop reason: SDUPTHER

## 2018-01-14 ENCOUNTER — TELEPHONE (OUTPATIENT)
Dept: INTERNAL MEDICINE CLINIC | Age: 58
End: 2018-01-14

## 2018-01-14 RX ORDER — OSELTAMIVIR PHOSPHATE 75 MG/1
CAPSULE ORAL
Qty: 10 CAP | Refills: 0 | Status: SHIPPED | OUTPATIENT
Start: 2018-01-14 | End: 2018-01-29 | Stop reason: ALTCHOICE

## 2018-01-25 ENCOUNTER — OFFICE VISIT (OUTPATIENT)
Dept: CARDIOLOGY CLINIC | Age: 58
End: 2018-01-25

## 2018-01-25 VITALS
OXYGEN SATURATION: 96 % | BODY MASS INDEX: 28 KG/M2 | HEART RATE: 87 BPM | SYSTOLIC BLOOD PRESSURE: 140 MMHG | DIASTOLIC BLOOD PRESSURE: 80 MMHG | WEIGHT: 200 LBS | HEIGHT: 71 IN

## 2018-01-25 DIAGNOSIS — Z86.79 S/P ABLATION OPERATION FOR ARRHYTHMIA: Primary | ICD-10-CM

## 2018-01-25 DIAGNOSIS — I50.32 DIASTOLIC CHF, CHRONIC (HCC): ICD-10-CM

## 2018-01-25 DIAGNOSIS — R06.09 DYSPNEA ON EXERTION: ICD-10-CM

## 2018-01-25 DIAGNOSIS — Z98.890 S/P ABLATION OPERATION FOR ARRHYTHMIA: Primary | ICD-10-CM

## 2018-01-25 DIAGNOSIS — G47.30 SLEEP APNEA, UNSPECIFIED TYPE: ICD-10-CM

## 2018-01-25 NOTE — PROGRESS NOTES
History of Present Illness:  The patient is a 79-year-old female here for followup. She underwent pulmonary vein isolation with radiofrequency method 17. She did well for a few days but started to develop fatigue and hypotension and I was worried about bradycardia and flutter with AVR so I had an event monitor worn for a week. She had some runs of atrial flutter. She is back to riding 50 miles on the bike without significant limitation. She has a Garmin and her heart rate went up to 170 minutes at times but she feels okay. She any new chest pain, dyspnea, PND, orthopnea or edema. Overall, her blood pressure has been improved with systolic blood pressure up to 90's which is back to her baseline. Her severe fatigue is also improved.       Impression and Plan:      Paroxysmal atrial fibrillation status post pulmonary vein isolation radiofrequency 17. She did not get any IV contrast due to IV allergy. Hypotension, now resolved. Her cortisol level is normal. There was no significant pericardial effusion. Hemoglobin in both groins was stable. Transient hypotension, now improved. Intolerance to higher dose AV blocking agents due to bradycardia. Recent normal systolic function by recent echocardiogram.   Recent diagnosis of sleep apnea now starting treatment. She is off Flecainide and on Digoxin. She takes Lopressor only as needed. She is back to riding 50 miles on her bike without limitation. She is taking Eliquis. Her blood pressure is now stabilized back to her baseline. I will plan to see her back in 3 months and consider follow up EKG and 30-day event monitor within the next 6 months. All questions answered.      Past Medical History:   Diagnosis Date    Atrial fibrillation [facility])     Attention deficit disorder without mention of hyperactivity     DUB (dysfunctional uterine bleeding)     Generalized osteoarthrosis, involving multiple sites     Grief reaction     Mother  recently - Celexa  Memory changes     Adderall for this    Unspecified tinnitus        Current Outpatient Prescriptions   Medication Sig Dispense Refill    oseltamivir (TAMIFLU) 75 mg capsule 1 tab daily for 10 days for prophylaxis 10 Cap 0    DIGOX 125 mcg tablet TAKE 2 TABLETS TODAY THEN TAKE 1 TABLET BY MOUTH EVERY DAY 30 Tab 0    apixaban (ELIQUIS) 5 mg tablet Take 1 Tab by mouth two (2) times a day. 180 Tab 3    diclofenac (VOLTAREN) 1 % gel Apply 4 g to affected area four (4) times daily. 100 g 3    metoprolol tartrate (LOPRESSOR) 25 mg tablet Take 25 mg by mouth two (2) times a day.  thiamine (VITAMIN B-1) 100 mg tablet Take  by mouth daily.  cholecalciferol (VITAMIN D3) 1,000 unit tablet Take  by mouth daily.  cyanocobalamin (VITAMIN B-12) 1,000 mcg tablet Take 1,000 mcg by mouth daily.  multivitamin (ONE A DAY) tablet Take 1 Tab by mouth daily. Social History   reports that she has never smoked. She has never used smokeless tobacco.   reports that she does not drink alcohol. Family History  family history includes Diabetes in her maternal grandmother and mother; Heart Disease in her father and mother; Hypertension in her father and mother. Review of Systems  Except as stated above include:  Constitutional: Negative for fever, chills and malaise/fatigue. HEENT: No congestion or recent URI. Gastrointestinal: No nausea, vomiting, abdominal pain, bloody stools. Pulmonary:  Negative except as stated above. Cardiac:  Negative except as stated above. Musculoskeletal: Negative except as stated above. Neurological:  No localized symptoms. Skin:  Negative except as stated above. Psych:  No mood swings. Endocrine:  No heat/cold intolerance.     PHYSICAL EXAM  BP Readings from Last 3 Encounters:   01/25/18 140/80   12/27/17 128/78   12/14/17 120/82     Pulse Readings from Last 3 Encounters:   01/25/18 87   12/27/17 68   12/14/17 85     Wt Readings from Last 3 Encounters:   01/25/18 90.7 kg (200 lb)   12/27/17 91.2 kg (201 lb)   12/14/17 91.2 kg (201 lb)     General:   Well developed, well groomed. Head/Neck:   No jugular venous distention     No carotid bruits. No evidence of xanthelasma. Lungs:   No respiratory distress. Clear bilaterally. Heart:    Regular rate and rhythm. Normal S1/S2. Palpation of heart with normal point of maximum impulse. No significant murmurs, rubs or gallops. Abdomen:   Soft and nontender. No palpable abdominal mass or bruits. Extremities:   Intact peripheral pulses. No significant edema. Neurological:   Alert and oriented to person, place, time. No focal neurological deficit visually.     Blood Pressure Metric:  stable

## 2018-01-25 NOTE — MR AVS SNAPSHOT
[address] [place] [address] Lyndsay Arana [id number] [phone] Patient: Elza Garcia MRN: [id number] DNN:[birth year]14 Visit Information Date & Time Provider Department Dept. Phone Encounter #  
 1/25/2018  3:20 PM Liberty Seay MD [facility] [id number] Upcoming Health Maintenance Date Due Hepatitis C Screening 1960 Pneumococcal 19-64 Medium Risk (1 of 1 - PPSV23) 3/14/1981 DTaP/Tdap/Td series (1 - Tdap) 3/14/1983 PAP AKA CERVICAL CYTOLOGY 3/14/1983 FOBT Q 1 YEAR AGE 50-75 3/14/2012 BREAST CANCER SCRN MAMMOGRAM 8/16/2019 Allergies as of 1/25/2018  Review Complete On: 1/25/2018 By: Liberty Seay MD  
  
 Severity Noted Reaction Type Reactions Celebrex [Celecoxib]  07/24/2014    Hives Iodinated Contrast- Oral And Iv Dye  11/02/2017    Hives Patient was in CT for a cardiac scan. A test bolus of 20cc was given and patient broke out into hives immediately after. Cancelled the rest of the exam and escorted the patient to the ED for furhter evaluation. Sulfa (Sulfonamide Antibiotics)  09/09/2015    Other (comments) Patient states she is not allergic Current Immunizations  Never Reviewed Name Date  
 TB Skin Test (PPD) 8/29/2013, 2/27/2007 TB Skin Test (PPD) Intradermal 8/9/2017 Not reviewed this visit You Were Diagnosed With   
  
 Codes Comments S/P ablation operation for arrhythmia    -  Primary ICD-10-CM: Z98.890, Z86.79 
ICD-9-CM: V45.89 Dyspnea on exertion     ICD-10-CM: R06.09 
ICD-9-CM: 786.09 Diastolic CHF, chronic (HCC)     ICD-10-CM: I50.32 
ICD-9-CM: 428.32, 428.0 Sleep apnea, unspecified type     ICD-10-CM: G47.30 ICD-9-CM: 780.57 Vitals BP Pulse Height(growth percentile) Weight(growth percentile) SpO2 BMI  
 140/80 87 5' 11\" (1.803 m) 200 lb (90.7 kg) 96% 27.89 kg/m2 OB Status Smoking Status Postmenopausal Never Smoker BMI and BSA Data Body Mass Index Body Surface Area  
 27.89 kg/m 2 2.13 m 2 Preferred Pharmacy Pharmacy Name Phone [facility] [address] [place] [address], [facility] [phone] Your Updated Medication List  
  
   
This list is accurate as of: 1/25/18  3:56 PM.  Always use your most recent med list.  
  
  
  
  
 apixaban 5 mg tablet Commonly known as:  Maurilio Le Take 1 Tab by mouth two (2) times a day. diclofenac 1 % Gel Commonly known as:  VOLTAREN Apply 4 g to affected area four (4) times daily. DIGOX 0.125 mg tablet Generic drug:  digoxin TAKE 2 TABLETS TODAY THEN TAKE 1 TABLET BY MOUTH EVERY DAY  
  
 metoprolol tartrate 25 mg tablet Commonly known as:  LOPRESSOR Take 25 mg by mouth two (2) times a day. multivitamin tablet Commonly known as:  ONE A DAY Take 1 Tab by mouth daily. oseltamivir 75 mg capsule Commonly known as:  TAMIFLU  
1 tab daily for 10 days for prophylaxis VITAMIN B-1 100 mg tablet Generic drug:  thiamine Take  by mouth daily. VITAMIN B-12 1,000 mcg tablet Generic drug:  cyanocobalamin Take 1,000 mcg by mouth daily. VITAMIN D3 1,000 unit tablet Generic drug:  cholecalciferol Take  by mouth daily. Introducing [facility]! Dear Mariann Mckeon: Thank you for requesting a [employer] account. Our records indicate that you already have an active [employer] account. You can access your account anytime at [email or web address] Did you know that you can access your hospital and ER discharge instructions at any time in [employer]? You can also review all of your test results from your hospital stay or ER visit. Additional Information If you have questions, please visit the Frequently Asked Questions section of the [employer] website at [email or web address] Remember, [email or web address] is NOT to be used for urgent needs. For medical emergencies, dial 911. Now available from your iPhone and Android! Please provide this summary of care documentation to your next provider. Your primary care clinician is listed as Jerald Delcid. If you have any questions after today's visit, please call [phone].

## 2018-01-25 NOTE — PROGRESS NOTES
1. Have you been to the ER, urgent care clinic since your last visit? Hospitalized since your last visit? No     2. Have you seen or consulted any other health care providers outside of the [address] [place] [address] since your last visit? Include any pap smears or colon screening.  No

## 2018-01-29 ENCOUNTER — OFFICE VISIT (OUTPATIENT)
Dept: INTERNAL MEDICINE CLINIC | Age: 58
End: 2018-01-29

## 2018-01-29 ENCOUNTER — HOSPITAL ENCOUNTER (OUTPATIENT)
Dept: INFUSION THERAPY | Age: 58
Discharge: HOME | End: 2018-01-29
Payer: COMMERCIAL

## 2018-01-29 VITALS
WEIGHT: 200 LBS | DIASTOLIC BLOOD PRESSURE: 52 MMHG | BODY MASS INDEX: 28 KG/M2 | OXYGEN SATURATION: 96 % | SYSTOLIC BLOOD PRESSURE: 80 MMHG | TEMPERATURE: 99.6 F | RESPIRATION RATE: 16 BRPM | HEIGHT: 71 IN | HEART RATE: 108 BPM

## 2018-01-29 VITALS
DIASTOLIC BLOOD PRESSURE: 72 MMHG | TEMPERATURE: 97.9 F | RESPIRATION RATE: 18 BRPM | HEART RATE: 72 BPM | OXYGEN SATURATION: 98 % | SYSTOLIC BLOOD PRESSURE: 109 MMHG

## 2018-01-29 DIAGNOSIS — I49.9 IRREGULAR HEART BEAT: ICD-10-CM

## 2018-01-29 DIAGNOSIS — Z98.890 S/P ABLATION OF ATRIAL FIBRILLATION: ICD-10-CM

## 2018-01-29 DIAGNOSIS — I95.89 OTHER SPECIFIED HYPOTENSION: Primary | ICD-10-CM

## 2018-01-29 DIAGNOSIS — Z86.79 S/P ABLATION OF ATRIAL FIBRILLATION: ICD-10-CM

## 2018-01-29 DIAGNOSIS — I48.0 INTERMITTENT ATRIAL FIBRILLATION (HCC): ICD-10-CM

## 2018-01-29 PROCEDURE — 96361 HYDRATE IV INFUSION ADD-ON: CPT

## 2018-01-29 PROCEDURE — 96360 HYDRATION IV INFUSION INIT: CPT

## 2018-01-29 PROCEDURE — 74011250636 HC RX REV CODE- 250/636: Performed by: INTERNAL MEDICINE

## 2018-01-29 RX ORDER — SODIUM CHLORIDE 0.9 % (FLUSH) 0.9 %
10-40 SYRINGE (ML) INJECTION AS NEEDED
Status: DISCONTINUED | OUTPATIENT
Start: 2018-01-29 | End: 2018-02-02 | Stop reason: HOSPADM

## 2018-01-29 RX ORDER — SODIUM CHLORIDE 9 MG/ML
500 INJECTION, SOLUTION INTRAVENOUS ONCE
Status: COMPLETED | OUTPATIENT
Start: 2018-01-29 | End: 2018-01-29

## 2018-01-29 RX ADMIN — Medication 10 ML: at 13:40

## 2018-01-29 RX ADMIN — SODIUM CHLORIDE 500 ML/HR: 900 INJECTION, SOLUTION INTRAVENOUS at 13:45

## 2018-01-29 NOTE — PROGRESS NOTES
[facility] Progress Note    Date: 2018    Name: Narda Prince    MRN: [id number]         : 1960    Hydration    Ms. Abdifatah Vang to [facility], at 1330. Pt was assessed and education was provided. Ms. Jacobo's vitals were reviewed and patient was observed for 5 minutes prior to treatment. Visit Vitals    /72 (BP 1 Location: Left arm, BP Patient Position: Sitting)    Pulse 72    Temp 97.9 °F (36.6 °C)    Resp 18    SpO2 98%    Breastfeeding No         24 g PIV placed in the left AC x 1 attempt. PIV flushed easily and had brisk blood return. NS was infused at 200 mL/hr for two hours as ordered. Ms. Jacobo tolerated the infusion, and had no complaints. VS remained stable. PIV flushed with NS 10 ml and removed. No bleeding or hematoma noted at site. Guaze and tape applied. Patient armband removed and shredded. Ms. Abdifatah Vang was discharged from [place] [address] in stable condition at 1630. She has no further appointments with [facility] at this time.     Keli Trotter RN  2018

## 2018-01-29 NOTE — LETTER
18 To:  CHICO Escobedo RE:  [place] :  1960 Please infuse Normal Saline IV at 200 ml/hour for 2  hours for  [place] Diagnosis: ICD-10-CM ICD-9-CM 1. Irregular heart beat I49.9 427.9 AMB POC EKG ROUTINE W/ 12 LEADS, INTER & REP  
   CBC WITH AUTOMATED DIFF  
   METABOLIC PANEL, COMPREHENSIVE  
   DIGOXIN  
2. Intermittent atrial fibrillation (HCC) I48.0 427.31 AMB POC EKG ROUTINE W/ 12 LEADS, INTER & REP  
   CBC WITH AUTOMATED DIFF  
   METABOLIC PANEL, COMPREHENSIVE  
   DIGOXIN  
3. Other specified hypotension I95.89 458. 8 Thank you. Sincerely, Mora Brown M.D.   FACP

## 2018-01-29 NOTE — PROGRESS NOTES
1. Have you been to the ER, urgent care clinic since your last visit? Hospitalized since your last visit? No    2. Have you seen or consulted any other health care providers outside of the [address] [place] [address] since your last visit? Include any pap smears or colon screening.  No

## 2018-01-29 NOTE — PATIENT INSTRUCTIONS
Health Maintenance Due   Topic Date Due    Hepatitis C Test  1960    Pneumococcal Vaccine (1 of 1 - PPSV23) 03/14/1981    DTaP/Tdap/Td  (1 - Tdap) 03/14/1983    Cervical Cancer Screening  03/14/1983    Stool testing for trace blood  03/14/2012

## 2018-01-30 LAB
A-G RATIO,AGRAT: 1.4 RATIO (ref 1.1–2.6)
ABSOLUTE LYMPHOCYTE COUNT, 10803: 1.1 K/UL (ref 1–4.8)
ALBUMIN SERPL-MCNC: 3.8 G/DL (ref 3.5–5)
ALP SERPL-CCNC: 76 U/L (ref 25–115)
ALT SERPL-CCNC: 13 U/L (ref 5–40)
ANION GAP SERPL CALC-SCNC: 14 MMOL/L
AST SERPL W P-5'-P-CCNC: 15 U/L (ref 10–37)
BASOPHILS # BLD: 0 K/UL (ref 0–0.2)
BASOPHILS NFR BLD: 0 % (ref 0–2)
BILIRUB SERPL-MCNC: 0.9 MG/DL (ref 0.2–1.2)
BUN SERPL-MCNC: 15 MG/DL (ref 6–22)
CALCIUM SERPL-MCNC: 9.2 MG/DL (ref 8.4–10.5)
CHLORIDE SERPL-SCNC: 97 MMOL/L (ref 98–110)
CO2 SERPL-SCNC: 27 MMOL/L (ref 20–32)
CREAT SERPL-MCNC: 1.1 MG/DL (ref 0.5–1.2)
DIGOXIN SERPL-MCNC: 0.9 NG/ML (ref 0.8–2)
EOSINOPHIL # BLD: 0 K/UL (ref 0–0.5)
EOSINOPHIL NFR BLD: 0 % (ref 0–6)
ERYTHROCYTE [DISTWIDTH] IN BLOOD BY AUTOMATED COUNT: 15 % (ref 10–16)
GFRAA, 66117: 60
GFRNA, 66118: 49.5
GLOBULIN,GLOB: 2.7 G/DL (ref 2–4)
GLUCOSE SERPL-MCNC: 130 MG/DL (ref 70–99)
GRANULOCYTES,GRANS: 79 % (ref 40–75)
HCT VFR BLD AUTO: 39.7 % (ref 35.1–48)
HGB BLD-MCNC: 12.4 G/DL (ref 11.7–16)
LYMPHOCYTES, LYMLT: 11 % (ref 27–45)
MCH RBC QN AUTO: 30 PG (ref 26–34)
MCHC RBC AUTO-ENTMCNC: 31 G/DL (ref 32–36)
MCV RBC AUTO: 97 FL (ref 80–95)
MONOCYTES # BLD: 0.9 K/UL (ref 0.1–0.9)
MONOCYTES NFR BLD: 9 % (ref 3–9)
NEUTROPHILS # BLD AUTO: 7.6 K/UL (ref 1.8–7.7)
PLATELET # BLD AUTO: 212 K/UL (ref 140–440)
PMV BLD AUTO: 12.3 FL (ref 6–10.8)
POTASSIUM SERPL-SCNC: 4.4 MMOL/L (ref 3.5–5.5)
PROT SERPL-MCNC: 6.5 G/DL (ref 6.4–8.3)
RBC # BLD AUTO: 4.09 M/UL (ref 3.8–5.2)
SODIUM SERPL-SCNC: 138 MMOL/L (ref 133–145)
WBC # BLD AUTO: 9.6 K/UL (ref 4–11)

## 2018-01-30 NOTE — PROGRESS NOTES
The patient presents to the office today with the chief complaint of weakness    HPI    The patient felt fine last week but for the past 3 days the patient has felt lethargic and weak. The patient denies fever. The patient denies palpitations. The last EKGs in December were normal sinus rhythm. The patient feels similar to the situation 1 1 /2 months ago. Review of Systems   Respiratory: Negative for shortness of breath. Cardiovascular: Negative for chest pain and leg swelling. Neurological: Positive for weakness. Allergies   Allergen Reactions    Celebrex [Celecoxib] Hives    Iodinated Contrast- Oral And Iv Dye Hives     Patient was in CT for a cardiac scan. A test bolus of 20cc was given and patient broke out into hives immediately after. Cancelled the rest of the exam and escorted the patient to the ED for furhter evaluation.  Sulfa (Sulfonamide Antibiotics) Other (comments)     Patient states she is not allergic       Current Outpatient Prescriptions   Medication Sig Dispense Refill    DIGOX 125 mcg tablet TAKE 2 TABLETS TODAY THEN TAKE 1 TABLET BY MOUTH EVERY DAY 30 Tab 0    apixaban (ELIQUIS) 5 mg tablet Take 1 Tab by mouth two (2) times a day. 180 Tab 3    diclofenac (VOLTAREN) 1 % gel Apply 4 g to affected area four (4) times daily. 100 g 3    metoprolol tartrate (LOPRESSOR) 25 mg tablet Take 25 mg by mouth two (2) times a day.  thiamine (VITAMIN B-1) 100 mg tablet Take  by mouth daily.  cholecalciferol (VITAMIN D3) 1,000 unit tablet Take  by mouth daily.  cyanocobalamin (VITAMIN B-12) 1,000 mcg tablet Take 1,000 mcg by mouth daily.  multivitamin (ONE A DAY) tablet Take 1 Tab by mouth daily.        Facility-Administered Medications Ordered in Other Visits   Medication Dose Route Frequency Provider Last Rate Last Dose    sodium chloride (NS) flush 10-40 mL  10-40 mL IntraVENous PRN Clay Fuentes MD   10 mL at 01/29/18 1340       Past Medical History: Diagnosis Date    Atrial fibrillation [facility])     Attention deficit disorder without mention of hyperactivity     DUB (dysfunctional uterine bleeding)     Generalized osteoarthrosis, involving multiple sites     Grief reaction     Mother  recently - Celexa    Memory changes     Adderall for this    Unspecified tinnitus        Past Surgical History:   Procedure Laterality Date    HX KNEE ARTHROSCOPY Right     Meniscus repair    HX KNEE ARTHROSCOPY Right     Removed piece of bone (FB)    HX KNEE ARTHROSCOPY Right     ACL repair       Social History     Social History    Marital status:      Spouse name: N/A    Number of children: N/A    Years of education: N/A     Occupational History    Not on file.      Social History Main Topics    Smoking status: Never Smoker    Smokeless tobacco: Never Used    Alcohol use No    Drug use: No    Sexual activity: Not Currently     Other Topics Concern    Not on file     Social History Narrative       Patient does have an advanced directive on file    Visit Vitals    BP (!) 80/52 (BP 1 Location: Left arm, BP Patient Position: Sitting)    Pulse (!) 108    Temp 99.6 °F (37.6 °C) (Tympanic)    Resp 16    Ht 5' 11\" (1.803 m)    Wt 200 lb (90.7 kg)    SpO2 96%    BMI 27.89 kg/m2       Physical Exam    BMI:  OK    Office Visit on 2017   Component Date Value Ref Range Status    Glucose 2017 113* 65 - 99 mg/dL Final    BUN 2017 10  6 - 24 mg/dL Final    Creatinine 2017 1.10* 0.57 - 1.00 mg/dL Final    GFR est non-AA 2017 57* >59 mL/min/1.73 Final    GFR est AA 2017 65  >59 mL/min/1.73 Final    BUN/Creatinine ratio 2017 9  9 - 23 Final    Sodium 2017 129* 134 - 144 mmol/L Final    Potassium 2017 3.9  3.5 - 5.2 mmol/L Final    Chloride 2017 89* 96 - 106 mmol/L Final    CO2 2017 23  18 - 29 mmol/L Final    Calcium 2017 8.3* 8.7 - 10.2 mg/dL Final    Protein, total 12/01/2017 5.7* 6.0 - 8.5 g/dL Final    Albumin 12/01/2017 2.9* 3.5 - 5.5 g/dL Final    GLOBULIN, TOTAL 12/01/2017 2.8  1.5 - 4.5 g/dL Final    A-G Ratio 12/01/2017 1.0* 1.2 - 2.2 Final    Bilirubin, total 12/01/2017 0.6  0.0 - 1.2 mg/dL Final    Alk.  phosphatase 12/01/2017 87  39 - 117 IU/L Final    AST (SGOT) 12/01/2017 45* 0 - 40 IU/L Final    ALT (SGPT) 12/01/2017 51* 0 - 32 IU/L Final    Cortisol, random 12/01/2017 29.2  ug/dL Final    Comment:                         Cortisol AM         6.2 - 19.4                          Cortisol PM         2.3 - 11.9     Admission on 11/28/2017, Discharged on 11/28/2017   Component Date Value Ref Range Status    Ventricular Rate 11/28/2017 95  BPM Final    Atrial Rate 11/28/2017 357  BPM Final    QRS Duration 11/28/2017 84  ms Final    Q-T Interval 11/28/2017 360  ms Final    QTC Calculation (Bezet) 11/28/2017 452  ms Final    Calculated R Axis 11/28/2017 -53  degrees Final    Calculated T Axis 11/28/2017 59  degrees Final    Diagnosis 11/28/2017    Final                    Value:Atrial fibrillation  Left axis deviation  RSR' or QR pattern in V1 suggests right ventricular conduction delay  Nonspecific ST abnormality , probably digitalis effect  Abnormal ECG  When compared with ECG of 21-NOV-2017 13:02,  Atrial fibrillation has replaced Sinus rhythm  ST now depressed in Inferior leads  Confirmed by Chichi Tay MD, [facility] ([id number]) on 11/28/2017 3:44:06 PM      WBC 11/28/2017 11.4  4.6 - 13.2 K/uL Final    RBC 11/28/2017 4.27  4.20 - 5.30 M/uL Final    HGB 11/28/2017 12.7  12.0 - 16.0 g/dL Final    HCT 11/28/2017 39.4  35.0 - 45.0 % Final    MCV 11/28/2017 92.3  74.0 - 97.0 FL Final    MCH 11/28/2017 29.7  24.0 - 34.0 PG Final    MCHC 11/28/2017 32.2  31.0 - 37.0 g/dL Final    RDW 11/28/2017 13.1  11.6 - 14.5 % Final    PLATELET 52/24/7108 031  135 - 420 K/uL Final    MPV 11/28/2017 11.2  9.2 - 11.8 FL Final    NEUTROPHILS 11/28/2017 68  40 - 73 % Final    LYMPHOCYTES 11/28/2017 12* 21 - 52 % Final    MONOCYTES 11/28/2017 20* 3 - 10 % Final    EOSINOPHILS 11/28/2017 0  0 - 5 % Final    BASOPHILS 11/28/2017 0  0 - 2 % Final    ABS. NEUTROPHILS 11/28/2017 7.7  1.8 - 8.0 K/UL Final    ABS. LYMPHOCYTES 11/28/2017 1.4  0.9 - 3.6 K/UL Final    ABS. MONOCYTES 11/28/2017 2.3* 0.05 - 1.2 K/UL Final    ABS. EOSINOPHILS 11/28/2017 0.0  0.0 - 0.4 K/UL Final    ABS. BASOPHILS 11/28/2017 0.0  0.0 - 0.06 K/UL Final    DF 11/28/2017 MANUAL    Final    PLATELET COMMENTS 15/89/5504 ADEQUATE PLATELETS    Final    RBC COMMENTS 11/28/2017 NORMOCYTIC, NORMOCHROMIC    Final    Sodium 11/28/2017 132* 136 - 145 mmol/L Final    Potassium 11/28/2017 3.5  3.5 - 5.5 mmol/L Final    Chloride 11/28/2017 95* 100 - 108 mmol/L Final    CO2 11/28/2017 25  21 - 32 mmol/L Final    Anion gap 11/28/2017 12  3.0 - 18 mmol/L Final    Glucose 11/28/2017 140* 74 - 99 mg/dL Final    BUN 11/28/2017 23* 7.0 - 18 MG/DL Final    Creatinine 11/28/2017 1.42* 0.6 - 1.3 MG/DL Final    BUN/Creatinine ratio 11/28/2017 16  12 - 20   Final    GFR est AA 11/28/2017 47* >60 ml/min/1.73m2 Final    GFR est non-AA 11/28/2017 38* >60 ml/min/1.73m2 Final    Comment: (NOTE)  Estimated GFR is calculated using the Modification of Diet in Renal   Disease (MDRD) Study equation, reported for both  Americans   (GFRAA) and non- Americans (GFRNA), and normalized to 1.73m2   body surface area. The physician must decide which value applies to   the patient. The MDRD study equation should only be used in   individuals age 25 or older. It has not been validated for the   following: pregnant women, patients with serious comorbid conditions,   or on certain medications, or persons with extremes of body size,   muscle mass, or nutritional status.       Calcium 11/28/2017 9.2  8.5 - 10.1 MG/DL Final    Bilirubin, total 11/28/2017 1.1* 0.2 - 1.0 MG/DL Final    ALT (SGPT) 11/28/2017 25  13 - 56 U/L Final    AST (SGOT) 11/28/2017 21  15 - 37 U/L Final    Alk. phosphatase 11/28/2017 73  45 - 117 U/L Final    Protein, total 11/28/2017 7.2  6.4 - 8.2 g/dL Final    Albumin 11/28/2017 2.8* 3.4 - 5.0 g/dL Final    Globulin 11/28/2017 4.4* 2.0 - 4.0 g/dL Final    A-G Ratio 11/28/2017 0.6* 0.8 - 1.7   Final    Magnesium 11/28/2017 1.7  1.6 - 2.6 mg/dL Final    Troponin-I, Qt. 11/28/2017 <0.02  0.00 - 0.06 NG/ML Final    Comment: The presence of detectable troponin above the reference range indicates myocardial injury which may be due to ischemia, myocarditis, trauma, etc.  Clinical correlation is necessary to establish the significance of this finding. Sequential testing is recommended to determine if the typical rise and fall of cTnI is demonstrated. Note:  Cardiac troponin I has a relatively long half life and may be present well after the CK MB has returned to baseline. The reference range is based on the 99th percentile of the referent population. Orders Only on 11/27/2017   Component Date Value Ref Range Status    Glucose 11/27/2017 158* 70 - 99 mg/dL Final    BUN 11/27/2017 22  6 - 22 mg/dL Final    Creatinine 11/27/2017 1.0  0.5 - 1.2 mg/dL Final    Sodium 11/27/2017 134  133 - 145 mmol/L Final    Potassium 11/27/2017 3.5  3.5 - 5.5 mmol/L Final    Chloride 11/27/2017 93* 98 - 110 mmol/L Final    CO2 11/27/2017 24  20 - 32 mmol/L Final    AST (SGOT) 11/27/2017 18  10 - 37 U/L Final    ALT (SGPT) 11/27/2017 12  5 - 40 U/L Final    Alk.  phosphatase 11/27/2017 74  25 - 115 U/L Final    Bilirubin, total 11/27/2017 0.6  0.2 - 1.2 mg/dL Final    Calcium 11/27/2017 8.9  8.4 - 10.5 mg/dL Final    Protein, total 11/27/2017 6.7  6.4 - 8.3 g/dL Final    Albumin 11/27/2017 3.8  3.5 - 5.0 g/dL Final    A-G Ratio 11/27/2017 1.3  1.1 - 2.6 ratio Final    Globulin 11/27/2017 2.9  2.0 - 4.0 g/dL Final    Anion gap 11/27/2017 17.0  mmol/L Final    Comment: Test includes Albumin, Alkaline Phosphatase, ALT, AST, BUN, Calcium, CO2,  Chloride, Creatinine, Glucose, Potassium, Sodium, Total Bilirubin and Total  Protein. Estimated GFR results are reported in mL/min/1.73 sq.m. by the MDRD equation. This eGFR is validated for stable chronic renal failure patients. This   equation  is unreliable in acute illness or patients with normal renal function.  GFRAA 11/27/2017 >60.0  >60.0 Final    GFRNA 11/27/2017 55.0* >60.0 Final    TSH 11/27/2017 0.66  0.27 - 4.20 mcU/mL Final    WBC 11/27/2017 9.8  4.0 - 11.0 K/uL Final    RBC 11/27/2017 4.08  3.80 - 5.20 M/uL Final    HGB 11/27/2017 12.4  11.7 - 16.0 g/dL Final    HCT 11/27/2017 39.2  35.1 - 48.0 % Final    MCV 11/27/2017 96* 80 - 95 fL Final    MCH 11/27/2017 30  26 - 34 pg Final    MCHC 11/27/2017 32  32 - 36 g/dL Final    RDW 11/27/2017 13.9  10.0 - 16.0 % Final    PLATELET 97/02/8573 019  140 - 440 K/uL Final    MPV 11/27/2017 12.1* 6.0 - 10.8 fL Final    Macrocytosis 11/27/2017 1+* (none) Final    Ashley cells 11/27/2017 1+* (none) Final    NEUTROPHILS C MAN (DIFF) 11/27/2017 68  40 - 75 % Final    LYMPHOCYTES C MAN (DIFF) 11/27/2017 10* 27 - 45 % Final    MONOCYTES C MAN (DIFF) 11/27/2017 22* 3 - 9 % Final    Neutrophils abs 11/27/2017 7.1  1.8 - 7.7 K/uL Final    Lymphs abs-DIF 11/27/2017 1.1  1.0 - 4.8 K/uL Final    Monocytes abs-DIF 11/27/2017 2.2* 0.1 - 0.9 K/uL Final    NORMOCHROMIC CELLAVISION 11/27/2017 Normochromic   Final    SMEAR EVAL 11/27/2017 Platelet morphology appears normal.   Final    PATH REVIEW OF SMEAR 11/27/2017 See Comment   Final    Comment: Absolute monocytosis, possibly due to drug effect, chronic infection, collagen  vascular disease, reactive processes or neoplasia. Correlate clinically.   Electronic signature: Loly Fisher M.D.,  [phone]  CPT: [id number]     Admission on 11/21/2017, Discharged on 11/22/2017   Component Date Value Ref Range Status    Activated Clotting Time (POC) 11/21/2017 235* 79 - 138 SECS Final SEE REPEAT RESULT    Activated Clotting Time (POC) 11/21/2017 252* 79 - 138 SECS Final    SEE REPEAT RESULT    Activated Clotting Time (POC) 11/21/2017 318* 79 - 138 SECS Final    Activated Clotting Time (POC) 11/21/2017 318* 79 - 138 SECS Final    Ventricular Rate 11/21/2017 79  BPM Final    Atrial Rate 11/21/2017 79  BPM Final    P-R Interval 11/21/2017 210  ms Final    QRS Duration 11/21/2017 94  ms Final    Q-T Interval 11/21/2017 404  ms Final    QTC Calculation (Bezet) 11/21/2017 463  ms Final    Calculated P Axis 11/21/2017 63  degrees Final    Calculated R Axis 11/21/2017 -58  degrees Final    Calculated T Axis 11/21/2017 60  degrees Final    Diagnosis 11/21/2017    Final                    Value:Sinus rhythm with 1st degree AV block  Left axis deviation  Incomplete right bundle branch block    Confirmed by Hector Cooley MD, [facility] ([id number]) on 11/21/2017 4:07:25 PM      Activated Clotting Time (POC) 11/21/2017 230* 79 - 138 SECS Final    Activated Clotting Time (POC) 11/21/2017 219* 79 - 138 SECS Final    Activated Clotting Time (POC) 11/21/2017 136  79 - [address] Final   Hospital Outpatient Visit on 11/17/2017   Component Date Value Ref Range Status    WBC 11/17/2017 6.6  4.6 - 13.2 K/uL Final    RBC 11/17/2017 4.25  4.20 - 5.30 M/uL Final    HGB 11/17/2017 12.9  12.0 - 16.0 g/dL Final    HCT 11/17/2017 40.2  35.0 - 45.0 % Final    MCV 11/17/2017 94.6  74.0 - 97.0 FL Final    MCH 11/17/2017 30.4  24.0 - 34.0 PG Final    MCHC 11/17/2017 32.1  31.0 - 37.0 g/dL Final    RDW 11/17/2017 13.3  11.6 - 14.5 % Final    PLATELET 26/76/5861 470  135 - 420 K/uL Final    MPV 11/17/2017 12.3* 9.2 - 11.8 FL Final    NEUTROPHILS 11/17/2017 63  40 - 73 % Final    LYMPHOCYTES 11/17/2017 24  21 - 52 % Final    MONOCYTES 11/17/2017 9  3 - 10 % Final    EOSINOPHILS 11/17/2017 4  0 - 5 % Final    BASOPHILS 11/17/2017 0  0 - 2 % Final    ABS. NEUTROPHILS 11/17/2017 4.1  1.8 - 8.0 K/UL Final    ABS. LYMPHOCYTES 11/17/2017 1.6  0.9 - 3.6 K/UL Final    ABS. MONOCYTES 11/17/2017 0.6  0.05 - 1.2 K/UL Final    ABS. EOSINOPHILS 11/17/2017 0.2  0.0 - 0.4 K/UL Final    ABS. BASOPHILS 11/17/2017 0.0  0.0 - 0.06 K/UL Final    DF 11/17/2017 AUTOMATED    Final    Prothrombin time 11/17/2017 14.9  11.5 - 15.2 sec Final    INR 11/17/2017 1.2  0.8 - 1.2   Final    Comment:            INR Therapeutic Ranges         (on stable oral anticoagulant):     INDICATION                INR  DVT/PE/Atrial Fib          2.0-3.0  MI/Mechanical Heart Valve  2.5-3.5      Sodium 11/17/2017 140  136 - 145 mmol/L Final    Potassium 11/17/2017 4.0  3.5 - 5.5 mmol/L Final    Chloride 11/17/2017 102  100 - 108 mmol/L Final    CO2 11/17/2017 32  21 - 32 mmol/L Final    Anion gap 11/17/2017 6  3.0 - 18 mmol/L Final    Glucose 11/17/2017 74  74 - 99 mg/dL Final    BUN 11/17/2017 20* 7.0 - 18 MG/DL Final    Creatinine 11/17/2017 0.96  0.6 - 1.3 MG/DL Final    BUN/Creatinine ratio 11/17/2017 21* 12 - 20   Final    GFR est AA 11/17/2017 >60  >60 ml/min/1.73m2 Final    GFR est non-AA 11/17/2017 >60  >60 ml/min/1.73m2 Final    Comment: (NOTE)  Estimated GFR is calculated using the Modification of Diet in Renal   Disease (MDRD) Study equation, reported for both  Americans   (GFRAA) and non- Americans (GFRNA), and normalized to 1.73m2   body surface area. The physician must decide which value applies to   the patient. The MDRD study equation should only be used in   individuals age 25 or older. It has not been validated for the   following: pregnant women, patients with serious comorbid conditions,   or on certain medications, or persons with extremes of body size,   muscle mass, or nutritional status.  Calcium 11/17/2017 9.0  8.5 - 10.1 MG/DL Final    Bilirubin, total 11/17/2017 0.6  0.2 - 1.0 MG/DL Final    ALT (SGPT) 11/17/2017 25  13 - 56 U/L Final    AST (SGOT) 11/17/2017 18  15 - 37 U/L Final    Alk.  phosphatase 11/17/2017 66  45 - 117 U/L Final    Protein, total 11/17/2017 7.0  6.4 - 8.2 g/dL Final    Albumin 11/17/2017 3.6  3.4 - 5.0 g/dL Final    Globulin 11/17/2017 3.4  2.0 - 4.0 g/dL Final    A-G Ratio 11/17/2017 1.1  0.8 - 1.7   Final   Hospital Outpatient Visit on 11/02/2017   Component Date Value Ref Range Status    Creatinine, POC 11/02/2017 1.1  0.6 - 1.3 MG/DL Final    GFRAA, POC 11/02/2017 >60  >60 ml/min/1.73m2 Final    GFRNA, POC 11/02/2017 52* >60 ml/min/1.73m2 Final    Comment: Estimated GFR is calculated using the IDMS-traceable Modification of Diet in Renal Disease (MDRD) Study equation, reported for both  Americans (GFRAA) and non- Americans (GFRNA), and normalized to 1.73m2 body surface area. The physician must decide which value applies to the patient. The MDRD study equation should only be used in individuals age 25 or older. It has not been validated for the following: pregnant women, patients with serious comorbid conditions, or on certain medications, or persons with extremes of body size, muscle mass, or nutritional status. .  Results for orders placed or performed in visit on 01/29/18   AMB POC EKG ROUTINE W/ 12 LEADS, INTER & REP    Impression    Atrial Fibrillation. New from EKG 12/24/17       Assessment / Plan      ICD-10-CM ICD-9-CM    1. Other specified hypotension I95.89 458.8    2. Irregular heart beat I49.9 427.9 AMB POC EKG ROUTINE W/ 12 LEADS, INTER & REP      CBC WITH AUTOMATED DIFF      METABOLIC PANEL, COMPREHENSIVE      DIGOXIN   3. Intermittent atrial fibrillation (HCC) I48.0 427.31 AMB POC EKG ROUTINE W/ 12 LEADS, INTER & REP      CBC WITH AUTOMATED DIFF      METABOLIC PANEL, COMPREHENSIVE      DIGOXIN   4. S/P ablation of atrial fibrillation Z98.890 V45.89     Z86.79       IV normal saline  Labs  she was advised to continue her maintenance medications      Follow-up Disposition:  Return in about 2 weeks (around 2/12/2018).     I asked Belem Carbajal Shannon Groves if she has any questions and I answered the questions. Malika Groves states that she understands the treatment plan and agrees with the treatment plan

## 2018-02-02 ENCOUNTER — CLINICAL SUPPORT (OUTPATIENT)
Dept: INTERNAL MEDICINE CLINIC | Age: 58
End: 2018-02-02

## 2018-02-02 DIAGNOSIS — I48.91 ATRIAL FIBRILLATION, UNSPECIFIED TYPE (HCC): Primary | ICD-10-CM

## 2018-02-02 RX ORDER — AMIODARONE HYDROCHLORIDE 200 MG/1
TABLET ORAL
Qty: 40 TAB | Refills: 5 | Status: SHIPPED | OUTPATIENT
Start: 2018-02-02 | End: 2018-05-31 | Stop reason: SDUPTHER

## 2018-02-03 VITALS — HEART RATE: 68 BPM | DIASTOLIC BLOOD PRESSURE: 78 MMHG | SYSTOLIC BLOOD PRESSURE: 142 MMHG

## 2018-02-03 NOTE — PROGRESS NOTES
Patient is feeling much better. No weakness. No dizziness. EKG now shows normal sinus rhythm. It appears that when the patient goes into atrial fibrillation is when her BP drops and his sense of well being deteriorates. I will begin Amiodarone to hold sinus rhythm and discuss with cardiology next week.

## 2018-02-16 ENCOUNTER — OFFICE VISIT (OUTPATIENT)
Dept: INTERNAL MEDICINE CLINIC | Age: 58
End: 2018-02-16

## 2018-02-16 VITALS
SYSTOLIC BLOOD PRESSURE: 140 MMHG | HEART RATE: 73 BPM | WEIGHT: 200 LBS | BODY MASS INDEX: 28 KG/M2 | OXYGEN SATURATION: 100 % | RESPIRATION RATE: 16 BRPM | HEIGHT: 71 IN | DIASTOLIC BLOOD PRESSURE: 80 MMHG | TEMPERATURE: 98.2 F

## 2018-02-16 DIAGNOSIS — Z98.890 S/P ABLATION OF ATRIAL FIBRILLATION: ICD-10-CM

## 2018-02-16 DIAGNOSIS — Z86.79 S/P ABLATION OF ATRIAL FIBRILLATION: ICD-10-CM

## 2018-02-16 DIAGNOSIS — I48.0 INTERMITTENT ATRIAL FIBRILLATION (HCC): Primary | ICD-10-CM

## 2018-02-16 NOTE — PROGRESS NOTES
.1. Have you been to the ER, urgent care clinic since your last visit? Hospitalized since your last visit? No    2. Have you seen or consulted any other health care providers outside of the [address] [place] [address] since your last visit? Include any pap smears or colon screening.  No

## 2018-02-19 RX ORDER — DIGOXIN 125 MCG
TABLET ORAL
Qty: 15 TAB | Refills: 2
Start: 2018-02-19 | End: 2018-09-09 | Stop reason: ALTCHOICE

## 2018-02-19 RX ORDER — DIGOXIN 125 UG/1
TABLET ORAL
Qty: 30 TAB | Refills: 0 | Status: SHIPPED | OUTPATIENT
Start: 2018-02-19 | End: 2018-02-19 | Stop reason: ALTCHOICE

## 2018-02-20 NOTE — PROGRESS NOTES
The patient presents to the office today with the chief complaint of intermittent atrial fibrillation    HPI    The patient has not had any further episodes of palpitations or drops in BP. She feels quite fatigued. The patient remains on Amiodarone and Digoxin for prophylaxis post ablation of atrial fibrillation. Review of Systems   Constitutional: Positive for malaise/fatigue. Respiratory: Negative for shortness of breath. Cardiovascular: Negative for chest pain and leg swelling. Allergies   Allergen Reactions    Celebrex [Celecoxib] Hives    Iodinated Contrast- Oral And Iv Dye Hives     Patient was in CT for a cardiac scan. A test bolus of 20cc was given and patient broke out into hives immediately after. Cancelled the rest of the exam and escorted the patient to the ED for furhter evaluation.  Sulfa (Sulfonamide Antibiotics) Other (comments)     Patient states she is not allergic       Current Outpatient Prescriptions   Medication Sig Dispense Refill    digoxin (LANOXIN) 0.125 mg tablet 1 tab Monday, Wednesday, Friday 15 Tab 2    amiodarone (CORDARONE) 200 mg tablet 1 tablet twice per day for 5 days then 1 tablet daily 40 Tab 5    apixaban (ELIQUIS) 5 mg tablet Take 1 Tab by mouth two (2) times a day. 180 Tab 3    thiamine (VITAMIN B-1) 100 mg tablet Take  by mouth daily.  cholecalciferol (VITAMIN D3) 1,000 unit tablet Take  by mouth daily.  cyanocobalamin (VITAMIN B-12) 1,000 mcg tablet Take 1,000 mcg by mouth daily.  multivitamin (ONE A DAY) tablet Take 1 Tab by mouth daily.          Past Medical History:   Diagnosis Date    Atrial fibrillation [facility])     Attention deficit disorder without mention of hyperactivity     DUB (dysfunctional uterine bleeding)     Generalized osteoarthrosis, involving multiple sites     Grief reaction     Mother  recently - Celexa    Memory changes     Adderall for this    Unspecified tinnitus        Past Surgical History: Procedure Laterality Date    HX KNEE ARTHROSCOPY Right     Meniscus repair    HX KNEE ARTHROSCOPY Right     Removed piece of bone (FB)    HX KNEE ARTHROSCOPY Right     ACL repair       Social History     Social History    Marital status:      Spouse name: N/A    Number of children: N/A    Years of education: N/A     Occupational History    Not on file. Social History Main Topics    Smoking status: Never Smoker    Smokeless tobacco: Never Used    Alcohol use No    Drug use: No    Sexual activity: Not Currently     Other Topics Concern    Not on file     Social History Narrative       Patient does have an advanced directive on file    Visit Vitals    /80 (BP 1 Location: Right arm, BP Patient Position: Sitting)    Pulse 73    Temp 98.2 °F (36.8 °C) (Tympanic)    Resp 16    Ht 5' 11\" (1.803 m)    Wt 200 lb (90.7 kg)    SpO2 100%    BMI 27.89 kg/m2       Physical Exam   Neck: Carotid bruit is not present. Cardiovascular: Normal rate and regular rhythm. Exam reveals no gallop. No murmur heard. Pulmonary/Chest: She has no wheezes. She has no rales. Abdominal: Soft. She exhibits no distension. There is no tenderness. Musculoskeletal: She exhibits no edema. BMI:  OK    Orders Only on 01/29/2018   Component Date Value Ref Range Status    Glucose 01/29/2018 130* 70 - 99 mg/dL Final    BUN 01/29/2018 15  6 - 22 mg/dL Final    Creatinine 01/29/2018 1.1  0.5 - 1.2 mg/dL Final    Sodium 01/29/2018 138  133 - 145 mmol/L Final    Potassium 01/29/2018 4.4  3.5 - 5.5 mmol/L Final    Chloride 01/29/2018 97* 98 - 110 mmol/L Final    CO2 01/29/2018 27  20 - 32 mmol/L Final    AST (SGOT) 01/29/2018 15  10 - 37 U/L Final    ALT (SGPT) 01/29/2018 13  5 - 40 U/L Final    Alk.  phosphatase 01/29/2018 76  25 - 115 U/L Final    Bilirubin, total 01/29/2018 0.9  0.2 - 1.2 mg/dL Final    Calcium 01/29/2018 9.2  8.4 - 10.5 mg/dL Final    Protein, total 01/29/2018 6.5  6.4 - 8.3 g/dL Final    Albumin 01/29/2018 3.8  3.5 - 5.0 g/dL Final    A-G Ratio 01/29/2018 1.4  1.1 - 2.6 ratio Final    Globulin 01/29/2018 2.7  2.0 - 4.0 g/dL Final    Anion gap 01/29/2018 14.0  mmol/L Final    Comment: Test includes Albumin, Alkaline Phosphatase, ALT, AST, BUN, Calcium, CO2,  Chloride, Creatinine, Glucose, Potassium, Sodium, Total Bilirubin and Total  Protein. Estimated GFR results are reported in mL/min/1.73 sq.m. by the MDRD equation. This eGFR is validated for stable chronic renal failure patients. This   equation  is unreliable in acute illness or patients with normal renal function.  GFRAA 01/29/2018 60.0* >60.0 Final    GFRNA 01/29/2018 49.5* >60.0 Final    Digoxin level 01/29/2018 0.9  0.8 - 2.0 ng/mL Final    WBC 01/29/2018 9.6  4.0 - 11.0 K/uL Final    RBC 01/29/2018 4.09  3.80 - 5.20 M/uL Final    HGB 01/29/2018 12.4  11.7 - 16.0 g/dL Final    HCT 01/29/2018 39.7  35.1 - 48.0 % Final    MCV 01/29/2018 97* 80 - 95 fL Final    MCH 01/29/2018 30  26 - 34 pg Final    MCHC 01/29/2018 31* 32 - 36 g/dL Final    RDW 01/29/2018 15.0  10.0 - 16.0 % Final    PLATELET 60/87/8869 224  140 - 440 K/uL Final    MPV 01/29/2018 12.3* 6.0 - 10.8 fL Final    NEUTROPHILS 01/29/2018 79* 40 - 75 % Final    Lymphocytes 01/29/2018 11* 27 - 45 % Final    MONOCYTES 01/29/2018 9  3 - 9 % Final    EOSINOPHILS 01/29/2018 0  0 - 6 % Final    BASOPHILS 01/29/2018 0  0 - 2 % Final    ABS. NEUTROPHILS 01/29/2018 7.6  1.8 - 7.7 K/uL Final    ABSOLUTE LYMPHOCYTE COUNT 01/29/2018 1.1  1.0 - 4.8 K/uL Final    ABS. MONOCYTES 01/29/2018 0.9  0.1 - 0.9 K/uL Final    ABS. EOSINOPHILS 01/29/2018 0.0  0.0 - 0.5 K/uL Final    ABS.  BASOPHILS 01/29/2018 0.0  0.0 - 0.2 K/uL Final   Office Visit on 11/30/2017   Component Date Value Ref Range Status    Glucose 12/01/2017 113* 65 - 99 mg/dL Final    BUN 12/01/2017 10  6 - 24 mg/dL Final    Creatinine 12/01/2017 1.10* 0.57 - 1.00 mg/dL Final    GFR est non-AA 12/01/2017 57* >59 mL/min/1.73 Final    GFR est AA 12/01/2017 65  >59 mL/min/1.73 Final    BUN/Creatinine ratio 12/01/2017 9  9 - 23 Final    Sodium 12/01/2017 129* 134 - 144 mmol/L Final    Potassium 12/01/2017 3.9  3.5 - 5.2 mmol/L Final    Chloride 12/01/2017 89* 96 - 106 mmol/L Final    CO2 12/01/2017 23  18 - 29 mmol/L Final    Calcium 12/01/2017 8.3* 8.7 - 10.2 mg/dL Final    Protein, total 12/01/2017 5.7* 6.0 - 8.5 g/dL Final    Albumin 12/01/2017 2.9* 3.5 - 5.5 g/dL Final    GLOBULIN, TOTAL 12/01/2017 2.8  1.5 - 4.5 g/dL Final    A-G Ratio 12/01/2017 1.0* 1.2 - 2.2 Final    Bilirubin, total 12/01/2017 0.6  0.0 - 1.2 mg/dL Final    Alk.  phosphatase 12/01/2017 87  39 - 117 IU/L Final    AST (SGOT) 12/01/2017 45* 0 - 40 IU/L Final    ALT (SGPT) 12/01/2017 51* 0 - 32 IU/L Final    Cortisol, random 12/01/2017 29.2  ug/dL Final    Comment:                         Cortisol AM         6.2 - 19.4                          Cortisol PM         2.3 - 11.9     Admission on 11/28/2017, Discharged on 11/28/2017   Component Date Value Ref Range Status    Ventricular Rate 11/28/2017 95  BPM Final    Atrial Rate 11/28/2017 357  BPM Final    QRS Duration 11/28/2017 84  ms Final    Q-T Interval 11/28/2017 360  ms Final    QTC Calculation (Bezet) 11/28/2017 452  ms Final    Calculated R Axis 11/28/2017 -53  degrees Final    Calculated T Axis 11/28/2017 59  degrees Final    Diagnosis 11/28/2017    Final                    Value:Atrial fibrillation  Left axis deviation  RSR' or QR pattern in V1 suggests right ventricular conduction delay  Nonspecific ST abnormality , probably digitalis effect  Abnormal ECG  When compared with ECG of 21-NOV-2017 13:02,  Atrial fibrillation has replaced Sinus rhythm  ST now depressed in Inferior leads  Confirmed by Jennifer Moreau MD, Fritz Valente ([id number]) on 11/28/2017 3:44:06 PM      WBC 11/28/2017 11.4  4.6 - 13.2 K/uL Final    RBC 11/28/2017 4.27  4.20 - 5.30 M/uL Final    HGB 11/28/2017 12.7  12.0 - 16.0 g/dL Final    HCT 11/28/2017 39.4  35.0 - 45.0 % Final    MCV 11/28/2017 92.3  74.0 - 97.0 FL Final    MCH 11/28/2017 29.7  24.0 - 34.0 PG Final    MCHC 11/28/2017 32.2  31.0 - 37.0 g/dL Final    RDW 11/28/2017 13.1  11.6 - 14.5 % Final    PLATELET 86/29/6193 893  135 - 420 K/uL Final    MPV 11/28/2017 11.2  9.2 - 11.8 FL Final    NEUTROPHILS 11/28/2017 68  40 - 73 % Final    LYMPHOCYTES 11/28/2017 12* 21 - 52 % Final    MONOCYTES 11/28/2017 20* 3 - 10 % Final    EOSINOPHILS 11/28/2017 0  0 - 5 % Final    BASOPHILS 11/28/2017 0  0 - 2 % Final    ABS. NEUTROPHILS 11/28/2017 7.7  1.8 - 8.0 K/UL Final    ABS. LYMPHOCYTES 11/28/2017 1.4  0.9 - 3.6 K/UL Final    ABS. MONOCYTES 11/28/2017 2.3* 0.05 - 1.2 K/UL Final    ABS. EOSINOPHILS 11/28/2017 0.0  0.0 - 0.4 K/UL Final    ABS. BASOPHILS 11/28/2017 0.0  0.0 - 0.06 K/UL Final    DF 11/28/2017 MANUAL    Final    PLATELET COMMENTS 96/69/6910 ADEQUATE PLATELETS    Final    RBC COMMENTS 11/28/2017 NORMOCYTIC, NORMOCHROMIC    Final    Sodium 11/28/2017 132* 136 - 145 mmol/L Final    Potassium 11/28/2017 3.5  3.5 - 5.5 mmol/L Final    Chloride 11/28/2017 95* 100 - 108 mmol/L Final    CO2 11/28/2017 25  21 - 32 mmol/L Final    Anion gap 11/28/2017 12  3.0 - 18 mmol/L Final    Glucose 11/28/2017 140* 74 - 99 mg/dL Final    BUN 11/28/2017 23* 7.0 - 18 MG/DL Final    Creatinine 11/28/2017 1.42* 0.6 - 1.3 MG/DL Final    BUN/Creatinine ratio 11/28/2017 16  12 - 20   Final    GFR est AA 11/28/2017 47* >60 ml/min/1.73m2 Final    GFR est non-AA 11/28/2017 38* >60 ml/min/1.73m2 Final    Comment: (NOTE)  Estimated GFR is calculated using the Modification of Diet in Renal   Disease (MDRD) Study equation, reported for both  Americans   (GFRAA) and non- Americans (GFRNA), and normalized to 1.73m2   body surface area. The physician must decide which value applies to   the patient.  The MDRD study equation should only be used in   individuals age 25 or older. It has not been validated for the   following: pregnant women, patients with serious comorbid conditions,   or on certain medications, or persons with extremes of body size,   muscle mass, or nutritional status.  Calcium 11/28/2017 9.2  8.5 - 10.1 MG/DL Final    Bilirubin, total 11/28/2017 1.1* 0.2 - 1.0 MG/DL Final    ALT (SGPT) 11/28/2017 25  13 - 56 U/L Final    AST (SGOT) 11/28/2017 21  15 - 37 U/L Final    Alk. phosphatase 11/28/2017 73  45 - 117 U/L Final    Protein, total 11/28/2017 7.2  6.4 - 8.2 g/dL Final    Albumin 11/28/2017 2.8* 3.4 - 5.0 g/dL Final    Globulin 11/28/2017 4.4* 2.0 - 4.0 g/dL Final    A-G Ratio 11/28/2017 0.6* 0.8 - 1.7   Final    Magnesium 11/28/2017 1.7  1.6 - 2.6 mg/dL Final    Troponin-I, Qt. 11/28/2017 <0.02  0.00 - 0.06 NG/ML Final    Comment: The presence of detectable troponin above the reference range indicates myocardial injury which may be due to ischemia, myocarditis, trauma, etc.  Clinical correlation is necessary to establish the significance of this finding. Sequential testing is recommended to determine if the typical rise and fall of cTnI is demonstrated. Note:  Cardiac troponin I has a relatively long half life and may be present well after the CK MB has returned to baseline. The reference range is based on the 99th percentile of the referent population. Orders Only on 11/27/2017   Component Date Value Ref Range Status    Glucose 11/27/2017 158* 70 - 99 mg/dL Final    BUN 11/27/2017 22  6 - 22 mg/dL Final    Creatinine 11/27/2017 1.0  0.5 - 1.2 mg/dL Final    Sodium 11/27/2017 134  133 - 145 mmol/L Final    Potassium 11/27/2017 3.5  3.5 - 5.5 mmol/L Final    Chloride 11/27/2017 93* 98 - 110 mmol/L Final    CO2 11/27/2017 24  20 - 32 mmol/L Final    AST (SGOT) 11/27/2017 18  10 - 37 U/L Final    ALT (SGPT) 11/27/2017 12  5 - 40 U/L Final    Alk.  phosphatase 11/27/2017 74  25 - 115 U/L Final    Bilirubin, total 11/27/2017 0.6  0.2 - 1.2 mg/dL Final    Calcium 11/27/2017 8.9  8.4 - 10.5 mg/dL Final    Protein, total 11/27/2017 6.7  6.4 - 8.3 g/dL Final    Albumin 11/27/2017 3.8  3.5 - 5.0 g/dL Final    A-G Ratio 11/27/2017 1.3  1.1 - 2.6 ratio Final    Globulin 11/27/2017 2.9  2.0 - 4.0 g/dL Final    Anion gap 11/27/2017 17.0  mmol/L Final    Comment: Test includes Albumin, Alkaline Phosphatase, ALT, AST, BUN, Calcium, CO2,  Chloride, Creatinine, Glucose, Potassium, Sodium, Total Bilirubin and Total  Protein. Estimated GFR results are reported in mL/min/1.73 sq.m. by the MDRD equation. This eGFR is validated for stable chronic renal failure patients. This   equation  is unreliable in acute illness or patients with normal renal function.       GFRAA 11/27/2017 >60.0  >60.0 Final    GFRNA 11/27/2017 55.0* >60.0 Final    TSH 11/27/2017 0.66  0.27 - 4.20 mcU/mL Final    WBC 11/27/2017 9.8  4.0 - 11.0 K/uL Final    RBC 11/27/2017 4.08  3.80 - 5.20 M/uL Final    HGB 11/27/2017 12.4  11.7 - 16.0 g/dL Final    HCT 11/27/2017 39.2  35.1 - 48.0 % Final    MCV 11/27/2017 96* 80 - 95 fL Final    MCH 11/27/2017 30  26 - 34 pg Final    MCHC 11/27/2017 32  32 - 36 g/dL Final    RDW 11/27/2017 13.9  10.0 - 16.0 % Final    PLATELET 05/52/1510 373  140 - 440 K/uL Final    MPV 11/27/2017 12.1* 6.0 - 10.8 fL Final    Macrocytosis 11/27/2017 1+* (none) Final    Manley cells 11/27/2017 1+* (none) Final    NEUTROPHILS C MAN (DIFF) 11/27/2017 68  40 - 75 % Final    LYMPHOCYTES C MAN (DIFF) 11/27/2017 10* 27 - 45 % Final    MONOCYTES C MAN (DIFF) 11/27/2017 22* 3 - 9 % Final    Neutrophils abs 11/27/2017 7.1  1.8 - 7.7 K/uL Final    Lymphs abs-DIF 11/27/2017 1.1  1.0 - 4.8 K/uL Final    Monocytes abs-DIF 11/27/2017 2.2* 0.1 - 0.9 K/uL Final    NORMOCHROMIC CELLAVISION 11/27/2017 Normochromic   Final    SMEAR EVAL 11/27/2017 Platelet morphology appears normal.   Final    PATH REVIEW OF SMEAR 11/27/2017 See Comment   Final    Comment: Absolute monocytosis, possibly due to drug effect, chronic infection, collagen  vascular disease, reactive processes or neoplasia. Correlate clinically.   Electronic signature: Shameka Burnett M.D.,  [phone]  CPT: [id number]     Admission on 11/21/2017, Discharged on 11/22/2017   Component Date Value Ref Range Status    Activated Clotting Time (POC) 11/21/2017 235* 79 - 138 SECS Final    SEE REPEAT RESULT    Activated Clotting Time (POC) 11/21/2017 252* 79 - 138 SECS Final    SEE REPEAT RESULT    Activated Clotting Time (POC) 11/21/2017 318* 79 - 138 SECS Final    Activated Clotting Time (POC) 11/21/2017 318* 79 - 138 SECS Final    Ventricular Rate 11/21/2017 79  BPM Final    Atrial Rate 11/21/2017 79  BPM Final    P-R Interval 11/21/2017 210  ms Final    QRS Duration 11/21/2017 94  ms Final    Q-T Interval 11/21/2017 404  ms Final    QTC Calculation (Bezet) 11/21/2017 463  ms Final    Calculated P Axis 11/21/2017 63  degrees Final    Calculated R Axis 11/21/2017 -58  degrees Final    Calculated T Axis 11/21/2017 60  degrees Final    Diagnosis 11/21/2017    Final                    Value:Sinus rhythm with 1st degree AV block  Left axis deviation  Incomplete right bundle branch block    Confirmed by Rhea Molina MD, Wright Crew ([id number]) on 11/21/2017 4:07:25 PM      Activated Clotting Time (POC) 11/21/2017 230* 79 - 138 SECS Final    Activated Clotting Time (POC) 11/21/2017 219* 79 - 138 SECS Final    Activated Clotting Time (POC) 11/21/2017 136  79 - [address] Final   Hospital Outpatient Visit on 11/17/2017   Component Date Value Ref Range Status    WBC 11/17/2017 6.6  4.6 - 13.2 K/uL Final    RBC 11/17/2017 4.25  4.20 - 5.30 M/uL Final    HGB 11/17/2017 12.9  12.0 - 16.0 g/dL Final    HCT 11/17/2017 40.2  35.0 - 45.0 % Final    MCV 11/17/2017 94.6  74.0 - 97.0 FL Final    MCH 11/17/2017 30.4  24.0 - 34.0 PG Final    MCHC 11/17/2017 32.1  31.0 - 37.0 g/dL Final    RDW 11/17/2017 13.3  11.6 - 14.5 % Final    PLATELET 04/31/5015 997  135 - 420 K/uL Final    MPV 11/17/2017 12.3* 9.2 - 11.8 FL Final    NEUTROPHILS 11/17/2017 63  40 - 73 % Final    LYMPHOCYTES 11/17/2017 24  21 - 52 % Final    MONOCYTES 11/17/2017 9  3 - 10 % Final    EOSINOPHILS 11/17/2017 4  0 - 5 % Final    BASOPHILS 11/17/2017 0  0 - 2 % Final    ABS. NEUTROPHILS 11/17/2017 4.1  1.8 - 8.0 K/UL Final    ABS. LYMPHOCYTES 11/17/2017 1.6  0.9 - 3.6 K/UL Final    ABS. MONOCYTES 11/17/2017 0.6  0.05 - 1.2 K/UL Final    ABS. EOSINOPHILS 11/17/2017 0.2  0.0 - 0.4 K/UL Final    ABS. BASOPHILS 11/17/2017 0.0  0.0 - 0.06 K/UL Final    DF 11/17/2017 AUTOMATED    Final    Prothrombin time 11/17/2017 14.9  11.5 - 15.2 sec Final    INR 11/17/2017 1.2  0.8 - 1.2   Final    Comment:            INR Therapeutic Ranges         (on stable oral anticoagulant):     INDICATION                INR  DVT/PE/Atrial Fib          2.0-3.0  MI/Mechanical Heart Valve  2.5-3.5      Sodium 11/17/2017 140  136 - 145 mmol/L Final    Potassium 11/17/2017 4.0  3.5 - 5.5 mmol/L Final    Chloride 11/17/2017 102  100 - 108 mmol/L Final    CO2 11/17/2017 32  21 - 32 mmol/L Final    Anion gap 11/17/2017 6  3.0 - 18 mmol/L Final    Glucose 11/17/2017 74  74 - 99 mg/dL Final    BUN 11/17/2017 20* 7.0 - 18 MG/DL Final    Creatinine 11/17/2017 0.96  0.6 - 1.3 MG/DL Final    BUN/Creatinine ratio 11/17/2017 21* 12 - 20   Final    GFR est AA 11/17/2017 >60  >60 ml/min/1.73m2 Final    GFR est non-AA 11/17/2017 >60  >60 ml/min/1.73m2 Final    Comment: (NOTE)  Estimated GFR is calculated using the Modification of Diet in Renal   Disease (MDRD) Study equation, reported for both  Americans   (GFRAA) and non- Americans (GFRNA), and normalized to 1.73m2   body surface area. The physician must decide which value applies to   the patient. The MDRD study equation should only be used in   individuals age 25 or older.  It has not been validated for the   following: pregnant women, patients with serious comorbid conditions,   or on certain medications, or persons with extremes of body size,   muscle mass, or nutritional status.  Calcium 11/17/2017 9.0  8.5 - 10.1 MG/DL Final    Bilirubin, total 11/17/2017 0.6  0.2 - 1.0 MG/DL Final    ALT (SGPT) 11/17/2017 25  13 - 56 U/L Final    AST (SGOT) 11/17/2017 18  15 - 37 U/L Final    Alk. phosphatase 11/17/2017 66  45 - 117 U/L Final    Protein, total 11/17/2017 7.0  6.4 - 8.2 g/dL Final    Albumin 11/17/2017 3.6  3.4 - 5.0 g/dL Final    Globulin 11/17/2017 3.4  2.0 - 4.0 g/dL Final    A-G Ratio 11/17/2017 1.1  0.8 - 1.7   Final       .No results found for any visits on 02/16/18. Assessment / Plan      ICD-10-CM ICD-9-CM    1. Intermittent atrial fibrillation (HCC) I48.0 427.31    2. S/P ablation of atrial fibrillation Z98.890 V45.89     Z86.79       Decrease and consider discontinuing Digoxin  she was advised to continue her other maintenance medications      Follow-up Disposition:  Return in about 4 months (around 6/16/2018). I asked Jim Muniz if she has any questions and I answered the questions. Jim Muniz states that she understands the treatment plan and agrees with the treatment plan

## 2018-03-06 ENCOUNTER — OFFICE VISIT (OUTPATIENT)
Dept: INTERNAL MEDICINE CLINIC | Age: 58
End: 2018-03-06

## 2018-03-06 DIAGNOSIS — Z98.890 S/P ABLATION OF ATRIAL FIBRILLATION: ICD-10-CM

## 2018-03-06 DIAGNOSIS — Z86.79 S/P ABLATION OF ATRIAL FIBRILLATION: ICD-10-CM

## 2018-03-06 DIAGNOSIS — I48.0 INTERMITTENT ATRIAL FIBRILLATION (HCC): Primary | ICD-10-CM

## 2018-03-06 DIAGNOSIS — I10 ESSENTIAL HYPERTENSION: ICD-10-CM

## 2018-03-12 VITALS — DIASTOLIC BLOOD PRESSURE: 78 MMHG | SYSTOLIC BLOOD PRESSURE: 128 MMHG | HEART RATE: 78 BPM

## 2018-03-12 NOTE — PROGRESS NOTES
The patient presents to the office today with the chief complaint of intermittent atrial fibrillation    HPI    The patient has intermittent atrial fibrillation with problems of arrhythmia and swings in BP post ablation. The patient is on Amiodarone. She has done better for the past 2 weks. She has had no further episodes. Her BP has been stable. Review of Systems   Respiratory: Negative for shortness of breath. Cardiovascular: Negative for chest pain, palpitations and leg swelling. Allergies   Allergen Reactions    Celebrex [Celecoxib] Hives    Iodinated Contrast- Oral And Iv Dye Hives     Patient was in CT for a cardiac scan. A test bolus of 20cc was given and patient broke out into hives immediately after. Cancelled the rest of the exam and escorted the patient to the ED for furhter evaluation.  Sulfa (Sulfonamide Antibiotics) Other (comments)     Patient states she is not allergic       Current Outpatient Prescriptions   Medication Sig Dispense Refill    digoxin (LANOXIN) 0.125 mg tablet 1 tab Monday, Wednesday, Friday 15 Tab 2    amiodarone (CORDARONE) 200 mg tablet 1 tablet twice per day for 5 days then 1 tablet daily 40 Tab 5    apixaban (ELIQUIS) 5 mg tablet Take 1 Tab by mouth two (2) times a day. 180 Tab 3    thiamine (VITAMIN B-1) 100 mg tablet Take  by mouth daily.  cholecalciferol (VITAMIN D3) 1,000 unit tablet Take  by mouth daily.  cyanocobalamin (VITAMIN B-12) 1,000 mcg tablet Take 1,000 mcg by mouth daily.  multivitamin (ONE A DAY) tablet Take 1 Tab by mouth daily.          Past Medical History:   Diagnosis Date    Atrial fibrillation [facility])     Attention deficit disorder without mention of hyperactivity     DUB (dysfunctional uterine bleeding)     Generalized osteoarthrosis, involving multiple sites     Grief reaction     Mother  recently - Celexa    Memory changes     Adderall for this    Unspecified tinnitus        Past Surgical History: Procedure Laterality Date    HX KNEE ARTHROSCOPY Right     Meniscus repair    HX KNEE ARTHROSCOPY Right     Removed piece of bone (FB)    HX KNEE ARTHROSCOPY Right     ACL repair       Social History     Social History    Marital status:      Spouse name: N/A    Number of children: N/A    Years of education: N/A     Occupational History    Not on file. Social History Main Topics    Smoking status: Never Smoker    Smokeless tobacco: Never Used    Alcohol use No    Drug use: No    Sexual activity: Not Currently     Other Topics Concern    Not on file     Social History Narrative       Patient does have an advanced directive on file    Visit Vitals    /78 (BP 1 Location: Left arm, BP Patient Position: Sitting)    Pulse 78       Physical Exam   Cardiovascular: Normal rate and regular rhythm. Exam reveals no gallop. No murmur heard. Pulmonary/Chest: She has no wheezes. She has no rales. Orders Only on 01/29/2018   Component Date Value Ref Range Status    Glucose 01/29/2018 130* 70 - 99 mg/dL Final    BUN 01/29/2018 15  6 - 22 mg/dL Final    Creatinine 01/29/2018 1.1  0.5 - 1.2 mg/dL Final    Sodium 01/29/2018 138  133 - 145 mmol/L Final    Potassium 01/29/2018 4.4  3.5 - 5.5 mmol/L Final    Chloride 01/29/2018 97* 98 - 110 mmol/L Final    CO2 01/29/2018 27  20 - 32 mmol/L Final    AST (SGOT) 01/29/2018 15  10 - 37 U/L Final    ALT (SGPT) 01/29/2018 13  5 - 40 U/L Final    Alk.  phosphatase 01/29/2018 76  25 - 115 U/L Final    Bilirubin, total 01/29/2018 0.9  0.2 - 1.2 mg/dL Final    Calcium 01/29/2018 9.2  8.4 - 10.5 mg/dL Final    Protein, total 01/29/2018 6.5  6.4 - 8.3 g/dL Final    Albumin 01/29/2018 3.8  3.5 - 5.0 g/dL Final    A-G Ratio 01/29/2018 1.4  1.1 - 2.6 ratio Final    Globulin 01/29/2018 2.7  2.0 - 4.0 g/dL Final    Anion gap 01/29/2018 14.0  mmol/L Final    Comment: Test includes Albumin, Alkaline Phosphatase, ALT, AST, BUN, Calcium, CO2,  Chloride, Creatinine, Glucose, Potassium, Sodium, Total Bilirubin and Total  Protein. Estimated GFR results are reported in mL/min/1.73 sq.m. by the MDRD equation. This eGFR is validated for stable chronic renal failure patients. This   equation  is unreliable in acute illness or patients with normal renal function.  GFRAA 01/29/2018 60.0* >60.0 Final    GFRNA 01/29/2018 49.5* >60.0 Final    Digoxin level 01/29/2018 0.9  0.8 - 2.0 ng/mL Final    WBC 01/29/2018 9.6  4.0 - 11.0 K/uL Final    RBC 01/29/2018 4.09  3.80 - 5.20 M/uL Final    HGB 01/29/2018 12.4  11.7 - 16.0 g/dL Final    HCT 01/29/2018 39.7  35.1 - 48.0 % Final    MCV 01/29/2018 97* 80 - 95 fL Final    MCH 01/29/2018 30  26 - 34 pg Final    MCHC 01/29/2018 31* 32 - 36 g/dL Final    RDW 01/29/2018 15.0  10.0 - 16.0 % Final    PLATELET 84/34/2758 641  140 - 440 K/uL Final    MPV 01/29/2018 12.3* 6.0 - 10.8 fL Final    NEUTROPHILS 01/29/2018 79* 40 - 75 % Final    Lymphocytes 01/29/2018 11* 27 - 45 % Final    MONOCYTES 01/29/2018 9  3 - 9 % Final    EOSINOPHILS 01/29/2018 0  0 - 6 % Final    BASOPHILS 01/29/2018 0  0 - 2 % Final    ABS. NEUTROPHILS 01/29/2018 7.6  1.8 - 7.7 K/uL Final    ABSOLUTE LYMPHOCYTE COUNT 01/29/2018 1.1  1.0 - 4.8 K/uL Final    ABS. MONOCYTES 01/29/2018 0.9  0.1 - 0.9 K/uL Final    ABS. EOSINOPHILS 01/29/2018 0.0  0.0 - 0.5 K/uL Final    ABS. BASOPHILS 01/29/2018 0.0  0.0 - 0.2 K/uL Final       .No results found for any visits on 03/06/18. Assessment / Plan      ICD-10-CM ICD-9-CM    1. Intermittent atrial fibrillation (HCC) I48.0 427.31    2. Essential hypertension I10 401.9    3. S/P ablation of atrial fibrillation Z98.890 V45.89     Z86.79         she was advised to continue her maintenance medications      Follow-up Disposition:  Return in about 3 months (around 6/19/2018). I asked Nidhi Tracy if she has any questions and I answered the questions. Nidhi Tracy states that she understands the treatment plan and agrees with the treatment plan

## 2018-03-29 DIAGNOSIS — I48.91 ATRIAL FIBRILLATION, UNSPECIFIED TYPE (HCC): ICD-10-CM

## 2018-03-29 DIAGNOSIS — Z86.79 S/P ABLATION OF ATRIAL FIBRILLATION: ICD-10-CM

## 2018-03-29 DIAGNOSIS — Z98.890 S/P ABLATION OF ATRIAL FIBRILLATION: ICD-10-CM

## 2018-04-26 ENCOUNTER — OFFICE VISIT (OUTPATIENT)
Dept: CARDIOLOGY CLINIC | Age: 58
End: 2018-04-26

## 2018-04-26 ENCOUNTER — DOCUMENTATION ONLY (OUTPATIENT)
Dept: CARDIOLOGY CLINIC | Age: 58
End: 2018-04-26

## 2018-04-26 VITALS
BODY MASS INDEX: 27.58 KG/M2 | DIASTOLIC BLOOD PRESSURE: 62 MMHG | WEIGHT: 197 LBS | OXYGEN SATURATION: 96 % | HEIGHT: 71 IN | HEART RATE: 65 BPM | SYSTOLIC BLOOD PRESSURE: 120 MMHG

## 2018-04-26 DIAGNOSIS — Z98.890 S/P ABLATION OF ATRIAL FIBRILLATION: Primary | ICD-10-CM

## 2018-04-26 DIAGNOSIS — I48.0 PAROXYSMAL ATRIAL FIBRILLATION (HCC): ICD-10-CM

## 2018-04-26 DIAGNOSIS — G47.30 SLEEP APNEA, UNSPECIFIED TYPE: ICD-10-CM

## 2018-04-26 DIAGNOSIS — I48.0 INTERMITTENT ATRIAL FIBRILLATION (HCC): ICD-10-CM

## 2018-04-26 DIAGNOSIS — M79.89 LEG SWELLING: ICD-10-CM

## 2018-04-26 DIAGNOSIS — Z86.79 S/P ABLATION OF ATRIAL FIBRILLATION: Primary | ICD-10-CM

## 2018-04-26 DIAGNOSIS — I50.32 DIASTOLIC CHF, CHRONIC (HCC): ICD-10-CM

## 2018-04-26 NOTE — PROGRESS NOTES
1. Have you been to the ER, urgent care clinic since your last visit? Hospitalized since your last visit? No     2. Have you seen or consulted any other health care providers outside of the [facility] since your last visit? Include any pap smears or colon screening.  No

## 2018-04-26 NOTE — PROGRESS NOTES
History of Present Illness:  The patient is a 51-year-old female here for followup. She underwent pulmonary vein isolation 11/21/17. She had significant symptoms of fatigue and hypotension. She had short runs of atrial flutter but she was still able to ride her bike about 50 miles. She denies any new chest pain, dyspnea, PND, orthopnea or edema. She does have some occasional atypical chest pain. It is nonexertional. She just went for a 75 mile bike ride this past weekend and is planning to go 100 miles this weekend. She was started on Amiodarone due to her wildly variable rates.      Impression and Plan:      Paroxysmal atrial fibrillation with pulmonary vein isolation 11/21/17. She had flaco procedure hypotension for unclear etiology which improved. Hemoglobin was stable, both groins were stable. There was no significant pericardial effusion by echocardiogram.    Intolerance to higher dose AV blocking agents due to bradycardia. History of normal left ventricular function by echocardiogram.  Recent diagnosis of sleep apnea on treatment. Recent atypical chest pain of unclear etiology. Recent initiation of Amiodarone. Nuclear stress test October 2017 with normal function, no ischemia. Her chest pain etiology is unclear but I do not sense it is cardiac in nature. She continues on Amiodarone for now. I did try Flecainide and she seemed intolerant to that. She is back to riding her bike  miles on the weekends. She is in sinus rhythm today. I will obtain a 30-day event monitor and see her back. At that point, I may talk about transitioning her to a different antiarrhythmic or considering her for an epicardial ablation and be seen by Dr. Elvia Lockwood. All questions answered.         Past Medical History:   Diagnosis Date    At[facility] fibrillation [facility])     Attention deficit disorder without mention of hyperactivity     DUB (dysfunctional uterine bleeding)     Generalized osteoarthrosis, involving multiple sites  Grief reaction     Mother  recently - Celexa    Memory changes     Adderall for this    Unspecified tinnitus        Current Outpatient Prescriptions   Medication Sig Dispense Refill    digoxin (LANOXIN) 0.125 mg tablet 1 tab Monday, Wednesday, Friday 15 Tab 2    amiodarone (CORDARONE) 200 mg tablet 1 tablet twice per day for 5 days then 1 tablet daily 40 Tab 5    apixaban (ELIQUIS) 5 mg tablet Take 1 Tab by mouth two (2) times a day. 180 Tab 3    thiamine (VITAMIN B-1) 100 mg tablet Take  by mouth daily.  cholecalciferol (VITAMIN D3) 1,000 unit tablet Take  by mouth daily.  cyanocobalamin (VITAMIN B-12) 1,000 mcg tablet Take 1,000 mcg by mouth daily.  multivitamin (ONE A DAY) tablet Take 1 Tab by mouth daily. Social History   reports that she has never smoked. She has never used smokeless tobacco.   reports that she does not drink alcohol. Family History  family history includes Diabetes in her maternal grandmother and mother; Heart Disease in her father and mother; Hypertension in her father and mother. Review of Systems  Except as stated above include:  Constitutional: Negative for fever, chills and malaise/fatigue. HEENT: No congestion or recent URI. Gastrointestinal: No nausea, vomiting, abdominal pain, bloody stools. Pulmonary:  Negative except as stated above. Cardiac:  Negative except as stated above. Musculoskeletal: Negative except as stated above. Neurological:  No localized symptoms. Skin:  Negative except as stated above. Psych:  Negative except as stated above. Endocrine:  Negative except as stated above.     PHYSICAL EXAM  BP Readings from Last 3 Encounters:   18 120/62   18 128/78   18 140/80     Pulse Readings from Last 3 Encounters:   18 65   18 78   18 73     Wt Readings from Last 3 Encounters:   18 89.4 kg (197 lb)   18 90.7 kg (200 lb)   18 90.7 kg (200 lb)     General:   Well developed, well groomed. Head/Neck:   No jugular venous distention     No evidence of xanthelasma. Extremities:   Intact peripheral pulses. No significant edema. Neurological:   Alert and oriented to person, place, time. No focal neurological deficit visually.   Skin:   No obvious rash    Blood Pressure Metric:  [facility] has been given the following recommendations today due to her elevated BP reading: controlled

## 2018-04-26 NOTE — MR AVS SNAPSHOT
[address] [address] [address] [address] [phone] Patient: Omid Morgan MRN: [id number] MGZ:0/99/[id number] Visit Information Date & Time Provider Department Dept. Phone Encounter #  
 4/26/2018  3:40 PM Rico Leyden, MD [facility] [id number] Your Appointments 6/27/2018  8:30 AM  
Complete Physical with Thalia Murray MD  
[address]) Appt Note: ph; physical  
 [address], [address] [place] [address]  
  
   
 [address], [address] [place] [address] Upcoming Health Maintenance Date Due Hepatitis C Screening 1960 Pneumococcal 19-64 Medium Risk (1 of 1 - PPSV23) 3/14/1981 DTaP/Tdap/Td series (1 - Tdap) 3/14/1983 PAP AKA CERVICAL CYTOLOGY 3/14/1983 FOBT Q 1 YEAR AGE 50-75 3/14/2012 BREAST CANCER SCRN MAMMOGRAM 8/16/2019 Allergies as of 4/26/2018  Review Complete On: 4/26/2018 By: Rico Leyden, MD  
  
 Severity Noted Reaction Type Reactions Celebrex [Celecoxib]  07/24/2014    Hives Iodinated Contrast- Oral And Iv Dye  11/02/2017    Hives Patient was in CT for a cardiac scan. A test bolus of 20cc was given and patient broke out into hives immediately after. Cancelled the rest of the exam and escorted the patient to the ED for furhter evaluation. Sulfa (Sulfonamide Antibiotics)  09/09/2015    Other (comments) Patient states she is not allergic Current Immunizations  Never Reviewed Name Date  
 TB Skin Test (PPD) 8/29/2013, 2/27/2007 TB Skin Test (PPD) Intradermal 8/9/2017 Not reviewed this visit You Were Diagnosed With   
  
 Codes Comments S/P ablation of atrial fibrillation    -  Primary ICD-10-CM: Z98.890, Z86.79 
ICD-9-CM: V45.89 Intermittent atrial fibrillation (HCC)     ICD-10-CM: I48.0 ICD-9-CM: 427.31   
 Leg swelling     ICD-10-CM: M79.89 ICD-9-CM: 729.81 Diastolic CHF, chronic (HCC)     ICD-10-CM: I50.32 
ICD-9-CM: 428.32, 428.0 Sleep apnea, unspecified type     ICD-10-CM: G47.30 ICD-9-CM: 780.57 Paroxysmal atrial fibrillation (HCC)     ICD-10-CM: I48.0 ICD-9-CM: 427.31 Vitals BP Pulse Height(growth percentile) Weight(growth percentile) SpO2 BMI  
 120/62 65 5' 11\" (1.803 m) 197 lb (89.4 kg) 96% 27.48 kg/m2 OB Status Smoking Status Postmenopausal Never Smoker Vitals History BMI and BSA Data Body Mass Index Body Surface Area  
 27.48 kg/m 2 2.12 m 2 Preferred Pharmacy Pharmacy Name Phone [phone] [address], [address] [phone] Your Updated Medication List  
  
   
This list is accurate as of 4/26/18  4:06 PM.  Always use your most recent med list.  
  
  
  
  
 amiodarone 200 mg tablet Commonly known as:  CORDARONE  
1 tablet twice per day for 5 days then 1 tablet daily  
  
 apixaban 5 mg tablet Commonly known as:  Cherylin Guardian Take 1 Tab by mouth two (2) times a day. digoxin 0.125 mg tablet Commonly known as:  LANOXIN  
1 tab Monday, Wednesday, Friday  
  
 multivitamin tablet Commonly known as:  ONE A DAY Take 1 Tab by mouth daily. VITAMIN B-1 100 mg tablet Generic drug:  thiamine Take  by mouth daily. VITAMIN B-12 1,000 mcg tablet Generic drug:  cyanocobalamin Take 1,000 mcg by mouth daily. VITAMIN D3 1,000 unit tablet Generic drug:  cholecalciferol Take  by mouth daily. We Performed the Following AMB POC EKG ROUTINE W/ 12 LEADS, INTER & REP [83080 CPT(R)] ECG EVENT RECORD MONITORING SET-UP [id number] CPT(R)] Comments:  
 30 day event monitor for paroxsymal atrial fib Introducing [facility]! Dear Cory Joya: Thank you for requesting a [email or web address] account.   Our records indicate that you already have an active [employer] account. You can access your account anytime at [email or web address] Did you know that you can access your hospital and ER discharge instructions at any time in [employer]? You can also review all of your test results from your hospital stay or ER visit. Additional Information If you have questions, please visit the Frequently Asked Questions section of the [employer] website at [email or web address] Remember, [employer] is NOT to be used for urgent needs. For medical emergencies, dial 911. Now available from your iPhone and Android! Please provide this summary of care documentation to your next provider. Your primary care clinician is listed as Ulysses Richards. If you have any questions after today's visit, please call [phone].

## 2018-05-01 RX ORDER — DIGOXIN 125 UG/1
TABLET ORAL
Qty: 30 TAB | Refills: 0 | Status: SHIPPED | OUTPATIENT
Start: 2018-05-01 | End: 2018-05-31

## 2018-05-31 ENCOUNTER — OFFICE VISIT (OUTPATIENT)
Dept: CARDIOLOGY CLINIC | Age: 58
End: 2018-05-31

## 2018-05-31 VITALS
HEIGHT: 71 IN | DIASTOLIC BLOOD PRESSURE: 80 MMHG | HEART RATE: 59 BPM | SYSTOLIC BLOOD PRESSURE: 122 MMHG | WEIGHT: 190 LBS | BODY MASS INDEX: 26.6 KG/M2 | OXYGEN SATURATION: 98 %

## 2018-05-31 DIAGNOSIS — I95.9 HYPOTENSION, UNSPECIFIED HYPOTENSION TYPE: ICD-10-CM

## 2018-05-31 DIAGNOSIS — I49.9 IRREGULAR HEART BEAT: ICD-10-CM

## 2018-05-31 DIAGNOSIS — I48.0 INTERMITTENT ATRIAL FIBRILLATION (HCC): ICD-10-CM

## 2018-05-31 DIAGNOSIS — Z98.890 S/P ABLATION OF ATRIAL FIBRILLATION: Primary | ICD-10-CM

## 2018-05-31 DIAGNOSIS — Z86.79 S/P ABLATION OF ATRIAL FIBRILLATION: Primary | ICD-10-CM

## 2018-05-31 DIAGNOSIS — R53.83 FATIGUE, UNSPECIFIED TYPE: ICD-10-CM

## 2018-05-31 RX ORDER — AMIODARONE HYDROCHLORIDE 100 MG/1
TABLET ORAL
Qty: 30 TAB | Refills: 6 | Status: ON HOLD | OUTPATIENT
Start: 2018-05-31 | End: 2018-11-09 | Stop reason: CLARIF

## 2018-05-31 NOTE — MR AVS SNAPSHOT
[address] [place] [address] [address] [place] [address] 
[phone] Patient: Iglesia Chadwick MRN: [id number] MIP:[birth year] Visit Information Date & Time Provider Department Dept. Phone Encounter #  
 5/31/2018  4:20 PM Betty Garrido MD [facility] [address] [id number] Your Appointments 6/20/2018  9:45 AM  
Complete Physical with Ryland Weldon MD  
[address] [facility]) Appt Note: ph; physical; [address], [address] [place] [address]  
  
   
 [address] [place], [address] [place] [address] Upcoming Health Maintenance Date Due Hepatitis C Screening 1960 Pneumococcal 19-64 Medium Risk (1 of 1 - PPSV23) 3/14/1981 DTaP/Tdap/Td series (1 - Tdap) 3/14/1983 PAP AKA CERVICAL CYTOLOGY 3/14/1983 FOBT Q 1 YEAR AGE 50-75 3/14/2012 Influenza Age 5 to Adult 8/1/2018 BREAST CANCER SCRN MAMMOGRAM 8/16/2019 Allergies as of 5/31/2018  Review Complete On: 5/31/2018 By: Betty Garrido MD  
  
 Severity Noted Reaction Type Reactions Celebrex [Celecoxib]  07/24/2014    Hives Iodinated Contrast- Oral And Iv Dye  11/02/2017    Hives Patient was in CT for a cardiac scan. A test bolus of 20cc was given and patient broke out into hives immediately after. Cancelled the rest of the exam and escorted the patient to the ED for furhter evaluation. Sulfa (Sulfonamide Antibiotics)  09/09/2015    Other (comments) Patient states she is not allergic Current Immunizations  Never Reviewed Name Date  
 TB Skin Test (PPD) 8/29/2013, 2/27/2007 TB Skin Test (PPD) Intradermal 8/9/2017 Not reviewed this visit Vitals BP Pulse Height(growth percentile) Weight(growth percentile) SpO2 BMI  
 122/80 (!) 59 5' 11\" (1.803 m) 190 lb (86.2 kg) 98% 26.5 kg/m2 OB Status Smoking Status Postmenopausal Never Smoker Vitals History BMI and BSA Data Body Mass Index Body Surface Area  
 26.5 kg/m 2 2.08 m 2 Preferred Pharmacy Pharmacy Name Phone [facility] [address] [facility] Your Updated Medication List  
  
   
This list is accurate as of 5/31/18  4:49 PM.  Always use your most recent med list.  
  
  
  
  
 amiodarone 200 mg tablet Commonly known as:  CORDARONE  
1 tablet twice per day for 5 days then 1 tablet daily  
  
 apixaban 5 mg tablet Commonly known as:  Luis Bogaert Take 1 Tab by mouth two (2) times a day. digoxin 0.125 mg tablet Commonly known as:  LANOXIN  
1 tab Monday, Wednesday, Friday  
  
 multivitamin tablet Commonly known as:  ONE A DAY Take 1 Tab by mouth daily. VITAMIN B-1 100 mg tablet Generic drug:  thiamine Take  by mouth daily. VITAMIN B-12 1,000 mcg tablet Generic drug:  cyanocobalamin Take 1,000 mcg by mouth daily. VITAMIN D3 1,000 unit tablet Generic drug:  cholecalciferol Take  by mouth daily. Introducing [facility]! Dear Mei Brooks: Thank you for requesting a [employer] account. Our records indicate that you already have an active [employer] account. You can access your account anytime at [email or web address]. [email or web address] Did you know that you can access your hospital and ER discharge instructions at any time in [employer]? You can also review all of your test results from your hospital stay or ER visit. Additional Information If you have questions, please visit the Frequently Asked Questions section of the [employer] website at [email or web address] Remember, [employer] is NOT to be used for urgent needs. For medical emergencies, dial 911. Now available from your iPhone and Android! Please provide this summary of care documentation to your next provider. Your primary care clinician is listed as Cam Crabtree. If you have any questions after today's visit, please call [phone].

## 2018-05-31 NOTE — PROGRESS NOTES
1. Have you been to the ER, urgent care clinic since your last visit? Hospitalized since your last visit? No     2. Have you seen or consulted any other health care providers outside of the [address] [place] [address] since your last visit? Include any pap smears or colon screening.   No

## 2018-05-31 NOTE — PROGRESS NOTES
History of Present Illness: A 66 y.o. female here for follow up. She underwent pulmonary vein isolation 17. She has significant symptoms of fatigue, hypotension post procedure. She was started on Amiodarone and is now on 200 mg a day. Her Metoprolol has been stopped and she is on Digoxin three times a week. She has been doing relatively well and has been able to exercise and ride her bike  miles at times. She is really doing much better. 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 She is accompanied by a friend. Impression/Plan:   Paroxysmal symptomatic atrial fibrillation with pulmonary vein isolation 17 with flaco procedure hypotension. Intolerance to higher dose of AV blocking agents due to bradycardia and fatigue. She is now off Metoprolol. History of normal left ventricular function by echocardiogram.  Recent diagnosis of sleep apnea and treatment. Nuclear stress test 2017 with normal function, no ischemia. Her functional status is great. She seems to be doing well. I will decrease her Amiodarone from 200 down to 100 mg a day. Once again, I discussed potential side effects including liver, lung, thyroid, skin. At this point, I will hold off getting pulmonary function tests as my hope is that in about three months we could decrease the Amiodarone altogether. If she has rebound episodes, I would consider epicardial ablation with Dr. Nii Roberson. All questions answered.        Past Medical History:   Diagnosis Date    Atrial fibrillation [facility])     Attention deficit disorder without mention of hyperactivity     DUB (dysfunctional uterine bleeding)     Generalized osteoarthrosis, involving multiple sites     Grief reaction     Mother  recently - Celexa    Memory changes     Adderall for this    Unspecified tinnitus        Current Outpatient Prescriptions   Medication Sig Dispense Refill    digoxin (LANOXIN) 0.125 mg tablet 1 tab Monday, Wednesday, Friday 15 Tab 2    amiodarone (CORDARONE) 200 mg tablet 1 tablet twice per day for 5 days then 1 tablet daily 40 Tab 5    apixaban (ELIQUIS) 5 mg tablet Take 1 Tab by mouth two (2) times a day. 180 Tab 3    thiamine (VITAMIN B-1) 100 mg tablet Take  by mouth daily.  cholecalciferol (VITAMIN D3) 1,000 unit tablet Take  by mouth daily.  cyanocobalamin (VITAMIN B-12) 1,000 mcg tablet Take 1,000 mcg by mouth daily.  multivitamin (ONE A DAY) tablet Take 1 Tab by mouth daily. Social History   reports that she has never smoked. She has never used smokeless tobacco.   reports that she does not drink alcohol. Family History  family history includes Diabetes in her maternal grandmother and mother; Heart Disease in her father and mother; Hypertension in her father and mother. Review of Systems  Except as stated above include:  Constitutional: Negative for fever, chills and malaise/fatigue. HEENT: No congestion or recent URI. Gastrointestinal: No nausea, vomiting, abdominal pain, bloody stools. Pulmonary:  Negative except as stated above. Cardiac:  Negative except as stated above. Musculoskeletal: Negative except as stated above. Neurological:  No localized symptoms. Skin:  Negative except as stated above. Psych:  Negative except as stated above. Endocrine:  Negative except as stated above. PHYSICAL EXAM  BP Readings from Last 3 Encounters:   05/31/18 122/80   04/26/18 120/62   03/12/18 128/78     Pulse Readings from Last 3 Encounters:   05/31/18 (!) 59   04/26/18 65   03/12/18 78     Wt Readings from Last 3 Encounters:   05/31/18 86.2 kg (190 lb)   04/26/18 89.4 kg (197 lb)   02/16/18 90.7 kg (200 lb)     General:   Well developed, well groomed. Head/Neck:   No jugular venous distention     No carotid bruits. No evidence of xanthelasma. Lungs:   No respiratory distress. Clear bilaterally. Heart:    Regular rate and rhythm. Normal S1/S2.       Palpation of heart with normal point of maximum impulse. No significant murmurs, rubs or gallops. Abdomen:   Soft and nontender. No palpable abdominal mass or bruits. Extremities:   Intact peripheral pulses. No significant edema. Neurological:   Alert and oriented to person, place, time. No focal neurological deficit visually.   Skin:   No obvious rash    Blood Pressure Metric:  [place] has been given the following recommendations today due to her elevated BP reading: controlled

## 2018-06-12 RX ORDER — NITROFURANTOIN 25; 75 MG/1; MG/1
100 CAPSULE ORAL 2 TIMES DAILY
Qty: 14 CAP | Refills: 0 | Status: SHIPPED | OUTPATIENT
Start: 2018-06-12 | End: 2018-06-22 | Stop reason: ALTCHOICE

## 2018-06-15 ENCOUNTER — LAB ONLY (OUTPATIENT)
Dept: INTERNAL MEDICINE CLINIC | Age: 58
End: 2018-06-15

## 2018-06-15 DIAGNOSIS — I10 ESSENTIAL HYPERTENSION: ICD-10-CM

## 2018-06-15 DIAGNOSIS — I10 ESSENTIAL HYPERTENSION: Primary | ICD-10-CM

## 2018-06-16 LAB
ABSOLUTE LYMPHOCYTE COUNT, 10803: 1.1 K/UL (ref 1–4.8)
BASOPHILS # BLD: 0 K/UL (ref 0–0.2)
BASOPHILS NFR BLD: 0 % (ref 0–2)
EOSINOPHIL # BLD: 0.1 K/UL (ref 0–0.5)
EOSINOPHIL NFR BLD: 2 % (ref 0–6)
ERYTHROCYTE [DISTWIDTH] IN BLOOD BY AUTOMATED COUNT: 14.5 % (ref 10–15.5)
GRANULOCYTES,GRANS: 67 % (ref 40–75)
HCT VFR BLD AUTO: 42.1 % (ref 35.1–48)
HGB BLD-MCNC: 12.6 G/DL (ref 11.7–16)
LYMPHOCYTES, LYMLT: 23 % (ref 20–45)
MCH RBC QN AUTO: 30 PG (ref 26–34)
MCHC RBC AUTO-ENTMCNC: 30 G/DL (ref 31–36)
MCV RBC AUTO: 101 FL (ref 80–95)
MONOCYTES # BLD: 0.4 K/UL (ref 0.1–1)
MONOCYTES NFR BLD: 8 % (ref 3–12)
NEUTROPHILS # BLD AUTO: 3.2 K/UL (ref 1.8–7.7)
PLATELET # BLD AUTO: 222 K/UL (ref 140–440)
PMV BLD AUTO: 12.4 FL (ref 9–13)
RBC # BLD AUTO: 4.18 M/UL (ref 3.8–5.2)
WBC # BLD AUTO: 4.8 K/UL (ref 4–11)

## 2018-06-20 ENCOUNTER — OFFICE VISIT (OUTPATIENT)
Dept: INTERNAL MEDICINE CLINIC | Age: 58
End: 2018-06-20

## 2018-06-20 VITALS
WEIGHT: 180 LBS | BODY MASS INDEX: 25.2 KG/M2 | DIASTOLIC BLOOD PRESSURE: 62 MMHG | OXYGEN SATURATION: 99 % | SYSTOLIC BLOOD PRESSURE: 118 MMHG | RESPIRATION RATE: 20 BRPM | HEIGHT: 71 IN | TEMPERATURE: 98.8 F | HEART RATE: 68 BPM

## 2018-06-20 DIAGNOSIS — G60.9 IDIOPATHIC PERIPHERAL NEUROPATHY: ICD-10-CM

## 2018-06-20 DIAGNOSIS — I48.0 INTERMITTENT ATRIAL FIBRILLATION (HCC): Primary | ICD-10-CM

## 2018-06-20 DIAGNOSIS — I48.0 INTERMITTENT ATRIAL FIBRILLATION (HCC): ICD-10-CM

## 2018-06-20 RX ORDER — FLUTICASONE PROPIONATE 50 MCG
SPRAY, SUSPENSION (ML) NASAL
Qty: 1 BOTTLE | Refills: 2 | Status: SHIPPED | OUTPATIENT
Start: 2018-06-20 | End: 2019-05-06 | Stop reason: SDUPTHER

## 2018-06-20 NOTE — PROGRESS NOTES
1. Have you been to the ER, urgent care clinic since your last visit? Hospitalized since your last visit? No    2. Have you seen or consulted any other health care providers outside of the [address] [place] [address] since your last visit? Include any pap smears or colon screening.  No

## 2018-06-21 LAB
A-G RATIO,AGRAT: 1.6 RATIO (ref 1.1–2.6)
ABSOLUTE LYMPHOCYTE COUNT, 10803: 1.3 K/UL (ref 1–4.8)
ALBUMIN SERPL-MCNC: 4.5 G/DL (ref 3.5–5)
ALP SERPL-CCNC: 66 U/L (ref 25–115)
ALT SERPL-CCNC: 16 U/L (ref 5–40)
ANION GAP SERPL CALC-SCNC: 18 MMOL/L
AST SERPL W P-5'-P-CCNC: 18 U/L (ref 10–37)
BASOPHILS # BLD: 0 K/UL (ref 0–0.2)
BASOPHILS NFR BLD: 1 % (ref 0–2)
BILIRUB SERPL-MCNC: 0.6 MG/DL (ref 0.2–1.2)
BUN SERPL-MCNC: 26 MG/DL (ref 6–22)
CALCIUM SERPL-MCNC: 9.6 MG/DL (ref 8.4–10.5)
CHLORIDE SERPL-SCNC: 98 MMOL/L (ref 98–110)
CHOLEST SERPL-MCNC: 231 MG/DL (ref 110–200)
CO2 SERPL-SCNC: 25 MMOL/L (ref 20–32)
CREAT SERPL-MCNC: 1.2 MG/DL (ref 0.5–1.2)
EOSINOPHIL # BLD: 0.1 K/UL (ref 0–0.5)
EOSINOPHIL NFR BLD: 1 % (ref 0–6)
ERYTHROCYTE [DISTWIDTH] IN BLOOD BY AUTOMATED COUNT: 14.6 % (ref 10–15.5)
GFRAA, 66117: 54.2
GFRNA, 66118: 44.7
GLOBULIN,GLOB: 2.9 G/DL (ref 2–4)
GLUCOSE SERPL-MCNC: 82 MG/DL (ref 70–99)
GRANULOCYTES,GRANS: 62 % (ref 40–75)
HCT VFR BLD AUTO: 44.5 % (ref 35.1–48)
HDLC SERPL-MCNC: 3.2 MG/DL (ref 0–5)
HDLC SERPL-MCNC: 73 MG/DL (ref 40–59)
HGB BLD-MCNC: 13.5 G/DL (ref 11.7–16)
LDLC SERPL CALC-MCNC: 141 MG/DL (ref 50–99)
LYMPHOCYTES, LYMLT: 27 % (ref 20–45)
MCH RBC QN AUTO: 30 PG (ref 26–34)
MCHC RBC AUTO-ENTMCNC: 30 G/DL (ref 31–36)
MCV RBC AUTO: 100 FL (ref 80–95)
MONOCYTES # BLD: 0.4 K/UL (ref 0.1–1)
MONOCYTES NFR BLD: 9 % (ref 3–12)
NEUTROPHILS # BLD AUTO: 2.9 K/UL (ref 1.8–7.7)
PLATELET # BLD AUTO: 216 K/UL (ref 140–440)
PMV BLD AUTO: 12.5 FL (ref 9–13)
POTASSIUM SERPL-SCNC: 4.9 MMOL/L (ref 3.5–5.5)
PROT SERPL-MCNC: 7.4 G/DL (ref 6.4–8.3)
RBC # BLD AUTO: 4.45 M/UL (ref 3.8–5.2)
SODIUM SERPL-SCNC: 141 MMOL/L (ref 133–145)
TRIGL SERPL-MCNC: 85 MG/DL (ref 40–149)
VLDLC SERPL CALC-MCNC: 17 MG/DL (ref 8–30)
WBC # BLD AUTO: 4.7 K/UL (ref 4–11)

## 2018-06-22 ENCOUNTER — TELEPHONE (OUTPATIENT)
Dept: INTERNAL MEDICINE CLINIC | Age: 58
End: 2018-06-22

## 2018-06-22 PROBLEM — I10 ESSENTIAL HYPERTENSION: Status: RESOLVED | Noted: 2017-12-14 | Resolved: 2018-06-22

## 2018-06-22 RX ORDER — CEPHALEXIN 500 MG/1
CAPSULE ORAL
Qty: 25 CAP | Refills: 0 | Status: SHIPPED | OUTPATIENT
Start: 2018-06-22 | End: 2018-10-16

## 2018-06-23 NOTE — PROGRESS NOTES
The patient presents to the office today with the chief complaint of dysuria    HPI    The patient remains on Amiodarone for intermittent atrial fibrillation. she is tolerating the medications well. The patient has been remaining in sinus rhythm. The patient has complaints of dysuria. Present for 7 days. She finished up Macrobid with improvement of symptoms but mild symptoms persist.  The patient remains on      Review of Systems   Respiratory: Negative for shortness of breath. Cardiovascular: Negative for chest pain, palpitations and leg swelling. Genitourinary: Positive for dysuria. Allergies   Allergen Reactions    Celebrex [Celecoxib] Hives    Iodinated Contrast- Oral And Iv Dye Hives     Patient was in CT for a cardiac scan. A test bolus of 20cc was given and patient broke out into hives immediately after. Cancelled the rest of the exam and escorted the patient to the ED for furhter evaluation.  Sulfa (Sulfonamide Antibiotics) Other (comments)     Patient states she is not allergic       Current Outpatient Prescriptions   Medication Sig Dispense Refill    cephALEXin (KEFLEX) 500 mg capsule 1 cap three times per day 25 Cap 0    fluticasone (FLONASE) 50 mcg/actuation nasal spray 2 sprays up each nostril daily 1 Bottle 2    amiodarone (PACERONE) 100 mg tablet Take one tablet by mouth daily 30 Tab 6    digoxin (LANOXIN) 0.125 mg tablet 1 tab Monday, Wednesday, Friday 15 Tab 2    apixaban (ELIQUIS) 5 mg tablet Take 1 Tab by mouth two (2) times a day. 180 Tab 3    cholecalciferol (VITAMIN D3) 1,000 unit tablet Take  by mouth daily.  multivitamin (ONE A DAY) tablet Take 1 Tab by mouth daily.  thiamine (VITAMIN B-1) 100 mg tablet Take  by mouth daily.  cyanocobalamin (VITAMIN B-12) 1,000 mcg tablet Take 1,000 mcg by mouth daily.          Past Medical History:   Diagnosis Date    Atrial fibrillation [facility])     Attention deficit disorder without mention of hyperactivity     DUB (dysfunctional uterine bleeding)     Generalized osteoarthrosis, involving multiple sites     Grief reaction     Mother  recently - Celexa    Memory changes     Adderall for this    Unspecified tinnitus        Past Surgical History:   Procedure Laterality Date    HX KNEE ARTHROSCOPY Right     Meniscus repair    HX KNEE ARTHROSCOPY Right     Removed piece of bone (FB)    HX KNEE ARTHROSCOPY Right     ACL repair       Social History     Social History    Marital status:      Spouse name: N/A    Number of children: N/A    Years of education: N/A     Occupational History    Not on file. Social History Main Topics    Smoking status: Never Smoker    Smokeless tobacco: Never Used    Alcohol use No    Drug use: No    Sexual activity: Not Currently     Other Topics Concern    Not on file     Social History Narrative       Patient does have an advanced directive on file    Visit Vitals    /62 (BP 1 Location: Left arm, BP Patient Position: Sitting)    Pulse 68    Temp 98.8 °F (37.1 °C) (Tympanic)    Resp 20    Ht 5' 11\" (1.803 m)    Wt 180 lb (81.6 kg)    SpO2 99%    BMI 25.1 kg/m2       Physical Exam   No Cervical Lymphadenopathy  No Supraclavicular Lymphadenopathy  Thyroid is Normal  Lungs are normal to percussion. Clear to auscultation   Heart:  S1 S2 are normal, No gallops, No mummers  No Carotid Bruits  Abdomen:  Normal Bowel Sounds. No tenderness. No masses. No Hepatomegaly or Splenomegly  LE:  Strong Pedal Pulses.   No Edema      BMI:  OK    Orders Only on 2018   Component Date Value Ref Range Status    WBC 2018 4.7  4.0 - 11.0 K/uL Final    RBC 2018 4.45  3.80 - 5.20 M/uL Final    HGB 2018 13.5  11.7 - 16.0 g/dL Final    HCT 2018 44.5  35.1 - 48.0 % Final    MCV 2018 100* 80 - 95 fL Final    MCH 2018 30  26 - 34 pg Final    MCHC 2018 30* 31 - 36 g/dL Final    RDW 2018 14.6  10.0 - 15.5 % Final    PLATELET 06/20/2018 216  140 - 440 K/uL Final    MPV 06/20/2018 12.5  9.0 - 13.0 fL Final    NEUTROPHILS 06/20/2018 62  40 - 75 % Final    Lymphocytes 06/20/2018 27  20 - 45 % Final    MONOCYTES 06/20/2018 9  3 - 12 % Final    EOSINOPHILS 06/20/2018 1  0 - 6 % Final    BASOPHILS 06/20/2018 1  0 - 2 % Final    ABS. NEUTROPHILS 06/20/2018 2.9  1.8 - 7.7 K/uL Final    ABSOLUTE LYMPHOCYTE COUNT 06/20/2018 1.3  1.0 - 4.8 K/uL Final    ABS. MONOCYTES 06/20/2018 0.4  0.1 - 1.0 K/uL Final    ABS. EOSINOPHILS 06/20/2018 0.1  0.0 - 0.5 K/uL Final    ABS. BASOPHILS 06/20/2018 0.0  0.0 - 0.2 K/uL Final   Orders Only on 06/15/2018   Component Date Value Ref Range Status    WBC 06/15/2018 4.8  4.0 - 11.0 K/uL Final    RBC 06/15/2018 4.18  3.80 - 5.20 M/uL Final    HGB 06/15/2018 12.6  11.7 - 16.0 g/dL Final    HCT 06/15/2018 42.1  35.1 - 48.0 % Final    MCV 06/15/2018 101* 80 - 95 fL Final    MCH 06/15/2018 30  26 - 34 pg Final    MCHC 06/15/2018 30* 31 - 36 g/dL Final    RDW 06/15/2018 14.5  10.0 - 15.5 % Final    PLATELET 82/41/5633 032  140 - 440 K/uL Final    MPV 06/15/2018 12.4  9.0 - 13.0 fL Final    NEUTROPHILS 06/15/2018 67  40 - 75 % Final    Lymphocytes 06/15/2018 23  20 - 45 % Final    MONOCYTES 06/15/2018 8  3 - 12 % Final    EOSINOPHILS 06/15/2018 2  0 - 6 % Final    BASOPHILS 06/15/2018 0  0 - 2 % Final    ABS. NEUTROPHILS 06/15/2018 3.2  1.8 - 7.7 K/uL Final    ABSOLUTE LYMPHOCYTE COUNT 06/15/2018 1.1  1.0 - 4.8 K/uL Final    ABS. MONOCYTES 06/15/2018 0.4  0.1 - 1.0 K/uL Final    ABS. EOSINOPHILS 06/15/2018 0.1  0.0 - 0.5 K/uL Final    ABS.  BASOPHILS 06/15/2018 0.0  0.0 - 0.2 K/uL Final   Orders Only on 06/15/2018   Component Date Value Ref Range Status    Triglyceride 06/20/2018 85  40 - 149 mg/dL Final    HDL Cholesterol 06/20/2018 73* 40 - 59 mg/dL Final    Cholesterol, total 06/20/2018 231* 110 - 200 mg/dL Final    CHOLESTEROL/HDL 06/20/2018 3.2  0.0 - 5.0 Final    LDL, calculated 06/20/2018 141* 50 - 99 mg/dL Final    VLDL, calculated 06/20/2018 17  8 - 30 mg/dL Final    Comment: Test includes cholesterol, HDL cholesterol, triglycerides and LDL. Cholesterol Recommended NCEP guidelines in mg/dL:  Less than 200            Desirable  200 - 239                Borderline High  Greater than or  = 240   High  Please Note:  Total Chol/HDL Ratio                   Men     Women  1/2 Avg. Risk    3.4     3.3      Avg. Risk    5.0     4.4  2X  Avg. Risk    9.6     7.1  3X  Avg. Risk   23.4    11.0      Glucose 06/20/2018 82  70 - 99 mg/dL Final    BUN 06/20/2018 26* 6 - 22 mg/dL Final    Creatinine 06/20/2018 1.2  0.5 - 1.2 mg/dL Final    Sodium 06/20/2018 141  133 - 145 mmol/L Final    Potassium 06/20/2018 4.9  3.5 - 5.5 mmol/L Final    Chloride 06/20/2018 98  98 - 110 mmol/L Final    CO2 06/20/2018 25  20 - 32 mmol/L Final    AST (SGOT) 06/20/2018 18  10 - 37 U/L Final    ALT (SGPT) 06/20/2018 16  5 - 40 U/L Final    Alk. phosphatase 06/20/2018 66  25 - 115 U/L Final    Bilirubin, total 06/20/2018 0.6  0.2 - 1.2 mg/dL Final    Calcium 06/20/2018 9.6  8.4 - 10.5 mg/dL Final    Protein, total 06/20/2018 7.4  6.4 - 8.3 g/dL Final    Albumin 06/20/2018 4.5  3.5 - 5.0 g/dL Final    A-G Ratio 06/20/2018 1.6  1.1 - 2.6 ratio Final    Globulin 06/20/2018 2.9  2.0 - 4.0 g/dL Final    Anion gap 06/20/2018 18.0  mmol/L Final    Comment: Test includes Albumin, Alkaline Phosphatase, ALT, AST, BUN, Calcium, CO2,  Chloride, Creatinine, Glucose, Potassium, Sodium, Total Bilirubin and Total  Protein. Estimated GFR results are reported in mL/min/1.73 sq.m. by the MDRD equation. This eGFR is validated for stable chronic renal failure patients. This   equation  is unreliable in acute illness or patients with normal renal function.       GFRAA 06/20/2018 54.2* >60.0 Final    GFRNA 06/20/2018 44.7* >60.0 Final   Lab Only on 06/05/2018   Component Date Value Ref Range Status    Specific Gravity 06/05/2018 1.013  1.005 - 1.03 Final    pH (UA) 06/05/2018 6.0  5.0 - 8.0 pH Final    Protein 06/05/2018 30** Negative, mg/dL Final    Glucose 06/05/2018 Negative  Negative mg/dL Final    Ketone 06/05/2018 5** Negative mg/dL Final    Bilirubin 06/05/2018 Negative  Negative Final    Blood 06/05/2018 Moderate* Negative Final    Nitrites 06/05/2018 Negative  Negative Final    Leukocyte Esterase 06/05/2018 Large* Negative Final    Urobilinogen 06/05/2018 <2.0  <2.0 mg/dL Final    WBC 06/05/2018 Loaded* 0 - 2 /hpf Final    RBC 06/05/2018 20-50* Negative, /hpf Final    UCLWBC 06/05/2018 MANY   Final    RESULT 06/05/2018 Abnormal*  Final    Comment: Updated: [id number]  LAB ACC#: [id number]  SOURCE: Clean Catch Urine  --FINAL REPORT--  40,000 ORG/ML Escherichia coli  --SUSCEPTIBILITY REPORT--                 ESCHERICHIA COLI                                                        INTERP  Ampicillin                                                 S  Ampicillin/Sulbactam                                       S  Cefazolin                                                  S  Ceftriaxone                                                S  Ciprofloxacin                                              S  Gentamicin                                                 S  Nitrofurantoin                                             S  Trimethoprim/Sulfamethoxazole                              S         .  Results for orders placed or performed in visit on 06/20/18   CBC WITH AUTOMATED DIFF   Result Value Ref Range    WBC 4.7 4.0 - 11.0 K/uL    RBC 4.45 3.80 - 5.20 M/uL    HGB 13.5 11.7 - 16.0 g/dL    HCT 44.5 35.1 - 48.0 %     (H) 80 - 95 fL    MCH 30 26 - 34 pg    MCHC 30 (L) 31 - 36 g/dL    RDW 14.6 10.0 - 15.5 %    PLATELET 555 795 - 085 K/uL    MPV 12.5 9.0 - 13.0 fL    NEUTROPHILS 62 40 - 75 %    Lymphocytes 27 20 - 45 %    MONOCYTES 9 3 - 12 %    EOSINOPHILS 1 0 - 6 %    BASOPHILS 1 0 - 2 %    ABS.  NEUTROPHILS 2.9 1.8 - 7.7 K/uL    ABSOLUTE LYMPHOCYTE COUNT 1.3 1.0 - 4.8 K/uL    ABS. MONOCYTES 0.4 0.1 - 1.0 K/uL    ABS. EOSINOPHILS 0.1 0.0 - 0.5 K/uL    ABS. BASOPHILS 0.0 0.0 - 0.2 K/uL    Narrative    Unless additionally indicated, test performed at: [facility], [address] [place] [address]. PH: [phone]. Assessment / Plan      ICD-10-CM ICD-9-CM    1. Intermittent atrial fibrillation (HCC) I48.0 427.31 DIGOXIN      TSH 3RD GENERATION   2. Idiopathic peripheral neuropathy G60.9 356.9        Labs drawn  she was advised to continue her maintenance medications  Follow urinary symptioms    Follow-up Disposition:  Return in about 3 months (around 9/20/2018). I asked Ramandeep Garcia if she has any questions and I answered the questions. Ramandeep Garcia states that she understands the treatment plan and agrees with the treatment plan

## 2018-07-02 RX ORDER — DIGOXIN 125 UG/1
TABLET ORAL
Qty: 30 TAB | Refills: 0 | Status: SHIPPED | OUTPATIENT
Start: 2018-07-02 | End: 2018-09-29 | Stop reason: SDUPTHER

## 2018-07-06 ENCOUNTER — OFFICE VISIT (OUTPATIENT)
Dept: INTERNAL MEDICINE CLINIC | Age: 58
End: 2018-07-06

## 2018-07-06 VITALS
TEMPERATURE: 99.6 F | HEART RATE: 71 BPM | DIASTOLIC BLOOD PRESSURE: 80 MMHG | HEIGHT: 71 IN | OXYGEN SATURATION: 98 % | SYSTOLIC BLOOD PRESSURE: 120 MMHG

## 2018-07-06 DIAGNOSIS — N39.0 URINARY TRACT INFECTION WITHOUT HEMATURIA, SITE UNSPECIFIED: ICD-10-CM

## 2018-07-06 DIAGNOSIS — R35.0 FREQUENCY OF URINATION: ICD-10-CM

## 2018-07-06 DIAGNOSIS — D75.89 MACROCYTOSIS: ICD-10-CM

## 2018-07-06 DIAGNOSIS — I48.0 INTERMITTENT ATRIAL FIBRILLATION (HCC): Primary | ICD-10-CM

## 2018-07-06 LAB
BILIRUB UR QL STRIP: NORMAL
GLUCOSE UR-MCNC: NORMAL MG/DL
KETONES P FAST UR STRIP-MCNC: NORMAL MG/DL
PH UR STRIP: 6 [PH] (ref 4.6–8)
PROT UR QL STRIP: NORMAL
SP GR UR STRIP: 1.02 (ref 1–1.03)
UA UROBILINOGEN AMB POC: NORMAL (ref 0.2–1)
URINALYSIS CLARITY POC: CLEAR
URINALYSIS COLOR POC: YELLOW
URINE BLOOD POC: NORMAL
URINE LEUKOCYTES POC: NORMAL
URINE NITRITES POC: NEGATIVE

## 2018-07-06 RX ORDER — AMOXICILLIN AND CLAVULANATE POTASSIUM 875; 125 MG/1; MG/1
TABLET, FILM COATED ORAL
Qty: 14 TAB | Refills: 0 | Status: SHIPPED | OUTPATIENT
Start: 2018-07-06 | End: 2018-09-09 | Stop reason: ALTCHOICE

## 2018-07-07 NOTE — PROGRESS NOTES
The patient presents to the office today with the chief complaint of dysuria    HPI    The patient has a UTI. She has been on antibiotics but the symptoms persist.  The patient feels tired. She feels as her previous atrial fibrillation is active. Review of Systems   Respiratory: Negative for shortness of breath. Cardiovascular: Negative for chest pain and leg swelling. Allergies   Allergen Reactions    Celebrex [Celecoxib] Hives    Iodinated Contrast- Oral And Iv Dye Hives     Patient was in CT for a cardiac scan. A test bolus of 20cc was given and patient broke out into hives immediately after. Cancelled the rest of the exam and escorted the patient to the ED for furhter evaluation.  Sulfa (Sulfonamide Antibiotics) Other (comments)     Patient states she is not allergic       Current Outpatient Prescriptions   Medication Sig Dispense Refill    amoxicillin-clavulanate (AUGMENTIN) 875-125 mg per tablet 1 tablet twice per day with food 14 Tab 0    fluticasone (FLONASE) 50 mcg/actuation nasal spray 2 sprays up each nostril daily 1 Bottle 2    amiodarone (PACERONE) 100 mg tablet Take one tablet by mouth daily 30 Tab 6    digoxin (LANOXIN) 0.125 mg tablet 1 tab Monday, Wednesday, Friday 15 Tab 2    apixaban (ELIQUIS) 5 mg tablet Take 1 Tab by mouth two (2) times a day. 180 Tab 3    thiamine (VITAMIN B-1) 100 mg tablet Take  by mouth daily.  cyanocobalamin (VITAMIN B-12) 1,000 mcg tablet Take 1,000 mcg by mouth daily.  multivitamin (ONE A DAY) tablet Take 1 Tab by mouth daily.  DIGOX 125 mcg tablet TAKE 2 TABLETS TODAY THEN TAKE 1 TABLET BY MOUTH EVERY DAY 30 Tab 0    cephALEXin (KEFLEX) 500 mg capsule 1 cap three times per day 25 Cap 0    cholecalciferol (VITAMIN D3) 1,000 unit tablet Take  by mouth daily.          Past Medical History:   Diagnosis Date    Atrial fibrillation [facility])     Attention deficit disorder without mention of hyperactivity     DUB (dysfunctional uterine bleeding)     Generalized osteoarthrosis, involving multiple sites     Grief reaction     Mother  recently - Celexa    Memory changes     Adderall for this    Unspecified tinnitus        Past Surgical History:   Procedure Laterality Date    HX KNEE ARTHROSCOPY Right     Meniscus repair    HX KNEE ARTHROSCOPY Right     Removed piece of bone (FB)    HX KNEE ARTHROSCOPY Right     ACL repair       Social History     Social History    Marital status:      Spouse name: N/A    Number of children: N/A    Years of education: N/A     Occupational History    Not on file. Social History Main Topics    Smoking status: Never Smoker    Smokeless tobacco: Never Used    Alcohol use No    Drug use: No    Sexual activity: Not Currently     Other Topics Concern    Not on file     Social History Narrative       Patient does have an advanced directive on file    Visit Vitals    /80 (BP 1 Location: Right arm, BP Patient Position: Sitting)    Pulse 71    Temp 99.6 °F (37.6 °C) (Tympanic)    Ht 5' 11\" (1.803 m)    SpO2 98%       Physical Exam   Constitutional:   The patient looks as if she does not feel well   Neck: Carotid bruit is not present. No thyromegaly present. Cardiovascular: Normal rate and regular rhythm. Exam reveals no gallop. No murmur heard. Pulmonary/Chest: She has no wheezes. She has no rales. Abdominal: Soft. Bowel sounds are normal. She exhibits no distension and no mass. There is no splenomegaly or hepatomegaly. There is no tenderness. Musculoskeletal: She exhibits no edema.          Office Visit on 2018   Component Date Value Ref Range Status    Color (UA POC) 2018 Yellow   Final    Clarity (UA POC) 2018 Clear   Final    Glucose (UA POC) 2018 Trace  Negative Final    Bilirubin (UA POC) 2018 1+  Negative Final    Ketones (UA POC) 2018 1+  Negative Final    Specific gravity (UA POC) 2018 1.025  1.001 - 1.035 Final    Blood (UA POC) 07/06/2018 3+  Negative Final    pH (UA POC) 07/06/2018 6.0  4.6 - 8.0 Final    Protein (UA POC) 07/06/2018 2+  Negative Final    Urobilinogen (UA POC) 07/06/2018 0.2 mg/dL  0.2 - 1 Final    Nitrites (UA POC) 07/06/2018 Negative  Negative Final    Leukocyte esterase (UA POC) 07/06/2018 3+  Negative Final   Orders Only on 06/20/2018   Component Date Value Ref Range Status    WBC 06/20/2018 4.7  4.0 - 11.0 K/uL Final    RBC 06/20/2018 4.45  3.80 - 5.20 M/uL Final    HGB 06/20/2018 13.5  11.7 - 16.0 g/dL Final    HCT 06/20/2018 44.5  35.1 - 48.0 % Final    MCV 06/20/2018 100* 80 - 95 fL Final    MCH 06/20/2018 30  26 - 34 pg Final    MCHC 06/20/2018 30* 31 - 36 g/dL Final    RDW 06/20/2018 14.6  10.0 - 15.5 % Final    PLATELET 69/21/1830 785  140 - 440 K/uL Final    MPV 06/20/2018 12.5  9.0 - 13.0 fL Final    NEUTROPHILS 06/20/2018 62  40 - 75 % Final    Lymphocytes 06/20/2018 27  20 - 45 % Final    MONOCYTES 06/20/2018 9  3 - 12 % Final    EOSINOPHILS 06/20/2018 1  0 - 6 % Final    BASOPHILS 06/20/2018 1  0 - 2 % Final    ABS. NEUTROPHILS 06/20/2018 2.9  1.8 - 7.7 K/uL Final    ABSOLUTE LYMPHOCYTE COUNT 06/20/2018 1.3  1.0 - 4.8 K/uL Final    ABS. MONOCYTES 06/20/2018 0.4  0.1 - 1.0 K/uL Final    ABS. EOSINOPHILS 06/20/2018 0.1  0.0 - 0.5 K/uL Final    ABS.  BASOPHILS 06/20/2018 0.0  0.0 - 0.2 K/uL Final   Orders Only on 06/15/2018   Component Date Value Ref Range Status    WBC 06/15/2018 4.8  4.0 - 11.0 K/uL Final    RBC 06/15/2018 4.18  3.80 - 5.20 M/uL Final    HGB 06/15/2018 12.6  11.7 - 16.0 g/dL Final    HCT 06/15/2018 42.1  35.1 - 48.0 % Final    MCV 06/15/2018 101* 80 - 95 fL Final    MCH 06/15/2018 30  26 - 34 pg Final    MCHC 06/15/2018 30* 31 - 36 g/dL Final    RDW 06/15/2018 14.5  10.0 - 15.5 % Final    PLATELET 86/25/6652 718  140 - 440 K/uL Final    MPV 06/15/2018 12.4  9.0 - 13.0 fL Final    NEUTROPHILS 06/15/2018 67  40 - 75 % Final    Lymphocytes 06/15/2018 23  20 - 45 % Final    MONOCYTES 06/15/2018 8  3 - 12 % Final    EOSINOPHILS 06/15/2018 2  0 - 6 % Final    BASOPHILS 06/15/2018 0  0 - 2 % Final    ABS. NEUTROPHILS 06/15/2018 3.2  1.8 - 7.7 K/uL Final    ABSOLUTE LYMPHOCYTE COUNT 06/15/2018 1.1  1.0 - 4.8 K/uL Final    ABS. MONOCYTES 06/15/2018 0.4  0.1 - 1.0 K/uL Final    ABS. EOSINOPHILS 06/15/2018 0.1  0.0 - 0.5 K/uL Final    ABS. BASOPHILS 06/15/2018 0.0  0.0 - 0.2 K/uL Final   Orders Only on 06/15/2018   Component Date Value Ref Range Status    Triglyceride 06/20/2018 85  40 - 149 mg/dL Final    HDL Cholesterol 06/20/2018 73* 40 - 59 mg/dL Final    Cholesterol, total 06/20/2018 231* 110 - 200 mg/dL Final    CHOLESTEROL/HDL 06/20/2018 3.2  0.0 - 5.0 Final    LDL, calculated 06/20/2018 141* 50 - 99 mg/dL Final    VLDL, calculated 06/20/2018 17  8 - 30 mg/dL Final    Comment: Test includes cholesterol, HDL cholesterol, triglycerides and LDL. Cholesterol Recommended NCEP guidelines in mg/dL:  Less than 200            Desirable  200 - 239                Borderline High  Greater than or  = 240   High  Please Note:  Total Chol/HDL Ratio                   Men     Women  1/2 Avg. Risk    3.4     3.3      Avg. Risk    5.0     4.4  2X  Avg. Risk    9.6     7.1  3X  Avg. Risk   23.4    11.0      Glucose 06/20/2018 82  70 - 99 mg/dL Final    BUN 06/20/2018 26* 6 - 22 mg/dL Final    Creatinine 06/20/2018 1.2  0.5 - 1.2 mg/dL Final    Sodium 06/20/2018 141  133 - 145 mmol/L Final    Potassium 06/20/2018 4.9  3.5 - 5.5 mmol/L Final    Chloride 06/20/2018 98  98 - 110 mmol/L Final    CO2 06/20/2018 25  20 - 32 mmol/L Final    AST (SGOT) 06/20/2018 18  10 - 37 U/L Final    ALT (SGPT) 06/20/2018 16  5 - 40 U/L Final    Alk.  phosphatase 06/20/2018 66  25 - 115 U/L Final    Bilirubin, total 06/20/2018 0.6  0.2 - 1.2 mg/dL Final    Calcium 06/20/2018 9.6  8.4 - 10.5 mg/dL Final    Protein, total 06/20/2018 7.4  6.4 - 8.3 g/dL Final    Albumin 06/20/2018 4.5  3.5 - 5.0 g/dL Final    A-G Ratio 06/20/2018 1.6  1.1 - 2.6 ratio Final    Globulin 06/20/2018 2.9  2.0 - 4.0 g/dL Final    Anion gap 06/20/2018 18.0  mmol/L Final    Comment: Test includes Albumin, Alkaline Phosphatase, ALT, AST, BUN, Calcium, CO2,  Chloride, Creatinine, Glucose, Potassium, Sodium, Total Bilirubin and Total  Protein. Estimated GFR results are reported in mL/min/1.73 sq.m. by the MDRD equation. This eGFR is validated for stable chronic renal failure patients. This   equation  is unreliable in acute illness or patients with normal renal function.       GFRAA 06/20/2018 54.2* >60.0 Final    GFRNA 06/20/2018 44.7* >60.0 Final   Lab Only on 06/05/2018   Component Date Value Ref Range Status    Specific Gravity 06/05/2018 1.013  1.005 - 1.03 Final    pH (UA) 06/05/2018 6.0  5.0 - 8.0 pH Final    Protein 06/05/2018 30** Negative, mg/dL Final    Glucose 06/05/2018 Negative  Negative mg/dL Final    Ketone 06/05/2018 5** Negative mg/dL Final    Bilirubin 06/05/2018 Negative  Negative Final    Blood 06/05/2018 Moderate* Negative Final    Nitrites 06/05/2018 Negative  Negative Final    Leukocyte Esterase 06/05/2018 Large* Negative Final    Urobilinogen 06/05/2018 <2.0  <2.0 mg/dL Final    WBC 06/05/2018 Loaded* 0 - 2 /hpf Final    RBC 06/05/2018 20-50* Negative, /hpf Final    UCLWBC 06/05/2018 MANY   Final    RESULT 06/05/2018 Abnormal*  Final    Comment: Updated: [id number]  LAB ACC#: [id number]  SOURCE: Clean Catch Urine  --FINAL REPORT--  40,000 ORG/ML Escherichia coli  --SUSCEPTIBILITY REPORT--                 ESCHERICHIA COLI                                                        INTERP  Ampicillin                                                 S  Ampicillin/Sulbactam                                       S  Cefazolin                                                  S  Ceftriaxone                                                S  Ciprofloxacin S  Gentamicin                                                 S  Nitrofurantoin                                             S  Trimethoprim/Sulfamethoxazole                              S         .  Results for orders placed or performed in visit on 07/06/18   AMB POC URINALYSIS DIP STICK AUTO W/O MICRO   Result Value Ref Range    Color (UA POC) Yellow     Clarity (UA POC) Clear     Glucose (UA POC) Trace Negative    Bilirubin (UA POC) 1+ Negative    Ketones (UA POC) 1+ Negative    Specific gravity (UA POC) 1.025 1.001 - 1.035    Blood (UA POC) 3+ Negative    pH (UA POC) 6.0 4.6 - 8.0    Protein (UA POC) 2+ Negative    Urobilinogen (UA POC) 0.2 mg/dL 0.2 - 1    Nitrites (UA POC) Negative Negative    Leukocyte esterase (UA POC) 3+ Negative   AMB POC EKG ROUTINE W/ 12 LEADS, INTER & REP    Impression    Left Axis Deviation. Nonspecific  ST - T wave abnormalities       Assessment / Plan      ICD-10-CM ICD-9-CM    1. Intermittent atrial fibrillation (HCC) I48.0 427.31 AMB POC EKG ROUTINE W/ 12 LEADS, INTER & REP      TSH 3RD GENERATION      METHYLMALONIC ACID      DIGOXIN      TSH 3RD GENERATION      METHYLMALONIC ACID      DIGOXIN   2. Macrocytosis D75.89 289.89 AMB POC EKG ROUTINE W/ 12 LEADS, INTER & REP      TSH 3RD GENERATION      METHYLMALONIC ACID      DIGOXIN      TSH 3RD GENERATION      METHYLMALONIC ACID      DIGOXIN   3. Frequency of urination R35.0 788.41    4. Urinary tract infection without hematuria, site unspecified N39.0 599.0 AMB POC URINALYSIS DIP STICK AUTO W/O MICRO      CULTURE, URINE      CULTURE, URINE       Labs ordered  Empirc Augmentin with culture pending  she was advised to continue her maintenance medications      Follow-up Disposition:  Return in about 4 weeks (around 8/3/2018). I asked Anthony Weems if she has any questions and I answered the questions. Anthony Weems states that she understands the treatment plan and agrees with the treatment plan

## 2018-07-10 LAB
DIGOXIN SERPL-MCNC: 0.7 NG/ML (ref 0.8–2)
DISCLAIMER, 150294: NORMAL
METHYLMALONIC ACID: 96 NMOL/L
RESULT: ABNORMAL
TSH SERPL DL<=0.005 MIU/L-ACNC: 1.25 MCU/ML (ref 0.27–4.2)

## 2018-08-16 ENCOUNTER — OFFICE VISIT (OUTPATIENT)
Dept: CARDIOLOGY CLINIC | Age: 58
End: 2018-08-16

## 2018-08-16 ENCOUNTER — TELEPHONE (OUTPATIENT)
Dept: CARDIOLOGY CLINIC | Age: 58
End: 2018-08-16

## 2018-08-16 VITALS
DIASTOLIC BLOOD PRESSURE: 72 MMHG | OXYGEN SATURATION: 98 % | HEART RATE: 74 BPM | BODY MASS INDEX: 25.48 KG/M2 | HEIGHT: 71 IN | WEIGHT: 182 LBS | SYSTOLIC BLOOD PRESSURE: 112 MMHG

## 2018-08-16 DIAGNOSIS — Z98.890 S/P ABLATION OF ATRIAL FIBRILLATION: Primary | ICD-10-CM

## 2018-08-16 DIAGNOSIS — G47.30 SLEEP APNEA, UNSPECIFIED TYPE: ICD-10-CM

## 2018-08-16 DIAGNOSIS — Z86.79 S/P ABLATION OF ATRIAL FIBRILLATION: Primary | ICD-10-CM

## 2018-08-16 DIAGNOSIS — I48.0 INTERMITTENT ATRIAL FIBRILLATION (HCC): ICD-10-CM

## 2018-08-16 DIAGNOSIS — M79.89 LEG SWELLING: ICD-10-CM

## 2018-08-16 NOTE — PROGRESS NOTES
1. Have you been to the ER, urgent care clinic since your last visit? Hospitalized since your last visit?no     2. Have you seen or consulted any other health care providers outside of the [facility] since your last visit? Include any pap smears or colon screening.  No

## 2018-08-16 NOTE — PROGRESS NOTES
History of Present Illness: A 66 y.o. female here for follow up. She is accompanied by a friend today. She underwent pulmonary vein isolation 17 due to significant symptoms of fatigue. There was hypotension post procedure. She was started on Amiodarone 200 mg a day, decreased to 100 mg a day. She is also on Digoxin. She has been doing quite well. She started school this fall and thinking of moving to [place] and putting her house on the market. She continues to exercise and rides her bike  miles without limitation at this time. Impression/Plan:   Paroxysmal symptomatic atrial fibrillation with pulmonary vein isolation 17 with flaco procedure hypotension,rresolved. Intolerance to higher dose of AV blocking agents due to bradycardia and fatigue, now off Metoprolol. History of normal left ventricular function by echocardiogram.  Sleep apnea and treatment. Nuclear stress test 2017 with normal function, no ischemia. I will continue to try to wean her down from Amiodarone. I have asked her to take 100 mg every other day for a month, then stop and monitor. I will see her back in 3 months. She may be moving to [place]. I would be happy to set her up with Dr. Valarie Del Angel at that time. All questions answered. We did discuss magnesium and it would be reasonable to take supplements although her last levels were not that low.        Past Medical History:   Diagnosis Date    Atrial fibrillation [facility])     Attention deficit disorder without mention of hyperactivity     DUB (dysfunctional uterine bleeding)     Generalized osteoarthrosis, involving multiple sites     Grief reaction     Mother  recently - Celexa    Memory changes     Adderall for this    Unspecified tinnitus        Current Outpatient Prescriptions   Medication Sig Dispense Refill    amoxicillin-clavulanate (AUGMENTIN) 875-125 mg per tablet 1 tablet twice per day with food 14 Tab 0    DIGOX 125 mcg tablet TAKE 2 TABLETS TODAY THEN TAKE 1 TABLET BY MOUTH EVERY DAY 30 Tab 0    cephALEXin (KEFLEX) 500 mg capsule 1 cap three times per day 25 Cap 0    fluticasone (FLONASE) 50 mcg/actuation nasal spray 2 sprays up each nostril daily 1 Bottle 2    amiodarone (PACERONE) 100 mg tablet Take one tablet by mouth daily 30 Tab 6    digoxin (LANOXIN) 0.125 mg tablet 1 tab Monday, Wednesday, Friday 15 Tab 2    apixaban (ELIQUIS) 5 mg tablet Take 1 Tab by mouth two (2) times a day. 180 Tab 3    thiamine (VITAMIN B-1) 100 mg tablet Take  by mouth daily.  cholecalciferol (VITAMIN D3) 1,000 unit tablet Take  by mouth daily.  cyanocobalamin (VITAMIN B-12) 1,000 mcg tablet Take 1,000 mcg by mouth daily.  multivitamin (ONE A DAY) tablet Take 1 Tab by mouth daily. Social History   reports that she has never smoked. She has never used smokeless tobacco.   reports that she does not drink alcohol. Family History  family history includes Diabetes in her maternal grandmother and mother; Heart Disease in her father and mother; Hypertension in her father and mother. Review of Systems  Except as stated above include:  Constitutional: Negative for fever, chills and malaise/fatigue. HEENT: No congestion or recent URI. Gastrointestinal: No nausea, vomiting, abdominal pain, bloody stools. Pulmonary:  Negative except as stated above. Cardiac:  Negative except as stated above. Musculoskeletal: Negative except as stated above. Neurological:  No localized symptoms. Skin:  Negative except as stated above. Psych:  Negative except as stated above. Endocrine:  Negative except as stated above.     PHYSICAL EXAM  BP Readings from Last 3 Encounters:   08/16/18 112/72   07/06/18 120/80   06/20/18 118/62     Pulse Readings from Last 3 Encounters:   08/16/18 74   07/06/18 71   06/20/18 68     Wt Readings from Last 3 Encounters:   08/16/18 82.6 kg (182 lb)   06/20/18 81.6 kg (180 lb)   05/31/18 86.2 kg (190 lb)     General: Well developed, well groomed. Head/Neck:   No jugular venous distention     No carotid bruits. No evidence of xanthelasma. Lungs:   No respiratory distress. Clear bilaterally. Heart:    Regular rate and rhythm. Normal S1/S2. Palpation of heart with normal point of maximum impulse. No significant murmurs, rubs or gallops. Abdomen:   Soft and nontender. No palpable abdominal mass or bruits. Extremities:   Intact peripheral pulses. No significant edema. Neurological:   Alert and oriented to person, place, time. No focal neurological deficit visually.   Skin:   No obvious rash    Blood Pressure Metric:  Jana Hawkins has been given the following recommendations today due to her elevated BP reading: controlled

## 2018-08-16 NOTE — TELEPHONE ENCOUNTER
Referral to Neurology- Dr. Naranjo Falling- eval sleep apnea/sleep study  Ph: [phone]  Fax: [phone]  [address] [place] [address]  [address]    I spoke to [place] Derrell. . scheduled patient for appointment on 10/17/18 at 2:30 pm check-in at 2:15 pm.... Patient aware of the above given printed copy of appointment information. Silvestre Mayberry will be mailing patient the new patient paperwork. I will fax copy of Dr. Ninoska Means office note when complete.   Tony Fall

## 2018-08-16 NOTE — MR AVS SNAPSHOT
[address] [address] [address] [id number] [phone] Patient: Satnam Jones MRN: [id number] DEK:[id number] Visit Information Date & Time Provider Department Dept. Phone Encounter #  
 8/16/2018  3:40 PM Han Vickers MD [facility] [phone] Follow-up Instructions Follow-up and Disposition History Your Appointments 9/7/2018  4:00 PM  
Follow Up with Mendez Rutherford MD  
[facility] Appt Note: 3 mo f/u  
 [address], [address]  
  
   
 [address], [address] [place] [address]  
  
    
 11/15/2018  3:40 PM  
Follow Up with Han Vickers MD  
[facility] ([facility]) Appt Note: 3 month follow up [place] [address] [address] [address] [phone] [address] [place] [address] [address] Upcoming Health Maintenance Date Due Hepatitis C Screening 1960 Pneumococcal 19-64 Medium Risk (1 of 1 - PPSV23) 3/14/1981 DTaP/Tdap/Td series (1 - Tdap) 3/14/1983 PAP AKA CERVICAL CYTOLOGY 3/14/1983 FOBT Q 1 YEAR AGE 50-75 3/14/2012 Influenza Age 5 to Adult 8/1/2018 BREAST CANCER SCRN MAMMOGRAM 8/16/2019 Allergies as of 8/16/2018  Review Complete On: 8/16/2018 By: Han Vickers MD  
  
 Severity Noted Reaction Type Reactions Celebrex [Celecoxib]  07/24/2014    Hives Iodinated Contrast- Oral And Iv Dye  11/02/2017    Hives Patient was in CT for a cardiac scan. A test bolus of 20cc was given and patient broke out into hives immediately after. Cancelled the rest of the exam and escorted the patient to the ED for furhter evaluation. Sulfa (Sulfonamide Antibiotics)  09/09/2015    Other (comments) Patient states she is not allergic Current Immunizations  Never Reviewed Name Date  
 TB Skin Test (PPD) 8/29/2013, 2/27/2007 TB Skin Test (PPD) Intradermal 8/9/2017 Not reviewed this visit You Were Diagnosed With   
  
 Codes Comments S/P ablation of atrial fibrillation    -  Primary ICD-10-CM: Z98.890, Z86.79 
ICD-9-CM: V45.89 Intermittent atrial fibrillation (HCC)     ICD-10-CM: I48.0 ICD-9-CM: 427.31 Leg swelling     ICD-10-CM: M79.89 ICD-9-CM: 729.81 Sleep apnea, unspecified type     ICD-10-CM: G47.30 ICD-9-CM: 780.57 Vitals BP Pulse Height(growth percentile) Weight(growth percentile) SpO2 BMI  
 112/72 74 5' 11\" (1.803 m) 182 lb (82.6 kg) 98% 25.38 kg/m2 OB Status Smoking Status Postmenopausal Never Smoker BMI and BSA Data Body Mass Index Body Surface Area  
 25.38 kg/m 2 2.03 m 2 Preferred Pharmacy Pharmacy Name Phone [phone] - [place], [address] AT [address] [phone] Your Updated Medication List  
  
   
This list is accurate as of 8/16/18  4:50 PM.  Always use your most recent med list.  
  
  
  
  
 amiodarone 100 mg tablet Commonly known as:  Eileen Belling Take one tablet by mouth daily  
  
 amoxicillin-clavulanate 875-125 mg per tablet Commonly known as:  AUGMENTIN  
1 tablet twice per day with food  
  
 apixaban 5 mg tablet Commonly known as:  Amy Haver Take 1 Tab by mouth two (2) times a day. cephALEXin 500 mg capsule Commonly known as:  KEFLEX  
1 cap three times per day * digoxin 0.125 mg tablet Commonly known as:  LANOXIN  
1 tab Monday, Wednesday, Friday * DIGOX 0.125 mg tablet Generic drug:  digoxin TAKE 2 TABLETS TODAY THEN TAKE 1 TABLET BY MOUTH EVERY DAY  
  
 fluticasone 50 mcg/actuation nasal spray Commonly known as:  FLONASE  
2 sprays up each nostril daily  
  
 multivitamin tablet Commonly known as:  ONE A DAY  
 Take 1 Tab by mouth daily. VITAMIN B-1 100 mg tablet Generic drug:  thiamine HCL Take  by mouth daily. VITAMIN B-12 1,000 mcg tablet Generic drug:  cyanocobalamin Take 1,000 mcg by mouth daily. VITAMIN D3 1,000 unit tablet Generic drug:  cholecalciferol Take  by mouth daily. * Notice: This list has 2 medication(s) that are the same as other medications prescribed for you. Read the directions carefully, and ask your doctor or other care provider to review them with you. We Performed the Following AMB POC EKG ROUTINE W/ 12 LEADS, INTER & REP [93182 CPT(R)] REFERRAL TO NEUROLOGY [UMF54 Custom] Comments:  
 Please evaluate patient for Sleep apnea/sleep study. To-Do List   
 08/16/2018 Lab:  MAGNESIUM   
  
 08/22/2018 2:45 PM  
  Appointment with KAILA PELLETIER at [address] [place] [address] ([phone]) PAYMENT  For Non-Medicare patients - $15.00 will be collected from you at the time of your exam.  You will be billed $35.00 from the reading [facility]. OUTSIDE FILMS  - Any outside films related to the study being scheduled should be brought with you on the day of the exam.  If this cannot be done there may be a delay in the reading of the study. MEDICATIONS  - Patient must bring a complete list of all medications currently taking to include prescriptions, over-the-counter meds, herbals, vitamins & any dietary supplements  GENERAL INSTRUCTIONS  - On the day of your exam do not use any bath powder, deodorant or lotions on the armpit area. -Tenderness of breasts may cause an increase of discomfort during procedure. If you are experiencing breast tenderness on the day of your appointment and would like to reschedule, please call [phone]. Referral Information Referral ID Referred By Referred To  
  
 [id number] Toma Colon MD   
   [address] Phone: [phone] Fax: [phone] Visits Status Start Date End Date 1 New Request 8/16/18 8/16/19 If your referral has a status of pending review or denied, additional information will be sent to support the outcome of this decision. Patient Instructions Take amiodarone 100 mg every other day for 1 month then stop taking Introducing [facility] SERVICES! Dear Tatiana Arora: Thank you for requesting a [employer] account. Our records indicate that you already have an active [employer] account. You can access your account anytime at [email or web address] Did you know that you can access your hospital and ER discharge instructions at any time in [employer]? You can also review all of your test results from your hospital stay or ER visit. Additional Information If you have questions, please visit the Frequently Asked Questions section of the [employer] website at [email or web address] Remember, [employer] is NOT to be used for urgent needs. For medical emergencies, dial 911. Now available from your iPhone and Android! Please provide this summary of care documentation to your next provider. Your primary care clinician is listed as Kwesi Lozano. If you have any questions after today's visit, please call [phone].

## 2018-08-22 ENCOUNTER — HOSPITAL ENCOUNTER (OUTPATIENT)
Dept: MAMMOGRAPHY | Age: 58
Discharge: HOME | End: 2018-08-22
Attending: INTERNAL MEDICINE
Payer: COMMERCIAL

## 2018-08-22 DIAGNOSIS — Z12.39 BREAST SCREENING: ICD-10-CM

## 2018-08-22 PROCEDURE — 77063 BREAST TOMOSYNTHESIS BI: CPT

## 2018-09-07 ENCOUNTER — OFFICE VISIT (OUTPATIENT)
Dept: INTERNAL MEDICINE CLINIC | Age: 58
End: 2018-09-07

## 2018-09-07 VITALS
DIASTOLIC BLOOD PRESSURE: 84 MMHG | SYSTOLIC BLOOD PRESSURE: 142 MMHG | HEART RATE: 69 BPM | HEIGHT: 71 IN | OXYGEN SATURATION: 99 % | TEMPERATURE: 99.2 F | RESPIRATION RATE: 22 BRPM | WEIGHT: 181 LBS | BODY MASS INDEX: 25.34 KG/M2

## 2018-09-07 DIAGNOSIS — I48.0 INTERMITTENT ATRIAL FIBRILLATION (HCC): ICD-10-CM

## 2018-09-07 DIAGNOSIS — R30.0 DYSURIA: ICD-10-CM

## 2018-09-07 DIAGNOSIS — R30.0 DYSURIA: Primary | ICD-10-CM

## 2018-09-07 NOTE — PROGRESS NOTES
Chief Complaint Patient presents with  Well Woman Depression Screening: PHQ over the last two weeks 9/7/2018 Little interest or pleasure in doing things Not at all Feeling down, depressed, irritable, or hopeless Not at all Total Score PHQ 2 0 Learning Assessment: 
Learning Assessment 8/16/2018 PRIMARY LEARNER Patient HIGHEST LEVEL OF EDUCATION - PRIMARY LEARNER  -  
BARRIERS PRIMARY LEARNER -  
CO-LEARNER CAREGIVER -  
PRIMARY LANGUAGE ENGLISH  NEED -  
LEARNER PREFERENCE PRIMARY DEMONSTRATION  
ANSWERED BY Patient RELATIONSHIP SELF Abuse Screening: 
Abuse Screening Questionnaire 6/3/2016 Do you ever feel afraid of your partner? Theresa Distel Are you in a relationship with someone who physically or mentally threatens you? Theresa Distel Is it safe for you to go home? Eric Howard Fall Risk Fall Risk Assessment, last 12 mths 6/3/2016 Able to walk? Yes Fall in past 12 months? No  
 
 
 
Advance Directive: 1. Do you have an advance directive in place? Patient Reply:on file 1. Have you been to the ER, urgent care clinic since your last visit? Hospitalized since your last visit? No 
 
2. Have you seen or consulted any other health care providers outside of the [address] [place] [address] since your last visit? Include any pap smears or colon screening.  No

## 2018-09-09 LAB — RESULT: NORMAL

## 2018-09-09 NOTE — PROGRESS NOTES
The patient presents to the office today with the chief complaint of intermittent atrial fibrillation HPI The patient is on Amiodarone with plans to taper and discontinue. The patient is doing ok without any clear episodes of atrial fibrillation. The patient has occasional episodes of dyspnea of uncertain cause. The patient also had a UTI recently which has been treated with antibiotics. The dysuria has improved but persists. Review of Systems Respiratory: Positive for shortness of breath (Occasional episodes of dsypnea). Cardiovascular: Negative for chest pain and leg swelling. Genitourinary: Positive for dysuria. Allergies Allergen Reactions  Celebrex [Celecoxib] Hives  Iodinated Contrast- Oral And Iv Dye Hives Patient was in CT for a cardiac scan. A test bolus of 20cc was given and patient broke out into hives immediately after. Cancelled the rest of the exam and escorted the patient to the ED for furhter evaluation.  Sulfa (Sulfonamide Antibiotics) Other (comments) Patient states she is not allergic Current Outpatient Prescriptions Medication Sig Dispense Refill  DIGOX 125 mcg tablet TAKE 2 TABLETS TODAY THEN TAKE 1 TABLET BY MOUTH EVERY DAY 30 Tab 0  
 fluticasone (FLONASE) 50 mcg/actuation nasal spray 2 sprays up each nostril daily 1 Bottle 2  
 amiodarone (PACERONE) 100 mg tablet Take one tablet by mouth daily 30 Tab 6  
 apixaban (ELIQUIS) 5 mg tablet Take 1 Tab by mouth two (2) times a day. 180 Tab 3  
 thiamine (VITAMIN B-1) 100 mg tablet Take  by mouth daily.  cyanocobalamin (VITAMIN B-12) 1,000 mcg tablet Take 1,000 mcg by mouth daily.  multivitamin (ONE A DAY) tablet Take 1 Tab by mouth daily.  cephALEXin (KEFLEX) 500 mg capsule 1 cap three times per day 25 Cap 0 Past Medical History:  
Diagnosis Date  Atrial fibrillation ([address]  Attention deficit disorder without mention of hyperactivity  DUB (dysfunctional uterine bleeding)  Generalized osteoarthrosis, involving multiple sites  Grief reaction Mother  recently - Celexa  Memory changes Adderall for this  Unspecified tinnitus Past Surgical History:  
Procedure Laterality Date  HX KNEE ARTHROSCOPY Right Meniscus repair  HX KNEE ARTHROSCOPY Right Removed piece of bone (FB)  
 HX KNEE ARTHROSCOPY Right ACL repair Social History Social History  Marital status:  Spouse name: N/A  
 Number of children: N/A  
 Years of education: N/A Occupational History  Not on file. Social History Main Topics  Smoking status: Never Smoker  Smokeless tobacco: Never Used  Alcohol use No  
 Drug use: No  
 Sexual activity: Not Currently Other Topics Concern  Not on file Social History Narrative Patient does have an advanced directive on file Visit Vitals  /84 (BP 1 Location: Right arm, BP Patient Position: Sitting)  Pulse 69  Temp 99.2 °F (37.3 °C) (Tympanic)  Resp 22  
 Ht 5' 11\" (1.803 m)  Wt 181 lb (82.1 kg)  SpO2 99%  BMI 25.24 kg/m2 Physical Exam  
No Cervical Lymphadenopathy No Supraclavicular Lymphadenopathy Thyroid is Normal 
Lungs are normal to percussion. Clear to auscultation Heart:  S1 S2 are normal, No gallops, No mummers No Carotid Bruits Abdomen:  Normal Bowel Sounds. No tenderness. No masses. No Hepatomegaly or Splenomegaly LE:  Strong Pedal Pulses. No Edema BMI:  OK Orders Only on 2018 Component Date Value Ref Range Status  RESULT 2018 Normal   Final  
 Comment: Updated: [id number] LAB ACC#: [id number] SOURCE: Clean Catch Urine 
--FINAL REPORT-- 
Mixed Culture 60,000 col/mL more than 2 different organisms. Please submit a new specimen Office Visit on 2018 Component Date Value Ref Range Status  Color (UA POC) 2018 Yellow   Final  
  Clarity (UA POC) 07/06/2018 Clear   Final  
 Glucose (UA POC) 07/06/2018 Trace  Negative Final  
 Bilirubin (UA POC) 07/06/2018 1+  Negative Final  
 Ketones (UA POC) 07/06/2018 1+  Negative Final  
 Specific gravity (UA POC) 07/06/2018 1.025  1.001 - 1.035 Final  
 Blood (UA POC) 07/06/2018 3+  Negative Final  
 pH (UA POC) 07/06/2018 6.0  4.6 - 8.0 Final  
 Protein (UA POC) 07/06/2018 2+  Negative Final  
 Urobilinogen (UA POC) 07/06/2018 0.2 mg/dL  0.2 - 1 Final  
 Nitrites (UA POC) 07/06/2018 Negative  Negative Final  
 Leukocyte esterase (UA POC) 07/06/2018 3+  Negative Final  
 Methylmalonic acid 07/06/2018 96  0 - 378 nmol/L Final  
 Disclaimer 07/06/2018 Comment   Final  
 Comment: This test was developed and its performance characteristics 
determined by [facility]. It has not been cleared or approved 
by the Food and Drug Administration. Performed at:  [address]  [id number] : Kait Herring MD, Phone:  [phone]  RESULT 07/06/2018 Abnormal*  Final  
 Comment: Updated: [id number] LAB ACC#: [id number] SOURCE: Clean Catch Urine 
--FINAL REPORT-- 
Mixed culture more than 2 different organisms the following organisms 
predominating 50,000 ORG/ML Escherichia coli 
--SUSCEPTIBILITY REPORT-- 
               ESCHERICHIA COLI INTERP Ampicillin                                                 S 
Ampicillin/Sulbactam                                       S 
Cefazolin                                                  S 
Ceftriaxone                                                S 
Ciprofloxacin                                              S 
Gentamicin                                                 S 
Nitrofurantoin                                             S 
Trimethoprim/Sulfamethoxazole                              S 
  
  Digoxin level 07/06/2018 0.7* 0.8 - 2.0 ng/mL Final  
 TSH 07/06/2018 1.25  0.27 - 4.20 mcU/mL Final  
Orders Only on 06/20/2018 Component Date Value Ref Range Status  WBC 06/20/2018 4.7  4.0 - 11.0 K/uL Final  
 RBC 06/20/2018 4.45  3.80 - 5.20 M/uL Final  
 HGB 06/20/2018 13.5  11.7 - 16.0 g/dL Final  
 HCT 06/20/2018 44.5  35.1 - 48.0 % Final  
 MCV 06/20/2018 100* 80 - 95 fL Final  
 MCH 06/20/2018 30  26 - 34 pg Final  
 MCHC 06/20/2018 30* 31 - 36 g/dL Final  
 RDW 06/20/2018 14.6  10.0 - 15.5 % Final  
 PLATELET 60/84/3450 441  140 - 440 K/uL Final  
 MPV 06/20/2018 12.5  9.0 - 13.0 fL Final  
 NEUTROPHILS 06/20/2018 62  40 - 75 % Final  
 Lymphocytes 06/20/2018 27  20 - 45 % Final  
 MONOCYTES 06/20/2018 9  3 - 12 % Final  
 EOSINOPHILS 06/20/2018 1  0 - 6 % Final  
 BASOPHILS 06/20/2018 1  0 - 2 % Final  
 ABS. NEUTROPHILS 06/20/2018 2.9  1.8 - 7.7 K/uL Final  
 ABSOLUTE LYMPHOCYTE COUNT 06/20/2018 1.3  1.0 - 4.8 K/uL Final  
 ABS. MONOCYTES 06/20/2018 0.4  0.1 - 1.0 K/uL Final  
 ABS. EOSINOPHILS 06/20/2018 0.1  0.0 - 0.5 K/uL Final  
 ABS. BASOPHILS 06/20/2018 0.0  0.0 - 0.2 K/uL Final  
Orders Only on 06/15/2018 Component Date Value Ref Range Status  WBC 06/15/2018 4.8  4.0 - 11.0 K/uL Final  
 RBC 06/15/2018 4.18  3.80 - 5.20 M/uL Final  
 HGB 06/15/2018 12.6  11.7 - 16.0 g/dL Final  
 HCT 06/15/2018 42.1  35.1 - 48.0 % Final  
 MCV 06/15/2018 101* 80 - 95 fL Final  
 MCH 06/15/2018 30  26 - 34 pg Final  
 MCHC 06/15/2018 30* 31 - 36 g/dL Final  
 RDW 06/15/2018 14.5  10.0 - 15.5 % Final  
 PLATELET 18/99/7472 950  140 - 440 K/uL Final  
 MPV 06/15/2018 12.4  9.0 - 13.0 fL Final  
 NEUTROPHILS 06/15/2018 67  40 - 75 % Final  
 Lymphocytes 06/15/2018 23  20 - 45 % Final  
 MONOCYTES 06/15/2018 8  3 - 12 % Final  
 EOSINOPHILS 06/15/2018 2  0 - 6 % Final  
 BASOPHILS 06/15/2018 0  0 - 2 % Final  
 ABS.  NEUTROPHILS 06/15/2018 3.2  1.8 - 7.7 K/uL Final  
  ABSOLUTE LYMPHOCYTE COUNT 06/15/2018 1.1  1.0 - 4.8 K/uL Final  
 ABS. MONOCYTES 06/15/2018 0.4  0.1 - 1.0 K/uL Final  
 ABS. EOSINOPHILS 06/15/2018 0.1  0.0 - 0.5 K/uL Final  
 ABS. BASOPHILS 06/15/2018 0.0  0.0 - 0.2 K/uL Final  
Orders Only on 06/15/2018 Component Date Value Ref Range Status  Triglyceride 06/20/2018 85  40 - 149 mg/dL Final  
 HDL Cholesterol 06/20/2018 73* 40 - 59 mg/dL Final  
 Cholesterol, total 06/20/2018 231* 110 - 200 mg/dL Final  
 CHOLESTEROL/HDL 06/20/2018 3.2  0.0 - 5.0 Final  
 LDL, calculated 06/20/2018 141* 50 - 99 mg/dL Final  
 VLDL, calculated 06/20/2018 17  8 - 30 mg/dL Final  
 Comment: Test includes cholesterol, HDL cholesterol, triglycerides and LDL. Cholesterol Recommended NCEP guidelines in mg/dL: 
Less than 200            Desirable 200 - 239                Borderline High Greater than or  = 240   High Please Note: Total Chol/HDL Ratio Men     Women 1/2 Avg. Risk    3.4     3.3 Avg. Risk    5.0     4.4 
2X  Avg. Risk    9.6     7.1 3X  Avg. Risk   23.4    11.0  Glucose 06/20/2018 82  70 - 99 mg/dL Final  
 BUN 06/20/2018 26* 6 - 22 mg/dL Final  
 Creatinine 06/20/2018 1.2  0.5 - 1.2 mg/dL Final  
 Sodium 06/20/2018 141  133 - 145 mmol/L Final  
 Potassium 06/20/2018 4.9  3.5 - 5.5 mmol/L Final  
 Chloride 06/20/2018 98  98 - 110 mmol/L Final  
 CO2 06/20/2018 25  20 - 32 mmol/L Final  
 AST (SGOT) 06/20/2018 18  10 - 37 U/L Final  
 ALT (SGPT) 06/20/2018 16  5 - 40 U/L Final  
 Alk.  phosphatase 06/20/2018 66  25 - 115 U/L Final  
 Bilirubin, total 06/20/2018 0.6  0.2 - 1.2 mg/dL Final  
 Calcium 06/20/2018 9.6  8.4 - 10.5 mg/dL Final  
 Protein, total 06/20/2018 7.4  6.4 - 8.3 g/dL Final  
 Albumin 06/20/2018 4.5  3.5 - 5.0 g/dL Final  
 A-G Ratio 06/20/2018 1.6  1.1 - 2.6 ratio Final  
 Globulin 06/20/2018 2.9  2.0 - 4.0 g/dL Final  
 Anion gap 06/20/2018 18.0  mmol/L Final  
 Comment: Test includes Albumin, Alkaline Phosphatase, ALT, AST, BUN, Calcium, CO2, Chloride, Creatinine, Glucose, Potassium, Sodium, Total Bilirubin and Total 
Protein. Estimated GFR results are reported in mL/min/1.73 sq.m. by the MDRD equation. This eGFR is validated for stable chronic renal failure patients. This  
equation 
is unreliable in acute illness or patients with normal renal function.  GFRAA 06/20/2018 54.2* >60.0 Final  
 GFRNA 06/20/2018 44.7* >60.0 Final  
 
 
. Results for orders placed or performed in visit on 09/07/18 CULTURE, URINE Result Value Ref Range RESULT Normal   
 Narrative Unless additionally indicated, test performed at: [address], [address] [place] [address]. PH: [phone]. Assessment / Plan ICD-10-CM ICD-9-CM 1. Dysuria R30.0 788. 1 CULTURE, URINE  
2. Intermittent atrial fibrillation (HCC) I48.0 427.31 Urine for POC urine and urine for culture Follow-up Disposition: 
Return in about 4 months (around 12/27/2018). I asked Carolin Esparza if she has any questions and I answered the questions. Carolin Esparza states that she understands the treatment plan and agrees with the treatment plan

## 2018-09-12 ENCOUNTER — TELEPHONE (OUTPATIENT)
Dept: INTERNAL MEDICINE CLINIC | Age: 58
End: 2018-09-12

## 2018-09-12 DIAGNOSIS — R82.90 CLOUDY URINE: Primary | ICD-10-CM

## 2018-09-18 NOTE — TELEPHONE ENCOUNTER
Patient was called and advised that her culture needed to be redone due to lab saying it was contaminated, patient verbalized understanding and will be stopping by to give another sample

## 2018-09-19 ENCOUNTER — LAB ONLY (OUTPATIENT)
Dept: INTERNAL MEDICINE CLINIC | Age: 58
End: 2018-09-19

## 2018-09-19 DIAGNOSIS — R82.90 CLOUDY URINE: Primary | ICD-10-CM

## 2018-09-19 DIAGNOSIS — R82.90 CLOUDY URINE: ICD-10-CM

## 2018-09-20 ENCOUNTER — TELEPHONE (OUTPATIENT)
Dept: INTERNAL MEDICINE CLINIC | Age: 58
End: 2018-09-20

## 2018-09-20 DIAGNOSIS — R82.90 CLOUDY URINE: Primary | ICD-10-CM

## 2018-09-21 LAB — RESULT: NORMAL

## 2018-09-26 ENCOUNTER — OFFICE VISIT (OUTPATIENT)
Dept: UROLOGY | Age: 58
End: 2018-09-26

## 2018-09-26 VITALS
SYSTOLIC BLOOD PRESSURE: 130 MMHG | HEART RATE: 64 BPM | BODY MASS INDEX: 25.48 KG/M2 | DIASTOLIC BLOOD PRESSURE: 73 MMHG | HEIGHT: 71 IN | OXYGEN SATURATION: 98 % | WEIGHT: 182 LBS

## 2018-09-26 DIAGNOSIS — N39.0 RECURRENT UTI: Primary | ICD-10-CM

## 2018-09-26 LAB
BILIRUB UR QL STRIP: NEGATIVE
GLUCOSE UR-MCNC: NEGATIVE MG/DL
KETONES P FAST UR STRIP-MCNC: NEGATIVE MG/DL
PH UR STRIP: 6 [PH] (ref 4.6–8)
PROT UR QL STRIP: NEGATIVE
SP GR UR STRIP: 1.02 (ref 1–1.03)
UA UROBILINOGEN AMB POC: NORMAL (ref 0.2–1)
URINALYSIS CLARITY POC: CLEAR
URINALYSIS COLOR POC: YELLOW
URINE BLOOD POC: NORMAL
URINE LEUKOCYTES POC: NORMAL
URINE NITRITES POC: NEGATIVE

## 2018-09-26 NOTE — MR AVS SNAPSHOT
[address] [id number] [facility] [address] 
[phone] Patient: Satnam Jones MRN: [id number] Visit Information Date & Time Provider Department Dept. Phone Encounter #  
 9/26/2018  1:15 PM Lula De La Vega, Jesus [facility] [id number] Your Appointments 9/28/2018  3:45 PM  
Complete Physical with Ruben Whitman NP  
[facility] [place] ([address]) Appt Note: pap smear [address] [facility] [address] [facility] [address]  
  
   
 [address] [facility], [address] [facility] [address]  
  
    
 11/15/2018  3:40 PM  
Follow Up with Han Vickers MD  
[facility] ([address]) Appt Note: 3 month follow up [place] [address] [address] [address] [phone] [address] [place] [address] [address] Upcoming Health Maintenance Date Due Hepatitis C Screening 1960 Pneumococcal 19-64 Medium Risk (1 of 1 - PPSV23) 3/14/1981 DTaP/Tdap/Td series (1 - Tdap) 3/14/1983 PAP AKA CERVICAL CYTOLOGY 3/14/1983 Shingrix Vaccine Age 50> (1 of 2) 3/14/2012 FOBT Q 1 YEAR AGE 50-75 3/14/2012 Influenza Age 5 to Adult 8/1/2018 BREAST CANCER SCRN MAMMOGRAM 8/22/2020 Allergies as of 9/26/2018  Review Complete On: 9/26/2018 By: Lula De La Vega MD  
  
 Severity Noted Reaction Type Reactions Celebrex [Celecoxib]  07/24/2014    Hives Iodinated Contrast- Oral And Iv Dye  11/02/2017    Hives Patient was in CT for a cardiac scan. A test bolus of 20cc was given and patient broke out into hives immediately after. Cancelled the rest of the exam and escorted the patient to the ED for furhter evaluation. Sulfa (Sulfonamide Antibiotics)  09/09/2015    Other (comments) Patient states she is not allergic Current Immunizations  Never Reviewed Name Date  
 TB Skin Test (PPD) 8/29/2013, 2/27/2007 TB Skin Test (PPD) Intradermal 8/9/2017 Not reviewed this visit You Were Diagnosed With   
  
 Codes Comments Recurrent UTI    -  Primary ICD-10-CM: N39.0 ICD-9-CM: 599.0 Vitals BP Pulse Height(growth percentile) Weight(growth percentile) SpO2 BMI  
 130/73 (BP 1 Location: Left arm, BP Patient Position: Sitting) 64 5' 11\" (1.803 m) 182 lb (82.6 kg) 98% 25.38 kg/m2 OB Status Smoking Status Postmenopausal Never Smoker Vitals History BMI and BSA Data Body Mass Index Body Surface Area  
 25.38 kg/m 2 2.03 m 2 Preferred Pharmacy Pharmacy Name Phone [phone] - [address], [address] AT [address] [phone] Your Updated Medication List  
  
   
This list is accurate as of 9/26/18  2:43 PM.  Always use your most recent med list.  
  
  
  
  
 amiodarone 100 mg tablet Commonly known as:  Georgena Eisenmenger Take one tablet by mouth daily  
  
 apixaban 5 mg tablet Commonly known as:  Kristina Wick Take 1 Tab by mouth two (2) times a day. cephALEXin 500 mg capsule Commonly known as:  KEFLEX  
1 cap three times per day DIGOX 0.125 mg tablet Generic drug:  digoxin TAKE 2 TABLETS TODAY THEN TAKE 1 TABLET BY MOUTH EVERY DAY  
  
 fluticasone 50 mcg/actuation nasal spray Commonly known as:  FLONASE  
2 sprays up each nostril daily  
  
 multivitamin tablet Commonly known as:  ONE A DAY Take 1 Tab by mouth daily. VITAMIN B-1 100 mg tablet Generic drug:  thiamine HCL Take  by mouth daily. VITAMIN B-12 1,000 mcg tablet Generic drug:  cyanocobalamin Take 1,000 mcg by mouth daily. We Performed the Following AMB POC URINALYSIS DIP STICK AUTO W/O MICRO [81764 CPT(R)] To-Do List   
 09/26/2018 Imaging:  CT ABD PELV WO CONT Patient Instructions Female Urinary Tract: Anatomy Sketch Current as of: Leisa 10, 2017 Content Version: 11.7 © 6233-3558 [employer], [employer]. Care instructions adapted under license by [employer] (which disclaims liability or warranty for this information). If you have questions about a medical condition or this instruction, always ask your healthcare professional. [address] any warranty or liability for your use of this information. Introducing [facility]! Dear Lenard Núñez: Thank you for requesting a [employer] account. Our records indicate that you already have an active [employer] account. You can access your account anytime at [email or web address] Did you know that you can access your hospital and ER discharge instructions at any time in [employer]? You can also review all of your test results from your hospital stay or ER visit. Additional Information If you have questions, please visit the Frequently Asked Questions section of the [employer] website at [email or web address] Remember, [employer] is NOT to be used for urgent needs. For medical emergencies, dial 911. Now available from your iPhone and Android! Please provide this summary of care documentation to your next provider. Your primary care clinician is listed as Christiano Brooks. If you have any questions after today's visit, please call [phone].

## 2018-09-26 NOTE — PATIENT INSTRUCTIONS
Female Urinary Tract: Anatomy Sketch Current as of: Leisa 10, 2017 Content Version: 11.7 © 6802-0545 [employer]. Care instructions adapted under license by [employer] (which disclaims liability or warranty for this information). If you have questions about a medical condition or this instruction, always ask your healthcare professional. [address] [address] any warranty or liability for your use of this information.

## 2018-09-26 NOTE — PROGRESS NOTES
Ms. Danielle Arias has a reminder for a \"due or due soon\" health maintenance. I have asked that she contact her primary care provider for follow-up on this health maintenance.

## 2018-09-27 NOTE — PROGRESS NOTES
Catalina Carreno 66 y.o. female Ms. Brandi Armstrong seen today for evaluation of recurrent urinary tract infections associated with microscopic hematuria Has experienced several episodes of urgency frequency with minimal dysuria finding urine cultures 20-60,000+ bacteria with symptoms resolving on short courses of antibiotic therapy Has had microscopic hematuria noted several times during the past few years No episodes of fever chills flank pain or gross hematuria History of  tract disease, trauma, or surgery Review of Systems:  
CNS: No seizure syncope headaches dizziness or visual changes Respiratory: Wheezing no cough no chest pain 
Cardiovascular: Palpitations secondary to atrial fibrillation status post ablation Rx-Eliquis anticoagulation Intestinal: No dyspepsia diarrhea or constipation Urinary: Urgency frequency with dysuria Skeletal: No bone or joint pain Endocrine: no diabetes/ no thyroid disease Other: 
 
Allergies: Allergies Allergen Reactions  Celebrex [Celecoxib] Hives  Iodinated Contrast- Oral And Iv Dye Hives Patient was in CT for a cardiac scan. A test bolus of 20cc was given and patient broke out into hives immediately after. Cancelled the rest of the exam and escorted the patient to the ED for furhter evaluation.  Sulfa (Sulfonamide Antibiotics) Other (comments) Patient states she is not allergic Medications:   
Current Outpatient Prescriptions Medication Sig Dispense Refill  DIGOX 125 mcg tablet TAKE 2 TABLETS TODAY THEN TAKE 1 TABLET BY MOUTH EVERY DAY 30 Tab 0  
 apixaban (ELIQUIS) 5 mg tablet Take 1 Tab by mouth two (2) times a day. 180 Tab 3  
 thiamine (VITAMIN B-1) 100 mg tablet Take  by mouth daily.  cyanocobalamin (VITAMIN B-12) 1,000 mcg tablet Take 1,000 mcg by mouth daily.  multivitamin (ONE A DAY) tablet Take 1 Tab by mouth daily.     
 cephALEXin (KEFLEX) 500 mg capsule 1 cap three times per day 25 Cap 0  
  fluticasone (FLONASE) 50 mcg/actuation nasal spray 2 sprays up each nostril daily 1 Bottle 2  
 amiodarone (PACERONE) 100 mg tablet Take one tablet by mouth daily 30 Tab 6 Past Medical History:  
Diagnosis Date  Atrial fibrillation ([address]  Attention deficit disorder without mention of hyperactivity  DUB (dysfunctional uterine bleeding)  Generalized osteoarthrosis, involving multiple sites  Grief reaction Mother  recently - Celexa  Memory changes Adderall for this  Recurrent UTI  Unspecified tinnitus Past Surgical History:  
Procedure Laterality Date  HX KNEE ARTHROSCOPY Right Meniscus repair  HX KNEE ARTHROSCOPY Right Removed piece of bone (FB)  
 HX KNEE ARTHROSCOPY Right ACL repair Social History Social History  Marital status:  Spouse name: N/A  
 Number of children: N/A  
 Years of education: N/A Occupational History  Not on file. Social History Main Topics  Smoking status: Never Smoker  Smokeless tobacco: Never Used  Alcohol use No  
 Drug use: No  
 Sexual activity: Not Currently Other Topics Concern  Not on file Social History Narrative Family History Problem Relation Age of Onset  Diabetes Mother  Heart Disease Mother  Hypertension Mother  Heart Disease Father  Hypertension Father  Diabetes Maternal Grandmother Physical Examination: Well-nourished mature female in no apparent distress Abdomen is nontender  
-No percussion CVA tenderness Lower extremity motor and sensory function are normal  
 
urinalysis: ++heme/negative nitrite Impression: Recurrent urinary tract infections Microscopic hematuria Plan: non-con CT scan imaging of the abdomen and pelvis-IV contrast allergy Cystoscopy after CT scan Described/ discussed urologic evaluation of UTIs and microscopic hematuria-rule out urinary tract lesion-  
 
 
 
Dutch Thomas MD 
-electronically signed- 
 
PLEASE NOTE: 
This document has been produced using voice recognition software. Unrecognized errors in transcription may be present.

## 2018-09-28 ENCOUNTER — OFFICE VISIT (OUTPATIENT)
Dept: INTERNAL MEDICINE CLINIC | Age: 58
End: 2018-09-28

## 2018-09-28 VITALS
WEIGHT: 186 LBS | BODY MASS INDEX: 26.04 KG/M2 | TEMPERATURE: 98.7 F | OXYGEN SATURATION: 100 % | SYSTOLIC BLOOD PRESSURE: 124 MMHG | DIASTOLIC BLOOD PRESSURE: 82 MMHG | RESPIRATION RATE: 16 BRPM | HEART RATE: 55 BPM | HEIGHT: 71 IN

## 2018-09-28 DIAGNOSIS — Z00.00 ROUTINE MEDICAL EXAM: Primary | ICD-10-CM

## 2018-09-28 DIAGNOSIS — Z01.419 ENCOUNTER FOR GYNECOLOGICAL EXAMINATION: ICD-10-CM

## 2018-09-28 NOTE — PROGRESS NOTES
Jorge Banks is a 66 y.o. female presenting today for Gyn Exam  .       HPI:  Jorge Banks presents to the office today for gyn examination. Patient is schedule for a pap smear today. She denies any chest pain, palpitation or dyspnea. She denies any vaginal discharge. Review of Systems   Respiratory: Negative for cough. Cardiovascular: Negative for chest pain and palpitations. Allergies   Allergen Reactions    Celebrex [Celecoxib] Hives    Iodinated Contrast- Oral And Iv Dye Hives     Patient was in CT for a cardiac scan. A test bolus of 20cc was given and patient broke out into hives immediately after. Cancelled the rest of the exam and escorted the patient to the ED for furhter evaluation.  Sulfa (Sulfonamide Antibiotics) Other (comments)     Patient states she is not allergic       Current Outpatient Prescriptions   Medication Sig Dispense Refill    DIGOX 125 mcg tablet TAKE 2 TABLETS TODAY THEN TAKE 1 TABLET BY MOUTH EVERY DAY (Patient taking differently: 1 tab mon, wed, and fri) 30 Tab 0    fluticasone (FLONASE) 50 mcg/actuation nasal spray 2 sprays up each nostril daily 1 Bottle 2    apixaban (ELIQUIS) 5 mg tablet Take 1 Tab by mouth two (2) times a day. 180 Tab 3    thiamine (VITAMIN B-1) 100 mg tablet Take  by mouth daily.  cyanocobalamin (VITAMIN B-12) 1,000 mcg tablet Take 1,000 mcg by mouth daily.  multivitamin (ONE A DAY) tablet Take 1 Tab by mouth daily.       cephALEXin (KEFLEX) 500 mg capsule 1 cap three times per day 25 Cap 0    amiodarone (PACERONE) 100 mg tablet Take one tablet by mouth daily 30 Tab 6       Past Medical History:   Diagnosis Date    Atrial fibrillation (HCC)     Attention deficit disorder without mention of hyperactivity     DUB (dysfunctional uterine bleeding)     Generalized osteoarthrosis, involving multiple sites     Grief reaction     Mother  recently - Celexa    Memory changes     Adderall for this    Recurrent UTI     Unspecified tinnitus        Past Surgical History:   Procedure Laterality Date    HX KNEE ARTHROSCOPY Right     Meniscus repair    HX KNEE ARTHROSCOPY Right     Removed piece of bone (FB)    HX KNEE ARTHROSCOPY Right     ACL repair       Social History     Social History    Marital status:      Spouse name: N/A    Number of children: N/A    Years of education: N/A     Occupational History    Not on file. Social History Main Topics    Smoking status: Never Smoker    Smokeless tobacco: Never Used    Alcohol use No    Drug use: No    Sexual activity: Not Currently     Other Topics Concern    Not on file     Social History Narrative       Patient does not have an advanced directive on file    Vitals:    09/28/18 1600   BP: 124/82   Pulse: (!) 55   Resp: 16   Temp: 98.7 °F (37.1 °C)   TempSrc: Tympanic   SpO2: 100%   Weight: 186 lb (84.4 kg)   Height: 5' 11\" (1.803 m)   PainSc:   0 - No pain       Physical Exam   Constitutional: She is well-developed, well-nourished, and in no distress. No distress. Neck: Normal range of motion. Neck supple. Cardiovascular: Normal rate, regular rhythm and normal heart sounds. Pulmonary/Chest: Effort normal and breath sounds normal.   Genitourinary: Vagina normal, right adnexa normal and left adnexa normal. No vaginal discharge found. Genitourinary Comments: Denies any CMT   Lymphadenopathy:     She has no cervical adenopathy. Nursing note and vitals reviewed.       Office Visit on 09/26/2018   Component Date Value Ref Range Status    Color (UA POC) 09/26/2018 Yellow   Final    Clarity (UA POC) 09/26/2018 Clear   Final    Glucose (UA POC) 09/26/2018 Negative  Negative Final    Bilirubin (UA POC) 09/26/2018 Negative  Negative Final    Ketones (UA POC) 09/26/2018 Negative  Negative Final    Specific gravity (UA POC) 09/26/2018 1.025  1.001 - 1.035 Final    Blood (UA POC) 09/26/2018 2+  Negative Final    pH (UA POC) 09/26/2018 6.0  4.6 - 8.0 Final  Protein (UA POC) 09/26/2018 Negative  Negative Final    Urobilinogen (UA POC) 09/26/2018 0.2 mg/dL  0.2 - 1 Final    Nitrites (UA POC) 09/26/2018 Negative  Negative Final    Leukocyte esterase (UA POC) 09/26/2018 3+  Negative Final   Orders Only on 09/19/2018   Component Date Value Ref Range Status    RESULT 09/19/2018 Normal   Final    Comment: Updated: [id number]  [facility]#: [id number]  SOURCE: Clean Catch Urine  --FINAL REPORT--  No Growth     Orders Only on 09/07/2018   Component Date Value Ref Range Status    RESULT 09/07/2018 Normal   Final    Comment: Updated: [id number]  [facility]#: [id number]  SOURCE: Clean Catch Urine  --FINAL REPORT--  Mixed Culture  60,000 col/mL more than 2 different organisms. Please submit a new specimen     Office Visit on 07/06/2018   Component Date Value Ref Range Status    Color (UA POC) 07/06/2018 Yellow   Final    Clarity (UA POC) 07/06/2018 Clear   Final    Glucose (UA POC) 07/06/2018 Trace  Negative Final    Bilirubin (UA POC) 07/06/2018 1+  Negative Final    Ketones (UA POC) 07/06/2018 1+  Negative Final    Specific gravity (UA POC) 07/06/2018 1.025  1.001 - 1.035 Final    Blood (UA POC) 07/06/2018 3+  Negative Final    pH (UA POC) 07/06/2018 6.0  4.6 - 8.0 Final    Protein (UA POC) 07/06/2018 2+  Negative Final    Urobilinogen (UA POC) 07/06/2018 0.2 mg/dL  0.2 - 1 Final    Nitrites (UA POC) 07/06/2018 Negative  Negative Final    Leukocyte esterase (UA POC) 07/06/2018 3+  Negative Final    Methylmalonic acid 07/06/2018 96  0 - 378 nmol/L Final    Disclaimer 07/06/2018 Comment   Final    Comment: This test was developed and its performance characteristics  determined by [facility]. It has not been cleared or approved  by the Food and Drug Administration.   Performed at:  [address]  [id number]  : Leila Allen MD, Phone:  [phone]      RESULT 07/06/2018 Abnormal*  Final    Comment: Updated: [id number]  [facility]#: [id number]  SOURCE: Clean Catch Urine  --FINAL REPORT--  Mixed culture more than 2 different organisms the following organisms  predominating  50,000 ORG/ML Escherichia coli  --SUSCEPTIBILITY REPORT--                 ESCHERICHIA COLI                                                        INTERP  Ampicillin                                                 S  Ampicillin/Sulbactam                                       S  Cefazolin                                                  S  Ceftriaxone                                                S  Ciprofloxacin                                              S  Gentamicin                                                 S  Nitrofurantoin                                             S  Trimethoprim/Sulfamethoxazole                              S      Digoxin level 07/06/2018 0.7* 0.8 - 2.0 ng/mL Final    TSH 07/06/2018 1.25  0.27 - 4.20 mcU/mL Final       .No results found for any visits on 09/28/18. Assessment / Plan:      ICD-10-CM ICD-9-CM    1. Encounter for gynecological examination Z01.419 V72.31 PAP IG, APTIMA HPV AND RFX 16/18,45 ([id number])     Pap completed. Specimen sent    Follow-up Disposition:  Return if symptoms worsen or fail to improve. I asked the patient if she  had any questions and answered her  questions. The patient stated that she understands the treatment plan and agrees with the treatment plan      Discussed the patient's BMI with her. The BMI follow up plan is as follows:     dietary management education, guidance, and counseling  encourage exercise  monitor weight  prescribed dietary intake    An After Visit Summary was printed and given to the patient.

## 2018-09-28 NOTE — PATIENT INSTRUCTIONS
Body Mass Index: Care Instructions  Your Care Instructions    Body mass index (BMI) can help you see if your weight is raising your risk for health problems. It uses a formula to compare how much you weigh with how tall you are. · A BMI lower than 18.5 is considered underweight. · A BMI between 18.5 and 24.9 is considered healthy. · A BMI between 25 and 29.9 is considered overweight. A BMI of 30 or higher is considered obese. If your BMI is in the normal range, it means that you have a lower risk for weight-related health problems. If your BMI is in the overweight or obese range, you may be at increased risk for weight-related health problems, such as high blood pressure, heart disease, stroke, arthritis or joint pain, and diabetes. If your BMI is in the underweight range, you may be at increased risk for health problems such as fatigue, lower protection (immunity) against illness, muscle loss, bone loss, hair loss, and hormone problems. BMI is just one measure of your risk for weight-related health problems. You may be at higher risk for health problems if you are not active, you eat an unhealthy diet, or you drink too much alcohol or use tobacco products. Follow-up care is a key part of your treatment and safety. Be sure to make and go to all appointments, and call your doctor if you are having problems. It's also a good idea to know your test results and keep a list of the medicines you take. How can you care for yourself at home? · Practice healthy eating habits. This includes eating plenty of fruits, vegetables, whole grains, lean protein, and low-fat dairy. · If your doctor recommends it, get more exercise. Walking is a good choice. Bit by bit, increase the amount you walk every day. Try for at least 30 minutes on most days of the week. · Do not smoke. Smoking can increase your risk for health problems. If you need help quitting, talk to your doctor about stop-smoking programs and medicines. These can increase your chances of quitting for good. · Limit alcohol to 2 drinks a day for men and 1 drink a day for women. Too much alcohol can cause health problems. If you have a BMI higher than 25  · Your doctor may do other tests to check your risk for weight-related health problems. This may include measuring the distance around your waist. A waist measurement of more than 40 inches in men or 35 inches in women can increase the risk of weight-related health problems. · Talk with your doctor about steps you can take to stay healthy or improve your health. You may need to make lifestyle changes to lose weight and stay healthy, such as changing your diet and getting regular exercise. If you have a BMI lower than 18.5  · Your doctor may do other tests to check your risk for health problems. · Talk with your doctor about steps you can take to stay healthy or improve your health. You may need to make lifestyle changes to gain or maintain weight and stay healthy, such as getting more healthy foods in your diet and doing exercises to build muscle. Where can you learn more? Go to http://edison-dean.info/. Enter S176 in the search box to learn more about \"Body Mass Index: Care Instructions. \"  Current as of: October 13, 2016  Content Version: 11.4  © 8300-8950 [employer]. Care instructions adapted under license by [employer] (which disclaims liability or warranty for this information). If you have questions about a medical condition or this instruction, always ask your healthcare professional. [address] any warranty or liability for your use of this information. Body Mass Index: Care Instructions  Your Care Instructions    Body mass index (BMI) can help you see if your weight is raising your risk for health problems. It uses a formula to compare how much you weigh with how tall you are.   · A BMI lower than 18.5 is considered underweight. · A BMI between 18.5 and 24.9 is considered healthy. · A BMI between 25 and 29.9 is considered overweight. A BMI of 30 or higher is considered obese. If your BMI is in the normal range, it means that you have a lower risk for weight-related health problems. If your BMI is in the overweight or obese range, you may be at increased risk for weight-related health problems, such as high blood pressure, heart disease, stroke, arthritis or joint pain, and diabetes. If your BMI is in the underweight range, you may be at increased risk for health problems such as fatigue, lower protection (immunity) against illness, muscle loss, bone loss, hair loss, and hormone problems. BMI is just one measure of your risk for weight-related health problems. You may be at higher risk for health problems if you are not active, you eat an unhealthy diet, or you drink too much alcohol or use tobacco products. Follow-up care is a key part of your treatment and safety. Be sure to make and go to all appointments, and call your doctor if you are having problems. It's also a good idea to know your test results and keep a list of the medicines you take. How can you care for yourself at home? · Practice healthy eating habits. This includes eating plenty of fruits, vegetables, whole grains, lean protein, and low-fat dairy. · If your doctor recommends it, get more exercise. Walking is a good choice. Bit by bit, increase the amount you walk every day. Try for at least 30 minutes on most days of the week. · Do not smoke. Smoking can increase your risk for health problems. If you need help quitting, talk to your doctor about stop-smoking programs and medicines. These can increase your chances of quitting for good. · Limit alcohol to 2 drinks a day for men and 1 drink a day for women. Too much alcohol can cause health problems.   If you have a BMI higher than 25  · Your doctor may do other tests to check your risk for weight-related health problems. This may include measuring the distance around your waist. A waist measurement of more than 40 inches in men or 35 inches in women can increase the risk of weight-related health problems. · Talk with your doctor about steps you can take to stay healthy or improve your health. You may need to make lifestyle changes to lose weight and stay healthy, such as changing your diet and getting regular exercise. If you have a BMI lower than 18.5  · Your doctor may do other tests to check your risk for health problems. · Talk with your doctor about steps you can take to stay healthy or improve your health. You may need to make lifestyle changes to gain or maintain weight and stay healthy, such as getting more healthy foods in your diet and doing exercises to build muscle. Where can you learn more? Go to http://edison-dean.info/. Enter S176 in the search box to learn more about \"Body Mass Index: Care Instructions. \"  Current as of: October 13, 2016  Content Version: 11.4  © 1732-2411 [employer]. Care instructions adapted under license by [employer] (which disclaims liability or warranty for this information). If you have questions about a medical condition or this instruction, always ask your healthcare professional. [address] any warranty or liability for your use of this information.

## 2018-10-01 RX ORDER — DIGOXIN 125 UG/1
TABLET ORAL
Qty: 30 TAB | Refills: 0 | Status: SHIPPED | OUTPATIENT
Start: 2018-10-01 | End: 2018-11-15 | Stop reason: ALTCHOICE

## 2018-10-02 ENCOUNTER — TELEPHONE (OUTPATIENT)
Dept: INTERNAL MEDICINE CLINIC | Age: 58
End: 2018-10-02

## 2018-10-02 LAB
HPV HIGH RISK, 507303: NEGATIVE
PAP IMAGE GUIDED, 8900296: NORMAL

## 2018-10-05 NOTE — TELEPHONE ENCOUNTER
Contacted Rosina Mason and informed Her of lab results per Roxie Roberson NP's request. Rosina Mason expressed understanding.  PAP and HPV negative

## 2018-10-09 ENCOUNTER — HOSPITAL ENCOUNTER (OUTPATIENT)
Dept: LAB | Age: 58
Discharge: HOME | End: 2018-10-09

## 2018-10-09 ENCOUNTER — HOSPITAL ENCOUNTER (OUTPATIENT)
Dept: CT IMAGING | Age: 58
Discharge: HOME | End: 2018-10-09
Attending: UROLOGY
Payer: COMMERCIAL

## 2018-10-09 DIAGNOSIS — N39.0 RECURRENT UTI: ICD-10-CM

## 2018-10-09 LAB — SENTARA SPECIMEN COL,SENBCF: NORMAL

## 2018-10-09 PROCEDURE — 74176 CT ABD & PELVIS W/O CONTRAST: CPT

## 2018-10-09 PROCEDURE — 99001 SPECIMEN HANDLING PT-LAB: CPT | Performed by: INTERNAL MEDICINE

## 2018-10-10 LAB — MAGNESIUM SERPL-MCNC: 2.2 MG/DL (ref 1.6–2.5)

## 2018-10-16 ENCOUNTER — OFFICE VISIT (OUTPATIENT)
Dept: UROLOGY | Age: 58
End: 2018-10-16

## 2018-10-16 VITALS
BODY MASS INDEX: 25.9 KG/M2 | DIASTOLIC BLOOD PRESSURE: 75 MMHG | SYSTOLIC BLOOD PRESSURE: 128 MMHG | HEIGHT: 71 IN | HEART RATE: 67 BPM | WEIGHT: 185 LBS | OXYGEN SATURATION: 97 %

## 2018-10-16 DIAGNOSIS — N39.0 RECURRENT UTI: ICD-10-CM

## 2018-10-16 DIAGNOSIS — N20.0 STONE, KIDNEY: ICD-10-CM

## 2018-10-16 DIAGNOSIS — Z01.818 PRE-OP TESTING: ICD-10-CM

## 2018-10-16 DIAGNOSIS — R31.29 MICROSCOPIC HEMATURIA: ICD-10-CM

## 2018-10-16 DIAGNOSIS — R31.29 MICROSCOPIC HEMATURIA: Primary | ICD-10-CM

## 2018-10-16 LAB
BILIRUB UR QL STRIP: NEGATIVE
GLUCOSE UR-MCNC: NEGATIVE MG/DL
KETONES P FAST UR STRIP-MCNC: NEGATIVE MG/DL
PH UR STRIP: 6 [PH] (ref 4.6–8)
PROT UR QL STRIP: NEGATIVE
SP GR UR STRIP: 1.01 (ref 1–1.03)
UA UROBILINOGEN AMB POC: NORMAL (ref 0.2–1)
URINALYSIS CLARITY POC: NORMAL
URINALYSIS COLOR POC: YELLOW
URINE BLOOD POC: NORMAL
URINE LEUKOCYTES POC: NORMAL
URINE NITRITES POC: NEGATIVE

## 2018-10-16 RX ORDER — CEPHALEXIN 500 MG/1
500 CAPSULE ORAL 4 TIMES DAILY
Qty: 12 CAP | Refills: 0 | Status: SHIPPED | OUTPATIENT
Start: 2018-10-16 | End: 2018-10-19

## 2018-10-16 NOTE — MR AVS SNAPSHOT
[address] [facility] [address] [address] 
[phone] Patient: Mike Chavez MRN: [id number]:[id number] Visit Information Date & Time Provider Department Dept. Phone Encounter #  
 10/16/2018  3:15 PM Yaritza Pollen, [address] [facility] [id number] Your Appointments 11/15/2018  3:40 PM  
Follow Up with Ron Khan MD  
[facility] Appt Note: 3 month follow up Tim Taylor [phone] [address] Upcoming Health Maintenance Date Due Hepatitis C Screening 1960 Pneumococcal 19-64 Medium Risk (1 of 1 - PPSV23) 3/14/1981 Shingrix Vaccine Age 50> (1 of 2) 3/14/2012 FOBT Q 1 YEAR AGE 50-75 3/14/2012 Influenza Age 5 to Adult 8/1/2018 BREAST CANCER SCRN MAMMOGRAM 8/22/2020 PAP AKA CERVICAL CYTOLOGY 9/28/2021 DTaP/Tdap/Td series (2 - Td) 4/1/2025 Allergies as of 10/16/2018  Review Complete On: 10/16/2018 By: Shara Tena LPN Severity Noted Reaction Type Reactions Celebrex [Celecoxib]  07/24/2014    Hives Iodinated Contrast- Oral And Iv Dye  11/02/2017    Hives Patient was in CT for a cardiac scan. A test bolus of 20cc was given and patient broke out into hives immediately after. Cancelled the rest of the exam and escorted the patient to the ED for furhter evaluation. Sulfa (Sulfonamide Antibiotics)  09/09/2015    Other (comments) Patient states she is not allergic Current Immunizations  Reviewed on 9/28/2018 Name Date  
 TB Skin Test (PPD) 8/29/2013, 2/27/2007 TB Skin Test (PPD) Intradermal 8/9/2017 Tdap 4/1/2015 Not reviewed this visit You Were Diagnosed With   
  
 Codes Comments Microscopic hematuria    -  Primary ICD-10-CM: R31.29 ICD-9-CM: 599.72   
 Recurrent UTI     ICD-10-CM: N39.0 ICD-9-CM: 599.0 Vitals BP Pulse Height(growth percentile) Weight(growth percentile) SpO2 BMI  
 128/75 (BP 1 Location: Right arm, BP Patient Position: Sitting) 67 5' 11\" (1.803 m) 185 lb (83.9 kg) 97% 25.8 kg/m2 OB Status Smoking Status Postmenopausal Never Smoker Vitals History BMI and BSA Data Body Mass Index Body Surface Area  
 25.8 kg/m 2 2.05 m 2 Preferred Pharmacy Pharmacy Name Phone [phone] [address], [address] [facility] [address] [phone] Your Updated Medication List  
  
   
This list is accurate as of 10/16/18  3:46 PM.  Always use your most recent med list.  
  
  
  
  
 amiodarone 100 mg tablet Commonly known as:  Yuval Hands Take one tablet by mouth daily  
  
 apixaban 5 mg tablet Commonly known as:  James Hunter Take 1 Tab by mouth two (2) times a day. DIGOX 0.125 mg tablet Generic drug:  digoxin TAKE 2 TABLETS TODAY THEN TAKE 1 TABLET BY MOUTH EVERY DAY  
  
 fluticasone 50 mcg/actuation nasal spray Commonly known as:  FLONASE  
2 sprays up each nostril daily  
  
 multivitamin tablet Commonly known as:  ONE A DAY Take 1 Tab by mouth daily. VITAMIN B-1 100 mg tablet Generic drug:  thiamine HCL Take  by mouth daily. VITAMIN B-12 1,000 mcg tablet Generic drug:  cyanocobalamin Take 1,000 mcg by mouth daily. We Performed the Following AMB POC URINALYSIS DIP STICK AUTO W/O MICRO [02820 CPT(R)] Patient Instructions Cystoscopy: Care Instructions Your Care Instructions Cystoscopy is a test. It uses a thin, lighted tube called a cystoscope to see the inside of the bladder and the urethra. The urethra is the tube that carries urine out of the body.  
This test is helpful because it lets your doctor see areas of your bladder and urethra that are hard to see on X-rays. It can help your doctor find bladder stones, tumors, bleeding, and infection. During this test, your doctor also can take tissue and urine samples. And if your doctor finds small stones or growths, he or she can remove them. In most cases the scope is in the bladder for less than 10 minutes. But the entire test may take 45 minutes or longer. You will probably get local anesthesia. This numbs a small part of your body. Or you may get spinal anesthesia, which numbs more of your body. Once in a while, doctors use general anesthesia. It makes you sleep during surgery. If you get a local anesthetic, you may be able to get up right after the test. But if you had spinal or general anesthesia, you will stay in the recovery room until you are able to walk or you have feeling again in your lower body. This usually takes about an hour. Your doctor may be able to tell you some of the results right after the test. But the complete results may take several days. Follow-up care is a key part of your treatment and safety. Be sure to make and go to all appointments, and call your doctor if you are having problems. It's also a good idea to know your test results and keep a list of the medicines you take. How can you care for yourself at home? Before the test 
· If you are having a local anesthetic, you can eat and drink before the test. 
· If you are having a spinal or general anesthetic, do not eat or drink anything for at least 8 hours before the test. Tell your doctor what medicines you take. · If you are not staying overnight in the hospital, make sure you have someone who can drive you home after the test. 
After the test 
· If your doctor prescribed antibiotics, take them as directed. Do not stop taking them just because you feel better. You need to take the full course of antibiotics.  
· You may have some burning when you urinate for a day or two after the test. You may feel better if you drink more fluids. This may also help prevent an infection. · Your urine may have a pinkish color for a few days after the test. 
When should you call for help? Call your doctor now or seek immediate medical care if: 
  · Your urine is still red or you see blood clots after you have urinated several times.  
  · You cannot pass urine 8 hours after the test.  
  · You get a fever or chills.  
  · You have pain in your belly or your back just below your rib cage. This is also called flank pain.  
 Watch closely for changes in your health, and be sure to contact your doctor if: 
  · You have pain or burning when you urinate. A burning sensation is normal for a day or two after the test. But call if it does not get better.  
  · You have a frequent urge to urinate but can pass only small amounts of urine.  
  · Your urine is pink, red, or cloudy or smells bad. It is normal for the urine to have a pinkish color for a few days after the test. But call if it does not get better.  
  · You do not get better as expected. Where can you learn more? Go to http://edison-dean.info/. Enter X559 in the search box to learn more about \"Cystoscopy: Care Instructions. \" Current as of: March 21, 2018 Content Version: 11.8 © 5732-7950 [address]. Care instructions adapted under license by [employer] (which disclaims liability or warranty for this information). If you have questions about a medical condition or this instruction, always ask your healthcare professional. [place] [address] any warranty or liability for your use of this information. Introducing [facility]! Dear Rufina Collier: Thank you for requesting a Teburu account. Our records indicate that you already have an active Teburu account. You can access your account anytime at [email or web address] Did you know that you can access your hospital and ER discharge instructions at any time in Kippt? You can also review all of your test results from your hospital stay or ER visit. Additional Information If you have questions, please visit the Frequently Asked Questions section of the Kippt website at [email or web address] Remember, Kippt is NOT to be used for urgent needs. For medical emergencies, dial 911. Now available from your iPhone and Android! Please provide this summary of care documentation to your next provider. Your primary care clinician is listed as Lauren Krause. If you have any questions after today's visit, please call [phone].

## 2018-10-16 NOTE — PROGRESS NOTES
Ms. Cesar Escalona has a reminder for a \"due or due soon\" health maintenance. I have asked that she contact her primary care provider for follow-up on this health maintenance. [facility]  OFFICE PROCEDURE PROGRESS NOTE        Chart reviewed for the following:   Ramirez PAUL LPN, have reviewed the History, Physical and updated the Allergic reactions for [address] performed immediately prior to start of procedure:   Lolis Magallanes LPN, have performed the following reviews on Leobardo Sloan prior to the start of the procedure:            * Patient was identified by name and date of birth   * Agreement on procedure being performed was verified  * Risks and Benefits explained to the patient  * Procedure site verified and marked as necessary  * Patient was positioned for comfort  * Consent was signed and verified     Time: 3:55PM      Date of procedure: 10/16/2018    Procedure performed by:  Kayleen Baez MD    Provider assisted by: Brandin Marley LPN    Patient assisted by: self    How tolerated by patient: tolerated the procedure well with no complications    Post Procedural Pain Scale: 0 - No Hurt    Comments: none    Patient verbalized understanding of procedure and post procedure instructions.

## 2018-10-16 NOTE — PATIENT INSTRUCTIONS
Cystoscopy: Care Instructions  Your Care Instructions  Cystoscopy is a test. It uses a thin, lighted tube called a cystoscope to see the inside of the bladder and the urethra. The urethra is the tube that carries urine out of the body. This test is helpful because it lets your doctor see areas of your bladder and urethra that are hard to see on X-rays. It can help your doctor find bladder stones, tumors, bleeding, and infection. During this test, your doctor also can take tissue and urine samples. And if your doctor finds small stones or growths, he or she can remove them. In most cases the scope is in the bladder for less than 10 minutes. But the entire test may take 45 minutes or longer. You will probably get local anesthesia. This numbs a small part of your body. Or you may get spinal anesthesia, which numbs more of your body. Once in a while, doctors use general anesthesia. It makes you sleep during surgery. If you get a local anesthetic, you may be able to get up right after the test. But if you had spinal or general anesthesia, you will stay in the recovery room until you are able to walk or you have feeling again in your lower body. This usually takes about an hour. Your doctor may be able to tell you some of the results right after the test. But the complete results may take several days. Follow-up care is a key part of your treatment and safety. Be sure to make and go to all appointments, and call your doctor if you are having problems. It's also a good idea to know your test results and keep a list of the medicines you take. How can you care for yourself at home? Before the test  · If you are having a local anesthetic, you can eat and drink before the test.  · If you are having a spinal or general anesthetic, do not eat or drink anything for at least 8 hours before the test. Tell your doctor what medicines you take.   · If you are not staying overnight in the hospital, make sure you have someone who can drive you home after the test.  After the test  · If your doctor prescribed antibiotics, take them as directed. Do not stop taking them just because you feel better. You need to take the full course of antibiotics. · You may have some burning when you urinate for a day or two after the test. You may feel better if you drink more fluids. This may also help prevent an infection. · Your urine may have a pinkish color for a few days after the test.  When should you call for help? Call your doctor now or seek immediate medical care if:    · Your urine is still red or you see blood clots after you have urinated several times.     · You cannot pass urine 8 hours after the test.     · You get a fever or chills.     · You have pain in your belly or your back just below your rib cage. This is also called flank pain.    Watch closely for changes in your health, and be sure to contact your doctor if:    · You have pain or burning when you urinate. A burning sensation is normal for a day or two after the test. But call if it does not get better.     · You have a frequent urge to urinate but can pass only small amounts of urine.     · Your urine is pink, red, or cloudy or smells bad. It is normal for the urine to have a pinkish color for a few days after the test. But call if it does not get better.     · You do not get better as expected. Where can you learn more? Go to http://edison-dean.info/. Enter G684 in the search box to learn more about \"Cystoscopy: Care Instructions. \"  Current as of: March 21, 2018  Content Version: 11.8  © 5734-3344 [employer]. Care instructions adapted under license by [employer] (which disclaims liability or warranty for this information).  If you have questions about a medical condition or this instruction, always ask your healthcare professional. [address] any warranty or liability for your use of this information.

## 2018-10-17 NOTE — PROGRESS NOTES
[place] 66 y.o. female     Ms. Sridhar Quintana seen today for cystoscopy assessing recurrent urinary tract infections with microscopic hematuria-here today for cystoscopy and CT scan follow-up    CT scan imaging of the abdomen and pelvis on 9 October 2010 shows a 7 x 10 mm stone obstructing the left ureteropelvic junction causing left-sided hydronephrosis-stones measuring 5 and 7 mm are present in the dilated left collecting system. There are no stone densities or signs of obstruction in the right urinary tract. Images have been reviewed with the patient today on PACS downloaded for scanning into the EMR also laser printed for the patient    recurrent urinary tract infections associated with microscopic hematuria  Has experienced several episodes of urgency frequency with minimal dysuria finding urine cultures 20-60,000+ bacteria with symptoms resolving on short courses of antibiotic therapy  Has had microscopic hematuria noted several times during the past few years  No episodes of fever chills flank pain or gross hematuria  History of  tract disease, trauma, or surgery     Review of Systems:   CNS: No seizure syncope headaches dizziness or visual changes  Respiratory: Wheezing no cough no chest pain  Cardiovascular: Palpitations secondary to atrial fibrillation status post ablation Rx-Eliquis anticoagulation  Intestinal: No dyspepsia diarrhea or constipation  Urinary: Urgency frequency with dysuria  Skeletal: No bone or joint pain  Endocrine: no diabetes/ no thyroid disease  Other:        Allergies: Allergies   Allergen Reactions    Celebrex [Celecoxib] Hives    Iodinated Contrast- Oral And Iv Dye Hives     Patient was in CT for a cardiac scan. A test bolus of 20cc was given and patient broke out into hives immediately after. Cancelled the rest of the exam and escorted the patient to the ED for furhter evaluation.     Sulfa (Sulfonamide Antibiotics) Other (comments)     Patient states she is not allergic      Medications:    Current Outpatient Medications   Medication Sig Dispense Refill    cephALEXin (KEFLEX) 500 mg capsule Take 1 Cap by mouth four (4) times daily for 3 days. 12 Cap 0    DIGOX 125 mcg tablet TAKE 2 TABLETS TODAY THEN TAKE 1 TABLET BY MOUTH EVERY DAY 30 Tab 0    fluticasone (FLONASE) 50 mcg/actuation nasal spray 2 sprays up each nostril daily 1 Bottle 2    apixaban (ELIQUIS) 5 mg tablet Take 1 Tab by mouth two (2) times a day. 180 Tab 3    thiamine (VITAMIN B-1) 100 mg tablet Take  by mouth daily.  cyanocobalamin (VITAMIN B-12) 1,000 mcg tablet Take 1,000 mcg by mouth daily.  multivitamin (ONE A DAY) tablet Take 1 Tab by mouth daily.       amiodarone (PACERONE) 100 mg tablet Take one tablet by mouth daily 30 Tab 6       Past Medical History:   Diagnosis Date    Atrial fibrillation (HCC)     Attention deficit disorder without mention of hyperactivity     DUB (dysfunctional uterine bleeding)     Generalized osteoarthrosis, involving multiple sites     Grief reaction     Mother  recently - Celexa    Memory changes     Adderall for this    Recurrent UTI     Unspecified tinnitus       Past Surgical History:   Procedure Laterality Date    HX KNEE ARTHROSCOPY Right     Meniscus repair    HX KNEE ARTHROSCOPY Right     Removed piece of bone (FB)    HX KNEE ARTHROSCOPY Right     ACL repair     Social History     Socioeconomic History    Marital status:      Spouse name: Not on file    Number of children: Not on file    Years of education: Not on file    Highest education level: Not on file   Social Needs    Financial resource strain: Not on file    Food insecurity - worry: Not on file    Food insecurity - inability: Not on file   [employer] needs - medical: Not on file   [employer] needs - non-medical: Not on file   Occupational History    Not on file   Tobacco Use    Smoking status: Never Smoker    Smokeless tobacco: Never Used   Substance and Sexual Activity    Alcohol use: No    Drug use: No    Sexual activity: Not Currently   Other Topics Concern    Not on file   Social History Narrative    Not on file      Family History   Problem Relation Age of Onset    Diabetes Mother     Heart Disease Mother     Hypertension Mother     Heart Disease Father     Hypertension Father     Diabetes Maternal Grandmother         Physical Examination: Well-nourished mature female in no apparent distress  Neck: No masses nodes or bruits  Lungs: No wheezes rales or rhonchi  Heart: No murmurs gallops or rubs  Abdomen: Nontender no palpable masses no organomegaly no bruits  Back: No percussion CVA tenderness  Skin: Warm and dry no rash no lesions   Neurologic: No motor or sensory  deficits in lower extremities    Cystoscopy Report: After prepping the introitus with Betadine solution and instilling 2% lidocaine jelly intraurethrally a flexible cystoscope was passed through the urethra into the bladder revealing normal urothelium throughout. There are no strictures, stones, tumors, or diverticuli. No trabeculation evident-ureteral orifices are both slitlike in appearance with clear urine jets observed from both sides.   There are no abnormal blood vessels-no glomerulations injection hemorrhages or friability evident-procedure was uncomplicated well-tolerated  EBL-none  Specimen-none    Urinalysis: ++heme/negative nitrite    Impression: Obstructing proximal left urinary tract stone/multiple left kidney stones        Plan: Ureteroscopic laser lithotripsy with stent placement            Kidney stone precautions            Cipro 500 mg twice daily times 3 days    Counseling Note:    Indications for an technique of ureteroscopic laser lithotripsy have been described discussed patient understands and accepts the risk of bleeding, infection, perforation, and discomfort from the presence of an indwelling stent postoperatively      More than 1/2 of this 40 minute visit was spent in counselling and coordination of care, as described above. Beulah Capone MD  -electronically signed-    PLEASE NOTE:  This document has been produced using voice recognition software. Unrecognized errors in transcription may be present.

## 2018-10-29 ENCOUNTER — OFFICE VISIT (OUTPATIENT)
Dept: INTERNAL MEDICINE CLINIC | Age: 58
End: 2018-10-29

## 2018-10-29 VITALS
OXYGEN SATURATION: 99 % | DIASTOLIC BLOOD PRESSURE: 80 MMHG | HEIGHT: 71 IN | BODY MASS INDEX: 26.32 KG/M2 | WEIGHT: 188 LBS | SYSTOLIC BLOOD PRESSURE: 120 MMHG | HEART RATE: 67 BPM | TEMPERATURE: 97.7 F

## 2018-10-29 DIAGNOSIS — Z00.00 ANNUAL PHYSICAL EXAM: ICD-10-CM

## 2018-10-29 DIAGNOSIS — N20.0 KIDNEY STONE ON LEFT SIDE: ICD-10-CM

## 2018-10-29 DIAGNOSIS — I48.0 INTERMITTENT ATRIAL FIBRILLATION (HCC): Primary | ICD-10-CM

## 2018-10-29 NOTE — PROGRESS NOTES
Shashank Sullivan presents today for Chief Complaint Patient presents with  Well Woman Shashank Sullivan preferred language for health care discussion is english. Is someone accompanying this pt? no 
 
Is the patient using any DME equipment during OV? no 
 
Depression Screening: PHQ over the last two weeks 10/29/2018 Little interest or pleasure in doing things Not at all Feeling down, depressed, irritable, or hopeless Not at all Total Score PHQ 2 0 Learning Assessment: 
Learning Assessment 8/16/2018 PRIMARY LEARNER Patient HIGHEST LEVEL OF EDUCATION - PRIMARY LEARNER  -  
BARRIERS PRIMARY LEARNER -  
CO-LEARNER CAREGIVER -  
PRIMARY LANGUAGE ENGLISH  NEED -  
LEARNER PREFERENCE PRIMARY DEMONSTRATION  
ANSWERED BY Patient RELATIONSHIP SELF Abuse Screening: 
Abuse Screening Questionnaire 6/3/2016 Do you ever feel afraid of your partner? Melissa Jacobs Are you in a relationship with someone who physically or mentally threatens you? Melissa Jacobs Is it safe for you to go home? Dilshad Woodward Health Maintenance reviewed and discussed and ordered per Provider. Health Maintenance Due Topic Date Due  
 Hepatitis C Screening  1960  Pneumococcal 19-64 Medium Risk (1 of 1 - PPSV23) 03/14/1981  Shingrix Vaccine Age 50> (1 of 2) 03/14/2012  FOBT Q 1 YEAR AGE 50-75  03/14/2012  Influenza Age 5 to Adult  08/01/2018 Guillaume Sullivan is updated on all  Pt currently taking Antiplatelet therapy? Yes Eliqis Coordination of Care: 1. Have you been to the ER, urgent care clinic since your last visit? no  Hospitalized since your last visit? no 
 
2. Have you seen or consulted any other health care providers outside of the [address] [place] [address] since your last visit? no Include any pap smears or colon screening.  no

## 2018-10-30 RX ORDER — SODIUM CHLORIDE 9 MG/ML
100 INJECTION, SOLUTION INTRAVENOUS CONTINUOUS
Status: CANCELLED | OUTPATIENT
Start: 2018-10-30

## 2018-10-30 NOTE — PROGRESS NOTES
The patient presents to the office today for a check up HPI The patient presents to the office today for a check up. The patient is stats post ablation The patient remains on digoxin and anticoagulation due to intermittent atrial fibrillation. She is off Amiodarone. She has no further attacks. The patient has a large stone in her left proximal ureter. The patient has evidence of hydronephrosis and cortical thinning. The patient is scheduled for removal of the stone. Review of Systems Respiratory: Negative for shortness of breath. Cardiovascular: Negative for chest pain and leg swelling. Allergies Allergen Reactions  Celebrex [Celecoxib] Hives  Iodinated Contrast- Oral And Iv Dye Hives Patient was in CT for a cardiac scan. A test bolus of 20cc was given and patient broke out into hives immediately after. Cancelled the rest of the exam and escorted the patient to the ED for furhter evaluation.  Sulfa (Sulfonamide Antibiotics) Other (comments) Patient states she is not allergic Current Outpatient Medications Medication Sig Dispense Refill  DIGOX 125 mcg tablet TAKE 2 TABLETS TODAY THEN TAKE 1 TABLET BY MOUTH EVERY DAY 30 Tab 0  
 fluticasone (FLONASE) 50 mcg/actuation nasal spray 2 sprays up each nostril daily 1 Bottle 2  
 apixaban (ELIQUIS) 5 mg tablet Take 1 Tab by mouth two (2) times a day. 180 Tab 3  
 thiamine (VITAMIN B-1) 100 mg tablet Take  by mouth daily.  cyanocobalamin (VITAMIN B-12) 1,000 mcg tablet Take 1,000 mcg by mouth daily.  multivitamin (ONE A DAY) tablet Take 1 Tab by mouth daily.  amiodarone (PACERONE) 100 mg tablet Take one tablet by mouth daily 30 Tab 6 Past Medical History:  
Diagnosis Date  Atrial fibrillation ([address]  Attention deficit disorder without mention of hyperactivity  DUB (dysfunctional uterine bleeding)  Generalized osteoarthrosis, involving multiple sites  Grief reaction Mother  recently - Celexa  Memory changes Adderall for this  Recurrent UTI  Unspecified tinnitus Past Surgical History:  
Procedure Laterality Date  HX KNEE ARTHROSCOPY Right Meniscus repair  HX KNEE ARTHROSCOPY Right Removed piece of bone (FB)  
 HX KNEE ARTHROSCOPY Right ACL repair Social History Socioeconomic History  Marital status:  Spouse name: Not on file  Number of children: Not on file  Years of education: Not on file  Highest education level: Not on file Social Needs  Financial resource strain: Not on file  Food insecurity - worry: Not on file  Food insecurity - inability: Not on file  Transportation needs - medical: Not on file  Transportation needs - non-medical: Not on file Occupational History  Not on file Tobacco Use  Smoking status: Never Smoker  Smokeless tobacco: Never Used Substance and Sexual Activity  Alcohol use: No  
 Drug use: No  
 Sexual activity: Not Currently Other Topics Concern  Not on file Social History Narrative  Not on file Patient does have an advanced directive on file Visit Vitals /80 (BP 1 Location: Left arm, BP Patient Position: Sitting) Pulse 67 Temp 97.7 °F (36.5 °C) (Tympanic) Ht 5' 11\" (1.803 m) Wt 188 lb (85.3 kg) SpO2 99% BMI 26.22 kg/m² Physical Exam  
No Cervical Lymphadenopathy No Supraclavicular Lymphadenopathy Thyroid is Normal 
Lungs are normal to percussion. Clear to auscultation Heart:  S1 S2 are normal, No gallops, No mummers No Carotid Bruits Abdomen:  Normal Bowel Sounds. No tenderness. No masses. No Hepatomegaly or Splenomegaly LE:  Strong Pedal Pulses. No Edema BMI:  OK Office Visit on 10/16/2018 Component Date Value Ref Range Status  Color (UA POC) 10/16/2018 Yellow   Final  
 Clarity (UA POC) 10/16/2018 Cloudy   Final  
 Glucose (UA POC) 10/16/2018 Negative  Negative Final  
  Bilirubin (UA POC) 10/16/2018 Negative  Negative Final  
 Ketones (UA POC) 10/16/2018 Negative  Negative Final  
 Specific gravity (UA POC) 10/16/2018 1.010  1.001 - 1.035 Final  
 Blood (UA POC) 10/16/2018 2+  Negative Final  
 pH (UA POC) 10/16/2018 6.0  4.6 - 8.0 Final  
 Protein (UA POC) 10/16/2018 Negative  Negative Final  
 Urobilinogen (UA POC) 10/16/2018 0.2 mg/dL  0.2 - 1 Final  
 Nitrites (UA POC) 10/16/2018 Negative  Negative Final  
 Leukocyte esterase (UA POC) 10/16/2018 3+  Negative Final  
Orders Only on 10/09/2018 Component Date Value Ref Range Status  Magnesium 10/09/2018 2.2  1.6 - 2.5 mg/dL Final  
Hospital Outpatient Visit on 10/09/2018 Component Date Value Ref Range Status  [facility] SPECIMEN COL 10/09/2018 Specimens collected/sent to [facility]    Final  
Office Visit on 09/28/2018 Component Date Value Ref Range Status  HPV DNA Probe, High Risk 09/28/2018 Negative  Negative Final  
 Comment: The [facility]  HPV Test is a qualitative PCR and nucleic acid hybridization test 
for the detection of 14 high-risk types of Human Papillomavirus (HPV) in 
cervical specimens collected by a clinician using an endocervical  
brush/spatula 
and placed in the ThinPrep  Pap TestTM PreservCyt  Solution. The test can 
specifically identify HPV16 and HPV18 while concurrently detecting the rest of 
the high risk types (31, 33, 35, 39, 45, 51, 52, 56, 58, 59, 66 and 68). The 
HPV16 and HPV18 will only be differentiated from other HPV high-risk types if 
ordered as such. Persistent infection with human papillomavirus (HPV) is the principal cause of 
cervical cancer and its precursor cervical intraepithelial neoplasia (DUSTIN). The 
presence of HPV has been implicated in greater than 99% of cervical cancers, 
worldwide. Although persistent infection with high-risk (HR) HPV is a  
condition 
of cervical cancer only a small percentage of infections progress to these 
disease states. Sexually transmit bimal infection with HPV is extremely common, 
with estimates of up to 75% of all women experiencing exposure to HPV at some 
point. However, almost all of infected women will mount an effective immune 
response and clear the infection within 2 years without any long term health 
consequences. SOURCE: CERVICAL  PAP IMAGE GUIDED 09/28/2018    Final  
 Comment: GYNECOLOGICAL CYTOLOGY REPORT Collected:           09/28/2018 1643 Received:            09/28/2018 4853 First Screen:          JACK Durham (ASCP) Rescreen:              JACK Buck (ASCP) CLINICAL INFORMATION: 
LMP: 
Not provided ADEQUACY OF SPECIMEN: 
Satisfactory for evaluation of an atrophic sample. DIAGNOSIS: 
NEGATIVE FOR INTRAEPITHELIAL LESION OR MALIGNANCY (NIL) ELECTRONICALLY SIGNED BY JACK Perkins (ASCP) ON 10/2/2018 AT 11:23 AM 
This Liquid based ThinPrep pap test was screened with the use of an image 
guided system (vendome [id number]). Case: [id number] Specimen:    IMAGE GUIDED TP, Cervical 
GYN SPECIMEN RECEIVED: 
Received 1 ThinPrep vial.  Prepared 1 pap stained filter slide. OTHER PATIENT INFORMATION: 
Postmenopausal 
PAP NOTE: 
The PAP test is a screening test that aids detection of pre-malignant and 
malignant conditions of the uterine cervix. The PAP test is not a diagnostic 
procedure and should not be used exclusively to detect cerv  
                        ical cancer. Both 
false-positive and false-negative reports do occur. Office Visit on 09/26/2018 Component Date Value Ref Range Status  Color (UA POC) 09/26/2018 Yellow   Final  
 Clarity (UA POC) 09/26/2018 Clear   Final  
 Glucose (UA POC) 09/26/2018 Negative  Negative Final  
 Bilirubin (UA POC) 09/26/2018 Negative  Negative Final  
 Ketones (UA POC) 09/26/2018 Negative  Negative Final  
 Specific gravity (UA POC) 09/26/2018 1.025  1.001 - 1.035 Final  
 Blood (UA POC) 09/26/2018 2+  Negative Final  
  pH (UA POC) 09/26/2018 6.0  4.6 - 8.0 Final  
 Protein (UA POC) 09/26/2018 Negative  Negative Final  
 Urobilinogen (UA POC) 09/26/2018 0.2 mg/dL  0.2 - 1 Final  
 Nitrites (UA POC) 09/26/2018 Negative  Negative Final  
 Leukocyte esterase (UA POC) 09/26/2018 3+  Negative Final  
Orders Only on 09/19/2018 Component Date Value Ref Range Status  RESULT 09/19/2018 Normal   Final  
 Comment: Updated: [id number] LAB ACC#: [id number] SOURCE: Clean Catch Urine 
--FINAL REPORT-- No Growth Orders Only on 09/07/2018 Component Date Value Ref Range Status  RESULT 09/07/2018 Normal   Final  
 Comment: Updated: [id number] LAB ACC#: [id number] SOURCE: Clean Catch Urine 
--FINAL REPORT-- 
Mixed Culture 60,000 col/mL more than 2 different organisms. Please submit a new specimen Deisy Armendariz No results found for any visits on 10/29/18. Assessment / Plan ICD-10-CM ICD-9-CM 1. Annual physical exam Z00.00 V70.0 2. Intermittent atrial fibrillation (HCC) I48.0 427.31   
3. Kidney stone on left side N20.0 592.0 I advised the patient to continue good health habits 
she was advised to continue her maintenance medications The patient is due for pre op testing. If this testing is ok then THE PATIENT IS OK FOR SURGERY Follow-up Disposition: 
Return in about 6 months (around 4/29/2019). I asked Avie Neptune. [place] if she has any questions and I answered the questions. Avie Neptune. [place] states that she understands the treatment plan and agrees with the treatment plan

## 2018-10-31 ENCOUNTER — TELEPHONE (OUTPATIENT)
Dept: CARDIOLOGY CLINIC | Age: 58
End: 2018-10-31

## 2018-10-31 NOTE — TELEPHONE ENCOUNTER
Patient schedule to have surgery with Dr. Jasbir Putnam on 11/9/2018, she states Dr. Jasbir Putnam is ok with remaining on Eliquis, would like your opinion as well. Please advise.   Thank you

## 2018-11-05 ENCOUNTER — HOSPITAL ENCOUNTER (OUTPATIENT)
Dept: LAB | Age: 58
Discharge: HOME | End: 2018-11-05
Payer: COMMERCIAL

## 2018-11-05 DIAGNOSIS — R31.29 MICROSCOPIC HEMATURIA: ICD-10-CM

## 2018-11-05 DIAGNOSIS — Z01.818 PRE-OP TESTING: ICD-10-CM

## 2018-11-05 DIAGNOSIS — N39.0 RECURRENT UTI: ICD-10-CM

## 2018-11-05 LAB
ANION GAP SERPL CALC-SCNC: 7 MMOL/L (ref 3–18)
ATRIAL RATE: 59 BPM
BUN SERPL-MCNC: 19 MG/DL (ref 7–18)
BUN/CREAT SERPL: 17 (ref 12–20)
CALCIUM SERPL-MCNC: 9 MG/DL (ref 8.5–10.1)
CALCULATED P AXIS, ECG09: 21 DEGREES
CALCULATED R AXIS, ECG10: -44 DEGREES
CALCULATED T AXIS, ECG11: 66 DEGREES
CHLORIDE SERPL-SCNC: 103 MMOL/L (ref 100–108)
CO2 SERPL-SCNC: 30 MMOL/L (ref 21–32)
CREAT SERPL-MCNC: 1.15 MG/DL (ref 0.6–1.3)
DIAGNOSIS, 93000: NORMAL
ERYTHROCYTE [DISTWIDTH] IN BLOOD BY AUTOMATED COUNT: 13.2 % (ref 11.6–14.5)
GLUCOSE SERPL-MCNC: 79 MG/DL (ref 74–99)
HCT VFR BLD AUTO: 38.8 % (ref 35–45)
HGB BLD-MCNC: 12.4 G/DL (ref 12–16)
MCH RBC QN AUTO: 30.1 PG (ref 24–34)
MCHC RBC AUTO-ENTMCNC: 32 G/DL (ref 31–37)
MCV RBC AUTO: 94.2 FL (ref 74–97)
P-R INTERVAL, ECG05: 194 MS
PLATELET # BLD AUTO: 185 K/UL (ref 135–420)
PMV BLD AUTO: 11.1 FL (ref 9.2–11.8)
POTASSIUM SERPL-SCNC: 4 MMOL/L (ref 3.5–5.5)
Q-T INTERVAL, ECG07: 436 MS
QRS DURATION, ECG06: 94 MS
QTC CALCULATION (BEZET), ECG08: 431 MS
RBC # BLD AUTO: 4.12 M/UL (ref 4.2–5.3)
SODIUM SERPL-SCNC: 140 MMOL/L (ref 136–145)
VENTRICULAR RATE, ECG03: 59 BPM
WBC # BLD AUTO: 5.2 K/UL (ref 4.6–13.2)

## 2018-11-05 PROCEDURE — 85027 COMPLETE CBC AUTOMATED: CPT | Performed by: UROLOGY

## 2018-11-05 PROCEDURE — 93005 ELECTROCARDIOGRAM TRACING: CPT

## 2018-11-05 PROCEDURE — 36415 COLL VENOUS BLD VENIPUNCTURE: CPT | Performed by: UROLOGY

## 2018-11-05 PROCEDURE — 80048 BASIC METABOLIC PNL TOTAL CA: CPT | Performed by: UROLOGY

## 2018-11-06 ENCOUNTER — DOCUMENTATION ONLY (OUTPATIENT)
Dept: UROLOGY | Age: 58
End: 2018-11-06

## 2018-11-07 NOTE — PROGRESS NOTES
Patient presented today for analgesics prescription-left kidney stone is now symptomatic -ureteroscopic laser lithotripsy scheduled for 9 November 2018    Rx Oxycodone 5 mg every 6 hours as needed pain #18    Garth Alonzo MD

## 2018-11-08 ENCOUNTER — ANESTHESIA EVENT (OUTPATIENT)
Dept: SURGERY | Age: 58
End: 2018-11-08
Payer: COMMERCIAL

## 2018-11-09 ENCOUNTER — HOSPITAL ENCOUNTER (OUTPATIENT)
Age: 58
Setting detail: OUTPATIENT SURGERY
Discharge: HOME | End: 2018-11-09
Attending: UROLOGY | Admitting: UROLOGY
Payer: COMMERCIAL

## 2018-11-09 ENCOUNTER — APPOINTMENT (OUTPATIENT)
Dept: GENERAL RADIOLOGY | Age: 58
End: 2018-11-09
Attending: UROLOGY
Payer: COMMERCIAL

## 2018-11-09 ENCOUNTER — ANESTHESIA (OUTPATIENT)
Dept: SURGERY | Age: 58
End: 2018-11-09
Payer: COMMERCIAL

## 2018-11-09 VITALS
SYSTOLIC BLOOD PRESSURE: 139 MMHG | BODY MASS INDEX: 25.68 KG/M2 | HEIGHT: 71 IN | OXYGEN SATURATION: 99 % | RESPIRATION RATE: 16 BRPM | TEMPERATURE: 97 F | DIASTOLIC BLOOD PRESSURE: 75 MMHG | WEIGHT: 183.4 LBS | HEART RATE: 58 BPM

## 2018-11-09 DIAGNOSIS — N20.0 KIDNEY STONE ON LEFT SIDE: Primary | ICD-10-CM

## 2018-11-09 PROCEDURE — 77030032490 HC SLV COMPR SCD KNE COVD -B: Performed by: UROLOGY

## 2018-11-09 PROCEDURE — 87077 CULTURE AEROBIC IDENTIFY: CPT

## 2018-11-09 PROCEDURE — 74420 UROGRAPHY RTRGR +-KUB: CPT

## 2018-11-09 PROCEDURE — 74011000250 HC RX REV CODE- 250: Performed by: UROLOGY

## 2018-11-09 PROCEDURE — C1769 GUIDE WIRE: HCPCS | Performed by: UROLOGY

## 2018-11-09 PROCEDURE — 87086 URINE CULTURE/COLONY COUNT: CPT

## 2018-11-09 PROCEDURE — 74011250637 HC RX REV CODE- 250/637: Performed by: NURSE ANESTHETIST, CERTIFIED REGISTERED

## 2018-11-09 PROCEDURE — C1726 CATH, BAL DIL, NON-VASCULAR: HCPCS | Performed by: UROLOGY

## 2018-11-09 PROCEDURE — 74011250636 HC RX REV CODE- 250/636: Performed by: UROLOGY

## 2018-11-09 PROCEDURE — C1894 INTRO/SHEATH, NON-LASER: HCPCS | Performed by: UROLOGY

## 2018-11-09 PROCEDURE — 77030038020 HC MANFLD NEPTUNE STRY -B: Performed by: UROLOGY

## 2018-11-09 PROCEDURE — C2617 STENT, NON-COR, TEM W/O DEL: HCPCS | Performed by: UROLOGY

## 2018-11-09 PROCEDURE — 74011250636 HC RX REV CODE- 250/636

## 2018-11-09 PROCEDURE — 77030018836 HC SOL IRR NACL ICUM -A: Performed by: UROLOGY

## 2018-11-09 PROCEDURE — 77030020782 HC GWN BAIR PAWS FLX 3M -B: Performed by: UROLOGY

## 2018-11-09 PROCEDURE — 76010000162 HC OR TIME 1.5 TO 2 HR INTENSV-TIER 1: Performed by: UROLOGY

## 2018-11-09 PROCEDURE — 77030019927 HC TBNG IRR CYSTO BAXT -A: Performed by: UROLOGY

## 2018-11-09 PROCEDURE — 76060000034 HC ANESTHESIA 1.5 TO 2 HR: Performed by: UROLOGY

## 2018-11-09 PROCEDURE — 77030005537 HC CATH URETH BARD -A: Performed by: UROLOGY

## 2018-11-09 PROCEDURE — 74011250636 HC RX REV CODE- 250/636: Performed by: NURSE ANESTHETIST, CERTIFIED REGISTERED

## 2018-11-09 PROCEDURE — 76210000006 HC OR PH I REC 0.5 TO 1 HR: Performed by: UROLOGY

## 2018-11-09 PROCEDURE — 87186 SC STD MICRODIL/AGAR DIL: CPT

## 2018-11-09 PROCEDURE — 76210000026 HC REC RM PH II 1 TO 1.5 HR: Performed by: UROLOGY

## 2018-11-09 PROCEDURE — 74011000272 HC RX REV CODE- 272: Performed by: UROLOGY

## 2018-11-09 PROCEDURE — 77030034696 HC CATH URETH FOL 2W BARD -A: Performed by: UROLOGY

## 2018-11-09 PROCEDURE — C1758 CATHETER, URETERAL: HCPCS | Performed by: UROLOGY

## 2018-11-09 DEVICE — URETERAL STENT
Type: IMPLANTABLE DEVICE | Site: URETER | Status: FUNCTIONAL
Brand: POLARIS™ ULTRA

## 2018-11-09 RX ORDER — PROPOFOL 10 MG/ML
INJECTION, EMULSION INTRAVENOUS AS NEEDED
Status: DISCONTINUED | OUTPATIENT
Start: 2018-11-09 | End: 2018-11-09 | Stop reason: HOSPADM

## 2018-11-09 RX ORDER — KETOROLAC TROMETHAMINE 30 MG/ML
INJECTION, SOLUTION INTRAMUSCULAR; INTRAVENOUS AS NEEDED
Status: DISCONTINUED | OUTPATIENT
Start: 2018-11-09 | End: 2018-11-09 | Stop reason: HOSPADM

## 2018-11-09 RX ORDER — MIDAZOLAM HYDROCHLORIDE 1 MG/ML
INJECTION, SOLUTION INTRAMUSCULAR; INTRAVENOUS AS NEEDED
Status: DISCONTINUED | OUTPATIENT
Start: 2018-11-09 | End: 2018-11-09 | Stop reason: HOSPADM

## 2018-11-09 RX ORDER — DIPHENHYDRAMINE HYDROCHLORIDE 50 MG/ML
INJECTION, SOLUTION INTRAMUSCULAR; INTRAVENOUS AS NEEDED
Status: DISCONTINUED | OUTPATIENT
Start: 2018-11-09 | End: 2018-11-09 | Stop reason: HOSPADM

## 2018-11-09 RX ORDER — INSULIN LISPRO 100 [IU]/ML
INJECTION, SOLUTION INTRAVENOUS; SUBCUTANEOUS ONCE
Status: DISCONTINUED | OUTPATIENT
Start: 2018-11-09 | End: 2018-11-09 | Stop reason: HOSPADM

## 2018-11-09 RX ORDER — SODIUM CHLORIDE 0.9 % (FLUSH) 0.9 %
5-10 SYRINGE (ML) INJECTION AS NEEDED
Status: DISCONTINUED | OUTPATIENT
Start: 2018-11-09 | End: 2018-11-09 | Stop reason: HOSPADM

## 2018-11-09 RX ORDER — DOCUSATE SODIUM 100 MG/1
100 CAPSULE, LIQUID FILLED ORAL 2 TIMES DAILY
Qty: 10 CAP | Refills: 2 | Status: SHIPPED | OUTPATIENT
Start: 2018-11-09 | End: 2018-11-14

## 2018-11-09 RX ORDER — CEFAZOLIN SODIUM 2 G/50ML
2 SOLUTION INTRAVENOUS ONCE
Status: COMPLETED | OUTPATIENT
Start: 2018-11-09 | End: 2018-11-09

## 2018-11-09 RX ORDER — LIDOCAINE HYDROCHLORIDE 10 MG/ML
0.1 INJECTION, SOLUTION EPIDURAL; INFILTRATION; INTRACAUDAL; PERINEURAL AS NEEDED
Status: DISCONTINUED | OUTPATIENT
Start: 2018-11-09 | End: 2018-11-09 | Stop reason: HOSPADM

## 2018-11-09 RX ORDER — DEXTROSE 50 % IN WATER (D50W) INTRAVENOUS SYRINGE
25-50 AS NEEDED
Status: DISCONTINUED | OUTPATIENT
Start: 2018-11-09 | End: 2018-11-09 | Stop reason: HOSPADM

## 2018-11-09 RX ORDER — SODIUM CHLORIDE, SODIUM LACTATE, POTASSIUM CHLORIDE, CALCIUM CHLORIDE 600; 310; 30; 20 MG/100ML; MG/100ML; MG/100ML; MG/100ML
75 INJECTION, SOLUTION INTRAVENOUS CONTINUOUS
Status: DISCONTINUED | OUTPATIENT
Start: 2018-11-09 | End: 2018-11-09 | Stop reason: HOSPADM

## 2018-11-09 RX ORDER — DEXAMETHASONE SODIUM PHOSPHATE 4 MG/ML
INJECTION, SOLUTION INTRA-ARTICULAR; INTRALESIONAL; INTRAMUSCULAR; INTRAVENOUS; SOFT TISSUE AS NEEDED
Status: DISCONTINUED | OUTPATIENT
Start: 2018-11-09 | End: 2018-11-09 | Stop reason: HOSPADM

## 2018-11-09 RX ORDER — FENTANYL CITRATE 50 UG/ML
25 INJECTION, SOLUTION INTRAMUSCULAR; INTRAVENOUS AS NEEDED
Status: DISCONTINUED | OUTPATIENT
Start: 2018-11-09 | End: 2018-11-09 | Stop reason: HOSPADM

## 2018-11-09 RX ORDER — FENTANYL CITRATE 50 UG/ML
INJECTION, SOLUTION INTRAMUSCULAR; INTRAVENOUS AS NEEDED
Status: DISCONTINUED | OUTPATIENT
Start: 2018-11-09 | End: 2018-11-09 | Stop reason: HOSPADM

## 2018-11-09 RX ORDER — MAGNESIUM SULFATE 100 %
4 CRYSTALS MISCELLANEOUS AS NEEDED
Status: DISCONTINUED | OUTPATIENT
Start: 2018-11-09 | End: 2018-11-09 | Stop reason: HOSPADM

## 2018-11-09 RX ORDER — FAMOTIDINE 20 MG/1
20 TABLET, FILM COATED ORAL ONCE
Status: COMPLETED | OUTPATIENT
Start: 2018-11-09 | End: 2018-11-09

## 2018-11-09 RX ORDER — OXYCODONE HYDROCHLORIDE 5 MG/1
5 TABLET ORAL
Qty: 18 TAB | Refills: 0 | Status: SHIPPED | OUTPATIENT
Start: 2018-11-09 | End: 2018-11-12

## 2018-11-09 RX ORDER — SUCCINYLCHOLINE CHLORIDE 20 MG/ML
INJECTION INTRAMUSCULAR; INTRAVENOUS AS NEEDED
Status: DISCONTINUED | OUTPATIENT
Start: 2018-11-09 | End: 2018-11-09 | Stop reason: HOSPADM

## 2018-11-09 RX ORDER — CEPHALEXIN 250 MG/1
250 CAPSULE ORAL DAILY
Qty: 10 CAP | Refills: 0 | Status: SHIPPED | OUTPATIENT
Start: 2018-11-09 | End: 2018-11-19

## 2018-11-09 RX ORDER — SODIUM CHLORIDE 9 MG/ML
100 INJECTION, SOLUTION INTRAVENOUS CONTINUOUS
Status: DISCONTINUED | OUTPATIENT
Start: 2018-11-09 | End: 2018-11-09 | Stop reason: HOSPADM

## 2018-11-09 RX ORDER — SODIUM CHLORIDE 0.9 % (FLUSH) 0.9 %
5-10 SYRINGE (ML) INJECTION EVERY 8 HOURS
Status: DISCONTINUED | OUTPATIENT
Start: 2018-11-09 | End: 2018-11-09 | Stop reason: HOSPADM

## 2018-11-09 RX ORDER — PHENAZOPYRIDINE HYDROCHLORIDE 200 MG/1
200 TABLET, FILM COATED ORAL
Qty: 30 TAB | Refills: 2 | Status: SHIPPED | OUTPATIENT
Start: 2018-11-09 | End: 2018-11-12

## 2018-11-09 RX ORDER — LIDOCAINE HYDROCHLORIDE 20 MG/ML
INJECTION, SOLUTION EPIDURAL; INFILTRATION; INTRACAUDAL; PERINEURAL AS NEEDED
Status: DISCONTINUED | OUTPATIENT
Start: 2018-11-09 | End: 2018-11-09 | Stop reason: HOSPADM

## 2018-11-09 RX ORDER — ONDANSETRON 2 MG/ML
INJECTION INTRAMUSCULAR; INTRAVENOUS AS NEEDED
Status: DISCONTINUED | OUTPATIENT
Start: 2018-11-09 | End: 2018-11-09 | Stop reason: HOSPADM

## 2018-11-09 RX ADMIN — FENTANYL CITRATE 25 MCG: 50 INJECTION, SOLUTION INTRAMUSCULAR; INTRAVENOUS at 10:23

## 2018-11-09 RX ADMIN — ONDANSETRON 4 MG: 2 INJECTION INTRAMUSCULAR; INTRAVENOUS at 09:52

## 2018-11-09 RX ADMIN — DIPHENHYDRAMINE HYDROCHLORIDE 25 MG: 50 INJECTION, SOLUTION INTRAMUSCULAR; INTRAVENOUS at 09:52

## 2018-11-09 RX ADMIN — DEXAMETHASONE SODIUM PHOSPHATE 4 MG: 4 INJECTION, SOLUTION INTRA-ARTICULAR; INTRALESIONAL; INTRAMUSCULAR; INTRAVENOUS; SOFT TISSUE at 09:54

## 2018-11-09 RX ADMIN — FENTANYL CITRATE 50 MCG: 50 INJECTION, SOLUTION INTRAMUSCULAR; INTRAVENOUS at 09:48

## 2018-11-09 RX ADMIN — KETOROLAC TROMETHAMINE 30 MG: 30 INJECTION, SOLUTION INTRAMUSCULAR; INTRAVENOUS at 09:53

## 2018-11-09 RX ADMIN — MIDAZOLAM HYDROCHLORIDE 2 MG: 1 INJECTION, SOLUTION INTRAMUSCULAR; INTRAVENOUS at 09:28

## 2018-11-09 RX ADMIN — SODIUM CHLORIDE, SODIUM LACTATE, POTASSIUM CHLORIDE, AND CALCIUM CHLORIDE 75 ML/HR: 600; 310; 30; 20 INJECTION, SOLUTION INTRAVENOUS at 08:02

## 2018-11-09 RX ADMIN — CEFAZOLIN SODIUM 2 G: 2 SOLUTION INTRAVENOUS at 09:28

## 2018-11-09 RX ADMIN — PROPOFOL 150 MG: 10 INJECTION, EMULSION INTRAVENOUS at 09:42

## 2018-11-09 RX ADMIN — FENTANYL CITRATE 50 MCG: 50 INJECTION, SOLUTION INTRAMUSCULAR; INTRAVENOUS at 09:42

## 2018-11-09 RX ADMIN — SUCCINYLCHOLINE CHLORIDE 100 MG: 20 INJECTION INTRAMUSCULAR; INTRAVENOUS at 09:42

## 2018-11-09 RX ADMIN — FAMOTIDINE 20 MG: 20 TABLET ORAL at 08:02

## 2018-11-09 RX ADMIN — LIDOCAINE HYDROCHLORIDE 100 MG: 20 INJECTION, SOLUTION EPIDURAL; INFILTRATION; INTRACAUDAL; PERINEURAL at 09:42

## 2018-11-09 RX ADMIN — FENTANYL CITRATE 25 MCG: 50 INJECTION, SOLUTION INTRAMUSCULAR; INTRAVENOUS at 10:24

## 2018-11-09 NOTE — BRIEF OP NOTE
BRIEF OPERATIVE NOTE    Date of Procedure: 11/9/2018   Preoperative Diagnosis: Kidney stones [N20.0] left UPJ 10 x 15 mm  Postoperative Diagnosis: Kidney stones [N20.0] left  10 x 15 mm  Procedure(s):  LEFT URETEROSCOPIC 100W HOLMIUM LASER LITHOTRIPSY/STENT/C-ARM  Surgeon(s) and Role:      Lottie Hagen MD - Primary         Surgical Assistant: none    Surgical Staff:  Circ-1: Piero Lockwood  Radiology Technician: Zach Guadalupe  Scrub Tech-1: Edgar Monsivais  Event Time In Time Out   Incision Start 0950    Incision Close 1100      Anesthesia: General   Estimated Blood Loss: minimal  Specimens:   ID Type Source Tests Collected by Time Destination   1 : URINE (CYSTOSCOPIC) Urine  CULTURE, URINE Lottie Hagen MD 11/9/2018 9926 Microbiology      Findings: left UPJ stone 10 x 15 mm - impacted and embedded  Complications: none  Implants:   Implant Name Type Inv.  Item Serial No.  Lot No. LRB No. Used Neyda Guillaume Scarce [id number] Rxoanne Guillaume Scarce [id number] -- [employer] [id number] Left 1 Implanted     Partial laser lithotripsy/ stent placement    Will likely require open pyeloplasty/pyelolithotomy     Elena Fernandes MD

## 2018-11-09 NOTE — ANESTHESIA POSTPROCEDURE EVALUATION
Procedure(s): LEFT URETEROSCOPIC 100W HOLMIUM LASER LITHOTRIPSY/STENT/C-ARM. Anesthesia Post Evaluation Multimodal analgesia: multimodal analgesia not used between 6 hours prior to anesthesia start to PACU discharge Patient location during evaluation: bedside Patient participation: complete - patient participated Level of consciousness: responsive to verbal stimuli Pain score: 0 Pain management: adequate Airway patency: patent Anesthetic complications: no 
Cardiovascular status: acceptable Respiratory status: acceptable Hydration status: acceptable Post anesthesia nausea and vomiting:  none Visit Vitals /76 Pulse 69 Temp 36.4 °C (97.6 °F) Resp 15 Ht 5' 11\" (1.803 m) Wt 83.2 kg (183 lb 6.4 oz) SpO2 100% BMI 25.58 kg/m²

## 2018-11-09 NOTE — DISCHARGE SUMMARY
Left ureteroscopic laser lithotripsy with stent placeement under gen anes by LMA- uncomplicated    Vanessa London MD

## 2018-11-09 NOTE — ANESTHESIA PREPROCEDURE EVALUATION
Anesthetic History No history of anesthetic complications Review of Systems / Medical History Patient summary reviewed and pertinent labs reviewed Pulmonary Within defined limits Sleep apnea: CPAP Neuro/Psych Within defined limits Cardiovascular Within defined limits Dysrhythmias : atrial fibrillation Exercise tolerance: >4 METS Comments: A fib s/p ablation, SB now GI/Hepatic/Renal 
Within defined limits Endo/Other Within defined limits Arthritis Other Findings Physical Exam 
 
Airway Mallampati: II 
TM Distance: 4 - 6 cm Neck ROM: normal range of motion Mouth opening: Normal 
 
 Cardiovascular Regular rate and rhythm,  S1 and S2 normal,  no murmur, click, rub, or gallop Rhythm: regular Rate: normal 
 
 
 
 Dental 
 
Dentition: Lower dentition intact and Upper dentition intact Pulmonary Breath sounds clear to auscultation Abdominal 
GI exam deferred Other Findings Anesthetic Plan ASA: 3 Anesthesia type: general 
 
 
 
 
Induction: Intravenous Anesthetic plan and risks discussed with: Patient

## 2018-11-09 NOTE — DISCHARGE INSTRUCTIONS
Light activity  Reg diet  Beckman to leg bag x 72 hrs- d/c on 11-12- 0218  Rx Cipro, Colace, Pyridium, Oxycodone    rtc 10 days    Jd Tipton MD     Laser Lithotripsy: What to Expect at [address] lithotripsy is a way to treat kidney stones. This treatment uses a laser to break kidney stones into tiny pieces. For several hours after the procedure you may have a burning feeling when you urinate. You may feel the urge to go even if you don't need to. This feeling should go away within a day. Drinking a lot of water can help. Your doctor also may advise you to take medicine that numbs the burning. This medicine is called phenazopyridine. It is available by prescription and over the counter. Brand names include Pyridium and Uristat. Your doctor may prescribe an antibiotic. This will help prevent an infection. You may have some blood in your urine for 2 or 3 days. Your doctor may have placed a small tube inside one of your ureters. Ureters are the tubes that connect the kidneys to the bladder. The small tube the doctor may have placed is called a stent. It may help the stone fragments pass through your body. Your doctor may remove the stent in a few weeks. Most stone fragments that are not removed pass out of the body within 24 hours. But sometimes it can take many weeks. If you have a large stone, you may need to come back for more treatments. This care sheet gives you a general idea about how long it will take for you to recover. But each person recovers at a different pace. Follow the steps below to feel better as quickly as possible. How can you care for yourself at home? Activity    · Rest as much as you need to after you go home.     · You may do your regular activities. But avoid hard exercise or sports for about a week or until there is no blood in your urine.    Diet    · You can eat your normal diet after lithotripsy.     · Continue to drink plenty of fluids, enough so that your urine is light yellow or clear like water. If you have kidney, heart, or liver disease and have to limit fluids, talk with your doctor before you increase the amount of fluids you drink. Medicines    · Your doctor will tell you if and when you can restart your medicines. He or she will also give you instructions about taking any new medicines.     · If you take blood thinners, such as warfarin (Coumadin), clopidogrel (Plavix), or aspirin, be sure to talk to your doctor. He or she will tell you if and when to start taking those medicines again. Make sure that you understand exactly what your doctor wants you to do.     · If you take medicine to stop the burning when you urinate, take it exactly as recommended. Call your doctor if you think you are having a problem with your medicine. This medicine may color your urine orange or red. This is normal. You will get more details on the specific medicine your doctor recommends.     · If your doctor prescribed antibiotics, take them as directed. Do not stop taking them just because you feel better. You need to take the full course of antibiotics.     · Be safe with medicines. Read and follow all instructions on the label. ? If the doctor gave you a prescription medicine for pain, take it as prescribed. ? If you are not taking a prescription pain medicine, ask your doctor if you can take acetaminophen (Tylenol). Do not take ibuprofen (Advil, Motrin) or naproxen (Aleve) or similar medicines unless your doctor tells you to. ? Do not take two or more pain medicines at the same time unless the doctor told you to. Many pain medicines have acetaminophen, which is Tylenol. Too much acetaminophen (Tylenol) can be harmful.    Heat    · Take a warm bath. This may soothe the burning. Other instructions    · Urinate through the strainer the doctor gives you. Save any stone pieces, including those that look like sand or gravel. Take these to your doctor.  This will help your doctor find the cause of your stones. Follow-up care is a key part of your treatment and safety. Be sure to make and go to all appointments, and call your doctor if you are having problems. It's also a good idea to know your test results and keep a list of the medicines you take. When should you call for help? Call 911 anytime you think you may need emergency care. For example, call if:    · You passed out (lost consciousness).     · You have chest pain, are short of breath, or cough up blood.    Call your doctor now or seek immediate medical care if:    · You have pain that does not get better after you take pain medicine.     · You have new or more blood clots in your urine. (It is normal for the urine to be pink for a few days.)     · You cannot urinate.     · You have symptoms of a urinary tract infection. These may include:  ? Pain or burning when you urinate. ? A frequent need to urinate without being able to pass much urine. ? Pain in the flank, which is just below the rib cage and above the waist on either side of the back. ? Blood in the urine. ? A fever.     · You are sick to your stomach or cannot drink fluids.     · You have signs of a blood clot in your leg (called a deep vein thrombosis), such as:  ? Pain in the calf, back of the knee, thigh, or groin. ? Redness and swelling in your leg.    Watch closely for any changes in your health, and be sure to contact your doctor if you have any problems. Where can you learn more? Go to http://edison-dean.info/. Enter Q239 in the search box to learn more about \"Laser Lithotripsy: What to Expect at Home. \"  Current as of: March 15, 2018  Content Version: 11.8  © 9462-0628 [employer]. Care instructions adapted under license by [employer] (which disclaims liability or warranty for this information).  If you have questions about a medical condition or this instruction, always ask your healthcare professional. Healthwise, [facility] disclaims any warranty or liability for your use of this information. Learning About Urinary Catheter Care to Prevent Infection  What is a urinary catheter? A urinary catheter is a flexible plastic tube used to drain urine from your bladder when you can't urinate on your own. The catheter allows urine to drain from the bladder into a bag. Two types of drainage bags may be used with a urinary catheter. · A bedside bag is a large bag that you can hang on the side of your bed or on a chair. You can use it overnight or anytime you will be sitting or lying down for a long time. · A leg bag is a small bag that you can use during the day. It is usually attached to your thigh or calf and hidden under your clothes. Having a urinary catheter increases your risk of getting a urinary tract infection. Germs may get on the catheter and cause an infection in your bladder or kidneys. The longer you have a catheter, the more likely it is that you will get an infection. You can help prevent this problem with good hygiene and careful handling of your catheter and drainage bags. How can you help prevent infection? Take care to be clean  · Always wash your hands well before and after you handle your catheter. · Clean the skin around the catheter twice a day using soap and water. Dry with a clean towel afterward. You can shower with your catheter and drainage bag in place unless your doctor told you not to. · When you clean around the catheter, check the surrounding skin for signs of infection. Look for things like pus or irritated, swollen, red, or tender skin around the catheter. Be careful with your drainage bag  · Always keep the drainage bag below the level of your bladder. This will help keep urine from flowing back into your bladder. · Check often to see that urine is flowing through the catheter into the drainage bag. · Empty the drainage bag when it is half full.  This will keep it from overflowing or backing up. · When you empty the drainage bag, do not let the tubing or drain spout touch anything. Be careful with your catheter  · Do not unhook the catheter from the drain tube. That could let germs get into the tube. · Make sure that the catheter tubing does not get twisted or kinked. · Do not tug or pull on the catheter. And make sure that the drainage bag does not drag or pull on the catheter. · Do not put powder or lotion on the skin around the catheter. · Talk with your doctor about your options for sexual intercourse while wearing a catheter. How do you empty a urine drainage bag? If your doctor has asked you to keep a record, write down the amount of urine in the bag before you empty it. Wash your hands before and after you touch the bag. 1. Remove the drain spout from its sleeve at the bottom of the drainage bag.  2. Open the valve on the drain spout. Let the urine flow out into the toilet or a container. Be careful not to let the tubing or drain spout touch anything. 3. After you empty the bag, wipe off any liquid on the end of the drain spout. Close the valve. Then put the drain spout back into its sleeve at the bottom of the collection bag. How do you add a bedside bag to a leg bag? Wash your hands before and after you handle the bags. 1. Empty the leg bag attached to the catheter. 2. Put a clean towel under the leg bag.  3. Use an alcohol wipe to clean the tip of the bedside bag. Then connect the bedside bag to the leg bag. How can you clean a bedside drainage bag? Many people clean their bedside bag in the morning if they switch to a leg bag. To clean a bedside drainage ba. Remove the bedside bag from the leg bag.  2. Fill the bag with 2 parts vinegar and 3 parts water. Let it stand for 20 minutes. 3. Empty the bag, and let it air dry. When should you call for help?   Call your doctor now or seek immediate medical care if:  · You have symptoms of a urinary infection. These may include:  ? Pain or burning when you urinate. ? A frequent need to urinate without being able to pass much urine. ? Pain in the flank, which is just below the rib cage and above the waist on either side of the back. ? Blood in your urine. ? A fever. · Your urine smells bad. · You see large blood clots in your urine. · No urine or very little urine is flowing into the bag for 4 or more hours. Watch closely for changes in your health, and be sure to contact your doctor if:  · The area around the catheter becomes irritated, swollen, red, or tender, or there is pus draining from it. · Urine is leaking from the place where the catheter enters your body. Follow-up care is a key part of your treatment and safety. Be sure to make and go to all appointments, and call your doctor if you are having problems. It's also a good idea to know your test results and keep a list of the medicines you take. Where can you learn more? Go to http://edison-dean.info/. Enter U010 in the search box to learn more about \"Learning About Urinary Catheter Care to Prevent Infection. \"  Current as of: March 21, 2018  Content Version: 11.8  © 6887-5980 Healthwise, [employer]. Care instructions adapted under license by [employer] (which disclaims liability or warranty for this information). If you have questions about a medical condition or this instruction, always ask your healthcare professional. [place] [address] any warranty or liability for your use of this information.     DISCHARGE SUMMARY from Nurse    PATIENT INSTRUCTIONS:    After general anesthesia or intravenous sedation, for 24 hours or while taking prescription Narcotics:  · Limit your activities  · Do not drive and operate hazardous machinery  · Do not make important personal or business decisions  · Do  not drink alcoholic beverages  · If you have not urinated within 8 hours after discharge, please contact your surgeon on call. Report the following to your surgeon:  · Excessive pain, swelling, redness or odor of or around the surgical area  · Temperature over 100.5  · Nausea and vomiting lasting longer than 4 hours or if unable to take medications  · Any signs of decreased circulation or nerve impairment to extremity: change in color, persistent  numbness, tingling, coldness or increase pain  · Any questions    *  Please give a list of your current medications to your Primary Care Provider. *  Please update this list whenever your medications are discontinued, doses are      changed, or new medications (including over-the-counter products) are added. *  Please carry medication information at all times in case of emergency situations. These are general instructions for a healthy lifestyle:    No smoking/ No tobacco products/ Avoid exposure to second hand smoke  Surgeon General's Warning:  Quitting smoking now greatly reduces serious risk to your health. Obesity, smoking, and sedentary lifestyle greatly increases your risk for illness    A healthy diet, regular physical exercise & weight monitoring are important for maintaining a healthy lifestyle    You may be retaining fluid if you have a history of heart failure or if you experience any of the following symptoms:  Weight gain of 3 pounds or more overnight or 5 pounds in a week, increased swelling in our hands or feet or shortness of breath while lying flat in bed. Please call your doctor as soon as you notice any of these symptoms; do not wait until your next office visit. Recognize signs and symptoms of STROKE:    F-face looks uneven    A-arms unable to move or move unevenly    S-speech slurred or non-existent    T-time-call 911 as soon as signs and symptoms begin-DO NOT go       Back to bed or wait to see if you get better-TIME IS BRAIN. Warning Signs of HEART ATTACK     Call 911 if you have these symptoms:   Chest discomfort.  Most heart attacks involve discomfort in the center of the chest that lasts more than a few minutes, or that goes away and comes back. It can feel like uncomfortable pressure, squeezing, fullness, or pain.  Discomfort in other areas of the upper body. Symptoms can include pain or discomfort in one or both arms, the back, neck, jaw, or stomach.  Shortness of breath with or without chest discomfort.  Other signs may include breaking out in a cold sweat, nausea, or lightheadedness. Don't wait more than five minutes to call 911 - MINUTES MATTER! Fast action can save your life. Calling 911 is almost always the fastest way to get lifesaving treatment. Emergency Medical Services staff can begin treatment when they arrive -- up to an hour sooner than if someone gets to the hospital by car. The discharge information has been reviewed with the patient/friend. The patient verbalized understanding. Discharge medications reviewed with the patient and appropriate educational materials and side effects teaching were provided.   ___________________________________________________________________________________________________________________________________

## 2018-11-09 NOTE — H&P
Cyrena Balloon 66 y.o. female      Ms. Martín Vargas seen today for cystoscopy assessing recurrent urinary tract infections with microscopic hematuria-here today for cystoscopy and CT scan follow-up     CT scan imaging of the abdomen and pelvis on 9 October 2010 shows a 7 x 10 mm stone obstructing the left ureteropelvic junction causing left-sided hydronephrosis-stones measuring 5 and 7 mm are present in the dilated left collecting system. There are no stone densities or signs of obstruction in the right urinary tract. Images have been reviewed with the patient today on PACS downloaded for scanning into the EMR also laser printed for the patient     recurrent urinary tract infections associated with microscopic hematuria  Has experienced several episodes of urgency frequency with minimal dysuria finding urine cultures 20-60,000+ bacteria with symptoms resolving on short courses of antibiotic therapy  Has had microscopic hematuria noted several times during the past few years  No episodes of fever chills flank pain or gross hematuria  History of  tract disease, trauma, or surgery     Review of Systems:   CNS: No seizure syncope headaches dizziness or visual changes  Respiratory: Wheezing no cough no chest pain  Cardiovascular: Palpitations secondary to atrial fibrillation status post ablation Rx-Eliquis anticoagulation  Intestinal: No dyspepsia diarrhea or constipation  Urinary: Urgency frequency with dysuria  Skeletal: No bone or joint pain  Endocrine: no diabetes/ no thyroid disease  Other:           Allergies: Allergies   Allergen Reactions    Celebrex [Celecoxib] Hives    Iodinated Contrast- Oral And Iv Dye Hives       Patient was in CT for a cardiac scan. A test bolus of 20cc was given and patient broke out into hives immediately after. Cancelled the rest of the exam and escorted the patient to the ED for furhter evaluation.     Sulfa (Sulfonamide Antibiotics) Other (comments)       Patient states she is not allergic      Medications:           Current Outpatient Medications   Medication Sig Dispense Refill    cephALEXin (KEFLEX) 500 mg capsule Take 1 Cap by mouth four (4) times daily for 3 days. 12 Cap 0    DIGOX 125 mcg tablet TAKE 2 TABLETS TODAY THEN TAKE 1 TABLET BY MOUTH EVERY DAY 30 Tab 0    fluticasone (FLONASE) 50 mcg/actuation nasal spray 2 sprays up each nostril daily 1 Bottle 2    apixaban (ELIQUIS) 5 mg tablet Take 1 Tab by mouth two (2) times a day.  180 Tab 3    thiamine (VITAMIN B-1) 100 mg tablet Take  by mouth daily.        cyanocobalamin (VITAMIN B-12) 1,000 mcg tablet Take 1,000 mcg by mouth daily.        multivitamin (ONE A DAY) tablet Take 1 Tab by mouth daily.        amiodarone (PACERONE) 100 mg tablet Take one tablet by mouth daily 30 Tab 6              Past Medical History:   Diagnosis Date    Atrial fibrillation (HCC)      Attention deficit disorder without mention of hyperactivity      DUB (dysfunctional uterine bleeding)      Generalized osteoarthrosis, involving multiple sites      Grief reaction       Mother  recently - Celexa    Memory changes       Adderall for this    Recurrent UTI      Unspecified tinnitus              Past Surgical History:   Procedure Laterality Date    HX KNEE ARTHROSCOPY Right       Meniscus repair    HX KNEE ARTHROSCOPY Right       Removed piece of bone (FB)    HX KNEE ARTHROSCOPY Right       ACL repair      Social History            Socioeconomic History    Marital status:        Spouse name: Not on file    Number of children: Not on file    Years of education: Not on file    Highest education level: Not on file   Social Needs    Financial resource strain: Not on file    Food insecurity - worry: Not on file    Food insecurity - inability: Not on file   [facility] needs - medical: Not on file   [facility] needs - non-medical: Not on file   Occupational History    Not on file   Tobacco Use    Smoking status: Never Smoker    Smokeless tobacco: Never Used   Substance and Sexual Activity    Alcohol use: No    Drug use: No    Sexual activity: Not Currently   Other Topics Concern    Not on file   Social History Narrative    Not on file            Family History   Problem Relation Age of Onset    Diabetes Mother      Heart Disease Mother      Hypertension Mother      Heart Disease Father      Hypertension Father      Diabetes Maternal Grandmother           Physical Examination: Well-nourished mature female in no apparent distress  Neck: No masses nodes or bruits  Lungs: No wheezes rales or rhonchi  Heart: No murmurs gallops or rubs  Abdomen: Nontender no palpable masses no organomegaly no bruits  Back: No percussion CVA tenderness  Skin: Warm and dry no rash no lesions   Neurologic: No motor or sensory  deficits in lower extremities     Cystoscopy Report:     After prepping the introitus with Betadine solution and instilling 2% lidocaine jelly intraurethrally a flexible cystoscope was passed through the urethra into the bladder revealing normal urothelium throughout. There are no strictures, stones, tumors, or diverticuli. No trabeculation evident-ureteral orifices are both slitlike in appearance with clear urine jets observed from both sides.   There are no abnormal blood vessels-no glomerulations injection hemorrhages or friability evident-procedure was uncomplicated well-tolerated  EBL-none  Specimen-none     Urinalysis: ++heme/negative nitrite     Impression: Obstructing proximal left urinary tract stone/multiple left kidney stones           Plan: Ureteroscopic laser lithotripsy with stent placement            Kidney stone precautions            Cipro 500 mg twice daily times 3 days     Counseling Note:     Indications for an technique of ureteroscopic laser lithotripsy have been described discussed patient understands and accepts the risk of bleeding, infection, perforation, and discomfort from the presence of an indwelling stent postoperatively       Maia Horta MD

## 2018-11-10 NOTE — OP NOTES
[facility]  OPERATIVE REPORT    Rebel Priest  MR#: [id number]  : 1960  ACCOUNT #: [de-identified]   DATE OF SERVICE: 2018    PREOPERATIVE DIAGNOSIS:  Left urinary tract kidney stones. POSTOPERATIVE DIAGNOSIS:  Left urinary tract kidney stones. PROCEDURE PERFORMED:  Ureteroscopic laser lithotripsy with stent placement. SURGEON:  Renetta Llamas MD    ANESTHESIA:  General by LMA. ESTIMATED BLOOD LOSS:  None. SPECIMENS REMOVED:  None. TUBES AND DRAINS:  A 6-Central African x 24 cm double pigtail stent, Beckman catheter to gravity bag drainage. ASSISTANTS:  None. COMPLICATIONS:  None. IMPLANTS: none    INDICATIONS FOR OPERATION:  A 61-year-old female with intermittent colicky left flank pain, finding a stone measuring 7 x 9 mm obstructing the left ureteropelvic junction associated with left-sided hydronephrosis. The patient has had a history of kidney stone disease passing stones spontaneously and also undergoing endoscopic and ESWL procedures in the past.  The patient has had intermittent colicky flank pain for several years with a noncontrast imaging study 2 years ago showing a stone obstructing the ureteropelvic junction at that time associated with left side hydronephrosis. DESCRIPTION OF OPERATION:  After establishing adequate general anesthesia by LMA, the patient was placed in the dorsal lithotomy position, legs suspended in Michael stirrups cushioned with soft foam pad. External genitalia were then prepped with antibiotic scrub solution and draped in a sterile manner. A 20 Central African cystoscope was passed with visual obturator in place. Inspection of the urethra and bladder showed normal findings. There were no strictures, stones, tumors or diverticula evident. The left ureteral orifice was normal in appearance and easily admitted a 0.038 inch Glidewire, which was passed up the ureter. We then inserted a 5-Central African open-ended ureteral catheter.   The catheter and Glidewire were passed up to the ureteropelvic junction level where acute angulation of the ureter required several attempts at manipulation of the Glidewire through the open-ended catheter, but the floppy end of the Glidewire was successfully manipulated around the stone and allowed to coil within the renal pelvis. The Glidewire was left in place and the open-ended catheter was removed over the wire. A dilating balloon was then passed over the wire and the left ureter was dilated in 6 cm segments utilizing a balloon device having a maximum diameter of 18-Czech. There were no strictures encountered along the course of the left ureter as the ureter was dilated. A 13-15 Czech ureteral access sheath was then passed over the Glidewire, positioned such as the tip rested in the proximal ureter. A flexible ureteroscope was passed through the access sheath up to the ureteropelvic junction where a cannon crystalline stone was evident deeply imbedded within the walls of the ureteropelvic junction. Despite several attempts to push the stone back into the dilated renal pelvis, the stone would not budge and several large fragments could not be leveredged from their impacted positions. A holmium laser fiber was passed through the working channel of the ureteroscope and the stone was successfully but not thoroughly fragmented using the holmium laser lithotrite- and the opening in the ureteral lumen produced by  lithotripsy enabled the passage of the ureteroscope through the stone obstructed UPJ into the dilated left renal pelvis and collecting collecting system. A 1 cm stone was found in the middle pole kevin. Several more attempts were made to fragment the stone in the ureteropelvic junction, but the angulation of the UPJ prevented direct laser lithotripsy of the entire stone, but approximately 60-70% of the stone was successfully fragmented using this technique.   The remaining stone fragments were embedded deeply into the fibrotic UPJ and were not amenable to additional fragmentation with the laser device fearing disruption of the ureteropelvic junction by continued manipulation of the ureteroscope and laser fiber, the attempt to clear the UPJ of all stone material was abandoned. Instead a 6-Swedish x 24 cm double pigtail stent was passed over the Glidewire and positioned so that the proximal coil formed within the renal pelvis and distal coil formed within the lumen of the bladder. A retrograde study was then performed injecting contrast solution into the ureter, which showed moderate extravasation of contrast from the ureteropelvic junction region. The calices and renal pelvis were intact, but markedly dilated as visualized by C-arm fluoroscopic imaging. An 18-Swedish Beckman catheter was then passed into the bladder. Balloon inflated to 10 mL and placed to gravity drainage to ensure low pressure drainage of urine from the left urinary tract. The stone impacted in the ureteropelvic junction in this case had been present at that site for several years and has become intractably embedded in the walls of the ureter. Open stone surgery will be required to remove the stone and also excise the contracted and scarred ureteropelvic junction. This will be done at a later date following recovery from the endoscopic laser lithotripsy performed today. The patient was brought from the operating room to the PACU in good condition.       MD ANGEL Coppola /   D: 11/10/2018 00:18     T: 11/10/2018 09:00  JOB #: [id number]

## 2018-11-11 LAB
BACTERIA SPEC CULT: ABNORMAL
SERVICE CMNT-IMP: ABNORMAL

## 2018-11-12 ENCOUNTER — OFFICE VISIT (OUTPATIENT)
Dept: UROLOGY | Age: 58
End: 2018-11-12

## 2018-11-12 VITALS
HEART RATE: 92 BPM | OXYGEN SATURATION: 99 % | BODY MASS INDEX: 25.62 KG/M2 | WEIGHT: 183 LBS | SYSTOLIC BLOOD PRESSURE: 110 MMHG | HEIGHT: 71 IN | DIASTOLIC BLOOD PRESSURE: 60 MMHG

## 2018-11-12 DIAGNOSIS — N20.0 KIDNEY STONE: Primary | ICD-10-CM

## 2018-11-12 NOTE — PROGRESS NOTES
Ms. Rebecca Humphrey has a reminder for a \"due or due soon\" health maintenance. I have asked that she contact her primary care provider for follow-up on this health maintenance.

## 2018-11-12 NOTE — PATIENT INSTRUCTIONS
Kidney Stone: Care Instructions  Your Care Instructions    Kidney stones are formed when salts, minerals, and other substances normally found in the urine clump together. They can be as small as grains of sand or, rarely, as large as golf balls. While the stone is traveling through the ureter, which is the tube that carries urine from the kidney to the bladder, you will probably feel pain. The pain may be mild or very severe. You may also have some blood in your urine. As soon as the stone reaches the bladder, any intense pain should go away. If a stone is too large to pass on its own, you may need a medical procedure to help you pass the stone. The doctor has checked you carefully, but problems can develop later. If you notice any problems or new symptoms, get medical treatment right away. Follow-up care is a key part of your treatment and safety. Be sure to make and go to all appointments, and call your doctor if you are having problems. It's also a good idea to know your test results and keep a list of the medicines you take. How can you care for yourself at home? · Drink plenty of fluids, enough so that your urine is light yellow or clear like water. If you have kidney, heart, or liver disease and have to limit fluids, talk with your doctor before you increase the amount of fluids you drink. · Take pain medicines exactly as directed. Call your doctor if you think you are having a problem with your medicine. ? If the doctor gave you a prescription medicine for pain, take it as prescribed. ? If you are not taking a prescription pain medicine, ask your doctor if you can take an over-the-counter medicine. Read and follow all instructions on the label. · Your doctor may ask you to strain your urine so that you can collect your kidney stone when it passes. You can use a kitchen strainer or a tea strainer to catch the stone. Store it in a plastic bag until you see your doctor again.   Preventing future kidney stones  Some changes in your diet may help prevent kidney stones. Depending on the cause of your stones, your doctor may recommend that you:  · Drink plenty of fluids, enough so that your urine is light yellow or clear like water. If you have kidney, heart, or liver disease and have to limit fluids, talk with your doctor before you increase the amount of fluids you drink. · Limit coffee, tea, and alcohol. Also avoid grapefruit juice. · Do not take more than the recommended daily dose of vitamins C and D.  · Avoid antacids such as Gaviscon, Maalox, Mylanta, or Tums. · Limit the amount of salt (sodium) in your diet. · Eat a balanced diet that is not too high in protein. · Limit foods that are high in a substance called oxalate, which can cause kidney stones. These foods include dark green vegetables, rhubarb, chocolate, wheat bran, nuts, cranberries, and beans. When should you call for help? Call your doctor now or seek immediate medical care if:    · You cannot keep down fluids.     · Your pain gets worse.     · You have a fever or chills.     · You have new or worse pain in your back just below your rib cage (the flank area).     · You have new or more blood in your urine.    Watch closely for changes in your health, and be sure to contact your doctor if:    · You do not get better as expected. Where can you learn more? Go to http://edison-dean.info/. Enter Y976 in the search box to learn more about \"Kidney Stone: Care Instructions. \"  Current as of: March 15, 2018  Content Version: 11.8  © 8981-3069 [employer]. Care instructions adapted under license by [employer] (which disclaims liability or warranty for this information). If you have questions about a medical condition or this instruction, always ask your healthcare professional. [address] any warranty or liability for your use of this information.

## 2018-11-13 ENCOUNTER — DOCUMENTATION ONLY (OUTPATIENT)
Dept: UROLOGY | Age: 58
End: 2018-11-13

## 2018-11-13 NOTE — PROGRESS NOTES
Guadlupe Bible 66 y.o. female     Ms. Danna Raines seen today for kidney stone follow-up here today for Beckman catheter removal status post ureteroscopic laser lithotripsy of a left UPJ stone on 9 November 2018-impacted and embedded UPJ stone successfully fragmented with holmium laser but stone removal not possible because of tissue entrapment of stone    recurrent urinary tract infections with microscopic hematuria-here today for cystoscopy and CT scan follow-up     CT scan imaging of the abdomen and pelvis on 9 October 2010 shows a 7 x 10 mm stone obstructing the left ureteropelvic junction causing left-sided hydronephrosis-stones measuring 5 and 7 mm are present in the dilated left collecting system.  There are no stone densities or signs of obstruction in the right urinary tract.  Images have been reviewed with the patient today on PACS downloaded for scanning into the EMR also laser printed for the patient     recurrent urinary tract infections associated with microscopic hematuria  Has experienced several episodes of urgency frequency with minimal dysuria finding urine cultures 20-60,000+ bacteria with symptoms resolving on short courses of antibiotic therapy  Has had microscopic hematuria noted several times during the past few years  No episodes of fever chills flank pain or gross hematuria  History of  tract disease, trauma, or surgery     Review of Systems:   CNS: No seizure syncope headaches dizziness or visual changes  Respiratory: Wheezing no cough no chest pain  Cardiovascular: Palpitations secondary to atrial fibrillation status post ablation Rx-Eliquis anticoagulation  Intestinal: No dyspepsia diarrhea or constipation  Urinary: Urgency frequency with dysuria  Skeletal: No bone or joint pain  Endocrine: no diabetes/ no thyroid disease  Other:     Allergies:    Allergies   Allergen Reactions    Celebrex [Celecoxib] Hives    Iodinated Contrast- Oral And Iv Dye Hives     Patient was in CT for a cardiac scan. A test bolus of 20cc was given and patient broke out into hives immediately after. Cancelled the rest of the exam and escorted the patient to the ED for furhter evaluation.  Sulfa (Sulfonamide Antibiotics) Other (comments)     Its she's allergic to Celebrex      Medications:    Current Outpatient Medications   Medication Sig Dispense Refill    docusate sodium (COLACE) 100 mg capsule Take 1 Cap by mouth two (2) times a day for 5 days. 10 Cap 2    phenazopyridine (PYRIDIUM) 200 mg tablet Take 1 Tab by mouth three (3) times daily as needed for Pain for up to 3 days. 30 Tab 2    oxyCODONE IR (ROXICODONE) 5 mg immediate release tablet Take 1 Tab by mouth every four (4) hours as needed for Pain for up to 3 days. Max Daily Amount: 30 mg. 18 Tab 0    cephALEXin (KEFLEX) 250 mg capsule Take 1 Cap by mouth daily for 10 days. 10 Cap 0    DIGOX 125 mcg tablet TAKE 2 TABLETS TODAY THEN TAKE 1 TABLET BY MOUTH EVERY DAY 30 Tab 0    fluticasone (FLONASE) 50 mcg/actuation nasal spray 2 sprays up each nostril daily 1 Bottle 2    apixaban (ELIQUIS) 5 mg tablet Take 1 Tab by mouth two (2) times a day. 180 Tab 3    thiamine (VITAMIN B-1) 100 mg tablet Take  by mouth daily.  cyanocobalamin (VITAMIN B-12) 1,000 mcg tablet Take 1,000 mcg by mouth daily.  multivitamin (ONE A DAY) tablet Take 1 Tab by mouth daily.          Past Medical History:   Diagnosis Date    Adverse effect of anesthesia     Atrial fibrillation [facility])     had ablation in 2017    Attention deficit disorder without mention of hyperactivity     DUB (dysfunctional uterine bleeding)     Generalized osteoarthrosis, involving multiple sites     Grief reaction     Mother  recently - Celexa    Memory changes     Adderall for this    Recurrent UTI     Sleep apnea     cpap    Unspecified tinnitus       Past Surgical History:   Procedure Laterality Date    HX COLONOSCOPY      HX GYN      Laparoscopy    HX KNEE ARTHROSCOPY Right Meniscus repair    HX KNEE ARTHROSCOPY Right     Removed piece of bone (FB)    HX KNEE ARTHROSCOPY Right     ACL repair     Social History     Socioeconomic History    Marital status:      Spouse name: Not on file    Number of children: Not on file    Years of education: Not on file    Highest education level: Not on file   Social Needs    Financial resource strain: Not on file    Food insecurity - worry: Not on file    Food insecurity - inability: Not on file   [employer] needs - medical: Not on file   [employer] needs - non-medical: Not on file   Occupational History    Not on file   Tobacco Use    Smoking status: Never Smoker    Smokeless tobacco: Never Used   Substance and Sexual Activity    Alcohol use: No    Drug use: No    Sexual activity: Not Currently   Other Topics Concern    Not on file   Social History Narrative    Not on file      Family History   Problem Relation Age of Onset    Diabetes Mother     Heart Disease Mother     Hypertension Mother     Heart Disease Father     Hypertension Father     Diabetes Maternal Grandmother         Physical Examination: Well-nourished mature female in no apparent distress    Beckman catheter removed today        Impression: Impacted left UPJ kidney stone/stented        Plan: Described discussed prospect of laparoscopic  excision of UPJ obstructed by embedded kidney stone-referral to Dr. Kandy Sidhu    RTC 1 week stent removal      More than 1/2 of this 15 minute visit was spent in counselling and coordination of care, as described above. Kandy Calderón MD  -electronically signed-    PLEASE NOTE:  This document has been produced using voice recognition software. Unrecognized errors in transcription may be present.

## 2018-11-15 ENCOUNTER — OFFICE VISIT (OUTPATIENT)
Dept: CARDIOLOGY CLINIC | Age: 58
End: 2018-11-15

## 2018-11-15 VITALS
BODY MASS INDEX: 25.62 KG/M2 | WEIGHT: 183 LBS | HEART RATE: 68 BPM | SYSTOLIC BLOOD PRESSURE: 110 MMHG | HEIGHT: 71 IN | DIASTOLIC BLOOD PRESSURE: 70 MMHG

## 2018-11-15 DIAGNOSIS — Z86.79 S/P ABLATION OF ATRIAL FIBRILLATION: ICD-10-CM

## 2018-11-15 DIAGNOSIS — I48.0 INTERMITTENT ATRIAL FIBRILLATION (HCC): Primary | ICD-10-CM

## 2018-11-15 DIAGNOSIS — I50.32 DIASTOLIC CHF, CHRONIC (HCC): ICD-10-CM

## 2018-11-15 DIAGNOSIS — Z01.810 PREOP CARDIOVASCULAR EXAM: ICD-10-CM

## 2018-11-15 DIAGNOSIS — G47.30 SLEEP APNEA, UNSPECIFIED TYPE: ICD-10-CM

## 2018-11-15 DIAGNOSIS — Z98.890 S/P ABLATION OF ATRIAL FIBRILLATION: ICD-10-CM

## 2018-11-15 RX ORDER — DIGOXIN 125 MCG
0.12 TABLET ORAL
COMMUNITY
End: 2019-07-17 | Stop reason: ALTCHOICE

## 2018-11-15 NOTE — PROGRESS NOTES
History of Present Illness:  A 58 y.o. female here for follow up. She underwent pulmonary vein isolation for atrial fibrillation 17. I tapered her off Amiodarone and her last dose was 2018. She has been doing relatively well. Her last significant bike ride was 75 miles at the end of September. She denies any new chest pain, dyspnea, PND, orthopnea or edema. She had attempted kidney stone removal last Friday that was unsuccessful and she is going to go to [facility] next week. 
  
Impression/Plan:  
Recent kidney stones, unsuccessful removal and planning to be seen at [facility] on referral next week. History of paroxysmal symptomatic atrial fibrillation with pulmonary vein isolation 17 with flaco procedure hypotension,resolved. Intolerance to higher dose of AV blocking agents due to bradycardia and fatigue off Metoprolol. Previous Amiodarone use now discontinued, stable. History of historically normal left ventricular function by echocardiogram. 
Sleep apnea on treatment. Nuclear stress test 2017 with normal function. She is doing well off Amiodarone. She is planning to be seen at [facility] for her kidney stones. She just sold her house and will be staying with a friend and likely moving to the [facility]. She will see Dr. Arlyn Roach for now and if she needs to she could be referred to Dr. Skyla Barron at [phone], but for now I will see her back in four months. Past Medical History:  
Diagnosis Date  Adverse effect of anesthesia  Atrial fibrillation [facility])   
 had ablation in 2017  Attention deficit disorder without mention of hyperactivity  DUB (dysfunctional uterine bleeding)  Generalized osteoarthrosis, involving multiple sites  Grief reaction Mother  recently - Celexa  Memory changes Adderall for this  Recurrent UTI  Sleep apnea   
 cpap  Unspecified tinnitus Current Outpatient Medications Medication Sig Dispense Refill  digoxin (DIGITEK) 0.125 mg tablet Take 0.125 mg by mouth every Monday, Wednesday, Friday.  cephALEXin (KEFLEX) 250 mg capsule Take 1 Cap by mouth daily for 10 days. 10 Cap 0  
 fluticasone (FLONASE) 50 mcg/actuation nasal spray 2 sprays up each nostril daily 1 Bottle 2  
 apixaban (ELIQUIS) 5 mg tablet Take 1 Tab by mouth two (2) times a day. 180 Tab 3  
 thiamine (VITAMIN B-1) 100 mg tablet Take  by mouth daily.  cyanocobalamin (VITAMIN B-12) 1,000 mcg tablet Take 1,000 mcg by mouth daily.  multivitamin (ONE A DAY) tablet Take 1 Tab by mouth daily. Social History 
 reports that  has never smoked. she has never used smokeless tobacco. 
 reports that she does not drink alcohol. Family History 
family history includes Diabetes in her maternal grandmother and mother; Heart Disease in her father and mother; Hypertension in her father and mother. Review of Systems Except as stated above include: 
Constitutional: Negative for fever, chills and malaise/fatigue. HEENT: No congestion or recent URI. Gastrointestinal: No nausea, vomiting, abdominal pain, bloody stools. Pulmonary:  Negative except as stated above. Cardiac:  Negative except as stated above. Musculoskeletal: Negative except as stated above. Neurological:  No localized symptoms. Skin:  Negative except as stated above. Psych:  Negative except as stated above. Endocrine:  Negative except as stated above. PHYSICAL EXAM 
BP Readings from Last 3 Encounters:  
11/15/18 110/70  
11/12/18 110/60  
11/09/18 139/75 Pulse Readings from Last 3 Encounters:  
11/15/18 68  
11/12/18 92  
11/09/18 (!) 58 Wt Readings from Last 3 Encounters:  
11/15/18 83 kg (183 lb)  
11/12/18 83 kg (183 lb) 11/09/18 83.2 kg (183 lb 6.4 oz) General:   Well developed, well groomed. Head/Neck:   No jugular venous distention No carotid bruits. No evidence of xanthelasma. Lungs:   No respiratory distress. Clear bilaterally. Heart:    Regular rate and rhythm. Normal S1/S2. Palpation of heart with normal point of maximum impulse. No significant murmurs, rubs or gallops. Abdomen:   Soft and nontender. No palpable abdominal mass or bruits. Extremities:   Intact peripheral pulses. No significant edema. Neurological:   Alert and oriented to person, place, time. No focal neurological deficit visually. Skin:   No obvious rash Blood Pressure Metric: Roddy Love has been given the following recommendations today due to her elevated BP reading: controlled

## 2018-11-15 NOTE — PROGRESS NOTES
Chantal Friday presents today for No chief complaint on file. Chantal Friday preferred language for health care discussion is english/other Is someone accompanying this pt? No  
 
Is the patient using any DME equipment during OV? No  
 
Depression Screening: PHQ over the last two weeks 10/29/2018 Little interest or pleasure in doing things Not at all Feeling down, depressed, irritable, or hopeless Not at all Total Score PHQ 2 0 Learning Assessment: 
Learning Assessment 8/16/2018 PRIMARY LEARNER Patient HIGHEST LEVEL OF EDUCATION - PRIMARY LEARNER  -  
BARRIERS PRIMARY LEARNER -  
CO-LEARNER CAREGIVER -  
PRIMARY LANGUAGE ENGLISH  NEED -  
LEARNER PREFERENCE PRIMARY DEMONSTRATION  
ANSWERED BY Patient RELATIONSHIP SELF Abuse Screening: 
Abuse Screening Questionnaire 6/3/2016 Do you ever feel afraid of your partner? Jose Shane Are you in a relationship with someone who physically or mentally threatens you? Jose Shane Is it safe for you to go home? Eileen Preston Fall Risk Fall Risk Assessment, last 12 mths 6/3/2016 Able to walk? Yes Fall in past 12 months? No  
 
 
Pt currently taking Anticoagulant therapy? Eliquis Coordination of Care: 1. Have you been to the ER, urgent care clinic since your last visit? Hospitalized since your last visit? No  
 
2. Have you seen or consulted any other health care providers outside of the [address] [place] [address] since your last visit? Include any pap smears or colon screening.  No

## 2018-11-19 ENCOUNTER — OFFICE VISIT (OUTPATIENT)
Dept: UROLOGY | Age: 58
End: 2018-11-19

## 2018-11-19 VITALS
SYSTOLIC BLOOD PRESSURE: 115 MMHG | BODY MASS INDEX: 25.62 KG/M2 | HEIGHT: 71 IN | DIASTOLIC BLOOD PRESSURE: 75 MMHG | WEIGHT: 183 LBS | OXYGEN SATURATION: 100 % | HEART RATE: 65 BPM

## 2018-11-19 DIAGNOSIS — N20.0 KIDNEY STONE ON LEFT SIDE: Primary | ICD-10-CM

## 2018-11-19 DIAGNOSIS — N13.5 UPJ OBSTRUCTION, ACQUIRED: ICD-10-CM

## 2018-11-19 RX ORDER — DICLOFENAC SODIUM 10 MG/G
GEL TOPICAL 4 TIMES DAILY
COMMUNITY

## 2018-11-19 RX ORDER — INGENOL MEBUTATE 150 UG/G
GEL TOPICAL
Refills: 0 | COMMUNITY
Start: 2018-10-09 | End: 2018-12-05 | Stop reason: ALTCHOICE

## 2018-11-19 NOTE — PROGRESS NOTES
Edin Merrick 66 y.o. female     Ms. Saint Clair seen today for postop evaluation and ureteral stent removal status post flexible ureteroscopic laser lithotripsy treatment of a large proximal left ureteral stone leaving large fragments embedded in the UPJ-tethered stent removal planned for today   Referral to [facility] 4-6-week delay-patient made personal arrangements to be seen later this week at [facility] in [address] [place] [address] CT scan imaging requested    Beckman catheter removal on 12 November 2018 status post ureteroscopic laser lithotripsy of a left UPJ stone on 9 November 2018-impacted and embedded UPJ stone successfully fragmented with holmium laser but stone removal not possible because of tight adherence of surrounding tissue and trapping large fragments of stone       CT scan imaging of the abdomen and pelvis on 9 October 2010 shows a 7 x 10 mm stone obstructing the left ureteropelvic junction causing left-sided hydronephrosis-stones measuring 5 and 7 mm are present in the dilated left collecting system.  There are no stone densities or signs of obstruction in the right urinary tract.  Images have been reviewed with the patient today on PACS downloaded for scanning into the EMR also laser printed for the patient    Lumbar spine x-ray in April 2015 showed a 13 mm stone in the left ureteropelvic junction and an 8 mm stone in the lower pole of the left kidney noted as incidental findings    LUMBAR SPINE COMPLETE4/16/2015  [facility]  Result Impression   Impression:  Degenerative changes as described. No acute fracture. Likely left renal calculi as described. Result Narrative   Lumbar spine series, complete, 5 views including obliques:    5 non-rib-bearing lumbar vertebral bodies are present. Disc space narrowing is most prominent at L5-S1. Normal alignment. No acute fracture. No destructive bony lesion.  Suspect left renal calculi including a substantial calculus in the region of the renal pelvis measuring 13 mm. Additional 8 mm calculus overlying the lower pole left kidney. Status Results Details     Encounter Summary           recurrent urinary tract infections associated with microscopic hematuria  Has experienced several episodes of urgency frequency with minimal dysuria finding urine cultures 20-60,000+ bacteria with symptoms resolving on short courses of antibiotic therapy  Has had microscopic hematuria noted several times during the past few years  No episodes of fever chills flank pain or gross hematuria  History of  tract disease, trauma, or surgery     Review of Systems:   CNS: No seizure syncope headaches dizziness or visual changes  Respiratory: Wheezing no cough no chest pain  Cardiovascular: Palpitations secondary to atrial fibrillation status post ablation Rx-Eliquis anticoagulation  Intestinal: No dyspepsia diarrhea or constipation  Urinary: Urgency frequency with dysuria  Skeletal: No bone or joint pain  Endocrine: no diabetes/ no thyroid disease  Other:             Allergies: Allergies   Allergen Reactions    Celebrex [Celecoxib] Hives    Iodinated Contrast- Oral And Iv Dye Hives     Patient was in CT for a cardiac scan. A test bolus of 20cc was given and patient broke out into hives immediately after. Cancelled the rest of the exam and escorted the patient to the ED for furhter evaluation.  Sulfa (Sulfonamide Antibiotics) Other (comments)     Its she's allergic to Celebrex      Medications:    Current Outpatient Medications   Medication Sig Dispense Refill    diclofenac (VOLTAREN) 1 % gel Apply  to affected area four (4) times daily.  digoxin (DIGITEK) 0.125 mg tablet Take 0.125 mg by mouth every Monday, Wednesday, Friday.  cephALEXin (KEFLEX) 250 mg capsule Take 1 Cap by mouth daily for 10 days.  10 Cap 0    fluticasone (FLONASE) 50 mcg/actuation nasal spray 2 sprays up each nostril daily 1 Bottle 2    apixaban (ELIQUIS) 5 mg tablet Take 1 Tab by mouth two (2) times a day. 180 Tab 3    thiamine (VITAMIN B-1) 100 mg tablet Take  by mouth daily.  cyanocobalamin (VITAMIN B-12) 1,000 mcg tablet Take 1,000 mcg by mouth daily.  multivitamin (ONE A DAY) tablet Take 1 Tab by mouth daily.       PICATO 0.015 % gel APPLY TO THE FOREHEAD AND NOSE QHS FOR 3 NIGHTS  0       Past Medical History:   Diagnosis Date    Adverse effect of anesthesia     Atrial fibrillation [facility])     had ablation in 2017    Attention deficit disorder without mention of hyperactivity     DUB (dysfunctional uterine bleeding)     Generalized osteoarthrosis, involving multiple sites     Grief reaction     Mother  recently - Celexa    Memory changes     Adderall for this    Recurrent UTI     Sleep apnea     cpap    Unspecified tinnitus       Past Surgical History:   Procedure Laterality Date    HX COLONOSCOPY      HX GYN      Laparoscopy    HX KNEE ARTHROSCOPY Right     Meniscus repair    HX KNEE ARTHROSCOPY Right     Removed piece of bone (FB)    HX KNEE ARTHROSCOPY Right     ACL repair     Social History     Socioeconomic History    Marital status:      Spouse name: Not on file    Number of children: Not on file    Years of education: Not on file    Highest education level: Not on file   Social Needs    Financial resource strain: Not on file    Food insecurity - worry: Not on file    Food insecurity - inability: Not on file   [place]s needs - medical: Not on file   Fle[place]s needs - non-medical: Not on file   Occupational History    Not on file   Tobacco Use    Smoking status: Never Smoker    Smokeless tobacco: Never Used   Substance and Sexual Activity    Alcohol use: No    Drug use: No    Sexual activity: Not Currently   Other Topics Concern    Not on file   Social History Narrative    Not on file      Family History   Problem Relation Age of Onset    Diabetes Mother     Heart Disease Mother     Hypertension Mother     Heart Disease Father     Hypertension Father     Diabetes Maternal Grandmother         Physical Examination: Well-nourished mature female in no apparent distress      Tethered Beckman catheter was removed today easily intact and on encrusted        Impression: Impacted left UPJ stone/lower pole kidney stone                        Left UPJ obstruction      Plan: UPJ resection with pyelolithotomy and pyeloplasty by open surgery or by laparoscopic technique are indicated and recommended    Copy imaging studies for patient to take to [place] when seen at [facility] later this week    RTC PRN follow-up from Tony Molina MD  -electronically signed-    PLEASE NOTE:  This document has been produced using voice recognition software. Unrecognized errors in transcription may be present.

## 2018-11-19 NOTE — PATIENT INSTRUCTIONS
Learning About Hydronephrosis  What is hydronephrosis? Hydronephrosis is swelling of the kidneys. It is caused by a buildup of urine. This condition can happen if a tube that drains urine from your kidneys is blocked. The blockage can come from within the urinary tract or from pressure outside of the tract. Pregnancy is an example of an outside (external) cause. This condition is often caused by a blockage such as a kidney stone, tumor, or blood clot. It also can be caused by a problem in your urinary system that you were born with (congenital problem). What are the symptoms? Some of the common symptoms are:  · Pain in one or both sides. · Stomach pain. · Blood in your urine. Some people have no symptoms. How is it diagnosed? Your doctor will do an ultrasound to look for a blockage in your urinary system. An ultrasound allows your doctor to see a picture of the organs and other structures in your belly (abdomen). You also may need blood and urine tests. How is it treated? Your treatment depends on the cause of the swelling. If it is caused by a blockage, your treatment will depend on the type of blockage you have. If the blockage is caused by a kidney stone, you may wait for the stone to pass. If hydronephrosis happens during pregnancy, it usually clears up on its own. You may need to have urine drained from your bladder or kidneys. A urinary catheter is a small, flexible tube that can be inserted through the urethra and into the bladder, allowing urine to drain. A nephrostomy catheter is a thin tube placed into your kidney to drain urine. Sometimes surgery is needed to clear the blockage. If you have a blockage, you should begin to feel better after the blockage is gone. Many people recover and have no long-term problems. But some may have kidney damage. If hydronephrosis was left untreated for a long time, the damage can be severe. Severe damage will require further treatment.   Follow-up care is a key part of your treatment and safety. Be sure to make and go to all appointments, and call your doctor if you are having problems. It's also a good idea to know your test results and keep a list of the medicines you take. Where can you learn more? Go to http://edison-dean.info/. Enter S386 in the search box to learn more about \"Learning About Hydronephrosis. \"  Current as of: March 15, 2018  Content Version: 11.8  © 9733-3082 [employer]. Care instructions adapted under license by [employer] (which disclaims liability or warranty for this information). If you have questions about a medical condition or this instruction, always ask your healthcare professional. [address] any warranty or liability for your use of this information.

## 2018-11-19 NOTE — PROGRESS NOTES
Ms. Martín Vargas has a reminder for a \"due or due soon\" health maintenance. I have asked that she contact her primary care provider for follow-up on this health maintenance.

## 2018-11-20 ENCOUNTER — TELEPHONE (OUTPATIENT)
Dept: UROLOGY | Age: 58
End: 2018-11-20

## 2018-11-20 DIAGNOSIS — N20.0 KIDNEY STONE: Primary | ICD-10-CM

## 2018-11-20 RX ORDER — DIGOXIN 125 UG/1
TABLET ORAL
Qty: 30 TAB | Refills: 0 | Status: SHIPPED | OUTPATIENT
Start: 2018-11-20 | End: 2019-02-09 | Stop reason: SDUPTHER

## 2018-11-20 NOTE — TELEPHONE ENCOUNTER
Juice Faulkner at [facility] scheduling stated that after multiple attempts the patient refused to schedule the CT scan because it could not be done stat.

## 2018-11-28 RX ORDER — CIPROFLOXACIN 250 MG/1
TABLET, FILM COATED ORAL
Qty: 14 TAB | Refills: 0 | Status: CANCELLED | OUTPATIENT
Start: 2018-11-28

## 2018-12-04 ENCOUNTER — OFFICE VISIT (OUTPATIENT)
Dept: INTERNAL MEDICINE CLINIC | Age: 58
End: 2018-12-04

## 2018-12-04 VITALS
BODY MASS INDEX: 26.32 KG/M2 | OXYGEN SATURATION: 99 % | HEART RATE: 72 BPM | HEIGHT: 71 IN | WEIGHT: 188 LBS | DIASTOLIC BLOOD PRESSURE: 80 MMHG | SYSTOLIC BLOOD PRESSURE: 120 MMHG | TEMPERATURE: 98.4 F

## 2018-12-04 DIAGNOSIS — I48.0 PAROXYSMAL ATRIAL FIBRILLATION (HCC): ICD-10-CM

## 2018-12-04 DIAGNOSIS — Z01.818 PREOP EXAMINATION: Primary | ICD-10-CM

## 2018-12-04 DIAGNOSIS — N20.0 CALCIUM KIDNEY STONE: ICD-10-CM

## 2018-12-04 NOTE — PROGRESS NOTES
Yin Laws presents today for Chief Complaint Patient presents with  Mukund Woman Yin Laws preferred language for health care discussion is english. Is someone accompanying this pt? Yes friend Is the patient using any DME equipment during OV? no 
 
Depression Screening: PHQ over the last two weeks 12/4/2018 Little interest or pleasure in doing things Not at all Feeling down, depressed, irritable, or hopeless Not at all Total Score PHQ 2 0 Learning Assessment: 
Learning Assessment 8/16/2018 PRIMARY LEARNER Patient HIGHEST LEVEL OF EDUCATION - PRIMARY LEARNER  -  
BARRIERS PRIMARY LEARNER -  
CO-LEARNER CAREGIVER -  
PRIMARY LANGUAGE ENGLISH  NEED -  
LEARNER PREFERENCE PRIMARY DEMONSTRATION  
ANSWERED BY Patient RELATIONSHIP SELF Abuse Screening: 
Abuse Screening Questionnaire 6/3/2016 Do you ever feel afraid of your partner? Cosimo Blow Are you in a relationship with someone who physically or mentally threatens you? Cosimo Blow Is it safe for you to go home? Claudette Dunks Health Maintenance reviewed and discussed and ordered per Provider. Health Maintenance Due Topic Date Due  
 Hepatitis C Screening  1960  Pneumococcal 19-64 Medium Risk (1 of 1 - PPSV23) 03/14/1981  Shingrix Vaccine Age 50> (1 of 2) 03/14/2012  FOBT Q 1 YEAR AGE 50-75  03/14/2012  Influenza Age 5 to Adult  08/01/2018 Ken Mcnair Yin Laws is updated on all  Pt currently taking Antiplatelet therapy? Yes Eliquis Coordination of Care: 1. Have you been to the ER, urgent care clinic since your last visit? no  Hospitalized since your last visit? no 
 
2. Have you seen or consulted any other health care providers outside of the [address] [place] [address] since your last visit? no Include any pap smears or colon screening.  no

## 2018-12-05 NOTE — PROGRESS NOTES
The patient presents to the office today with the chief complaint of kidney stone and for a pre op evaluation HPI Shashank Sullivan complains of discomfort in her low left back. She has been found to have an impacted kidney stone high in the left renal pelvis (present since 4/2015). she is now scheduled for surgical removal.  Other than the effects from the sone the patient has no complaints. The patient denies chest pain or dyspnea. The patient is status pos an ablation due to atrial fibrillation (with a rapid ventricular response)  Initially she had some break through but this is now quiet. Review of Systems Respiratory: Negative for shortness of breath. Cardiovascular: Negative for chest pain and leg swelling. Musculoskeletal: Positive for back pain. Allergies Allergen Reactions  Celebrex [Celecoxib] Hives  Iodinated Contrast- Oral And Iv Dye Hives Patient was in CT for a cardiac scan. A test bolus of 20cc was given and patient broke out into hives immediately after. Cancelled the rest of the exam and escorted the patient to the ED for furhter evaluation.  Sulfa (Sulfonamide Antibiotics) Other (comments) Its she's allergic to Celebrex Current Outpatient Medications Medication Sig Dispense Refill  DIGOX 125 mcg tablet TAKE 2 TABLETS TODAY THEN TAKE 1 TABLET BY MOUTH EVERY DAY 30 Tab 0  
 diclofenac (VOLTAREN) 1 % gel Apply  to affected area four (4) times daily.  digoxin (DIGITEK) 0.125 mg tablet Take 0.125 mg by mouth every Monday, Wednesday, Friday.  fluticasone (FLONASE) 50 mcg/actuation nasal spray 2 sprays up each nostril daily 1 Bottle 2  
 apixaban (ELIQUIS) 5 mg tablet Take 1 Tab by mouth two (2) times a day. 180 Tab 3  
 thiamine (VITAMIN B-1) 100 mg tablet Take  by mouth daily.  cyanocobalamin (VITAMIN B-12) 1,000 mcg tablet Take 1,000 mcg by mouth daily.  multivitamin (ONE A DAY) tablet Take 1 Tab by mouth daily. Past Medical History:  
Diagnosis Date  Adverse effect of anesthesia  Atrial fibrillation [facility])   
 had ablation in 2017  Attention deficit disorder without mention of hyperactivity  DUB (dysfunctional uterine bleeding)  Generalized osteoarthrosis, involving multiple sites  Grief reaction Mother  recently - Celexa  Memory changes Adderall for this  Recurrent UTI  Sleep apnea   
 cpap  Unspecified tinnitus Past Surgical History:  
Procedure Laterality Date  HX COLONOSCOPY    HX GYN Laparoscopy  HX KNEE ARTHROSCOPY Right Meniscus repair  HX KNEE ARTHROSCOPY Right Removed piece of bone (FB)  
 HX KNEE ARTHROSCOPY Right ACL repair Social History Socioeconomic History  Marital status:  Spouse name: Not on file  Number of children: Not on file  Years of education: Not on file  Highest education level: Not on file Social Needs  Financial resource strain: Not on file  Food insecurity - worry: Not on file  Food insecurity - inability: Not on file  Transportation needs - medical: Not on file  Transportation needs - non-medical: Not on file Occupational History  Not on file Tobacco Use  Smoking status: Never Smoker  Smokeless tobacco: Never Used Substance and Sexual Activity  Alcohol use: No  
 Drug use: No  
 Sexual activity: Not Currently Other Topics Concern  Not on file Social History Narrative  Not on file Patient does have an advanced directive on file Visit Vitals /80 (BP 1 Location: Left arm, BP Patient Position: Sitting) Pulse 72 Temp 98.4 °F (36.9 °C) (Tympanic) Ht 5' 11\" (1.803 m) Wt 188 lb (85.3 kg) SpO2 99% BMI 26.22 kg/m² Physical Exam  
No Cervical Lymphadenopathy No Supraclavicular Lymphadenopathy Thyroid is Normal 
Lungs are normal to percussion. Clear to auscultation Heart:  S1 S2 are normal, No gallops, No murmurs No Carotid Bruits Abdomen:  Normal Bowel Sounds. No tenderness. No masses. No Hepatomegaly or Splenomegaly LE:  Strong Pedal Pulses. No Edema BMI:  OK 
 
Pre op testing:   
 
    EKG (11/5/2018)  (my review):  Normal Sinus Rhythm. Left anterior hemiblock, an additional intraventricular conduction delay -- unchanged from 8/16/2018 Labs:  CBC, Protime, PTT, CMP -- Normal  
               Urine Culture Pending Assessment / Plan ICD-10-CM ICD-9-CM 1. Preop examination Z01.818 V72.84   
2. Paroxysmal atrial fibrillation (HCC) I48.0 427.31   
3. Calcium kidney stone N20.0 592.0 THE PATIENT IS OK FOR SURGERY 
 
she was advised to continue her maintenance medications Follow-up Disposition: 
Return in about 3 months (around 3/4/2019). I asked Marcela Vega if she has any questions and I answered the questions. Marcela Vega states that she understands the treatment plan and agrees with the treatment plan

## 2018-12-10 ENCOUNTER — TELEPHONE (OUTPATIENT)
Dept: INTERNAL MEDICINE CLINIC | Age: 58
End: 2018-12-10

## 2018-12-10 DIAGNOSIS — N20.0 KIDNEY STONE: Primary | ICD-10-CM

## 2018-12-10 NOTE — TELEPHONE ENCOUNTER
Patient was notified of her appointment with [facility] on 1/8 at 10:45 with Dr. Mario Wang in  [address] [place] [address] at [address] [place] [address], ([phone])

## 2018-12-17 DIAGNOSIS — Z01.818 PRE-OP TESTING: Primary | ICD-10-CM

## 2018-12-17 DIAGNOSIS — N20.0 KIDNEY STONE: ICD-10-CM

## 2018-12-17 RX ORDER — APIXABAN 5 MG/1
TABLET, FILM COATED ORAL
Qty: 180 TAB | Refills: 0 | Status: SHIPPED | OUTPATIENT
Start: 2018-12-17 | End: 2019-04-08 | Stop reason: SDUPTHER

## 2019-02-10 RX ORDER — DIGOXIN 125 UG/1
TABLET ORAL
Qty: 30 TAB | Refills: 0 | Status: SHIPPED | OUTPATIENT
Start: 2019-02-10 | End: 2019-04-23 | Stop reason: SDUPTHER

## 2019-02-21 ENCOUNTER — OFFICE VISIT (OUTPATIENT)
Dept: CARDIOLOGY CLINIC | Age: 59
End: 2019-02-21

## 2019-02-21 VITALS
BODY MASS INDEX: 26.88 KG/M2 | WEIGHT: 192 LBS | HEART RATE: 77 BPM | OXYGEN SATURATION: 97 % | SYSTOLIC BLOOD PRESSURE: 118 MMHG | HEIGHT: 71 IN | DIASTOLIC BLOOD PRESSURE: 82 MMHG

## 2019-02-21 DIAGNOSIS — Z86.79 S/P ABLATION OF ATRIAL FIBRILLATION: Primary | ICD-10-CM

## 2019-02-21 DIAGNOSIS — Z98.890 S/P ABLATION OF ATRIAL FIBRILLATION: Primary | ICD-10-CM

## 2019-02-21 DIAGNOSIS — R53.83 FATIGUE, UNSPECIFIED TYPE: ICD-10-CM

## 2019-02-21 DIAGNOSIS — I48.0 INTERMITTENT ATRIAL FIBRILLATION (HCC): ICD-10-CM

## 2019-02-21 NOTE — PROGRESS NOTES
History of Present Illness: A 58 y.o. female here for follow up. The last time I saw her she had moved out of her house and was going to move to [place]. She actually moved into a condo in [place]. She has subsequently been quite busy moving. She also had kidney surgery at [facility]. She did well perioperatively. She has been doing quite well without any new chest pain, PND, dyspnea, orthopnea or edema. She has not been riding her bike as much as usual.  
  
Impression/Plan:  
History of kidney stones seen at [facility] status post surgery, doing well. Paroxysmal symptomatic atrial fibrillation with pulmonary vein isolation 17 with flaco procedure hypotension,rresolved. Intolerance to higher dose of AV blocking agents due to bradycardia and fatigue, now off Metoprolol. Previous Amiodarone use now discontinued. History of historically normal left ventricular function. Sleep apnea on treatment. Nuclear stress test 2017 with normal function, no ischemia. At this point I will see her in about six months. She tolerated her kidney surgery without issues. I recommend we continue Eliquis and Digoxin at the current dose for a total of two years post ablation before trying to back down. When I see her back in six months, I may consider a Holter monitor just to make sure she is not having any prolonged episodes of atrial fibrillation. All questions answered. Past Medical History:  
Diagnosis Date  Adverse effect of anesthesia  Atrial fibrillation [facility])   
 had ablation in 2017  Attention deficit disorder without mention of hyperactivity  DUB (dysfunctional uterine bleeding)  Generalized osteoarthrosis, involving multiple sites  Grief reaction Mother  recently - Celexa  Kidney stone  Memory changes Adderall for this  Recurrent UTI  Sleep apnea   
 cpap  Unspecified tinnitus Current Outpatient Medications Medication Sig Dispense Refill  DIGOX 125 mcg tablet TAKE 2 TABLETS TODAY THEN TAKE 1 TABLET BY MOUTH EVERY DAY 30 Tab 0  phenazopyridine (PYRIDIUM) 200 mg tablet TK 1 T PO  TID AS NEEDED FOR PAIN FOR UP TO 3 DAYS  2  
 ELIQUIS 5 mg tablet TAKE 1 TABLET BY MOUTH TWICE DAILY 180 Tab 0  
 diclofenac (VOLTAREN) 1 % gel Apply  to affected area four (4) times daily.  digoxin (DIGITEK) 0.125 mg tablet Take 0.125 mg by mouth every Monday, Wednesday, Friday.  fluticasone (FLONASE) 50 mcg/actuation nasal spray 2 sprays up each nostril daily 1 Bottle 2  
 thiamine (VITAMIN B-1) 100 mg tablet Take  by mouth daily.  cyanocobalamin (VITAMIN B-12) 1,000 mcg tablet Take 1,000 mcg by mouth daily.  multivitamin (ONE A DAY) tablet Take 1 Tab by mouth daily. Social History 
 reports that  has never smoked. she has never used smokeless tobacco. 
 reports that she does not drink alcohol. Family History 
family history includes Diabetes in her maternal grandmother and mother; Heart Disease in her father and mother; Hypertension in her father and mother. Review of Systems Except as stated above include: 
Constitutional: Negative for fever, chills and malaise/fatigue. HEENT: No congestion or recent URI. Gastrointestinal: No nausea, vomiting, abdominal pain, bloody stools. Pulmonary:  Negative except as stated above. Cardiac:  Negative except as stated above. Musculoskeletal: Negative except as stated above. Neurological:  No localized symptoms. Skin:  Negative except as stated above. Psych:  Negative except as stated above. Endocrine:  Negative except as stated above. PHYSICAL EXAM 
BP Readings from Last 3 Encounters:  
02/21/19 118/82  
01/08/19 120/60  
12/04/18 120/80 Pulse Readings from Last 3 Encounters:  
02/21/19 77  
12/04/18 72  
11/19/18 65 Wt Readings from Last 3 Encounters:  
02/21/19 87.1 kg (192 lb) 01/08/19 85.3 kg (188 lb) 12/04/18 85.3 kg (188 lb) General:   Well developed, well groomed. Head/Neck:   No jugular venous distention No carotid bruits. No evidence of xanthelasma. Lungs:   No respiratory distress. Clear bilaterally. Heart:    Regular rate and rhythm. Normal S1/S2. Palpation of heart with normal point of maximum impulse. No significant murmurs, rubs or gallops. Abdomen:   Soft and nontender. No palpable abdominal mass or bruits. Extremities:   Intact peripheral pulses. No significant edema. Neurological:   Alert and oriented to person, place, time. No focal neurological deficit visually. Skin:   No obvious rash Blood Pressure Metric: Juventino Baez has been given the following recommendations today due to her elevated BP reading: controlled

## 2019-02-21 NOTE — PROGRESS NOTES
Lela Fernandez presents today for Chief Complaint Patient presents with  Irregular Heart Beat  
  4 month follow up- no cardiac complaints Lela Fernandez preferred language for health care discussion is english/other. Is someone accompanying this pt? no 
 
Is the patient using any DME equipment during [address]? no 
 
Depression Screening: 
3 most recent PHQ Screens 12/4/2018 Little interest or pleasure in doing things Not at all Feeling down, depressed, irritable, or hopeless Not at all Total Score PHQ 2 0 Learning Assessment: 
Learning Assessment 8/16/2018 PRIMARY LEARNER Patient HIGHEST LEVEL OF EDUCATION - PRIMARY LEARNER  -  
BARRIERS PRIMARY LEARNER -  
CO-LEARNER CAREGIVER -  
PRIMARY LANGUAGE ENGLISH  NEED -  
LEARNER PREFERENCE PRIMARY DEMONSTRATION  
ANSWERED BY Patient RELATIONSHIP SELF Abuse Screening: 
Abuse Screening Questionnaire 6/3/2016 Do you ever feel afraid of your partner? Vivian Ye Are you in a relationship with someone who physically or mentally threatens you? Vivian Ye Is it safe for you to go home? Mona Shepherd Fall Risk Fall Risk Assessment, last 12 mths 6/3/2016 Able to walk? Yes Fall in past 12 months? No  
 
 
Pt currently taking Anticoagulant therapy? Eliquis Coordination of Care: 1. Have you been to the ER, urgent care clinic since your last visit? Hospitalized since your last visit? no 
 
2. Have you seen or consulted any other health care providers outside of the [address] [place] [address] since your last visit? Include any pap smears or colon screening.  no

## 2019-04-08 RX ORDER — APIXABAN 5 MG/1
TABLET, FILM COATED ORAL
Qty: 180 TAB | Refills: 3 | Status: SHIPPED | OUTPATIENT
Start: 2019-04-08 | End: 2020-03-30

## 2019-04-12 ENCOUNTER — OFFICE VISIT (OUTPATIENT)
Dept: FAMILY MEDICINE CLINIC | Facility: CLINIC | Age: 59
End: 2019-04-12

## 2019-04-12 VITALS
HEIGHT: 71 IN | OXYGEN SATURATION: 95 % | DIASTOLIC BLOOD PRESSURE: 82 MMHG | TEMPERATURE: 98.1 F | HEART RATE: 63 BPM | WEIGHT: 191 LBS | BODY MASS INDEX: 26.74 KG/M2 | SYSTOLIC BLOOD PRESSURE: 120 MMHG | RESPIRATION RATE: 20 BRPM

## 2019-04-12 DIAGNOSIS — M54.50 ACUTE LEFT-SIDED LOW BACK PAIN WITHOUT SCIATICA: Primary | ICD-10-CM

## 2019-04-12 DIAGNOSIS — N20.0 KIDNEY STONE: ICD-10-CM

## 2019-04-12 DIAGNOSIS — G60.9 IDIOPATHIC PERIPHERAL NEUROPATHY: ICD-10-CM

## 2019-04-12 DIAGNOSIS — Z98.890 S/P ABLATION OF ATRIAL FIBRILLATION: ICD-10-CM

## 2019-04-12 DIAGNOSIS — Z86.79 S/P ABLATION OF ATRIAL FIBRILLATION: ICD-10-CM

## 2019-04-12 LAB
BILIRUB UR QL STRIP: NEGATIVE
GLUCOSE UR-MCNC: NEGATIVE MG/DL
KETONES P FAST UR STRIP-MCNC: NEGATIVE MG/DL
PH UR STRIP: 5.5 [PH] (ref 4.6–8)
PROT UR QL STRIP: NEGATIVE
SP GR UR STRIP: 1.02 (ref 1–1.03)
UA UROBILINOGEN AMB POC: NORMAL (ref 0.2–1)
URINALYSIS CLARITY POC: CLEAR
URINALYSIS COLOR POC: YELLOW
URINE BLOOD POC: NORMAL
URINE LEUKOCYTES POC: NORMAL
URINE NITRITES POC: NEGATIVE

## 2019-04-13 LAB
A-G RATIO,AGRAT: 1.7 RATIO (ref 1.1–2.6)
ABSOLUTE LYMPHOCYTE COUNT, 10803: 1.1 K/UL (ref 1–4.8)
ALBUMIN SERPL-MCNC: 4.7 G/DL (ref 3.5–5)
ALP SERPL-CCNC: 80 U/L (ref 25–115)
ALT SERPL-CCNC: 12 U/L (ref 5–40)
ANION GAP SERPL CALC-SCNC: 12 MMOL/L
AST SERPL W P-5'-P-CCNC: 16 U/L (ref 10–37)
BASOPHILS # BLD: 0 K/UL (ref 0–0.2)
BASOPHILS NFR BLD: 0 % (ref 0–2)
BILIRUB SERPL-MCNC: 0.5 MG/DL (ref 0.2–1.2)
BUN SERPL-MCNC: 20 MG/DL (ref 6–22)
CALCIUM SERPL-MCNC: 9.4 MG/DL (ref 8.4–10.5)
CHLORIDE SERPL-SCNC: 100 MMOL/L (ref 98–110)
CO2 SERPL-SCNC: 31 MMOL/L (ref 20–32)
CREAT SERPL-MCNC: 0.9 MG/DL (ref 0.5–1.2)
EOSINOPHIL # BLD: 0.1 K/UL (ref 0–0.5)
EOSINOPHIL NFR BLD: 2 % (ref 0–6)
ERYTHROCYTE [DISTWIDTH] IN BLOOD BY AUTOMATED COUNT: 12.9 % (ref 10–15.5)
GFRAA, 66117: >60
GFRNA, 66118: >60
GLOBULIN,GLOB: 2.8 G/DL (ref 2–4)
GLUCOSE SERPL-MCNC: 85 MG/DL (ref 70–99)
GRANULOCYTES,GRANS: 59 % (ref 40–75)
HCT VFR BLD AUTO: 43 % (ref 35.1–48)
HGB BLD-MCNC: 13.3 G/DL (ref 11.7–16)
LYMPHOCYTES, LYMLT: 28 % (ref 20–45)
MCH RBC QN AUTO: 30 PG (ref 26–34)
MCHC RBC AUTO-ENTMCNC: 31 G/DL (ref 31–36)
MCV RBC AUTO: 98 FL (ref 80–95)
MONOCYTES # BLD: 0.5 K/UL (ref 0.1–1)
MONOCYTES NFR BLD: 12 % (ref 3–12)
NEUTROPHILS # BLD AUTO: 2.4 K/UL (ref 1.8–7.7)
PLATELET # BLD AUTO: 177 K/UL (ref 140–440)
PMV BLD AUTO: 12.5 FL (ref 9–13)
POTASSIUM SERPL-SCNC: 4.7 MMOL/L (ref 3.5–5.5)
PROT SERPL-MCNC: 7.5 G/DL (ref 6.4–8.3)
RBC # BLD AUTO: 4.38 M/UL (ref 3.8–5.2)
SODIUM SERPL-SCNC: 143 MMOL/L (ref 133–145)
TSH SERPL DL<=0.005 MIU/L-ACNC: 0.39 MCU/ML (ref 0.27–4.2)
WBC # BLD AUTO: 4 K/UL (ref 4–11)

## 2019-04-15 ENCOUNTER — TELEPHONE (OUTPATIENT)
Dept: FAMILY MEDICINE CLINIC | Facility: CLINIC | Age: 59
End: 2019-04-15

## 2019-04-15 NOTE — TELEPHONE ENCOUNTER
Patient called about her labs she has seen her results  on my chart and was concern  and wanted to know if she needs to get the T3  And T4 please advise [phone]

## 2019-04-17 NOTE — TELEPHONE ENCOUNTER
I called the patient back and stated that the TSH was normal and she did not need to get a T3 or T4. Left on VM.

## 2019-04-20 NOTE — PROGRESS NOTES
The patient presents to the office today with the chief complaint of left low back pain HPI The patient is status post removal of a large kidney stone on the right. This has been removed. The patient now complains of pain and stiffness in her left flank. Worse with exercise. There is no radiation to the pain. The patient is status post an ablation for atrial fibrillation. She has had rare brief beak throughs. The patient remains on Eliquis. Review of Systems Respiratory: Negative for shortness of breath. Cardiovascular: Negative for chest pain and leg swelling. Musculoskeletal: Positive for back pain. Allergies Allergen Reactions  Celebrex [Celecoxib] Hives  Iodinated Contrast- Oral And Iv Dye Hives Patient was in CT for a cardiac scan. A test bolus of 20cc was given and patient broke out into hives immediately after. Cancelled the rest of the exam and escorted the patient to the ED for furhter evaluation.  Sulfa (Sulfonamide Antibiotics) Other (comments) Its she's allergic to Celebrex Other reaction(s): Other (comments) Its she's allergic to Celebrex Current Outpatient Medications Medication Sig Dispense Refill  ELIQUIS 5 mg tablet TAKE 1 TABLET BY MOUTH TWICE DAILY 180 Tab 3  
 DIGOX 125 mcg tablet TAKE 2 TABLETS TODAY THEN TAKE 1 TABLET BY MOUTH EVERY DAY 30 Tab 0  
 diclofenac (VOLTAREN) 1 % gel Apply  to affected area four (4) times daily.  digoxin (DIGITEK) 0.125 mg tablet Take 0.125 mg by mouth every Monday, Wednesday, Friday.  fluticasone (FLONASE) 50 mcg/actuation nasal spray 2 sprays up each nostril daily 1 Bottle 2  
 thiamine (VITAMIN B-1) 100 mg tablet Take  by mouth daily.  cyanocobalamin (VITAMIN B-12) 1,000 mcg tablet Take 1,000 mcg by mouth daily.  multivitamin (ONE A DAY) tablet Take 1 Tab by mouth daily. Past Medical History:  
Diagnosis Date  Adverse effect of anesthesia  Atrial fibrillation [facility])   
 had ablation in 2017  Attention deficit disorder without mention of hyperactivity  DUB (dysfunctional uterine bleeding)  Generalized osteoarthrosis, involving multiple sites  Grief reaction Mother  recently - Celexa  Kidney stone  Memory changes Adderall for this  Recurrent UTI  Sleep apnea   
 cpap  Unspecified tinnitus Past Surgical History:  
Procedure Laterality Date  HX COLONOSCOPY    HX GYN Laparoscopy  HX KNEE ARTHROSCOPY Right Meniscus repair  HX KNEE ARTHROSCOPY Right Removed piece of bone (FB)  
 HX KNEE ARTHROSCOPY Right ACL repair  HX LITHOTRIPSY  2018  HX UROLOGICAL  2018 [facility]  
 HX UROLOGICAL  2019 Cysto w/ stent removal - Dr. oHlden Cherry, [facility].  IR CHANGE NEPHROSTOMY PYELOS TUBE LT  2018 Social History Socioeconomic History  Marital status:  Spouse name: Not on file  Number of children: Not on file  Years of education: Not on file  Highest education level: Not on file Occupational History  Not on file Social Needs  Financial resource strain: Not on file  Food insecurity:  
  Worry: Not on file Inability: Not on file  Transportation needs:  
  Medical: Not on file Non-medical: Not on file Tobacco Use  Smoking status: Never Smoker  Smokeless tobacco: Never Used Substance and Sexual Activity  Alcohol use: No  
 Drug use: No  
 Sexual activity: Not Currently Lifestyle  Physical activity:  
  Days per week: Not on file Minutes per session: Not on file  Stress: Not on file Relationships  Social connections:  
  Talks on phone: Not on file Gets together: Not on file Attends Hinduism service: Not on file Active member of club or organization: Not on file Attends meetings of clubs or organizations: Not on file Relationship status: Not on file  Intimate partner violence:  
  Fear of current or ex partner: Not on file Emotionally abused: Not on file Physically abused: Not on file Forced sexual activity: Not on file Other Topics Concern  Not on file Social History Narrative  Not on file Patient does have an advanced directive on file Visit Vitals /82 Pulse 63 Temp 98.1 °F (36.7 °C) (Tympanic) Resp 20 Ht 5' 11\" (1.803 m) Wt 191 lb (86.6 kg) SpO2 95% BMI 26.64 kg/m² Physical Exam  
No Cervical Lymphadenopathy No Supraclavicular Lymphadenopathy Thyroid is Normal 
Lungs are normal to percussion. Clear to auscultation Heart:  S1 S2 are normal, No gallops, No murmurs No Carotid Bruits Abdomen:  Normal Bowel Sounds. No tenderness. No masses. No Hepatomegaly or Splenomegaly Tight muscles over her left flank LE:  Strong Pedal Pulses. No Edema BMI:  OK Office Visit on 04/12/2019 Component Date Value Ref Range Status  Color (UA POC) 04/12/2019 Yellow   Final  
 Clarity (UA POC) 04/12/2019 Clear   Final  
 Glucose (UA POC) 04/12/2019 Negative  Negative Final  
 Bilirubin (UA POC) 04/12/2019 Negative  Negative Final  
 Ketones (UA POC) 04/12/2019 Negative  Negative Final  
 Specific gravity (UA POC) 04/12/2019 1.020  1.001 - 1.035 Final  
 Blood (UA POC) 04/12/2019 Trace  Negative Final  
 pH (UA POC) 04/12/2019 5.5  4.6 - 8.0 Final  
 Protein (UA POC) 04/12/2019 Negative  Negative Final  
 Urobilinogen (UA POC) 04/12/2019 0.2 mg/dL  0.2 - 1 Final  
 Nitrites (UA POC) 04/12/2019 Negative  Negative Final  
 Leukocyte esterase (UA POC) 04/12/2019 1+  Negative Final  
 TSH 04/12/2019 0.39  0.27 - 4.20 mcU/mL Final  
 Glucose 04/12/2019 85  70 - 99 mg/dL Final  
 BUN 04/12/2019 20  6 - 22 mg/dL Final  
 Creatinine 04/12/2019 0.9  0.5 - 1.2 mg/dL Final  
  Sodium 04/12/2019 143  133 - 145 mmol/L Final  
 Potassium 04/12/2019 4.7  3.5 - 5.5 mmol/L Final  
 Chloride 04/12/2019 100  98 - 110 mmol/L Final  
 CO2 04/12/2019 31  20 - 32 mmol/L Final  
 AST (SGOT) 04/12/2019 16  10 - 37 U/L Final  
 ALT (SGPT) 04/12/2019 12  5 - 40 U/L Final  
 Alk. phosphatase 04/12/2019 80  25 - 115 U/L Final  
 Bilirubin, total 04/12/2019 0.5  0.2 - 1.2 mg/dL Final  
 Calcium 04/12/2019 9.4  8.4 - 10.5 mg/dL Final  
 Protein, total 04/12/2019 7.5  6.4 - 8.3 g/dL Final  
 Albumin 04/12/2019 4.7  3.5 - 5.0 g/dL Final  
 A-G Ratio 04/12/2019 1.7  1.1 - 2.6 ratio Final  
 Globulin 04/12/2019 2.8  2.0 - 4.0 g/dL Final  
 Anion gap 04/12/2019 12.0  mmol/L Final  
 Comment: Test includes Albumin, Alkaline Phosphatase, ALT, AST, BUN, Calcium, CO2, Chloride, Creatinine, Glucose, Potassium, Sodium, Total Bilirubin and Total 
Protein. Estimated GFR results are reported in mL/min/1.73 sq.m. by the MDRD equation. This eGFR is validated for stable chronic renal failure patients. This  
equation 
is unreliable in acute illness or patients with normal renal function.  GFRAA 04/12/2019 >60.0  >60.0 Final  
 GFRNA 04/12/2019 >60.0  >60.0 Final  
 WBC 04/12/2019 4.0  4.0 - 11.0 K/uL Final  
 RBC 04/12/2019 4.38  3.80 - 5.20 M/uL Final  
 HGB 04/12/2019 13.3  11.7 - 16.0 g/dL Final  
 HCT 04/12/2019 43.0  35.1 - 48.0 % Final  
 MCV 04/12/2019 98* 80 - 95 fL Final  
 MCH 04/12/2019 30  26 - 34 pg Final  
 MCHC 04/12/2019 31  31 - 36 g/dL Final  
 RDW 04/12/2019 12.9  10.0 - 15.5 % Final  
 PLATELET 61/88/7554 761  140 - 440 K/uL Final  
 MPV 04/12/2019 12.5  9.0 - 13.0 fL Final  
 NEUTROPHILS 04/12/2019 59  40 - 75 % Final  
 Lymphocytes 04/12/2019 28  20 - 45 % Final  
 MONOCYTES 04/12/2019 12  3 - 12 % Final  
 EOSINOPHILS 04/12/2019 2  0 - 6 % Final  
 BASOPHILS 04/12/2019 0  0 - 2 % Final  
 ABS.  NEUTROPHILS 04/12/2019 2.4  1.8 - 7.7 K/uL Final  
  ABSOLUTE LYMPHOCYTE COUNT 04/12/2019 1.1  1.0 - 4.8 K/uL Final  
 ABS. MONOCYTES 04/12/2019 0.5  0.1 - 1.0 K/uL Final  
 ABS. EOSINOPHILS 04/12/2019 0.1  0.0 - 0.5 K/uL Final  
 ABS. BASOPHILS 04/12/2019 0.0  0.0 - 0.2 K/uL Final  
 
 
. Results for orders placed or performed in visit on 04/12/19 TSH 3RD GENERATION Result Value Ref Range TSH 0.39 0.27 - 4.20 mcU/mL Narrative Unless additionally indicated, test performed at: [facility], [address] [place] [address]. PH: [phone]. METABOLIC PANEL, COMPREHENSIVE Result Value Ref Range Glucose 85 70 - 99 mg/dL BUN 20 6 - 22 mg/dL Creatinine 0.9 0.5 - 1.2 mg/dL Sodium 143 133 - 145 mmol/L Potassium 4.7 3.5 - 5.5 mmol/L Chloride 100 98 - 110 mmol/L  
 CO2 31 20 - 32 mmol/L  
 AST (SGOT) 16 10 - 37 U/L  
 ALT (SGPT) 12 5 - 40 U/L Alk. phosphatase 80 25 - 115 U/L Bilirubin, total 0.5 0.2 - 1.2 mg/dL Calcium 9.4 8.4 - 10.5 mg/dL Protein, total 7.5 6.4 - 8.3 g/dL Albumin 4.7 3.5 - 5.0 g/dL A-G Ratio 1.7 1.1 - 2.6 ratio Globulin 2.8 2.0 - 4.0 g/dL Anion gap 12.0 mmol/L  
 GFRAA >60.0 >60.0 GFRNA >60.0 >60.0 Narrative Unless additionally indicated, test performed at: [employer], [address] [place] [address]. PH: [phone]. CBC WITH AUTOMATED DIFF Result Value Ref Range WBC 4.0 4.0 - 11.0 K/uL  
 RBC 4.38 3.80 - 5.20 M/uL  
 HGB 13.3 11.7 - 16.0 g/dL HCT 43.0 35.1 - 48.0 % MCV 98 (H) 80 - 95 fL  
 MCH 30 26 - 34 pg MCHC 31 31 - 36 g/dL  
 RDW 12.9 10.0 - 15.5 % PLATELET 987 397 - 565 K/uL MPV 12.5 9.0 - 13.0 fL  
 NEUTROPHILS 59 40 - 75 % Lymphocytes 28 20 - 45 % MONOCYTES 12 3 - 12 % EOSINOPHILS 2 0 - 6 % BASOPHILS 0 0 - 2 %  
 ABS. NEUTROPHILS 2.4 1.8 - 7.7 K/uL ABSOLUTE LYMPHOCYTE COUNT 1.1 1.0 - 4.8 K/uL  
 ABS. MONOCYTES 0.5 0.1 - 1.0 K/uL  
 ABS. EOSINOPHILS 0.1 0.0 - 0.5 K/uL ABS. BASOPHILS 0.0 0.0 - 0.2 K/uL Narrative Unless additionally indicated, test performed at: [address], [address] [place] [address]. PH: [phone]. AMB POC URINALYSIS DIP STICK AUTO W/O MICRO Result Value Ref Range Color (UA POC) Yellow Clarity (UA POC) Clear Glucose (UA POC) Negative Negative Bilirubin (UA POC) Negative Negative Ketones (UA POC) Negative Negative Specific gravity (UA POC) 1.020 1.001 - 1.035 Blood (UA POC) Trace Negative pH (UA POC) 5.5 4.6 - 8.0 Protein (UA POC) Negative Negative Urobilinogen (UA POC) 0.2 mg/dL 0.2 - 1 Nitrites (UA POC) Negative Negative Leukocyte esterase (UA POC) 1+ Negative Assessment / Plan ICD-10-CM ICD-9-CM 1. Acute left-sided low back pain without sciatica M54.5 724.2 2. S/P ablation of atrial fibrillation Z98.890 V45.89 CBC WITH AUTOMATED DIFF  
 H93.49  METABOLIC PANEL, COMPREHENSIVE  
   TSH 3RD GENERATION  
   TSH 3RD GENERATION  
   METABOLIC PANEL, COMPREHENSIVE  
   CBC WITH AUTOMATED DIFF 3. Idiopathic peripheral neuropathy G60.9 356.9 CBC WITH AUTOMATED DIFF  
   METABOLIC PANEL, COMPREHENSIVE  
   METABOLIC PANEL, COMPREHENSIVE  
   CBC WITH AUTOMATED DIFF 4. Kidney stone N20.0 592.0 AMB POC URINALYSIS DIP STICK AUTO W/O MICRO  
   CBC WITH AUTOMATED DIFF  
   METABOLIC PANEL, COMPREHENSIVE  
   METABOLIC PANEL, COMPREHENSIVE  
   CBC WITH AUTOMATED DIFF Labs ordered Moist heat to the area Avoid exercises that aggravate the back 
she was advised to continue her maintenance medications Follow-up and Dispositions · Return in about 4 months (around 8/19/2019). I asked Kathy Merlin. Ninfa Brisk if she has any questions and I answered the questions. Kathy Merlin. Ninfa Brisk states that she understands the treatment plan and agrees with the treatment plan

## 2019-04-24 RX ORDER — DIGOXIN 125 UG/1
TABLET ORAL
Qty: 30 TAB | Refills: 0 | Status: SHIPPED | OUTPATIENT
Start: 2019-04-24 | End: 2019-06-15 | Stop reason: SDUPTHER

## 2019-05-07 RX ORDER — FLUTICASONE PROPIONATE 50 MCG
SPRAY, SUSPENSION (ML) NASAL
Qty: 1 BOTTLE | Refills: 2 | Status: SHIPPED | OUTPATIENT
Start: 2019-05-07 | End: 2021-02-05

## 2019-05-14 ENCOUNTER — OFFICE VISIT (OUTPATIENT)
Dept: FAMILY MEDICINE CLINIC | Facility: CLINIC | Age: 59
End: 2019-05-14

## 2019-05-14 VITALS
OXYGEN SATURATION: 99 % | WEIGHT: 195 LBS | DIASTOLIC BLOOD PRESSURE: 80 MMHG | BODY MASS INDEX: 27.3 KG/M2 | TEMPERATURE: 98.2 F | HEART RATE: 62 BPM | HEIGHT: 71 IN | RESPIRATION RATE: 16 BRPM | SYSTOLIC BLOOD PRESSURE: 128 MMHG

## 2019-05-14 DIAGNOSIS — J06.9 ACUTE URI: Primary | ICD-10-CM

## 2019-05-14 DIAGNOSIS — R20.0 LEFT ARM NUMBNESS: ICD-10-CM

## 2019-05-14 DIAGNOSIS — Z98.890 S/P ABLATION OF ATRIAL FIBRILLATION: ICD-10-CM

## 2019-05-14 DIAGNOSIS — Z86.79 S/P ABLATION OF ATRIAL FIBRILLATION: ICD-10-CM

## 2019-05-14 RX ORDER — IMIQUIMOD 12.5 MG/.25G
CREAM TOPICAL
COMMUNITY
End: 2019-07-17 | Stop reason: ALTCHOICE

## 2019-05-14 RX ORDER — AZITHROMYCIN 250 MG/1
TABLET, FILM COATED ORAL
Qty: 6 TAB | Refills: 0 | Status: SHIPPED | OUTPATIENT
Start: 2019-05-14 | End: 2019-05-19

## 2019-05-14 NOTE — PROGRESS NOTES
1. Have you been to the ER, urgent care clinic since your last visit? Hospitalized since your last visit? No 
 
2. Have you seen or consulted any other health care providers outside of the [address] [place] [address] since your last visit? Include any pap smears or colon screening. Dr. Cem [facility] Chief Complaint Patient presents with  
 Headache  
  x 2 weeks  Sore Throat  
  x 2 weeks

## 2019-05-15 LAB
DIGOXIN SERPL-MCNC: <0.3 NG/ML (ref 0.8–2)
T4 FREE SERPL-MCNC: 1.5 NG/DL (ref 0.9–1.8)

## 2019-05-15 NOTE — PROGRESS NOTES
The patient presents to the office today with the chief complaint of sinus congestion HPI The patient complains of 5 days of sinus congestion with drainage and sore throat. The patient denies fever. The patient has history of atrial fibrillation. The patient is status post ablation. The patient denies palpitations. The patient had been doing well otherwise. The patient did have an episode 2 nights ago when she noticed numbness in her left hand and left arm. When she repositioned the arm symptoms eventually resolved after lasting 15 to 20 minutes. There is been no recurrence. ROS Positive for sinus congestion as above. The patient denies chest pain, dyspnea, or swelling lower extremities. Allergies Allergen Reactions  Celebrex [Celecoxib] Hives  Iodinated Contrast- Oral And Iv Dye Hives Patient was in CT for a cardiac scan. A test bolus of 20cc was given and patient broke out into hives immediately after. Cancelled the rest of the exam and escorted the patient to the ED for furhter evaluation.  Sulfa (Sulfonamide Antibiotics) Other (comments) Its she's allergic to Celebrex Other reaction(s): Other (comments) Its she's allergic to Celebrex Current Outpatient Medications Medication Sig Dispense Refill  imiquimod (ALDARA) 5 % cream Apply  to affected area Every Thursday. apply a thin layer as directed  azithromycin (ZITHROMAX) 250 mg tablet Take 2 tablets today, then take 1 tablet daily 6 Tab 0  
 fluticasone propionate (FLONASE) 50 mcg/actuation nasal spray SPRAY 2 SPRAYS IN EACH NOSTRIL DAILY 1 Bottle 2  
 DIGOX 125 mcg tablet TAKE 2 TABLETS BY MOUTH TODAY THEN TAKE 1 TABLET BY MOUTH EVERY DAY 30 Tab 0  
 ELIQUIS 5 mg tablet TAKE 1 TABLET BY MOUTH TWICE DAILY 180 Tab 3  
 diclofenac (VOLTAREN) 1 % gel Apply  to affected area four (4) times daily.  digoxin (DIGITEK) 0.125 mg tablet Take 0.125 mg by mouth every Monday, Wednesday, Friday.  thiamine (VITAMIN B-1) 100 mg tablet Take  by mouth daily.  cyanocobalamin (VITAMIN B-12) 1,000 mcg tablet Take 1,000 mcg by mouth daily.  multivitamin (ONE A DAY) tablet Take 1 Tab by mouth daily. Past Medical History:  
Diagnosis Date  Adverse effect of anesthesia  Atrial fibrillation [facility])   
 had ablation in 2017  Attention deficit disorder without mention of hyperactivity  DUB (dysfunctional uterine bleeding)  Generalized osteoarthrosis, involving multiple sites  Grief reaction Mother  recently - Celexa  Kidney stone  Memory changes Adderall for this  Recurrent UTI  Sleep apnea   
 cpap  Unspecified tinnitus Past Surgical History:  
Procedure Laterality Date  HX COLONOSCOPY    HX GYN Laparoscopy  HX KNEE ARTHROSCOPY Right Meniscus repair  HX KNEE ARTHROSCOPY Right Removed piece of bone (FB)  
 HX KNEE ARTHROSCOPY Right ACL repair  HX LITHOTRIPSY  2018  HX UROLOGICAL  2018 Left percutaneous [facility]rolithotomy - Dr. Trace Gama, [facility].  
 HX UROLOGICAL  2019 Cysto w/ stent removal - Dr. Trace Gama, [facility].  IR CHANGE NEPHROSTOMY PYELOS TUBE LT  2018 Social History Socioeconomic History  Marital status:  Spouse name: Not on file  Number of children: Not on file  Years of education: Not on file  Highest education level: Not on file Occupational History  Not on file Social Needs  Financial resource strain: Not on file  Food insecurity:  
  Worry: Not on file Inability: Not on file  Transportation needs:  
  Medical: Not on file Non-medical: Not on file Tobacco Use  Smoking status: Never Smoker  Smokeless tobacco: Never Used Substance and Sexual Activity  Alcohol use: No  
 Drug use: No  
 Sexual activity: Not Currently Lifestyle  Physical activity:  
  Days per week: Not on file Minutes per session: Not on file  Stress: Not on file Relationships  Social connections:  
  Talks on phone: Not on file Gets together: Not on file Attends Baptism service: Not on file Active member of club or organization: Not on file Attends meetings of clubs or organizations: Not on file Relationship status: Not on file  Intimate partner violence:  
  Fear of current or ex partner: Not on file Emotionally abused: Not on file Physically abused: Not on file Forced sexual activity: Not on file Other Topics Concern  Not on file Social History Narrative  Not on file Patient does not have an advanced directive on file Visit Vitals /80 (BP 1 Location: Right arm, BP Patient Position: Sitting) Pulse 62 Temp 98.2 °F (36.8 °C) (Oral) Resp 16 Ht 5' 11\" (1.803 m) Wt 195 lb (88.5 kg) SpO2 99% BMI 27.20 kg/m² Physical Exam  
Ears with normal tympanic membranes bilaterally Oropharynx with evidence of drainage and mild inflammation No Cervical Lymphadenopathy No Supraclavicular Lymphadenopathy Thyroid is Normal 
Lungs are normal to percussion. Clear to auscultation Heart:  S1 S2 are normal, No gallops, No murmurs No Carotid Bruits Abdomen:  Normal Bowel Sounds. No tenderness. No masses. No Hepatomegaly or Splenomegaly LE:  Strong Pedal Pulses. No Edema BMI: Okay Office Visit on 04/30/2019 Component Date Value Ref Range Status  Color (UA POC) 04/30/2019 Yellow   Final  
 Clarity (UA POC) 04/30/2019 Clear   Final  
 Glucose (UA POC) 04/30/2019 Negative  Negative Final  
 Bilirubin (UA POC) 04/30/2019 Negative  Negative Final  
 Ketones (UA POC) 04/30/2019 Negative  Negative Final  
 Specific gravity (UA POC) 04/30/2019 1.021  1.001 - 1.035 Final  
 Blood (UA POC) 04/30/2019 1+  Negative Final  
 pH (UA POC) 04/30/2019 6.0  4.6 - 8.0 Final  
 Protein (UA POC) 04/30/2019 Negative  Negative Final  
  Urobilinogen (UA POC) 04/30/2019 0.2 mg/dL  0.2 - 1 Final  
 Nitrites (UA POC) 04/30/2019 Negative  Negative Final  
 Leukocyte esterase (UA POC) 04/30/2019 Negative  Negative Final  
Office Visit on 04/12/2019 Component Date Value Ref Range Status  Color (UA POC) 04/12/2019 Yellow   Final  
 Clarity (UA POC) 04/12/2019 Clear   Final  
 Glucose (UA POC) 04/12/2019 Negative  Negative Final  
 Bilirubin (UA POC) 04/12/2019 Negative  Negative Final  
 Ketones (UA POC) 04/12/2019 Negative  Negative Final  
 Specific gravity (UA POC) 04/12/2019 1.020  1.001 - 1.035 Final  
 Blood (UA POC) 04/12/2019 Trace  Negative Final  
 pH (UA POC) 04/12/2019 5.5  4.6 - 8.0 Final  
 Protein (UA POC) 04/12/2019 Negative  Negative Final  
 Urobilinogen (UA POC) 04/12/2019 0.2 mg/dL  0.2 - 1 Final  
 Nitrites (UA POC) 04/12/2019 Negative  Negative Final  
 Leukocyte esterase (UA POC) 04/12/2019 1+  Negative Final  
 TSH 04/12/2019 0.39  0.27 - 4.20 mcU/mL Final  
 Glucose 04/12/2019 85  70 - 99 mg/dL Final  
 BUN 04/12/2019 20  6 - 22 mg/dL Final  
 Creatinine 04/12/2019 0.9  0.5 - 1.2 mg/dL Final  
 Sodium 04/12/2019 143  133 - 145 mmol/L Final  
 Potassium 04/12/2019 4.7  3.5 - 5.5 mmol/L Final  
 Chloride 04/12/2019 100  98 - 110 mmol/L Final  
 CO2 04/12/2019 31  20 - 32 mmol/L Final  
 AST (SGOT) 04/12/2019 16  10 - 37 U/L Final  
 ALT (SGPT) 04/12/2019 12  5 - 40 U/L Final  
 Alk.  phosphatase 04/12/2019 80  25 - 115 U/L Final  
 Bilirubin, total 04/12/2019 0.5  0.2 - 1.2 mg/dL Final  
 Calcium 04/12/2019 9.4  8.4 - 10.5 mg/dL Final  
 Protein, total 04/12/2019 7.5  6.4 - 8.3 g/dL Final  
 Albumin 04/12/2019 4.7  3.5 - 5.0 g/dL Final  
 A-G Ratio 04/12/2019 1.7  1.1 - 2.6 ratio Final  
 Globulin 04/12/2019 2.8  2.0 - 4.0 g/dL Final  
 Anion gap 04/12/2019 12.0  mmol/L Final  
 Comment: Test includes Albumin, Alkaline Phosphatase, ALT, AST, BUN, Calcium, CO2, 
 Chloride, Creatinine, Glucose, Potassium, Sodium, Total Bilirubin and Total 
Protein. Estimated GFR results are reported in mL/min/1.73 sq.m. by the MDRD equation. This eGFR is validated for stable chronic renal failure patients. This  
equation 
is unreliable in acute illness or patients with normal renal function.  GFRAA 04/12/2019 >60.0  >60.0 Final  
 GFRNA 04/12/2019 >60.0  >60.0 Final  
 WBC 04/12/2019 4.0  4.0 - 11.0 K/uL Final  
 RBC 04/12/2019 4.38  3.80 - 5.20 M/uL Final  
 HGB 04/12/2019 13.3  11.7 - 16.0 g/dL Final  
 HCT 04/12/2019 43.0  35.1 - 48.0 % Final  
 MCV 04/12/2019 98* 80 - 95 fL Final  
 MCH 04/12/2019 30  26 - 34 pg Final  
 MCHC 04/12/2019 31  31 - 36 g/dL Final  
 RDW 04/12/2019 12.9  10.0 - 15.5 % Final  
 PLATELET 50/32/3986 501  140 - 440 K/uL Final  
 MPV 04/12/2019 12.5  9.0 - 13.0 fL Final  
 NEUTROPHILS 04/12/2019 59  40 - 75 % Final  
 Lymphocytes 04/12/2019 28  20 - 45 % Final  
 MONOCYTES 04/12/2019 12  3 - 12 % Final  
 EOSINOPHILS 04/12/2019 2  0 - 6 % Final  
 BASOPHILS 04/12/2019 0  0 - 2 % Final  
 ABS. NEUTROPHILS 04/12/2019 2.4  1.8 - 7.7 K/uL Final  
 ABSOLUTE LYMPHOCYTE COUNT 04/12/2019 1.1  1.0 - 4.8 K/uL Final  
 ABS. MONOCYTES 04/12/2019 0.5  0.1 - 1.0 K/uL Final  
 ABS. EOSINOPHILS 04/12/2019 0.1  0.0 - 0.5 K/uL Final  
 ABS. BASOPHILS 04/12/2019 0.0  0.0 - 0.2 K/uL Final  
 
 
.No results found for any visits on 05/14/19. Assessment / Plan ICD-10-CM ICD-9-CM 1. Acute URI J06.9 465.9 2. S/P ablation of atrial fibrillation Z98.890 V45.89 T4, FREE  
 Z86.79  DIGOXIN  
   DIGOXIN  
   T4, FREE 3. Left arm numbness R20.0 782.0 DUPLEX CAROTID BILATERAL  
   DUPLEX CAROTID BILATERAL Zithromax = the patient was instructed to hold off and her digoxin for the 5 days that the patient remains on Zithromax 
she was advised to continue her maintenance medications Patient is to call if symptoms persist over 5 days Follow-up and Dispositions · Return in about 3 months (around 8/14/2019). I asked Patel Button. Zi Melissa if she has any questions and I answered the questions. Patel Button. Zi Torres states that she understands the treatment plan and agrees with the treatment plan THIS DOCUMENT WAS CREATED WITH A VOICE ACTIVATED DICTATION SYSTEM.   IT MAY CONTAIN TRANSCRIPTION ERRORS

## 2019-05-20 ENCOUNTER — HOSPITAL ENCOUNTER (OUTPATIENT)
Dept: VASCULAR SURGERY | Age: 59
Discharge: HOME | End: 2019-05-20
Attending: INTERNAL MEDICINE
Payer: COMMERCIAL

## 2019-05-20 LAB
LEFT CCA DIST DIAS: 27.4 CM/S
LEFT CCA DIST SYS: 86.5 CM/S
LEFT CCA MID DIAS: 30.52 CM/S
LEFT CCA MID SYS: 98.35 CM/S
LEFT CCA PROX DIAS: 32.3 CM/S
LEFT CCA PROX SYS: 120.5 CM/S
LEFT ECA DIAS: 13.6 CM/S
LEFT ECA SYS: 100.3 CM/S
LEFT ICA DIST DIAS: 51.1 CM/S
LEFT ICA DIST SYS: 126 CM/S
LEFT ICA MID DIAS: 33.3 CM/S
LEFT ICA MID SYS: 70.8 CM/S
LEFT ICA PROX DIAS: 33.3 CM/S
LEFT ICA PROX SYS: 86.5 CM/S
LEFT ICA/CCA SYS: 1.05
LEFT SUBCLAVIAN DIAS: 0 CM/S
LEFT SUBCLAVIAN SYS: 111.1 CM/S
LEFT VERTEBRAL DIAS: 22.06 CM/S
LEFT VERTEBRAL SYS: 56 CM/S
RIGHT CCA DIST DIAS: 23.2 CM/S
RIGHT CCA DIST SYS: 59.4 CM/S
RIGHT CCA MID DIAS: 29.37 CM/S
RIGHT CCA MID SYS: 90.47 CM/S
RIGHT CCA PROX DIAS: 25.4 CM/S
RIGHT CCA PROX SYS: 94.4 CM/S
RIGHT ECA DIAS: 7.91 CM/S
RIGHT ECA SYS: 87.1 CM/S
RIGHT ICA DIST DIAS: 36.9 CM/S
RIGHT ICA DIST SYS: 97.3 CM/S
RIGHT ICA MID DIAS: 53.2 CM/S
RIGHT ICA MID SYS: 108.9 CM/S
RIGHT ICA PROX DIAS: 32.1 CM/S
RIGHT ICA PROX SYS: 74.1 CM/S
RIGHT ICA/CCA SYS: 1.2
RIGHT SUBCLAVIAN DIAS: 0 CM/S
RIGHT SUBCLAVIAN SYS: 100.3 CM/S
RIGHT VERTEBRAL DIAS: 20.83 CM/S
RIGHT VERTEBRAL SYS: 49.9 CM/S

## 2019-05-20 PROCEDURE — 93880 EXTRACRANIAL BILAT STUDY: CPT

## 2019-06-14 ENCOUNTER — OFFICE VISIT (OUTPATIENT)
Dept: FAMILY MEDICINE CLINIC | Facility: CLINIC | Age: 59
End: 2019-06-14

## 2019-06-14 ENCOUNTER — TELEPHONE (OUTPATIENT)
Dept: CARDIOLOGY CLINIC | Age: 59
End: 2019-06-14

## 2019-06-14 DIAGNOSIS — Z98.890 S/P ABLATION OF ATRIAL FIBRILLATION: ICD-10-CM

## 2019-06-14 DIAGNOSIS — I48.0 PAROXYSMAL ATRIAL FIBRILLATION (HCC): Primary | ICD-10-CM

## 2019-06-14 DIAGNOSIS — Z86.79 S/P ABLATION OF ATRIAL FIBRILLATION: ICD-10-CM

## 2019-06-14 NOTE — TELEPHONE ENCOUNTER
Dr. Ada Clifton called to report that patient is back in afib with ventricular rate of 96. Patinet is in no distress. She was taking digoxin 0.125 mg QMWF, but he increased it to every day today.  Will inform Dr. Cathy Seo

## 2019-06-16 RX ORDER — DIGOXIN 125 MCG
TABLET ORAL
Qty: 30 TAB | Refills: 3 | Status: SHIPPED | OUTPATIENT
Start: 2019-06-16 | End: 2019-07-17 | Stop reason: ALTCHOICE

## 2019-06-17 VITALS — DIASTOLIC BLOOD PRESSURE: 78 MMHG | SYSTOLIC BLOOD PRESSURE: 128 MMHG | HEART RATE: 98 BPM

## 2019-06-17 NOTE — PROGRESS NOTES
The patient presents to the office today with the chief complaint of irregular pulse    HPI    The patient has intermittent atrial fibrillation. The patient is status post ablation. For several months post procedure the patient had trouble with relapse of atrial fibrillation times the left ventricular response. At times the patient was told to have marked drops in blood pressure not necessarily related to rhythm. Patient recently has been doing well. Patient has been on digoxin on Monday Wednesday and Friday evening as she remained in sinus rhythm. The rationale was that the patient developed atrial fibrillation that her ventricular response would not be excessive. Patient also has remained on Eliquis. Patient has been doing well when this morning she noted a rapid irregular pulse. The pulse at one point reached 130. Patient sense of well-being however was okay. Patient denies chest pain or dyspnea. ROS  Irregular pulse as above  Negative for chest pain, dyspnea, or lightheadedness    Allergies   Allergen Reactions    Celebrex [Celecoxib] Hives    Iodinated Contrast- Oral And Iv Dye Hives     Patient was in CT for a cardiac scan. A test bolus of 20cc was given and patient broke out into hives immediately after. Cancelled the rest of the exam and escorted the patient to the ED for furhter evaluation.  Sulfa (Sulfonamide Antibiotics) Other (comments)     Its she's allergic to Celebrex  Other reaction(s): Other (comments)  Its she's allergic to Celebrex       Current Outpatient Medications   Medication Sig Dispense Refill    digoxin (DIGOX) 0.125 mg tablet 1 tablet daily 30 Tab 3    imiquimod (ALDARA) 5 % cream Apply  to affected area Every Thursday.  apply a thin layer as directed      fluticasone propionate (FLONASE) 50 mcg/actuation nasal spray SPRAY 2 SPRAYS IN EACH NOSTRIL DAILY 1 Bottle 2    ELIQUIS 5 mg tablet TAKE 1 TABLET BY MOUTH TWICE DAILY 180 Tab 3    diclofenac (VOLTAREN) 1 % gel Apply  to affected area four (4) times daily.  digoxin (DIGITEK) 0.125 mg tablet Take 0.125 mg by mouth every Monday, Wednesday, Friday.  thiamine (VITAMIN B-1) 100 mg tablet Take  by mouth daily.  cyanocobalamin (VITAMIN B-12) 1,000 mcg tablet Take 1,000 mcg by mouth daily.  multivitamin (ONE A DAY) tablet Take 1 Tab by mouth daily. Past Medical History:   Diagnosis Date    Adverse effect of anesthesia     Atrial fibrillation [facility])     had ablation in 2017    Attention deficit disorder without mention of hyperactivity     DUB (dysfunctional uterine bleeding)     Generalized osteoarthrosis, involving multiple sites     Grief reaction     Mother  recently - Celexa    Kidney stone     Memory changes     Adderall for this    Recurrent UTI     Sleep apnea     cpap    Unspecified tinnitus        Past Surgical History:   Procedure Laterality Date    HX COLONOSCOPY      HX GYN      Laparoscopy    HX KNEE ARTHROSCOPY Right     Meniscus repair    HX KNEE ARTHROSCOPY Right     Removed piece of bone (FB)    HX KNEE ARTHROSCOPY Right     ACL repair    HX LITHOTRIPSY  2018    HX UROLOGICAL  2018    Left percutaneous nephrolithotomy - Dr. Linward Denver, [address] [place] [address] HX UROLOGICAL  2019    Cysto w/ stent removal - Dr. Linward Denver, [facility].     IR CHANGE NEPHROSTOMY PYELOS TUBE LT  2018       Social History     Socioeconomic History    Marital status:      Spouse name: Not on file    Number of children: Not on file    Years of education: Not on file    Highest education level: Not on file   Occupational History    Not on file   Social Needs    Financial resource strain: Not on file    Food insecurity:     Worry: Not on file     Inability: Not on file    Transportation needs:     Medical: Not on file     Non-medical: Not on file   Tobacco Use    Smoking status: Never Smoker    Smokeless tobacco: Never Used   Substance and Sexual Activity    Alcohol use: No    Drug use: No    Sexual activity: Not Currently   Lifestyle    Physical activity:     Days per week: Not on file     Minutes per session: Not on file    Stress: Not on file   Relationships    Social connections:     Talks on phone: Not on file     Gets together: Not on file     Attends Yarsani service: Not on file     Active member of club or organization: Not on file     Attends meetings of clubs or organizations: Not on file     Relationship status: Not on file    Intimate partner violence:     Fear of current or ex partner: Not on file     Emotionally abused: Not on file     Physically abused: Not on file     Forced sexual activity: Not on file   Other Topics Concern    Not on file   Social History Narrative    Not on file       Patient does not have an advanced directive on file    Visit Vitals  /78 (BP 1 Location: Right arm, BP Patient Position: Sitting)   Pulse 98       Physical Exam  Irregular pulses present  No Cervical Lymphadenopathy  No Supraclavicular Lymphadenopathy  Thyroid is Normal  Lungs are normal to percussion. Clear to auscultation   Heart:  S1 S2 are normal, No gallops, No murmurs  No Carotid Bruits  Abdomen:  Normal Bowel Sounds. No tenderness. No masses. No Hepatomegaly or Splenomegaly  LE:  Strong Pedal Pulses.   No Edema      BMI: Okay    Office Visit on 05/14/2019   Component Date Value Ref Range Status    Digoxin level 05/14/2019 <0.3* 0.8 - 2.0 ng/mL Final    T4, Free 05/15/2019 1.5  0.9 - 1.8 ng/dL Final    Right subclavian sys 05/20/2019 100.3  cm/s Final    RIGHT SUBCLAVIAN ARTERY D 05/20/2019 0.00  cm/s Final    RIGHT COMMON CAROTID ARTERY MID S 05/20/2019 90.47  cm/s Final    RIGHT COMMON CAROTID ARTERY MID D 05/20/2019 29.37  cm/s Final    Right CCA prox sys 05/20/2019 94.4  cm/s Final    Right CCA prox blackman 05/20/2019 25.4  cm/s Final    Right eca sys 05/20/2019 87.1  cm/s Final    RIGHT EXTERNAL CAROTID ARTERY D 05/20/2019 7.91  cm/s Final    Right ICA dist sys 05/20/2019 97.3  cm/s Final    Right ICA dist blackman 05/20/2019 36.9  cm/s Final    Right ICA mid sys 05/20/2019 108.9  cm/s Final    Right ICA mid blackman 05/20/2019 53.2  cm/s Final    Right ICA prox sys 05/20/2019 74.1  cm/s Final    Right ICA prox blackman 05/20/2019 32.1  cm/s Final    Right vertebral sys 05/20/2019 49.9  cm/s Final    RIGHT VERTEBRAL ARTERY D 05/20/2019 20.83  cm/s Final    Right ICA/CCA sys 05/20/2019 1.2   Final    Left subclavian sys 05/20/2019 111.1  cm/s Final    LEFT SUBCLAVIAN ARTERY D 05/20/2019 0.00  cm/s Final    LEFT COMMON CAROTID ARTERY MID S 05/20/2019 98.35  cm/s Final    LEFT COMMON CAROTID ARTERY MID D 05/20/2019 30.52  cm/s Final    Left CCA prox sys 05/20/2019 120.5  cm/s Final    Left CCA prox blackman 05/20/2019 32.3  cm/s Final    Left ECA sys 05/20/2019 100.3  cm/s Final    LEFT EXTERNAL CAROTID ARTERY D 05/20/2019 13.60  cm/s Final    Left ICA dist sys 05/20/2019 126.0  cm/s Final    Left ICA dist blackman 05/20/2019 51.1  cm/s Final    Left ICA mid sys 05/20/2019 70.8  cm/s Final    Left ICA mid blackman 05/20/2019 33.3  cm/s Final    Left ICA prox sys 05/20/2019 86.5  cm/s Final    Left ICA prox blackman 05/20/2019 33.3  cm/s Final    Left vertebral sys 05/20/2019 56.0  cm/s Final    LEFT VERTEBRAL ARTERY D 05/20/2019 22.06  cm/s Final    Left ICA/CCA sys 05/20/2019 1.05   Final    Right cca dist sys 05/20/2019 59.4  cm/s Final    Right CCA dist blackman 05/20/2019 23.2  cm/s Final    Left CCA dist sys 05/20/2019 86.5  cm/s Final    Left CCA dist blackman 05/20/2019 27.4  cm/s Final   Office Visit on 04/30/2019   Component Date Value Ref Range Status    Color (UA POC) 04/30/2019 Yellow   Final    Clarity (UA POC) 04/30/2019 Clear   Final    Glucose (UA POC) 04/30/2019 Negative  Negative Final    Bilirubin (UA POC) 04/30/2019 Negative  Negative Final    Ketones (UA POC) 04/30/2019 Negative  Negative Final    Specific gravity (UA POC) 04/30/2019 1.021  1.001 - 1.035 Final    Blood (UA POC) 04/30/2019 1+  Negative Final    pH (UA POC) 04/30/2019 6.0  4.6 - 8.0 Final    Protein (UA POC) 04/30/2019 Negative  Negative Final    Urobilinogen (UA POC) 04/30/2019 0.2 mg/dL  0.2 - 1 Final    Nitrites (UA POC) 04/30/2019 Negative  Negative Final    Leukocyte esterase (UA POC) 04/30/2019 Negative  Negative Final   Office Visit on 04/12/2019   Component Date Value Ref Range Status    Color (UA POC) 04/12/2019 Yellow   Final    Clarity (UA POC) 04/12/2019 Clear   Final    Glucose (UA POC) 04/12/2019 Negative  Negative Final    Bilirubin (UA POC) 04/12/2019 Negative  Negative Final    Ketones (UA POC) 04/12/2019 Negative  Negative Final    Specific gravity (UA POC) 04/12/2019 1.020  1.001 - 1.035 Final    Blood (UA POC) 04/12/2019 Trace  Negative Final    pH (UA POC) 04/12/2019 5.5  4.6 - 8.0 Final    Protein (UA POC) 04/12/2019 Negative  Negative Final    Urobilinogen (UA POC) 04/12/2019 0.2 mg/dL  0.2 - 1 Final    Nitrites (UA POC) 04/12/2019 Negative  Negative Final    Leukocyte esterase (UA POC) 04/12/2019 1+  Negative Final    TSH 04/12/2019 0.39  0.27 - 4.20 mcU/mL Final    Glucose 04/12/2019 85  70 - 99 mg/dL Final    BUN 04/12/2019 20  6 - 22 mg/dL Final    Creatinine 04/12/2019 0.9  0.5 - 1.2 mg/dL Final    Sodium 04/12/2019 143  133 - 145 mmol/L Final    Potassium 04/12/2019 4.7  3.5 - 5.5 mmol/L Final    Chloride 04/12/2019 100  98 - 110 mmol/L Final    CO2 04/12/2019 31  20 - 32 mmol/L Final    AST (SGOT) 04/12/2019 16  10 - 37 U/L Final    ALT (SGPT) 04/12/2019 12  5 - 40 U/L Final    Alk.  phosphatase 04/12/2019 80  25 - 115 U/L Final    Bilirubin, total 04/12/2019 0.5  0.2 - 1.2 mg/dL Final    Calcium 04/12/2019 9.4  8.4 - 10.5 mg/dL Final    Protein, total 04/12/2019 7.5  6.4 - 8.3 g/dL Final    Albumin 04/12/2019 4.7  3.5 - 5.0 g/dL Final    A-G Ratio 04/12/2019 1.7  1.1 - 2.6 ratio Final    Globulin 04/12/2019 2.8  2.0 - 4.0 g/dL Final    Anion gap 04/12/2019 12.0  mmol/L Final    Comment: Test includes Albumin, Alkaline Phosphatase, ALT, AST, BUN, Calcium, CO2,  Chloride, Creatinine, Glucose, Potassium, Sodium, Total Bilirubin and Total  Protein. Estimated GFR results are reported in mL/min/1.73 sq.m. by the MDRD equation. This eGFR is validated for stable chronic renal failure patients. This   equation  is unreliable in acute illness or patients with normal renal function.  GFRAA 04/12/2019 >60.0  >60.0 Final    GFRNA 04/12/2019 >60.0  >60.0 Final    WBC 04/12/2019 4.0  4.0 - 11.0 K/uL Final    RBC 04/12/2019 4.38  3.80 - 5.20 M/uL Final    HGB 04/12/2019 13.3  11.7 - 16.0 g/dL Final    HCT 04/12/2019 43.0  35.1 - 48.0 % Final    MCV 04/12/2019 98* 80 - 95 fL Final    MCH 04/12/2019 30  26 - 34 pg Final    MCHC 04/12/2019 31  31 - 36 g/dL Final    RDW 04/12/2019 12.9  10.0 - 15.5 % Final    PLATELET 93/19/6757 850  140 - 440 K/uL Final    MPV 04/12/2019 12.5  9.0 - 13.0 fL Final    NEUTROPHILS 04/12/2019 59  40 - 75 % Final    Lymphocytes 04/12/2019 28  20 - 45 % Final    MONOCYTES 04/12/2019 12  3 - 12 % Final    EOSINOPHILS 04/12/2019 2  0 - 6 % Final    BASOPHILS 04/12/2019 0  0 - 2 % Final    ABS. NEUTROPHILS 04/12/2019 2.4  1.8 - 7.7 K/uL Final    ABSOLUTE LYMPHOCYTE COUNT 04/12/2019 1.1  1.0 - 4.8 K/uL Final    ABS. MONOCYTES 04/12/2019 0.5  0.1 - 1.0 K/uL Final    ABS. EOSINOPHILS 04/12/2019 0.1  0.0 - 0.5 K/uL Final    ABS. BASOPHILS 04/12/2019 0.0  0.0 - 0.2 K/uL Final       .  Results for orders placed or performed in visit on 06/14/19   AMB POC EKG ROUTINE W/ 12 LEADS, INTER & REP    Impression    The limb leads are reversed. The rhythm is atrial fibrillation. The EKG is  considered uninterpretable secondary to limb lead reversal.   AMB POC EKG ROUTINE W/ 12 LEADS, INTER & REP    Impression    Atrial fibrillation with ventricular response 96 bpm.  New since previous tracing.   EKG is normal otherwise. Assessment / Plan      ICD-10-CM ICD-9-CM    1. Paroxysmal atrial fibrillation (HCC) I48.0 427.31 AMB POC EKG ROUTINE W/ 12 LEADS, INTER & REP   2. S/P ablation of atrial fibrillation Z98.890 V45.89 AMB POC EKG ROUTINE W/ 12 LEADS, INTER & REP    Z86.79         I recommended he increase the digoxin 0 0.125 mg every day. she was advised to continue her maintenance medications  Patient was advised to also see cardiology. Follow-up and Dispositions    · Return in about 1 month (around 7/12/2019). I asked Pamela Melara if she has any questions and I answered the questions. Pamela Melara states that she understands the treatment plan and agrees with the treatment plan          THIS DOCUMENT WAS CREATED WITH A VOICE ACTIVATED DICTATION SYSTEM.   IT MAY CONTAIN TRANSCRIPTION ERRORS

## 2019-06-19 DIAGNOSIS — N20.0 KIDNEY STONES, CALCIUM OXALATE: Primary | ICD-10-CM

## 2019-06-21 ENCOUNTER — DOCUMENTATION ONLY (OUTPATIENT)
Dept: CARDIOLOGY CLINIC | Age: 59
End: 2019-06-21

## 2019-06-21 DIAGNOSIS — Z86.79 S/P ABLATION OF ATRIAL FIBRILLATION: ICD-10-CM

## 2019-06-21 DIAGNOSIS — I48.0 INTERMITTENT ATRIAL FIBRILLATION (HCC): Primary | ICD-10-CM

## 2019-06-21 DIAGNOSIS — Z98.890 S/P ABLATION OF ATRIAL FIBRILLATION: ICD-10-CM

## 2019-06-24 ENCOUNTER — OFFICE VISIT (OUTPATIENT)
Dept: FAMILY MEDICINE CLINIC | Facility: CLINIC | Age: 59
End: 2019-06-24

## 2019-06-24 VITALS
TEMPERATURE: 97.8 F | BODY MASS INDEX: 27.16 KG/M2 | SYSTOLIC BLOOD PRESSURE: 108 MMHG | RESPIRATION RATE: 16 BRPM | HEART RATE: 85 BPM | DIASTOLIC BLOOD PRESSURE: 80 MMHG | WEIGHT: 194 LBS | OXYGEN SATURATION: 99 % | HEIGHT: 71 IN

## 2019-06-24 DIAGNOSIS — Z86.79 S/P ABLATION OF ATRIAL FIBRILLATION: ICD-10-CM

## 2019-06-24 DIAGNOSIS — I48.0 PAROXYSMAL ATRIAL FIBRILLATION (HCC): Primary | ICD-10-CM

## 2019-06-24 DIAGNOSIS — Z98.890 S/P ABLATION OF ATRIAL FIBRILLATION: ICD-10-CM

## 2019-06-24 DIAGNOSIS — N20.0 KIDNEY STONE: ICD-10-CM

## 2019-06-24 DIAGNOSIS — G60.9 IDIOPATHIC PERIPHERAL NEUROPATHY: ICD-10-CM

## 2019-06-24 NOTE — PROGRESS NOTES
1. Have you been to the ER, urgent care clinic since your last visit? Hospitalized since your last visit? No    2. Have you seen or consulted any other health care providers outside of the [address] [place] [address] since your last visit? Include any pap smears or colon screening. Urology      Patient stated that her Apple watch informed her that she was in A-fib. Patient denies chest pain and SOB but states her chest feels \"different\".        Chief Complaint   Patient presents with    Irregular Heart Beat

## 2019-06-25 NOTE — PROGRESS NOTES
The patient presents to the office today with the chief complaint of atrial fibrillation    HPI    The patient remains in atrial fibrillation. Patient is on digoxin for rate control with ventricular response running in the low 80s. Patient remains on Eliquis for anticoagulation. Patient is currently tolerating atrial fibrillation fairly well. Patient is found that she now has another kidney stone. She is working with urology on this. The patient is being instructed by a nutritionist regarding a low oxalate diet. Patient has idiopathic peripheral neuropathy which remains unchanged. ROS   No clear palpitations at this point but the patient notes her pulse is irregular  Negative for chest pain, dyspnea, or lightheadedness    Allergies   Allergen Reactions    Celebrex [Celecoxib] Hives    Iodinated Contrast- Oral And Iv Dye Hives     Patient was in CT for a cardiac scan. A test bolus of 20cc was given and patient broke out into hives immediately after. Cancelled the rest of the exam and escorted the patient to the ED for furhter evaluation.  Sulfa (Sulfonamide Antibiotics) Other (comments)     Its she's allergic to Celebrex  Other reaction(s): Other (comments)  Its she's allergic to Celebrex       Current Outpatient Medications   Medication Sig Dispense Refill    digoxin (DIGOX) 0.125 mg tablet 1 tablet daily 30 Tab 3    imiquimod (ALDARA) 5 % cream Apply  to affected area Every Thursday. apply a thin layer as directed      fluticasone propionate (FLONASE) 50 mcg/actuation nasal spray SPRAY 2 SPRAYS IN EACH NOSTRIL DAILY 1 Bottle 2    ELIQUIS 5 mg tablet TAKE 1 TABLET BY MOUTH TWICE DAILY 180 Tab 3    diclofenac (VOLTAREN) 1 % gel Apply  to affected area four (4) times daily.  digoxin (DIGITEK) 0.125 mg tablet Take 0.125 mg by mouth every Monday, Wednesday, Friday.  thiamine (VITAMIN B-1) 100 mg tablet Take  by mouth daily.       cyanocobalamin (VITAMIN B-12) 1,000 mcg tablet Take 1,000 mcg by mouth daily.  multivitamin (ONE A DAY) tablet Take 1 Tab by mouth daily. Past Medical History:   Diagnosis Date    Adverse effect of anesthesia     Atrial fibrillation [facility])     had ablation in 2017    Attention deficit disorder without mention of hyperactivity     DUB (dysfunctional uterine bleeding)     Generalized osteoarthrosis, involving multiple sites     Grief reaction     Mother  recently - Celexa    Kidney stone     Memory changes     Adderall for this    Recurrent UTI     Sleep apnea     cpap    Unspecified tinnitus        Past Surgical History:   Procedure Laterality Date    HX COLONOSCOPY      HX GYN      Laparoscopy    HX KNEE ARTHROSCOPY Right     Meniscus repair    HX KNEE ARTHROSCOPY Right     Removed piece of bone (FB)    HX KNEE ARTHROSCOPY Right     ACL repair    HX LITHOTRIPSY  2018    HX UROLOGICAL  2018    Left percutaneous nephrolithotomy - Dr. Varsha Corona,  [facility] HX UROLOGICAL  2019    Cysto w/ stent removal - Dr. Varsha Corona, [facility].     IR CHANGE NEPHROSTOMY PYELOS TUBE LT  2018       Social History     Socioeconomic History    Marital status:      Spouse name: Not on file    Number of children: Not on file    Years of education: Not on file    Highest education level: Not on file   Occupational History    Not on file   Social Needs    Financial resource strain: Not on file    Food insecurity:     Worry: Not on file     Inability: Not on file    Transportation needs:     Medical: Not on file     Non-medical: Not on file   Tobacco Use    Smoking status: Never Smoker    Smokeless tobacco: Never Used   Substance and Sexual Activity    Alcohol use: No    Drug use: No    Sexual activity: Not Currently   Lifestyle    Physical activity:     Days per week: Not on file     Minutes per session: Not on file    Stress: Not on file   Relationships    Social connections:     Talks on phone: Not on file     Gets together: Not on file     Attends Faith service: Not on file     Active member of club or organization: Not on file     Attends meetings of clubs or organizations: Not on file     Relationship status: Not on file    Intimate partner violence:     Fear of current or ex partner: Not on file     Emotionally abused: Not on file     Physically abused: Not on file     Forced sexual activity: Not on file   Other Topics Concern    Not on file   Social History Narrative    Not on file       Patient does not have an advanced directive on file    Visit Vitals  /80 (BP 1 Location: Right arm, BP Patient Position: Sitting)   Pulse 85   Temp 97.8 °F (36.6 °C) (Oral)   Resp 16   Ht 5' 11\" (1.803 m)   Wt 194 lb (88 kg)   SpO2 99%   BMI 27.06 kg/m²       Physical Exam   Pulse is irregularly irregular  No Cervical Lymphadenopathy  No Supraclavicular Lymphadenopathy  Thyroid is Normal  Lungs are normal to percussion. Clear to auscultation   Heart:  S1 S2 are normal, No gallops, No murmurs  No Carotid Bruits  Abdomen:  Normal Bowel Sounds. No tenderness. No masses. No Hepatomegaly or Splenomegaly  LE:  Strong Pedal Pulses.   No Edema      BMI: Okay    Office Visit on 06/18/2019   Component Date Value Ref Range Status    Color (UA POC) 06/18/2019 Yellow   Final    Clarity (UA POC) 06/18/2019 Clear   Final    Glucose (UA POC) 06/18/2019 Negative  Negative Final    Bilirubin (UA POC) 06/18/2019 Negative  Negative Final    Ketones (UA POC) 06/18/2019 Negative  Negative Final    Specific gravity (UA POC) 06/18/2019 1.010  1.001 - 1.035 Final    Blood (UA POC) 06/18/2019 1+  Negative Final    pH (UA POC) 06/18/2019 6.0  4.6 - 8.0 Final    Protein (UA POC) 06/18/2019 Negative  Negative Final    Urobilinogen (UA POC) 06/18/2019 0.2 mg/dL  0.2 - 1 Final    Nitrites (UA POC) 06/18/2019 Negative  Negative Final    Leukocyte esterase (UA POC) 06/18/2019 1+  Negative Final   Office Visit on 05/14/2019   Component Date Value Ref Range Status    Digoxin level 05/14/2019 <0.3* 0.8 - 2.0 ng/mL Final    T4, Free 05/15/2019 1.5  0.9 - 1.8 ng/dL Final    Right subclavian sys 05/20/2019 100.3  cm/s Final    RIGHT SUBCLAVIAN ARTERY D 05/20/2019 0.00  cm/s Final    RIGHT COMMON CAROTID ARTERY MID S 05/20/2019 90.47  cm/s Final    RIGHT COMMON CAROTID ARTERY MID D 05/20/2019 29.37  cm/s Final    Right CCA prox sys 05/20/2019 94.4  cm/s Final    Right CCA prox blackman 05/20/2019 25.4  cm/s Final    Right eca sys 05/20/2019 87.1  cm/s Final    RIGHT EXTERNAL CAROTID ARTERY D 05/20/2019 7.91  cm/s Final    Right ICA dist sys 05/20/2019 97.3  cm/s Final    Right ICA dist blackman 05/20/2019 36.9  cm/s Final    Right ICA mid sys 05/20/2019 108.9  cm/s Final    Right ICA mid blackman 05/20/2019 53.2  cm/s Final    Right ICA prox sys 05/20/2019 74.1  cm/s Final    Right ICA prox blackman 05/20/2019 32.1  cm/s Final    Right vertebral sys 05/20/2019 49.9  cm/s Final    RIGHT VERTEBRAL ARTERY D 05/20/2019 20.83  cm/s Final    Right ICA/CCA sys 05/20/2019 1.2   Final    Left subclavian sys 05/20/2019 111.1  cm/s Final    LEFT SUBCLAVIAN ARTERY D 05/20/2019 0.00  cm/s Final    LEFT COMMON CAROTID ARTERY MID S 05/20/2019 98.35  cm/s Final    LEFT COMMON CAROTID ARTERY MID D 05/20/2019 30.52  cm/s Final    Left CCA prox sys 05/20/2019 120.5  cm/s Final    Left CCA prox blackman 05/20/2019 32.3  cm/s Final    Left ECA sys 05/20/2019 100.3  cm/s Final    LEFT EXTERNAL CAROTID ARTERY D 05/20/2019 13.60  cm/s Final    Left ICA dist sys 05/20/2019 126.0  cm/s Final    Left ICA dist blackman 05/20/2019 51.1  cm/s Final    Left ICA mid sys 05/20/2019 70.8  cm/s Final    Left ICA mid blackman 05/20/2019 33.3  cm/s Final    Left ICA prox sys 05/20/2019 86.5  cm/s Final    Left ICA prox blackman 05/20/2019 33.3  cm/s Final    Left vertebral sys 05/20/2019 56.0  cm/s Final    LEFT VERTEBRAL ARTERY D 05/20/2019 22.06  cm/s Final    Left ICA/CCA sys 05/20/2019 1.05   Final    Right cca dist sys 05/20/2019 59.4  cm/s Final    Right CCA dist blackman 05/20/2019 23.2  cm/s Final    Left CCA dist sys 05/20/2019 86.5  cm/s Final    Left CCA dist blackman 05/20/2019 27.4  cm/s Final   Office Visit on 04/30/2019   Component Date Value Ref Range Status    Color (UA POC) 04/30/2019 Yellow   Final    Clarity (UA POC) 04/30/2019 Clear   Final    Glucose (UA POC) 04/30/2019 Negative  Negative Final    Bilirubin (UA POC) 04/30/2019 Negative  Negative Final    Ketones (UA POC) 04/30/2019 Negative  Negative Final    Specific gravity (UA POC) 04/30/2019 1.021  1.001 - 1.035 Final    Blood (UA POC) 04/30/2019 1+  Negative Final    pH (UA POC) 04/30/2019 6.0  4.6 - 8.0 Final    Protein (UA POC) 04/30/2019 Negative  Negative Final    Urobilinogen (UA POC) 04/30/2019 0.2 mg/dL  0.2 - 1 Final    Nitrites (UA POC) 04/30/2019 Negative  Negative Final    Leukocyte esterase (UA POC) 04/30/2019 Negative  Negative Final   Office Visit on 04/12/2019   Component Date Value Ref Range Status    Color (UA POC) 04/12/2019 Yellow   Final    Clarity (UA POC) 04/12/2019 Clear   Final    Glucose (UA POC) 04/12/2019 Negative  Negative Final    Bilirubin (UA POC) 04/12/2019 Negative  Negative Final    Ketones (UA POC) 04/12/2019 Negative  Negative Final    Specific gravity (UA POC) 04/12/2019 1.020  1.001 - 1.035 Final    Blood (UA POC) 04/12/2019 Trace  Negative Final    pH (UA POC) 04/12/2019 5.5  4.6 - 8.0 Final    Protein (UA POC) 04/12/2019 Negative  Negative Final    Urobilinogen (UA POC) 04/12/2019 0.2 mg/dL  0.2 - 1 Final    Nitrites (UA POC) 04/12/2019 Negative  Negative Final    Leukocyte esterase (UA POC) 04/12/2019 1+  Negative Final    TSH 04/12/2019 0.39  0.27 - 4.20 mcU/mL Final    Glucose 04/12/2019 85  70 - 99 mg/dL Final    BUN 04/12/2019 20  6 - 22 mg/dL Final    Creatinine 04/12/2019 0.9  0.5 - 1.2 mg/dL Final    Sodium 04/12/2019 143  133 - 145 mmol/L Final    Potassium 04/12/2019 4.7  3.5 - 5.5 mmol/L Final    Chloride 04/12/2019 100  98 - 110 mmol/L Final    CO2 04/12/2019 31  20 - 32 mmol/L Final    AST (SGOT) 04/12/2019 16  10 - 37 U/L Final    ALT (SGPT) 04/12/2019 12  5 - 40 U/L Final    Alk. phosphatase 04/12/2019 80  25 - 115 U/L Final    Bilirubin, total 04/12/2019 0.5  0.2 - 1.2 mg/dL Final    Calcium 04/12/2019 9.4  8.4 - 10.5 mg/dL Final    Protein, total 04/12/2019 7.5  6.4 - 8.3 g/dL Final    Albumin 04/12/2019 4.7  3.5 - 5.0 g/dL Final    A-G Ratio 04/12/2019 1.7  1.1 - 2.6 ratio Final    Globulin 04/12/2019 2.8  2.0 - 4.0 g/dL Final    Anion gap 04/12/2019 12.0  mmol/L Final    Comment: Test includes Albumin, Alkaline Phosphatase, ALT, AST, BUN, Calcium, CO2,  Chloride, Creatinine, Glucose, Potassium, Sodium, Total Bilirubin and Total  Protein. Estimated GFR results are reported in mL/min/1.73 sq.m. by the MDRD equation. This eGFR is validated for stable chronic renal failure patients. This   equation  is unreliable in acute illness or patients with normal renal function.  GFRAA 04/12/2019 >60.0  >60.0 Final    GFRNA 04/12/2019 >60.0  >60.0 Final    WBC 04/12/2019 4.0  4.0 - 11.0 K/uL Final    RBC 04/12/2019 4.38  3.80 - 5.20 M/uL Final    HGB 04/12/2019 13.3  11.7 - 16.0 g/dL Final    HCT 04/12/2019 43.0  35.1 - 48.0 % Final    MCV 04/12/2019 98* 80 - 95 fL Final    MCH 04/12/2019 30  26 - 34 pg Final    MCHC 04/12/2019 31  31 - 36 g/dL Final    RDW 04/12/2019 12.9  10.0 - 15.5 % Final    PLATELET 49/21/9905 334  140 - 440 K/uL Final    MPV 04/12/2019 12.5  9.0 - 13.0 fL Final    NEUTROPHILS 04/12/2019 59  40 - 75 % Final    Lymphocytes 04/12/2019 28  20 - 45 % Final    MONOCYTES 04/12/2019 12  3 - 12 % Final    EOSINOPHILS 04/12/2019 2  0 - 6 % Final    BASOPHILS 04/12/2019 0  0 - 2 % Final    ABS.  NEUTROPHILS 04/12/2019 2.4  1.8 - 7.7 K/uL Final    ABSOLUTE LYMPHOCYTE COUNT 04/12/2019 1.1  1.0 - 4.8 K/uL Final    ABS. MONOCYTES 04/12/2019 0.5  0.1 - 1.0 K/uL Final    ABS. EOSINOPHILS 04/12/2019 0.1  0.0 - 0.5 K/uL Final    ABS. BASOPHILS 04/12/2019 0.0  0.0 - 0.2 K/uL Final       .No results found for any visits on 06/24/19. Assessment / Plan      ICD-10-CM ICD-9-CM    1. Paroxysmal atrial fibrillation (HCC) I48.0 427.31    2. S/P ablation of atrial fibrillation Z98.890 V45.89     Z86.79     3. Idiopathic peripheral neuropathy G60.9 356.9    4. Kidney stone N20.0 592.0        The patient remains in atrial fibrillation. She is 1-3/4 years post ablation. The ablation was followed with episodic atrial fibrillation, bradycardia and significant hypotension for several months of an unclear etiology I feel that the patient will be best served by consideration for a permanent fix of his problem. I have contacted electrophysiology department at [facility]. They are going to see the patient tomorrow at 1 PM.  Appropriate records have been forwarded down to [place] with the patient's permission. she was advised to continue her maintenance medications -- I instructed the patient to hold off on the dose of digoxin for tomorrow until she is evaluated by the electrophysiologist at [facility]. The patient will continue to follow with urology and the nutritionist regarding recurrent nephrolithiasis. Follow-up and Dispositions    · Return in about 2 months (around 8/24/2019). I asked Carolin Esparza if she has any questions and I answered the questions. Carolin Esparza states that she understands the treatment plan and agrees with the treatment plan          THIS DOCUMENT WAS CREATED WITH A VOICE ACTIVATED DICTATION SYSTEM.   IT MAY CONTAIN TRANSCRIPTION ERRORS

## 2019-07-15 ENCOUNTER — OFFICE VISIT (OUTPATIENT)
Dept: FAMILY MEDICINE CLINIC | Facility: CLINIC | Age: 59
End: 2019-07-15

## 2019-07-15 VITALS
TEMPERATURE: 98.8 F | WEIGHT: 187.8 LBS | BODY MASS INDEX: 26.29 KG/M2 | HEART RATE: 76 BPM | SYSTOLIC BLOOD PRESSURE: 118 MMHG | OXYGEN SATURATION: 99 % | DIASTOLIC BLOOD PRESSURE: 88 MMHG | HEIGHT: 71 IN | RESPIRATION RATE: 16 BRPM

## 2019-07-15 DIAGNOSIS — E05.90 HYPERTHYROIDISM: Primary | ICD-10-CM

## 2019-07-15 DIAGNOSIS — I48.91 ATRIAL FIBRILLATION, UNSPECIFIED TYPE (HCC): ICD-10-CM

## 2019-07-15 RX ORDER — METOPROLOL SUCCINATE 25 MG/1
12.5 TABLET, EXTENDED RELEASE ORAL
COMMUNITY
Start: 2019-07-11 | End: 2020-02-06 | Stop reason: ALTCHOICE

## 2019-07-15 RX ORDER — DILTIAZEM HYDROCHLORIDE 120 MG/1
120 CAPSULE, COATED, EXTENDED RELEASE ORAL
COMMUNITY
Start: 2019-06-25 | End: 2020-02-19

## 2019-07-15 RX ORDER — DOFETILIDE 0.25 MG/1
250 CAPSULE ORAL 2 TIMES DAILY
COMMUNITY
Start: 2019-07-11 | End: 2020-07-10

## 2019-07-15 RX ORDER — METHIMAZOLE 10 MG/1
30 TABLET ORAL
COMMUNITY
Start: 2019-07-12 | End: 2020-02-06

## 2019-07-15 NOTE — PROGRESS NOTES
Henny Almeida is a 64 y.o. female presents in office for    Chief Complaint   Patient presents with    Irregular Heart Beat        Visit Vitals  /88 (BP 1 Location: Left arm, BP Patient Position: Sitting)   Pulse 76   Temp 98.8 °F (37.1 °C) (Tympanic)   Resp 16   Ht 5' 11\" (1.803 m)   Wt 187 lb 12.8 oz (85.2 kg)   SpO2 99%   BMI 26.19 kg/m²         Health Maintenance Due   Topic Date Due    Hepatitis C Screening  1960    Pneumococcal 0-64 years (1 of 1 - PPSV23) 03/14/1968    Shingrix Vaccine Age 50> (1 of 2) 03/14/2012    FOBT Q 1 YEAR AGE 50-75  03/14/2012         1. Have you been to the ER, urgent care clinic since your last visit? Hospitalized since your last visit? [facility] 7/8/19-7/11/19 r/t A-fib    2. Have you seen or consulted any other health care providers outside of the [address] [place] [address] since your last visit? Include any pap smears or colon screening.  [facility]     Learning Assessment 8/16/2018   PRIMARY LEARNER Patient   HIGHEST LEVEL OF EDUCATION - PRIMARY LEARNER  -   BARRIERS PRIMARY LEARNER -   CO-LEARNER CAREGIVER -   PRIMARY LANGUAGE ENGLISH    NEED -   LEARNER PREFERENCE PRIMARY DEMONSTRATION   ANSWERED BY Patient   RELATIONSHIP SELF

## 2019-07-16 ENCOUNTER — TELEPHONE (OUTPATIENT)
Dept: CARDIOLOGY CLINIC | Age: 59
End: 2019-07-16

## 2019-07-16 DIAGNOSIS — R94.6 THYROID FUNCTION STUDY ABNORMALITY: Primary | ICD-10-CM

## 2019-07-16 NOTE — TELEPHONE ENCOUNTER
Regarding: FW: Update Medical Information  Contact: [phone]  Please place referral for patient to dr. Dianelys Meeks for thyroid disorder, likely worsening her a.fib. thx    ----- Message -----  From: Alessandro Rico RN  Sent: 7/12/2019   1:18 PM  To: Hanna Colunga MD  Subject: FW: Update Medical Information                       ----- Message -----  From: Danielle Hemphill  Sent: 7/12/2019  12:49 PM  To: George Drake [facility]  Subject: RE: Update Medical Information                   ----- Message from [address] [place] [address] 95, [facility] sent at 7/12/2019 12:49 PM EDT -----    Dear Dr. Trey Swan,  I just got back from [facility]. They found hyperthyroidism. The endocrinologist did not know an endocrinologist in our area so I was hoping for a referral.   Also, because of the hyper thyroid, my ablation has been put off. Probably will not happen until Thanksgiving or [place] break. Honestly I am really scared that I may have the same result of being crazy tired and weak so would it be bad to put the ablation off until next summer? I also have another question, they performed a Pro-Brain Natriuretic Peptide, N-terminal (NT-Pro-BNP) blood test. My result was 1,261 pg/mL and normal is  < =225 pg/mL. [employer] calls this heart failure. How serious is this? I know that's a dumb question, but . . . . In closing, I am tired but in sr  ----- Message -----  From: Hanna Colunga MD  Sent: 6/28/2019  9:50 AM EDT  To: Danielle Hemphill  Subject: RE: Update Medical Information  Excellent, thank you for the update. Dr. Trey Swan      ----- Message -----     From: Danielle Hemphill     Sent: 6/26/2019  8:30 PM EDT       To: Hanna Colunga MD  Subject: RE: Tanmay Ryan    Dear Dr. Trey wSan,   Dr. Misael Bautista was able to get me in with Dr. Issa Sapp of [facility]. I saw him yesterday. I have attached his notes. He had me drop digoxin and added Cartia(Diltiazem) 120 mg daily and Propranolol 10-20 mg as needed if hr above 100.  Thanks again for all you do.  ----- Message -----  From: Mertie Galeazzi, MD  Sent: 6/19/2019  9:58 AM EDT  To: Rober Owens  Subject: RE: Update Medical Information  Hi Ms. Nasrin Nelson,  How do you feel with the A.fib? Given your history of multiple medications and previous ablation with recurrence, I think it would be best to get a 2nd opinion at a [place]. Sometimes they have \"new\" techniques and medications that we just don't have access to in the area. Given your young age and activity level I would lean toward being aggressive and continue with rhythm control if possible. If you like, I can refer you to [address] or [facility]? Thank you,  Dr. Annia Patel      ----- Message -----     From: Yosvany Mason     Sent: 6/17/2019  8:57 PM EDT       To: Mertie Galeazzi, MD  Subject: Update Medical Information    Dear Dr. Annia Patel,   Friday, June 14, I woke up in AFIB. I went to Dr. Norma Ferrer after work and he confirmed I was in AFIB with an ekg. My Alex Cast was telling me I was in AFIB all day Friday which is the first time since I got the watch in January. I went into [facility] Saturday and Sunday per my watch. This morning my watch came up \"inconclusive\" several times and now it is saying I am in AFIB again. My watch has been saying \"inconclusive\" all along but never AFIB until Friday. My heart is racing with very little activity. My pulse is fluctuating between 104-120, and I have been sitting for over a half an hour. Just not sure what to do. Dr. Jan Ludwig has me taking Digoxin every day vs MWF. Thanks for all you do.

## 2019-07-17 NOTE — PROGRESS NOTES
The patient presents to the office today with the chief complaint of Hyperthyroidism    HPI    The patient has atrial fibrillation which had recurred after ablation. The patient is status post recent hospitalization at [facility]. At that time she responded to cardioversion and then Tikosyn. She was found to have hyperthyroidism (patient had normal studies in April and May). The patient is now on methimazole. The patient is currently in normal sinus rhythm. She has been monitoring her rhythm on a smart watch. She has had a few breakthroughs of atrial fibrillation. ROS  Positive for weight loss of 7 pounds over the past month, palpitations, or shortness of breath    Allergies   Allergen Reactions    Celebrex [Celecoxib] Hives    Iodinated Contrast- Oral And Iv Dye Hives     Patient was in CT for a cardiac scan. A test bolus of 20cc was given and patient broke out into hives immediately after. Cancelled the rest of the exam and escorted the patient to the ED for furhter evaluation.  Sulfa (Sulfonamide Antibiotics) Other (comments)     Its she's allergic to Celebrex  Other reaction(s): Other (comments)  Its she's allergic to Celebrex       Current Outpatient Medications   Medication Sig Dispense Refill    dilTIAZem CD (CARDIZEM CD) 120 mg ER capsule Take 120 mg by mouth.  dofetilide (TIKOSYN) 250 mcg capsule Take 250 mcg by mouth.  methIMAzole (TAPAZOLE) 10 mg tablet Take 30 mg by mouth.  metoprolol succinate (TOPROL-XL) 25 mg XL tablet Take 12.5 mg by mouth.  fluticasone propionate (FLONASE) 50 mcg/actuation nasal spray SPRAY 2 SPRAYS IN EACH NOSTRIL DAILY 1 Bottle 2    ELIQUIS 5 mg tablet TAKE 1 TABLET BY MOUTH TWICE DAILY 180 Tab 3    diclofenac (VOLTAREN) 1 % gel Apply  to affected area four (4) times daily.  thiamine (VITAMIN B-1) 100 mg tablet Take  by mouth daily.  cyanocobalamin (VITAMIN B-12) 1,000 mcg tablet Take 1,000 mcg by mouth daily.       multivitamin (ONE A DAY) tablet Take 1 Tab by mouth daily. Past Medical History:   Diagnosis Date    Adverse effect of anesthesia     Atrial fibrillation [facility])     had ablation in 2017    Attention deficit disorder without mention of hyperactivity     DUB (dysfunctional uterine bleeding)     Generalized osteoarthrosis, involving multiple sites     Grief reaction     Mother  recently - Celexa    Kidney stone     Memory changes     Adderall for this    Recurrent UTI     Sleep apnea     cpap    Unspecified tinnitus        Past Surgical History:   Procedure Laterality Date    HX COLONOSCOPY      HX GYN      Laparoscopy    HX KNEE ARTHROSCOPY Right     Meniscus repair    HX KNEE ARTHROSCOPY Right     Removed piece of bone (FB)    HX KNEE ARTHROSCOPY Right     ACL repair    HX LITHOTRIPSY  2018    HX UROLOGICAL  2018    Left percutaneous nephrolithotomy - Dr. Aleksandra Alexandra,  [facility] HX UROLOGICAL  2019    Cysto w/ stent removal - Dr. Aleksandra Alexandra, [facility].     IR CHANGE NEPHROSTOMY PYELOS TUBE LT  2018       Social History     Socioeconomic History    Marital status:      Spouse name: Not on file    Number of children: Not on file    Years of education: Not on file    Highest education level: Not on file   Occupational History    Not on file   Social Needs    Financial resource strain: Not on file    Food insecurity:     Worry: Not on file     Inability: Not on file    Transportation needs:     Medical: Not on file     Non-medical: Not on file   Tobacco Use    Smoking status: Never Smoker    Smokeless tobacco: Never Used   Substance and Sexual Activity    Alcohol use: No    Drug use: No    Sexual activity: Not Currently   Lifestyle    Physical activity:     Days per week: Not on file     Minutes per session: Not on file    Stress: Not on file   Relationships    Social connections:     Talks on phone: Not on file     Gets together: Not on file     Attends [employer] service: Not on file     Active member of club or organization: Not on file     Attends meetings of clubs or organizations: Not on file     Relationship status: Not on file    Intimate partner violence:     Fear of current or ex partner: Not on file     Emotionally abused: Not on file     Physically abused: Not on file     Forced sexual activity: Not on file   Other Topics Concern    Not on file   Social History Narrative    Not on file       Patient does have an advanced directive on file    Visit Vitals  /88 (BP 1 Location: Left arm, BP Patient Position: Sitting)   Pulse 76   Temp 98.8 °F (37.1 °C) (Tympanic)   Resp 16   Ht 5' 11\" (1.803 m)   Wt 187 lb 12.8 oz (85.2 kg)   SpO2 99%   BMI 26.19 kg/m²       Physical Exam   Pulse is regular  No Cervical Lymphadenopathy  No Supraclavicular Lymphadenopathy  Thyroid is Normal  Lungs are normal to percussion. Clear to auscultation   Heart:  S1 S2 are normal, No gallops, No murmurs  No Carotid Bruits  Abdomen:  Normal Bowel Sounds. No tenderness. No masses. No Hepatomegaly or Splenomegaly  LE:  Strong Pedal Pulses.   No Edema      BMI: Okay    Office Visit on 06/18/2019   Component Date Value Ref Range Status    Color (UA POC) 06/18/2019 Yellow   Final    Clarity (UA POC) 06/18/2019 Clear   Final    Glucose (UA POC) 06/18/2019 Negative  Negative Final    Bilirubin (UA POC) 06/18/2019 Negative  Negative Final    Ketones (UA POC) 06/18/2019 Negative  Negative Final    Specific gravity (UA POC) 06/18/2019 1.010  1.001 - 1.035 Final    Blood (UA POC) 06/18/2019 1+  Negative Final    pH (UA POC) 06/18/2019 6.0  4.6 - 8.0 Final    Protein (UA POC) 06/18/2019 Negative  Negative Final    Urobilinogen (UA POC) 06/18/2019 0.2 mg/dL  0.2 - 1 Final    Nitrites (UA POC) 06/18/2019 Negative  Negative Final    Leukocyte esterase (UA POC) 06/18/2019 1+  Negative Final   Office Visit on 05/14/2019   Component Date Value Ref Range Status    Digoxin level 05/14/2019 <0.3* 0.8 - 2.0 ng/mL Final    T4, Free 05/15/2019 1.5  0.9 - 1.8 ng/dL Final    Right subclavian sys 05/20/2019 100.3  cm/s Final    RIGHT SUBCLAVIAN ARTERY D 05/20/2019 0.00  cm/s Final    RIGHT COMMON CAROTID ARTERY MID S 05/20/2019 90.47  cm/s Final    RIGHT COMMON CAROTID ARTERY MID D 05/20/2019 29.37  cm/s Final    Right CCA prox sys 05/20/2019 94.4  cm/s Final    Right CCA prox blackman 05/20/2019 25.4  cm/s Final    Right eca sys 05/20/2019 87.1  cm/s Final    RIGHT EXTERNAL CAROTID ARTERY D 05/20/2019 7.91  cm/s Final    Right ICA dist sys 05/20/2019 97.3  cm/s Final    Right ICA dist blackman 05/20/2019 36.9  cm/s Final    Right ICA mid sys 05/20/2019 108.9  cm/s Final    Right ICA mid blackman 05/20/2019 53.2  cm/s Final    Right ICA prox sys 05/20/2019 74.1  cm/s Final    Right ICA prox blackman 05/20/2019 32.1  cm/s Final    Right vertebral sys 05/20/2019 49.9  cm/s Final    RIGHT VERTEBRAL ARTERY D 05/20/2019 20.83  cm/s Final    Right ICA/CCA sys 05/20/2019 1.2   Final    Left subclavian sys 05/20/2019 111.1  cm/s Final    LEFT SUBCLAVIAN ARTERY D 05/20/2019 0.00  cm/s Final    LEFT COMMON CAROTID ARTERY MID S 05/20/2019 98.35  cm/s Final    LEFT COMMON CAROTID ARTERY MID D 05/20/2019 30.52  cm/s Final    Left CCA prox sys 05/20/2019 120.5  cm/s Final    Left CCA prox blackman 05/20/2019 32.3  cm/s Final    Left ECA sys 05/20/2019 100.3  cm/s Final    LEFT EXTERNAL CAROTID ARTERY D 05/20/2019 13.60  cm/s Final    Left ICA dist sys 05/20/2019 126.0  cm/s Final    Left ICA dist blackman 05/20/2019 51.1  cm/s Final    Left ICA mid sys 05/20/2019 70.8  cm/s Final    Left ICA mid blackman 05/20/2019 33.3  cm/s Final    Left ICA prox sys 05/20/2019 86.5  cm/s Final    Left ICA prox blackman 05/20/2019 33.3  cm/s Final    Left vertebral sys 05/20/2019 56.0  cm/s Final    LEFT VERTEBRAL ARTERY D 05/20/2019 22.06  cm/s Final    Left ICA/CCA sys 05/20/2019 1.05   Final    Right cca dist sys 05/20/2019 59.4  cm/s Final    Right CCA dist blackman 05/20/2019 23.2  cm/s Final    Left CCA dist sys 05/20/2019 86.5  cm/s Final    Left CCA dist blackman 05/20/2019 27.4  cm/s Final   Office Visit on 04/30/2019   Component Date Value Ref Range Status    Color (UA POC) 04/30/2019 Yellow   Final    Clarity (UA POC) 04/30/2019 Clear   Final    Glucose (UA POC) 04/30/2019 Negative  Negative Final    Bilirubin (UA POC) 04/30/2019 Negative  Negative Final    Ketones (UA POC) 04/30/2019 Negative  Negative Final    Specific gravity (UA POC) 04/30/2019 1.021  1.001 - 1.035 Final    Blood (UA POC) 04/30/2019 1+  Negative Final    pH (UA POC) 04/30/2019 6.0  4.6 - 8.0 Final    Protein (UA POC) 04/30/2019 Negative  Negative Final    Urobilinogen (UA POC) 04/30/2019 0.2 mg/dL  0.2 - 1 Final    Nitrites (UA POC) 04/30/2019 Negative  Negative Final    Leukocyte esterase (UA POC) 04/30/2019 Negative  Negative Final       .No results found for any visits on 07/15/19. Assessment / Plan      ICD-10-CM ICD-9-CM    1. Hyperthyroidism E05.90 242.90    2. Atrial fibrillation, unspecified type ([facility] I48.91 427.31 AMB POC EKG ROUTINE W/ 12 LEADS, INTER & REP     she was advised to continue her maintenance medications      Follow-up and Dispositions    · Return in about 1 month (around 8/12/2019). I asked Estela Landers. Nae Erazo if she has any questions and I answered the questions. Estela Landers. Nae Erazo states that she understands the treatment plan and agrees with the treatment plan          THIS DOCUMENT WAS CREATED WITH A VOICE ACTIVATED DICTATION SYSTEM.   IT MAY CONTAIN TRANSCRIPTION ERRORS

## 2019-07-23 ENCOUNTER — HOSPITAL ENCOUNTER (OUTPATIENT)
Dept: NUTRITION | Age: 59
Discharge: HOME | End: 2019-07-23
Payer: COMMERCIAL

## 2019-07-23 PROCEDURE — 97802 MEDICAL NUTRITION INDIV IN: CPT

## 2019-07-23 NOTE — PROGRESS NOTES
[facility]  [address], [address] [facility], [address] [address]  Phone: [phone]  Fax: [phone]   Nutrition Assessment - Medical Nutrition Therapy   Outpatient Initial Evaluation         Patient Name: Rona Juarez : 1960   Treatment Diagnosis: Kidney Stones, weight loss   Referral Source: Eddie Nuno MD Start of Care [facility]): 2019     Gender: female Age: 64 y.o. Ht: 71 in Wt:  190.2 lb  kg   BMI: 26.5 BMR   Male  BMR Female    Anthropometrics Assessment: Per BMI, pt is considered Overweight. Mild adiposity is evident based on visual observation     Past Medical History includes: High cholesterol, Hyperthyroid, BLL Neuropathy, oxilate kidney stones, A-fib     Pertinent Medications:   See MAR     Biochemical Data:   No results found for: HBA1C, HGBE8, FNO1RLKL, YWB1AZWI  Lab Results   Component Value Date/Time    Cholesterol, total 231 (H) 2018 11:28 AM    HDL Cholesterol 73 (H) 2018 11:28 AM    LDL, calculated 141 (H) 2018 11:28 AM    VLDL, calculated 17 2018 11:28 AM    Triglyceride 85 2018 11:28 AM     Lab Results   Component Value Date/Time    ALT (SGPT) 12 2019 03:00 PM    AST (SGOT) 16 2019 03:00 PM    Alk. phosphatase 80 2019 03:00 PM    Bilirubin, total 0.5 2019 03:00 PM     Lab Results   Component Value Date/Time    Creatinine, POC 1.1 2017 03:31 PM    Creatinine 0.9 2019 03:00 PM     Lab Results   Component Value Date/Time    BUN 20 2019 03:00 PM     No results found for: MCACR, MCA1, MCA2, MCA3, MCAU, MCAU2, MCALPOCT     Subjective/Assessment:   Pt is here seeking education for food choices with kidney stones (high oxilate) as well as low sodium, and weight loss education. 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 Pt has been going through various medical issues lately.  Recently diagnosed with hyperthyroid and going in and out of [place]. She lives with er son who will be moving out in 2 weeks ([address] to [place]). Pt eats out fairly consistently and cooks some meals at home. She frequently skips meals (Lunch or Dinner) She has [employer] and is trying to lose 20#. Reviewed all education, answered questions ans set goals. Pt expressed comprehension, high motivation, and compliance is expected. Current Eating Patterns: See Note sheet in chart     Estimate Needs   Calories: 5323-0068  Protein: 120 Carbs: 160 Fat: 53   Kcal/day  g/day  g/day  g/day        percent: 30  40  30               Education & Recommendations provided: Educated pt on [employer], as well as pairing other oxilaate foods with 300mg Ca when eating. Provided [employer] list, high calcium foods list. Educated pt on macro nutrient food sources, balancing carbohydrates, meal timing, and appropriate serving sizes. Encouraged pt to avoid sugary beverages. Reviewed meal builder tool, calorie and jack breakdown as well as exercise parameters. Adjusted calorie goals in My [employer]Itness Pal, discussed using as a meal planner before eating foods.  Meals should be 400-500 calories   Handouts Provided: [x]  Carbohydrates  [x]  Protein  [x]  Fiber  [x]  Serving Sizes  [x]  Meal and Snack Ideas  [x]  Food Journals []  Diabetes  []  Cholesterol  []  Sodium  []  Gen Nutr Guidelines  []  SBGM Guidelines  [x]  Others: Kidney stones, high oxilate foods, high calcium foods   Information Reviewed with: Patient   Readiness to Change Stage: []  Pre-contemplative    []  Contemplative  []  Preparation               [x]  Action                  []  Maintenance   Potential Barriers to Learning: []  Decline in memory    []  Language barrier   []  Other:  []  Emotional                  []  Limited mobility  []  Lack of motivation     [] Vision, hearing or cognitive impairment   Expected Compliance: Good     Nutritional Goal - To promote lifestyle changes to result in:    [x]  Weight loss  [] Improved diabetic control  []  Decreased cholesterol levels  []  Decreased blood pressure  []  Weight maintenance []  Preventing any interactions associated with food allergies  []  Adequate weight gain toward goal weight  [x]  Other:  Better food choices, prevention of Kidney stone development       Patient Goals:  SMART goals 1. Avoid high Oxilate foods, see food list. Pair other oxilate containing foods with [place] food sources (see list)  2. Start a daily probiotic (3 strands of bacteria)   3. Have a plan, batch cook, work on preparing more meals at home, pack you lunch and snacks for work  4. Use my fitness pal and meal builder tool together to plan meals ahead of times and balance appropriately.       Dietitian Signature: Rebecca Holder MA, RD Date: 7/23/2019   Follow-up: 23 Aug @ 1500/ 18 Sept @ 1530 Time: 9:57 AM

## 2019-08-14 ENCOUNTER — OFFICE VISIT (OUTPATIENT)
Dept: FAMILY MEDICINE CLINIC | Facility: CLINIC | Age: 59
End: 2019-08-14

## 2019-08-14 VITALS
RESPIRATION RATE: 16 BRPM | TEMPERATURE: 98.1 F | HEART RATE: 60 BPM | OXYGEN SATURATION: 98 % | WEIGHT: 193 LBS | BODY MASS INDEX: 27.02 KG/M2 | HEIGHT: 71 IN | DIASTOLIC BLOOD PRESSURE: 70 MMHG | SYSTOLIC BLOOD PRESSURE: 116 MMHG

## 2019-08-14 DIAGNOSIS — I48.19 PERSISTENT ATRIAL FIBRILLATION (HCC): ICD-10-CM

## 2019-08-14 DIAGNOSIS — R60.0 LEG EDEMA, LEFT: ICD-10-CM

## 2019-08-14 DIAGNOSIS — E05.90 HYPERTHYROIDISM: Primary | ICD-10-CM

## 2019-08-14 DIAGNOSIS — Z78.0 MENOPAUSE: ICD-10-CM

## 2019-08-14 NOTE — PATIENT INSTRUCTIONS
Medicare Wellness Visit, Female The best way to improve and maintain good health is to have a healthy lifestyle by eating a well-balanced diet, exercising regularly, limiting alcohol and stopping smoking. Regular visits with your physician or non-physician health care provider also support your good health. Preventive screening tests can find health problems before they become diseases or illnesses. Here is a list of your current Health Maintenance items with a due date: 
Health Maintenance Topic Date Due  
 Hepatitis C Screening  1960  Pneumococcal 0-64 years (1 of 1 - PPSV23) 03/14/1968  Shingrix Vaccine Age 50> (1 of 2) 03/14/2012  FOBT Q 1 YEAR AGE 50-75  03/14/2012  Influenza Age 5 to Adult  08/01/2019  BREAST CANCER SCRN MAMMOGRAM  08/22/2020  PAP AKA CERVICAL CYTOLOGY  09/28/2021  
 DTaP/Tdap/Td series (2 - Td) 04/16/2025 Preventive services such as immunizations prevent serious infections. All people over age 72 should have a Pneumovax and a Prevnar-13 shot to prevent potentially life threatening infections with the pneumococcus bacteria, a common cause of pneumonia. These are once in a lifetime unless you and your provider decide differently. All people over 65 should have a yearly influenza vaccine or \"flu\" shot. This does not prevent infection with cold viruses but has been proven to prevent hospitalization and death from influenza. Although Medicare part B \"regular Medicare\" currently only covers tetanus vaccination in the context of an injury, a tetanus vaccine (Tdap or Td) is recommended every 10 years. A shingles vaccine is recommended once in a lifetime after age 61. The Shingles vaccine is also not covered by Medicare part B. Note, however, that both the Shingles vaccine and Tdap/Td are generally covered by secondary carriers. Please check your coverage and out of pocket expenses.  Consider contacting your local health department because it may stock these vaccines for a reasonable charge. We currently have documentation of the following immunization history for you: 
Immunization History Administered Date(s) Administered  TB Skin Test (PPD) 02/27/2007, 08/29/2013  TB Skin Test (PPD) Intradermal 08/09/2017  Tdap 04/16/2015 Screening for infection with Hepatitis C is recommended for anyone born between [birth year] through [address]. The table at the bottom of this document indicates the status of this and other preventive services. A bone mass density test (DEXA) to screen for osteoporosis or thinning of the bones should be done at least once after age 72 and may be done up to every 2 years as determined by you and your health care provider. The most recent DEXA we have on file for you is: DEXA Results (most recent): No results found for this or any previous visit. Screening for diabetes mellitus with a blood sugar test (glucose) should be done at least every 3 years until age 79. You and your health care provider may decide whether to continue screening after age 79. The most recent blood glucose we have on file for you is:  
Lab Results Component Value Date/Time Glucose 85 04/12/2019 03:00 PM  
 
 
 
Glaucoma is a disease of the eye due to increased ocular pressure that can lead to blindness. People with risk factors for glaucoma ( race, diabetes, family history) should consider screening at least every 2 years by an eye professional.  
 
Cardiovascular screening tests that check for elevated lipids or cholesterol (fatty part of blood) which can lead to heart disease and strokes should be done every 4-6 years through age 79. You and your health care provider may decide whether to continue screening after age 79. The most recent lipid panel we have on file for you is:  
Lab Results Component Value Date/Time  Cholesterol, total 231 (H) 06/20/2018 11:28 AM  
 HDL Cholesterol 73 (H) 06/20/2018 11:28 AM  
 LDL, calculated 141 (H) 06/20/2018 11:28 AM  
 VLDL, calculated 17 06/20/2018 11:28 AM  
 Triglyceride 85 06/20/2018 11:28 AM  
 
 
Colorectal cancer screening that evaluates for blood or polyps in your colon for people with average risk should be done yearly as a stool test, every five years as a flexible sigmoidoscope or every 10 years as a colonoscopy up to age 76. You and your health care provider may decide whether to continue screening after age 76. Breast cancer screening with a mammogram is recommended at least once every 2 years  for women age 54-69. You and your health care provider may decide whether to continue screening after age 76. The most recent mammogram we have on file for you is: [facility] Results (most recent): 
Results from Hospital Encounter encounter on 08/22/18 BOBBY 3D MISSAEL W MAMMO BI SCREENING INCL CAD Narrative BILATERAL SCREENING DIGITAL MAMMOGRAM WITH CAD AND WITH DIGITAL BREAST 
TOMOSYNTHESIS IMAGING/COMBINATION 2-D 3-D EXAM 
 
History:   Screening mammogram 
 
Location: [facility] Comparison: mammogram 2013, [id number] Findings: 2-D /3-D digital mammograms were obtained of both breasts in the CC 
and MLO projections. CAD analysis was performed with the computer-aided 
detection system. Any areas marked correlated with the mammogram. 
 
 
There are no masses, regions of distortion, or suspicious microcalcifications. Impression Impression: No suspicious finding for malignancy. Recommend clinical followup and annual mammography as per the Society of Breast 
Imaging and the [employer] of Radiology guidelines. BIRADS 1: Negative Breast Composition: There are scattered fibroglandular densities (approximately 25-50% glandular). Screening for cervical cancer with a pap smear is recommended for all women with a cervix until age 72.  The frequency of this test is based on the details of her prior pap smear testing. You and your health care provider may decide whether to continue screening after age 72. People who have smoked the equivalent of 1 pack per day for 30 years or more may benefit from screening for lung cancer with a yearly low dose CT scan until they have been non smokers for 15 years or competing health conditions render this unlikely to be beneficial.  
 
Your Medicare Wellness Exam is recommended annually.

## 2019-08-14 NOTE — PROGRESS NOTES
Luana Hernández presents today for   Chief Complaint   Patient presents with    Well Woman     F/u       Luana Nomadi preferred language for health care discussion is english. Is someone accompanying this pt? no    Is the patient using any DME equipment during [address]? no    Depression Screening:  3 most recent PHQ Screens 8/14/2019   Little interest or pleasure in doing things Not at all   Feeling down, depressed, irritable, or hopeless Not at all   Total Score PHQ 2 0       Learning Assessment:  Learning Assessment 8/16/2018   PRIMARY LEARNER Patient   HIGHEST LEVEL OF EDUCATION - PRIMARY LEARNER  -   BARRIERS PRIMARY LEARNER -   [facility]    NEED -   LEARNER PREFERENCE PRIMARY DEMONSTRATION   ANSWERED BY Patient   RELATIONSHIP SELF       Abuse Screening:  Abuse Screening Questionnaire 6/3/2016   Do you ever feel afraid of your partner? N   Are you in a relationship with someone who physically or mentally threatens you? N   Is it safe for you to go home? Y       Fall Risk  Fall Risk Assessment, last 12 mths 6/3/2016   Able to walk? Yes   Fall in past 12 months? No       Health Maintenance reviewed and discussed and ordered per Provider. Health Maintenance Due   Topic Date Due    Hepatitis C Screening  1960    Pneumococcal 0-64 years (1 of 1 - PPSV23) 03/14/1968    Shingrix Vaccine Age 50> (1 of 2) 03/14/2012    FOBT Q 1 YEAR AGE 50-75  03/14/2012    Influenza Age 9 to Adult  08/01/2019   . Luana Hernández is updated on all     Pt currently taking Antiplatelet therapy? Yes eliquis    Coordination of Care:  1. Have you been to the ER, urgent care clinic since your last visit? no Hospitalized since your last visit? no    2. Have you seen or consulted any other health care providers outside of the [address] [place] [address] since your last visit? no Include any pap smears or colon screening.  no

## 2019-08-15 LAB
BNP SERPL-MCNC: 385 PG/ML
T3FREE SERPL-MCNC: 2.6 PG/ML (ref 2.3–4.2)
T4 FREE SERPL-MCNC: 1.1 NG/DL (ref 0.9–1.8)
TSH SERPL DL<=0.005 MIU/L-ACNC: 0.02 MCU/ML (ref 0.27–4.2)

## 2019-08-16 NOTE — PROGRESS NOTES
The patient presents to the office today with the chief complaint of hyperthyroidism    HPI    The patient remains on Tapazole 10 mg TID for hyperthyroidism. The patient is tolerating the medication well. The patient notes a localized rash in her right antecubital fossa at a previous blood draw site. The patient notes recurrent runs of atrial fibrillation. The patient remains on Eliquis. The patient has swelling of her leg leg following a prolonged ride. The patient has been noted to have an elevated nBNP in the past.  The patient is post menopause. She is due for a DEXA scan. Review of Systems   Respiratory: Negative for shortness of breath. Cardiovascular: Positive for leg swelling (As per HPI). Negative for chest pain. Allergies   Allergen Reactions    Celebrex [Celecoxib] Hives    Iodinated Contrast Media Hives     Patient was in CT for a cardiac scan. A test bolus of 20cc was given and patient broke out into hives immediately after. Cancelled the rest of the exam and escorted the patient to the ED for furhter evaluation.  Sulfa (Sulfonamide Antibiotics) Other (comments)     Its she's allergic to Celebrex  Other reaction(s): Other (comments)  Its she's allergic to Celebrex       Current Outpatient Medications   Medication Sig Dispense Refill    dilTIAZem CD (CARDIZEM CD) 120 mg ER capsule Take 120 mg by mouth.  dofetilide (TIKOSYN) 250 mcg capsule Take 250 mcg by mouth.  methIMAzole (TAPAZOLE) 10 mg tablet Take 30 mg by mouth.  metoprolol succinate (TOPROL-XL) 25 mg XL tablet Take 12.5 mg by mouth.  fluticasone propionate (FLONASE) 50 mcg/actuation nasal spray SPRAY 2 SPRAYS IN EACH NOSTRIL DAILY 1 Bottle 2    ELIQUIS 5 mg tablet TAKE 1 TABLET BY MOUTH TWICE DAILY 180 Tab 3    diclofenac (VOLTAREN) 1 % gel Apply  to affected area four (4) times daily.  thiamine (VITAMIN B-1) 100 mg tablet Take  by mouth daily.       cyanocobalamin (VITAMIN B-12) 1,000 mcg tablet Take 1,000 mcg by mouth daily.  multivitamin (ONE A DAY) tablet Take 1 Tab by mouth daily. Past Medical History:   Diagnosis Date    Adverse effect of anesthesia     Atrial fibrillation [facility])     had ablation in 2017    Attention deficit disorder without mention of hyperactivity     DUB (dysfunctional uterine bleeding)     Generalized osteoarthrosis, involving multiple sites     Grief reaction     Mother  recently - Celexa    Kidney stone     Memory changes     Adderall for this    Recurrent UTI     Sleep apnea     cpap    Unspecified tinnitus        Past Surgical History:   Procedure Laterality Date    HX COLONOSCOPY      HX GYN      Laparoscopy    HX KNEE ARTHROSCOPY Right     Meniscus repair    HX KNEE ARTHROSCOPY Right     Removed piece of bone (FB)    HX KNEE ARTHROSCOPY Right     ACL repair    HX LITHOTRIPSY  2018    HX UROLOGICAL  2018    Left percutaneous nephrolithotomy - Dr. Towana Primrose, [address] [place] [address] HX UROLOGICAL  2019    Cysto w/ stent removal - Dr. Towana Primrose, [facility].     IR CHANGE NEPHROSTOMY PYELOS TUBE LT  2018       Social History     Socioeconomic History    Marital status:      Spouse name: Not on file    Number of children: Not on file    Years of education: Not on file    Highest education level: Not on file   Occupational History    Not on file   Social Needs    Financial resource strain: Not on file    Food insecurity:     Worry: Not on file     Inability: Not on file    Transportation needs:     Medical: Not on file     Non-medical: Not on file   Tobacco Use    Smoking status: Never Smoker    Smokeless tobacco: Never Used   Substance and Sexual Activity    Alcohol use: No    Drug use: No    Sexual activity: Not Currently   Lifestyle    Physical activity:     Days per week: Not on file     Minutes per session: Not on file    Stress: Not on file   Relationships    Social connections:     Talks on phone: Not on file     Gets together: Not on file     Attends [employer] service: Not on file     Active member of club or organization: Not on file     Attends meetings of clubs or organizations: Not on file     Relationship status: Not on file    Intimate partner violence:     Fear of current or ex partner: Not on file     Emotionally abused: Not on file     Physically abused: Not on file     Forced sexual activity: Not on file   Other Topics Concern    Not on file   Social History Narrative    Not on file       Patient does have an advanced directive on file    Visit Vitals  /70 (BP 1 Location: Left arm, BP Patient Position: Sitting)   Pulse 60   Temp 98.1 °F (36.7 °C) (Tympanic)   Resp 16   Ht 5' 11\" (1.803 m)   Wt 193 lb (87.5 kg)   SpO2 98%   BMI 26.92 kg/m²       Physical Exam   Cardiovascular: Normal rate and regular rhythm. Exam reveals no gallop. No murmur heard. Pulmonary/Chest: She has no wheezes. She has no rales. Musculoskeletal: She exhibits edema (Trace left LE swelling).      BMI:  OK    Office Visit on 08/14/2019   Component Date Value Ref Range Status    Triiodothyronine (T3), free 08/14/2019 2.6  2.3 - 4.2 pg/mL Final    B-type Natriuretic Peptide 08/14/2019 385* <=125 pg/mL Final    Comment: Per the PRIDE study, those patients with dyspnea on presentation to the ED   were  more likely to have acute congestive heart failure if the NT-proBNP results  were as follows:  < 50 yrs:    >450  pg/mL  50 to 75:    >900  pg/mL  > 75 yrs:    >1800 pg/mL      T4, Free 08/14/2019 1.1  0.9 - 1.8 ng/dL Final    TSH 08/14/2019 0.02* 0.27 - 4.20 mcU/mL Final   Office Visit on 06/18/2019   Component Date Value Ref Range Status    Color (UA POC) 06/18/2019 Yellow   Final    Clarity (UA POC) 06/18/2019 Clear   Final    Glucose (UA POC) 06/18/2019 Negative  Negative Final    Bilirubin (UA POC) 06/18/2019 Negative  Negative Final    Ketones (UA POC) 06/18/2019 Negative  Negative Final    Specific gravity (UA POC) 06/18/2019 1.010  1.001 - 1.035 Final    Blood (UA POC) 06/18/2019 1+  Negative Final    pH (UA POC) 06/18/2019 6.0  4.6 - 8.0 Final    Protein (UA POC) 06/18/2019 Negative  Negative Final    Urobilinogen (UA POC) 06/18/2019 0.2 mg/dL  0.2 - 1 Final    Nitrites (UA POC) 06/18/2019 Negative  Negative Final    Leukocyte esterase (UA POC) 06/18/2019 1+  Negative Final       .  Results for orders placed or performed in visit on 08/14/19   T3, FREE   Result Value Ref Range    Triiodothyronine (T3), free 2.6 2.3 - 4.2 pg/mL    Narrative    Unless additionally indicated, test performed at: [address] PH: [phone]. NT-PRO BNP   Result Value Ref Range    B-type Natriuretic Peptide 385 (H) <=125 pg/mL    Narrative    Unless additionally indicated, test performed at: [address] PH: [phone]. T4, FREE   Result Value Ref Range    T4, Free 1.1 0.9 - 1.8 ng/dL    Narrative    Unless additionally indicated, test performed at: [address] PH: [phone]. TSH 3RD GENERATION   Result Value Ref Range    TSH 0.02 (L) 0.27 - 4.20 mcU/mL    Narrative    Unless additionally indicated, test performed at: [facility] [address]. PH: [phone]. Assessment / Plan      ICD-10-CM ICD-9-CM    1. Hyperthyroidism E05.90 242.90 TSH 3RD GENERATION      T4, FREE      T3, FREE      T3, FREE      T4, FREE      TSH 3RD GENERATION   2. Persistent atrial fibrillation (HCC) I48.1 427.31 NT-PRO BNP      NT-PRO BNP   3. Leg edema, left R60.0 782.3 NT-PRO BNP      NT-PRO BNP   4.  Menopause Z78.0 627.2 DEXA BONE DENSITY STUDY AXIAL       she was advised to continue her maintenance medications  Free T4, free T3 and TSH were ordered  To Endocrine    Follow-up and Dispositions    · Return in about 3 months (around 11/14/2019). I asked Elmer Jose if she has any questions and I answered the questions. Henriquekeiry Jose states that she understands the treatment plan and agrees with the treatment plan        THIS DOCUMENT WAS CREATED WITH A VOICE ACTIVATED DICTATION SYSTEM.   IT MAY CONTAIN TRANSCRIPTION ERRORS

## 2019-08-30 ENCOUNTER — TELEPHONE (OUTPATIENT)
Dept: FAMILY MEDICINE CLINIC | Facility: CLINIC | Age: 59
End: 2019-08-30

## 2019-09-18 ENCOUNTER — HOSPITAL ENCOUNTER (OUTPATIENT)
Dept: NUTRITION | Age: 59
Discharge: HOME | End: 2019-09-18
Payer: COMMERCIAL

## 2019-09-18 PROCEDURE — 97803 MED NUTRITION INDIV SUBSEQ: CPT

## 2019-09-18 NOTE — PROGRESS NOTES
NUTRITION - FOLLOW-UP TREATMENT NOTE  Patient Name: Dorothy Enter         Date: 2019  : 1960    YES Patient  Verified  Diagnosis: Kidney stones hypo/hyper thyroid   In time:   1530             Out time:   1600   Total Treatment Time (min):   30     SUBJECTIVE/ASSESSMENT    Changes in medication or medical history? Any new allergies, surgeries or procedures? YES    If yes, update Summary List   Doctor to adjust thyroid dosage   Surgery scheduled for november         Current Wt: 197 Previous Wt: 196 Wt Change: +1     Achievement of Goals: Pt has been okay. She was told today her thyroid dose was too high and now she is hypothyroid and not hyper. She is visually fatigued and her nutrition intake is still lacking. Reviewed her my fitness pal journal and she is still not meeting her balanced nutrition needs. She is guessing on some foods, but she also doesn't;t understand exactly what she is looking at/for. Discussed using the actual website in combo with maia to get a better visual. Used this as a teaching method to show her how she should balance her meals and snacks. Keeping carbs at 40% and protein and fat at 30% to preserve kidney function. She also has a sweet tooth an dhas dificulty saying no to candy, candy bars, and donuts. Discussed this and how to balance indulgence into diet. She continues to walk and cycle on the weekends. Will follow in one month.           Patient Education:  []  Review current plan with patient   []  Other:    Handouts/  Information Provided: []  Carbohydrates  []  Protein  []  Fiber  []  Serving Sizes  []  Fluids  []  General guidelines []  Diabetes  []  Cholesterol  []  Sodium  []  SBGM  []  Food Journals  []  Others:      New Patient Goals: - Start using MFP on computer for better visual and understanding  - Carbs should be no more than 10-15gm more than protein  Protein should never be less than 10gm at meals and snacks  - back off sweets- these are empty calories; an indulgence is okay form time to time = 1/2 candy bar       PLAN    [x]  Continue on current plan []  Follow-up PRN   []  Discharge due to :    [x]  Next appt: 16 Oct @ 1530/ 21 Nov @ 0     Dietitian: Zainab Ventura MA, RD    Date: 9/18/2019 Time: 3:13 PM

## 2019-09-27 ENCOUNTER — HOSPITAL ENCOUNTER (OUTPATIENT)
Dept: MAMMOGRAPHY | Age: 59
Discharge: HOME | End: 2019-09-27
Attending: INTERNAL MEDICINE
Payer: COMMERCIAL

## 2019-09-27 DIAGNOSIS — Z12.31 VISIT FOR SCREENING MAMMOGRAM: ICD-10-CM

## 2019-09-27 PROCEDURE — 77063 BREAST TOMOSYNTHESIS BI: CPT

## 2019-10-10 ENCOUNTER — OFFICE VISIT (OUTPATIENT)
Dept: FAMILY MEDICINE CLINIC | Facility: CLINIC | Age: 59
End: 2019-10-10

## 2019-10-10 VITALS
SYSTOLIC BLOOD PRESSURE: 134 MMHG | TEMPERATURE: 97 F | RESPIRATION RATE: 12 BRPM | OXYGEN SATURATION: 100 % | HEART RATE: 60 BPM | HEIGHT: 71 IN | DIASTOLIC BLOOD PRESSURE: 80 MMHG | BODY MASS INDEX: 28.14 KG/M2 | WEIGHT: 201 LBS

## 2019-10-10 DIAGNOSIS — I48.0 PAROXYSMAL ATRIAL FIBRILLATION (HCC): ICD-10-CM

## 2019-10-10 DIAGNOSIS — E05.90 HYPERTHYROIDISM: ICD-10-CM

## 2019-10-10 DIAGNOSIS — R53.82 CHRONIC FATIGUE: ICD-10-CM

## 2019-10-10 DIAGNOSIS — E05.90 HYPERTHYROIDISM: Primary | ICD-10-CM

## 2019-10-10 RX ORDER — LEVOTHYROXINE SODIUM 50 UG/1
TABLET ORAL
Refills: 0 | COMMUNITY
Start: 2019-09-23 | End: 2020-02-06 | Stop reason: ALTCHOICE

## 2019-10-10 NOTE — PROGRESS NOTES
Merle Dietz presents today for   Chief Complaint   Patient presents with    Follow-up       Is someone accompanying this pt? no    Is the patient using any DME equipment during OV? no    Depression Screening:  3 most recent PHQ Screens 10/10/2019   Little interest or pleasure in doing things Not at all   Feeling down, depressed, irritable, or hopeless Not at all   Total Score PHQ 2 0       Learning Assessment:  Learning Assessment 8/16/2018   PRIMARY LEARNER Patient   HIGHEST LEVEL OF EDUCATION - PRIMARY LEARNER  -   BARRIERS PRIMARY LEARNER -   [facility]    NEED -   LEARNER PREFERENCE PRIMARY DEMONSTRATION   ANSWERED BY Patient   RELATIONSHIP SELF       Abuse Screening:  Abuse Screening Questionnaire 6/3/2016   Do you ever feel afraid of your partner? N   Are you in a relationship with someone who physically or mentally threatens you? N   Is it safe for you to go home? Y       Fall Risk  Fall Risk Assessment, last 12 mths 6/3/2016   Able to walk? Yes   Fall in past 12 months? No       Health Maintenance reviewed and discussed and ordered per Provider. Health Maintenance Due   Topic Date Due    Hepatitis C Screening  1960    Pneumococcal 0-64 years (1 of 1 - PPSV23) 03/14/1968    Shingrix Vaccine Age 50> (1 of 2) 03/14/2012    FOBT Q 1 YEAR AGE 50-75  03/14/2012    Influenza Age 9 to Adult  08/01/2019           Coordination of Care:  1. Have you been to the ER, urgent care clinic since your last visit? Hospitalized since your last visit? no    2. Have you seen or consulted any other health care providers outside of the [address] [place] [address] since your last visit? Include any pap smears or colon screening.  no

## 2019-10-13 NOTE — PROGRESS NOTES
The patient presents to the office today with the chief complaint of fatigue    HPI    The patient remains on Tapazole - now once daily and now with the addition of Synthroid 50 ug daily. Her last TSH was high prior to the adjustment of the Tapazole and addition of Synthroid. The patient continues to feel fatigued and her weight is increasing. The patient has paroxysmal atrial fibrillation. She has had fewer breakthroughs, but less so. They occur with limits of exercise. Review of Systems   Respiratory: Negative for shortness of breath. Cardiovascular: Positive for palpitations. Negative for chest pain and leg swelling. Allergies   Allergen Reactions    Celebrex [Celecoxib] Hives    Iodinated Contrast Media Hives     Patient was in CT for a cardiac scan. A test bolus of 20cc was given and patient broke out into hives immediately after. Cancelled the rest of the exam and escorted the patient to the ED for furhter evaluation.  Sulfa (Sulfonamide Antibiotics) Other (comments)     Its she's allergic to Celebrex  Other reaction(s): Other (comments)  Its she's allergic to Celebrex       Current Outpatient Medications   Medication Sig Dispense Refill    levothyroxine (SYNTHROID) 50 mcg tablet TK 1 T PO D  0    dilTIAZem CD (CARDIZEM CD) 120 mg ER capsule Take 120 mg by mouth.  dofetilide (TIKOSYN) 250 mcg capsule Take 250 mcg by mouth.  methIMAzole (TAPAZOLE) 10 mg tablet Take 30 mg by mouth.  metoprolol succinate (TOPROL-XL) 25 mg XL tablet Take 12.5 mg by mouth.  fluticasone propionate (FLONASE) 50 mcg/actuation nasal spray SPRAY 2 SPRAYS IN EACH NOSTRIL DAILY 1 Bottle 2    ELIQUIS 5 mg tablet TAKE 1 TABLET BY MOUTH TWICE DAILY 180 Tab 3    diclofenac (VOLTAREN) 1 % gel Apply  to affected area four (4) times daily.  thiamine (VITAMIN B-1) 100 mg tablet Take  by mouth daily.  cyanocobalamin (VITAMIN B-12) 1,000 mcg tablet Take 1,000 mcg by mouth daily.       multivitamin (ONE A DAY) tablet Take 1 Tab by mouth daily. Past Medical History:   Diagnosis Date    Adverse effect of anesthesia     Atrial fibrillation [facility])     had ablation in 2017    Attention deficit disorder without mention of hyperactivity     DUB (dysfunctional uterine bleeding)     Generalized osteoarthrosis, involving multiple sites     Grief reaction     Mother  recently - Celexa    Kidney stone     Memory changes     Adderall for this    Recurrent UTI     Sleep apnea     cpap    Unspecified tinnitus        Past Surgical History:   Procedure Laterality Date    HX COLONOSCOPY      HX GYN      Laparoscopy    HX KNEE ARTHROSCOPY Right     Meniscus repair    HX KNEE ARTHROSCOPY Right     Removed piece of bone (FB)    HX KNEE ARTHROSCOPY Right     ACL repair    HX LITHOTRIPSY  2018    HX UROLOGICAL  2018    Left percutaneous nephrolithotomy - Dr. Cynthia Olson,  [facility] HX UROLOGICAL  2019    Cysto w/ stent removal - Dr. Cynthia Olson, [facility].     IR CHANGE NEPHROSTOMY PYELOS TUBE LT  2018       Social History     Socioeconomic History    Marital status:      Spouse name: Not on file    Number of children: Not on file    Years of education: Not on file    Highest education level: Not on file   Occupational History    Not on file   Social Needs    Financial resource strain: Not on file    Food insecurity:     Worry: Not on file     Inability: Not on file    Transportation needs:     Medical: Not on file     Non-medical: Not on file   Tobacco Use    Smoking status: Never Smoker    Smokeless tobacco: Never Used   Substance and Sexual Activity    Alcohol use: No    Drug use: No    Sexual activity: Not Currently   Lifestyle    Physical activity:     Days per week: Not on file     Minutes per session: Not on file    Stress: Not on file   Relationships    Social connections:     Talks on phone: Not on file     Gets together: Not on file     Attends [employer] service: Not on file     Active member of club or organization: Not on file     Attends meetings of clubs or organizations: Not on file     Relationship status: Not on file    Intimate partner violence:     Fear of current or ex partner: Not on file     Emotionally abused: Not on file     Physically abused: Not on file     Forced sexual activity: Not on file   Other Topics Concern    Not on file   Social History Narrative    Not on file       Patient does not have an advanced directive on file    Visit Vitals  /80   Pulse 60   Temp 97 °F (36.1 °C) (Oral)   Resp 12   Ht 5' 11\" (1.803 m)   Wt 201 lb (91.2 kg)   SpO2 100%   BMI 28.03 kg/m²       Physical Exam  Pulse is regular  No Cervical Lymphadenopathy  No Supraclavicular Lymphadenopathy  Thyroid is Normal  Lungs are normal to percussion. Clear to auscultation   Heart:  S1 S2 are normal, No gallops, No murmurs  No Carotid Bruits  Abdomen:  Normal Bowel Sounds. No tenderness. No masses. No Hepatomegaly or Splenomegaly  LE:  Strong Pedal Pulses. No Edema        BMI:  OK    Lab Only on 09/17/2019   Component Date Value Ref Range Status    Triiodothyronine (T3), free 09/17/2019 1.8* 2.3 - 4.2 pg/mL Final    T4, Free 09/17/2019 0.5* 0.9 - 1.8 ng/dL Final    TSH 09/17/2019 25.06* 0.27 - 4.20 mcU/mL Final    Glucose 09/17/2019 92  70 - 99 mg/dL Final    BUN 09/17/2019 30* 6 - 22 mg/dL Final    Creatinine 09/17/2019 1.1  0.5 - 1.2 mg/dL Final    Sodium 09/17/2019 139  133 - 145 mmol/L Final    Potassium 09/17/2019 4.5  3.5 - 5.5 mmol/L Final    Chloride 09/17/2019 98  98 - 110 mmol/L Final    CO2 09/17/2019 27  20 - 32 mmol/L Final    AST (SGOT) 09/17/2019 21  10 - 37 U/L Final    ALT (SGPT) 09/17/2019 15  5 - 40 U/L Final    Alk.  phosphatase 09/17/2019 93  25 - 115 U/L Final    Bilirubin, total 09/17/2019 0.4  0.2 - 1.2 mg/dL Final    Calcium 09/17/2019 9.8  8.4 - 10.5 mg/dL Final    Protein, total 09/17/2019 7.0  6.4 - 8.3 g/dL Final  Albumin 09/17/2019 4.6  3.5 - 5.0 g/dL Final    A-G Ratio 09/17/2019 1.9  1.1 - 2.6 ratio Final    Globulin 09/17/2019 2.4  2.0 - 4.0 g/dL Final    Anion gap 09/17/2019 14.0  mmol/L Final    Comment: Estimated GFR results are reported in mL/min/1.73 sq.m. by the MDRD equation. This eGFR is validated for stable chronic renal failure patients. This   equation  is unreliable in acute illness or patients with normal renal function.  GFRAA 09/17/2019 59.5* >60.0 Final    GFRNA 09/17/2019 49.1* >60.0 Final    WBC 09/17/2019 6.3  4.0 - 11.0 K/uL Final    RBC 09/17/2019 4.13  3.80 - 5.20 M/uL Final    HGB 09/17/2019 12.6  11.7 - 16.0 g/dL Final    HCT 09/17/2019 39.2  35.1 - 48.0 % Final    MCV 09/17/2019 95  80 - 95 fL Final    MCH 09/17/2019 31  26 - 34 pg Final    MCHC 09/17/2019 32  31 - 36 g/dL Final    RDW 09/17/2019 14.6  10.0 - 15.5 % Final    PLATELET 46/46/4968 987  140 - 440 K/uL Final    MPV 09/17/2019 12.6  9.0 - 13.0 fL Final    NEUTROPHILS 09/17/2019 68  40 - 75 % Final    Lymphocytes 09/17/2019 23  20 - 45 % Final    MONOCYTES 09/17/2019 8  3 - 12 % Final    EOSINOPHILS 09/17/2019 1  0 - 6 % Final    BASOPHILS 09/17/2019 1  0 - 2 % Final    ABS. NEUTROPHILS 09/17/2019 4.3  1.8 - 7.7 K/uL Final    ABSOLUTE LYMPHOCYTE COUNT 09/17/2019 1.4  1.0 - 4.8 K/uL Final    ABS. MONOCYTES 09/17/2019 0.5  0.1 - 1.0 K/uL Final    ABS. EOSINOPHILS 09/17/2019 0.1  0.0 - 0.5 K/uL Final    ABS.  BASOPHILS 09/17/2019 0.0  0.0 - 0.2 K/uL Final   Office Visit on 08/15/2019   Component Date Value Ref Range Status    Color (UA POC) 08/15/2019 Yellow   Final    Clarity (UA POC) 08/15/2019 Clear   Final    Glucose (UA POC) 08/15/2019 Negative  Negative Final    Bilirubin (UA POC) 08/15/2019 Negative  Negative Final    Ketones (UA POC) 08/15/2019 Negative  Negative Final    Specific gravity (UA POC) 08/15/2019 1.015  1.001 - 1.035 Final    Blood (UA POC) 08/15/2019 Trace  Negative Final    pH (UA POC) 08/15/2019 6.5  4.6 - 8.0 Final    Protein (UA POC) 08/15/2019 Negative  Negative Final    Urobilinogen (UA POC) 08/15/2019 0.2 mg/dL  0.2 - 1 Final    Nitrites (UA POC) 08/15/2019 Negative  Negative Final    Leukocyte esterase (UA POC) 08/15/2019 3+  Negative Final   Office Visit on 08/14/2019   Component Date Value Ref Range Status    Triiodothyronine (T3), free 08/14/2019 2.6  2.3 - 4.2 pg/mL Final    B-type Natriuretic Peptide 08/14/2019 385* <=125 pg/mL Final    Comment: Per the PRIDE study, those patients with dyspnea on presentation to the ED   were  more likely to have acute congestive heart failure if the NT-proBNP results  were as follows:  < 50 yrs:    >450  pg/mL  50 to 75:    >900  pg/mL  > 75 yrs:    >1800 pg/mL      T4, Free 08/14/2019 1.1  0.9 - 1.8 ng/dL Final    TSH 08/14/2019 0.02* 0.27 - 4.20 mcU/mL Final       .No results found for any visits on 10/10/19. Assessment / Plan      ICD-10-CM ICD-9-CM    1. Hyperthyroidism E05.90 242.90 CBC WITH AUTOMATED DIFF      METABOLIC PANEL, COMPREHENSIVE      LIPID PANEL      TSH 3RD GENERATION      T4, FREE      T3, FREE   2. Paroxysmal atrial fibrillation (HCC) I48.0 427.31 CBC WITH AUTOMATED DIFF      METABOLIC PANEL, COMPREHENSIVE      LIPID PANEL      MAGNESIUM   3. Chronic fatigue R53.82 780.79 CBC WITH AUTOMATED DIFF      METABOLIC PANEL, COMPREHENSIVE      LIPID PANEL      TSH 3RD GENERATION      T4, FREE      T3, FREE       she was advised to continue her maintenance medications  Labs ordered    Follow-up and Dispositions    · Return in about 2 months (around 12/10/2019). I asked Franchesca Scott if she has any questions and I answered the questions. Franchesca Scott states that she understands the treatment plan and agrees with the treatment plan          THIS DOCUMENT WAS CREATED WITH A VOICE ACTIVATED DICTATION SYSTEM.   IT MAY CONTAIN TRANSCRIPTION ERRORS

## 2019-10-16 ENCOUNTER — APPOINTMENT (OUTPATIENT)
Dept: NUTRITION | Age: 59
End: 2019-10-16
Payer: COMMERCIAL

## 2019-10-17 ENCOUNTER — HOSPITAL ENCOUNTER (OUTPATIENT)
Dept: NUTRITION | Age: 59
Discharge: HOME | End: 2019-10-17
Payer: COMMERCIAL

## 2019-10-17 PROCEDURE — 97803 MED NUTRITION INDIV SUBSEQ: CPT

## 2019-10-17 NOTE — PROGRESS NOTES
NUTRITION - FOLLOW-UP TREATMENT NOTE  Patient Name: Sasha White         Date: 10/17/2019  : 1960    YES Patient  Verified  Diagnosis: Kidney stones, Hypo/hyper thyroid   In time:   1530            Out time:   1600   Total Treatment Time (min):   30     SUBJECTIVE/ASSESSMENT    Changes in medication or medical history? Any new allergies, surgeries or procedures? NO    If yes, update Summary List   No change          Current Wt: 199 Previous Wt: 197 Wt Change: +2     Achievement of Goals: Pt is still having issues with her thyroid regulation. She is constantly fatigued, needing to take naps and get at least 7 hours of sleep per night. Due to her fatigue she often does not eat properly. She is not always hitting her macro nutrient needs with protein and still over consuming carbohydrates. She has to limit her protein intake due to her kidney stones. Discussed incorporating protein drinks, reinforced not over consuming carbs. Readjusted her calorie needs down to bring her macro needs down and pt isn't doing any exercise output to need excess calorie intake. (1400 daniela, 105 gm pro, 123 gm car, 54 gm fat). Pt is still using My fitness pal to track her intake (adjusted daniela goals in her maia for her) Discussed next steps if still no change at next appointment (will consider taking a break from nutrition appointments until Thyroid medication has regulated and pt is less exhausted).           Patient Education:  []  Review current plan with patient   []  Other:    Handouts/  Information Provided: []  Carbohydrates  []  Protein  []  Fiber  []  Serving Sizes  []  Fluids  []  General guidelines []  Diabetes  []  Cholesterol  []  Sodium  []  SBGM  []  Food Journals  []  Others:      New Patient Goals: - Get 4 oz of protein with meals (~28 gm) and use protein shake in between to meet the remaining need  - Have a good balance with other nutrients (carbs) keep carbs under control - 35 gm per meal       PLAN    [] Continue on current plan []  Follow-up PRN   []  Discharge due to :    []  Next appt: 21 Nov @ [facility]     Dietitian: Altagracia Haley MA, RD    Date: 10/17/2019 Time: 3:26 PM

## 2019-10-18 ENCOUNTER — APPOINTMENT (OUTPATIENT)
Dept: NUTRITION | Age: 59
End: 2019-10-18
Payer: COMMERCIAL

## 2019-11-11 ENCOUNTER — CLINICAL SUPPORT (OUTPATIENT)
Dept: CARDIOLOGY CLINIC | Age: 59
End: 2019-11-11

## 2019-11-11 VITALS
HEIGHT: 71 IN | BODY MASS INDEX: 28 KG/M2 | OXYGEN SATURATION: 97 % | HEART RATE: 60 BPM | DIASTOLIC BLOOD PRESSURE: 84 MMHG | WEIGHT: 200 LBS | SYSTOLIC BLOOD PRESSURE: 128 MMHG

## 2019-11-11 DIAGNOSIS — I50.32 DIASTOLIC CHF, CHRONIC (HCC): Primary | ICD-10-CM

## 2019-11-11 NOTE — PROGRESS NOTES
Verified two patient identifiers. Reviewed medications with patient. All medications were taken at 8 AM including Eliquis, Cardizem and Tikosyn. Patient states she is due for next dose of Tikosyn at 8 PM.  Reviewed EKG with Dr. Aniyah Cesar and patient left with a copy to bring to [facility].

## 2019-11-21 ENCOUNTER — HOSPITAL ENCOUNTER (OUTPATIENT)
Dept: NUTRITION | Age: 59
Discharge: HOME | End: 2019-11-21
Payer: COMMERCIAL

## 2019-11-21 PROCEDURE — 97803 MED NUTRITION INDIV SUBSEQ: CPT

## 2019-12-20 ENCOUNTER — OFFICE VISIT (OUTPATIENT)
Dept: FAMILY MEDICINE CLINIC | Facility: CLINIC | Age: 59
End: 2019-12-20

## 2019-12-20 VITALS
RESPIRATION RATE: 12 BRPM | WEIGHT: 207 LBS | TEMPERATURE: 97.7 F | SYSTOLIC BLOOD PRESSURE: 130 MMHG | DIASTOLIC BLOOD PRESSURE: 82 MMHG | HEIGHT: 71 IN | HEART RATE: 64 BPM | BODY MASS INDEX: 28.98 KG/M2 | OXYGEN SATURATION: 98 %

## 2019-12-20 DIAGNOSIS — I48.0 PAROXYSMAL ATRIAL FIBRILLATION (HCC): Primary | ICD-10-CM

## 2019-12-20 DIAGNOSIS — M17.0 PRIMARY OSTEOARTHRITIS OF BOTH KNEES: ICD-10-CM

## 2019-12-20 DIAGNOSIS — E05.90 HYPERTHYROIDISM: ICD-10-CM

## 2019-12-20 NOTE — PROGRESS NOTES
Preethi Gaston presents today for   Chief Complaint   Patient presents with    Follow-up    Results     test       Preethi Gaston preferred language for health care discussion is english. Is someone accompanying this pt? no    Is the patient using any DME equipment during [address]? no    Depression Screening:  3 most recent PHQ Screens 10/10/2019   Little interest or pleasure in doing things Not at all   Feeling down, depressed, irritable, or hopeless Not at all   Total Score PHQ 2 0       Learning Assessment:  Learning Assessment 8/16/2018   PRIMARY LEARNER Patient   HIGHEST LEVEL OF EDUCATION - PRIMARY LEARNER  -   BARRIERS PRIMARY LEARNER -   [facility]    NEED -   LEARNER PREFERENCE PRIMARY DEMONSTRATION   ANSWERED BY Patient   RELATIONSHIP SELF       Abuse Screening:  Abuse Screening Questionnaire 6/3/2016   Do you ever feel afraid of your partner? N   Are you in a relationship with someone who physically or mentally threatens you? N   Is it safe for you to go home? Y       Fall Risk  Fall Risk Assessment, last 12 mths 6/3/2016   Able to walk? Yes   Fall in past 12 months? No       Health Maintenance reviewed and discussed and ordered per Provider. Health Maintenance Due   Topic Date Due    Hepatitis C Screening  1960    Pneumococcal 0-64 years (1 of 1 - PPSV23) 03/14/1968    Shingrix Vaccine Age 50> (1 of 2) 03/14/2012    FOBT Q 1 YEAR AGE 50-75  03/14/2012    Influenza Age 9 to Adult  08/01/2019   . Preethi Gaston is updated on all     Pt currently taking Antiplatelet therapy? no    Coordination of Care:  1. Have you been to the ER, urgent care clinic since your last visit? no Hospitalized since your last visit? no    2. Have you seen or consulted any other health care providers outside of the [address] [place] [address] since your last visit? no Include any pap smears or colon screening.  no

## 2019-12-26 NOTE — PROGRESS NOTES
The patient presents to the office today with the chief complaint of paroxysmal atrial fibrillation    HPI    The patient has paroxysmal atrial fibrillation. The patient remains on Tikosyn, metoprolol, and Cardizem for prophylaxis and Eliquis coagulation. The patient has not had any recent symptomatic episodes of atrial fibrillation. The patient is tolerating medications well. Patient ashlyn on Eliquis for anticoagulation. Patient is found to be hyperthyroid. Patient remains on Tapazole. Her thyroid levels have been normal.  Patient suffers from osteoarthritis of both knees. The right knee is worse than the left. The patient status post procedures on both knees. The procedures had helped the knees but now they are again worsening. Patient is status post removal of a large left kidney stone. Patient has recovered from this procedure. ROS  Negative for chest pain, dyspnea, lower extremity edema    Allergies   Allergen Reactions    Celebrex [Celecoxib] Hives    Iodinated Contrast Media Hives     Patient was in CT for a cardiac scan. A test bolus of 20cc was given and patient broke out into hives immediately after. Cancelled the rest of the exam and escorted the patient to the ED for furhter evaluation.  Sulfa (Sulfonamide Antibiotics) Other (comments)     Its she's allergic to Celebrex  Other reaction(s): Other (comments)  Its she's allergic to Celebrex       Current Outpatient Medications   Medication Sig Dispense Refill    levothyroxine (SYNTHROID) 50 mcg tablet TK 1 T PO D  0    dilTIAZem CD (CARDIZEM CD) 120 mg ER capsule Take 120 mg by mouth.  dofetilide (TIKOSYN) 250 mcg capsule Take 250 mcg by mouth.  methIMAzole (TAPAZOLE) 10 mg tablet Take 30 mg by mouth.  metoprolol succinate (TOPROL-XL) 25 mg XL tablet Take 12.5 mg by mouth.       fluticasone propionate (FLONASE) 50 mcg/actuation nasal spray SPRAY 2 SPRAYS IN EACH NOSTRIL DAILY 1 Bottle 2    ELIQUIS 5 mg tablet TAKE 1 TABLET BY MOUTH TWICE DAILY 180 Tab 3    diclofenac (VOLTAREN) 1 % gel Apply  to affected area four (4) times daily.  thiamine (VITAMIN B-1) 100 mg tablet Take  by mouth daily.  cyanocobalamin (VITAMIN B-12) 1,000 mcg tablet Take 1,000 mcg by mouth daily.  multivitamin (ONE A DAY) tablet Take 1 Tab by mouth daily. Past Medical History:   Diagnosis Date    Adverse effect of anesthesia     Atrial fibrillation [facility])     had ablation in 2017    Attention deficit disorder without mention of hyperactivity     DUB (dysfunctional uterine bleeding)     Generalized osteoarthrosis, involving multiple sites     Grief reaction     Mother  recently - Celexa    Kidney stone     Memory changes     Adderall for this    Recurrent UTI     Sleep apnea     cpap    Unspecified tinnitus        Past Surgical History:   Procedure Laterality Date    HX COLONOSCOPY      HX GYN      Laparoscopy    HX KNEE ARTHROSCOPY Right     Meniscus repair    HX KNEE ARTHROSCOPY Right     Removed piece of bone (FB)    HX KNEE ARTHROSCOPY Right     ACL repair    HX LITHOTRIPSY  2018    HX UROLOGICAL  2018    Left percutaneous nephrolithotomy - Dr. Amie Lema, [address] [place] [address] HX UROLOGICAL  2019    Cysto w/ stent removal - Dr. Amie Lema, [facility].     IR CHANGE NEPHROSTOMY PYELOS TUBE LT  2018       Social History     Socioeconomic History    Marital status:      Spouse name: Not on file    Number of children: Not on file    Years of education: Not on file    Highest education level: Not on file   Occupational History    Not on file   Social Needs    Financial resource strain: Not on file    Food insecurity:     Worry: Not on file     Inability: Not on file    Transportation needs:     Medical: Not on file     Non-medical: Not on file   Tobacco Use    Smoking status: Never Smoker    Smokeless tobacco: Never Used   Substance and Sexual Activity    Alcohol use: No    Drug use: No    Sexual activity: Not Currently   Lifestyle    Physical activity:     Days per week: Not on file     Minutes per session: Not on file    Stress: Not on file   Relationships    Social connections:     Talks on phone: Not on file     Gets together: Not on file     Attends [employer] service: Not on file     Active member of club or organization: Not on file     Attends meetings of clubs or organizations: Not on file     Relationship status: Not on file    Intimate partner violence:     Fear of current or ex partner: Not on file     Emotionally abused: Not on file     Physically abused: Not on file     Forced sexual activity: Not on file   Other Topics Concern    Not on file   Social History Narrative    Not on file       Patient does have an advanced directive on file    Visit Vitals  /82 (BP 1 Location: Left arm, BP Patient Position: Sitting)   Pulse 64   Temp 97.7 °F (36.5 °C) (Tympanic)   Resp 12   Ht 5' 11\" (1.803 m)   Wt 207 lb (93.9 kg)   SpO2 98%   BMI 28.87 kg/m²       Physical Exam  No Cervical Lymphadenopathy  No Supraclavicular Lymphadenopathy  Thyroid is Normal  Lungs are normal to percussion. Clear to auscultation   Heart:  S1 S2 are normal, No gallops, No murmurs  No Carotid Bruits  Abdomen:  Normal Bowel Sounds. No tenderness. No masses. No Hepatomegaly or Splenomegaly  Moderately severe osteoarthritis is present moderate osteoarthritis in the left knee  LE:  Strong Pedal Pulses. No Edema      BMI:   Okay    No visits with results within 3 Month(s) from this visit.    Latest known visit with results is:   Lab Only on 09/17/2019   Component Date Value Ref Range Status    Triiodothyronine (T3), free 09/17/2019 1.8* 2.3 - 4.2 pg/mL Final    T4, Free 09/17/2019 0.5* 0.9 - 1.8 ng/dL Final    TSH 09/17/2019 25.06* 0.27 - 4.20 mcU/mL Final    Glucose 09/17/2019 92  70 - 99 mg/dL Final    BUN 09/17/2019 30* 6 - 22 mg/dL Final    Creatinine 09/17/2019 1.1  0.5 - 1.2 mg/dL Final    Sodium 09/17/2019 139  133 - 145 mmol/L Final    Potassium 09/17/2019 4.5  3.5 - 5.5 mmol/L Final    Chloride 09/17/2019 98  98 - 110 mmol/L Final    CO2 09/17/2019 27  20 - 32 mmol/L Final    AST (SGOT) 09/17/2019 21  10 - 37 U/L Final    ALT (SGPT) 09/17/2019 15  5 - 40 U/L Final    Alk. phosphatase 09/17/2019 93  25 - 115 U/L Final    Bilirubin, total 09/17/2019 0.4  0.2 - 1.2 mg/dL Final    Calcium 09/17/2019 9.8  8.4 - 10.5 mg/dL Final    Protein, total 09/17/2019 7.0  6.4 - 8.3 g/dL Final    Albumin 09/17/2019 4.6  3.5 - 5.0 g/dL Final    A-G Ratio 09/17/2019 1.9  1.1 - 2.6 ratio Final    Globulin 09/17/2019 2.4  2.0 - 4.0 g/dL Final    Anion gap 09/17/2019 14.0  mmol/L Final    Comment: Estimated GFR results are reported in mL/min/1.73 sq.m. by the MDRD equation. This eGFR is validated for stable chronic renal failure patients. This   equation  is unreliable in acute illness or patients with normal renal function.  GFRAA 09/17/2019 59.5* >60.0 Final    GFRNA 09/17/2019 49.1* >60.0 Final    WBC 09/17/2019 6.3  4.0 - 11.0 K/uL Final    RBC 09/17/2019 4.13  3.80 - 5.20 M/uL Final    HGB 09/17/2019 12.6  11.7 - 16.0 g/dL Final    HCT 09/17/2019 39.2  35.1 - 48.0 % Final    MCV 09/17/2019 95  80 - 95 fL Final    MCH 09/17/2019 31  26 - 34 pg Final    MCHC 09/17/2019 32  31 - 36 g/dL Final    RDW 09/17/2019 14.6  10.0 - 15.5 % Final    PLATELET 29/81/4808 517  140 - 440 K/uL Final    MPV 09/17/2019 12.6  9.0 - 13.0 fL Final    NEUTROPHILS 09/17/2019 68  40 - 75 % Final    Lymphocytes 09/17/2019 23  20 - 45 % Final    MONOCYTES 09/17/2019 8  3 - 12 % Final    EOSINOPHILS 09/17/2019 1  0 - 6 % Final    BASOPHILS 09/17/2019 1  0 - 2 % Final    ABS. NEUTROPHILS 09/17/2019 4.3  1.8 - 7.7 K/uL Final    ABSOLUTE LYMPHOCYTE COUNT 09/17/2019 1.4  1.0 - 4.8 K/uL Final    ABS. MONOCYTES 09/17/2019 0.5  0.1 - 1.0 K/uL Final    ABS. EOSINOPHILS 09/17/2019 0.1  0.0 - 0.5 K/uL Final    ABS. BASOPHILS 09/17/2019 0.0  0.0 - 0.2 K/uL Final       .No results found for any visits on 12/20/19. Assessment / Plan      ICD-10-CM ICD-9-CM    1. Paroxysmal atrial fibrillation (HCC) I48.0 427.31    2. Hyperthyroidism E05.90 242.90    3. Primary osteoarthritis of both knees M17.0 715.16        she was advised to continue her maintenance medications      Follow-up and Dispositions    · Return in about 3 months (around 3/20/2020). I asked Suzette Meyerfloresita Angel Cohen if she has any questions and I answered the questions. Suzette Cohen states that she understands the treatment plan and agrees with the treatment plan          THIS DOCUMENT WAS CREATED WITH A VOICE ACTIVATED DICTATION SYSTEM.   IT MAY CONTAIN TRANSCRIPTION ERRORS

## 2020-01-16 ENCOUNTER — HOSPITAL ENCOUNTER (OUTPATIENT)
Dept: NUTRITION | Age: 60
Discharge: HOME | End: 2020-01-16
Payer: COMMERCIAL

## 2020-01-16 PROCEDURE — 97803 MED NUTRITION INDIV SUBSEQ: CPT

## 2020-01-16 NOTE — PROGRESS NOTES
NUTRITION - FOLLOW-UP TREATMENT NOTE  Patient Name: Roosevelt Andrew         Date: 2020  : 1960    YES Patient  Verified  Diagnosis: Kidney stones, Hypo/hyper thyroid   In time:   1530            Out time:   1600   Total Treatment Time (min):   30     SUBJECTIVE/ASSESSMENT    Changes in medication or medical history? Any new allergies, surgeries or procedures? YES    If yes, update Summary List   Pt has been taken off all thyroid medication     Cardiologist recommended stopping one BP med to see if this helps with her fatigue. Current Wt: 207.4 Previous Wt: 200 Wt Change: +7.4     Achievement of Goals: Pt still has issues with fatigue and meal balance. She is eating out for dinner most days of the week (taste mostly). She is cycling 3x/week with her HR never going >140. She is fearful of certain proteins due to the saturated fat content. Discussed keeping Sat Fats to <3 gm and provided examples. Pt also admits she uses condiments for most foods and she has not been tracking that on My Fitness Pal. Discussed pulling back on heavy carb foods and cooking more foods at home. Set up an account on [employer] maia to help with meal ideas and planning. Pt is very excited to try as it gives her a shopping list and meal ideas. Will continue to work with patient to help her with her weight loss. Patient Education:  []  Review current plan with patient   []  Other:    Handouts/  Information Provided: []  Carbohydrates  []  Protein  []  Fiber  []  Serving Sizes  []  Fluids  []  General guidelines []  Diabetes  []  Cholesterol  []  Sodium  []  SBGM  []  Food Journals  []  Others:     New Patient Goals: -Track condiments in My Fitness Pal  - It is okay to eat any product with less than 3 gm of saturated fat (this is low fat)  - Reduce/limit heavy carbs - bread, white potatoes, rice, pasta  - Start cooking more foods at home use the [employer] maia to help with planning and recipes.       PLAN    []  Continue on current plan []  Follow-up PRN   []  Discharge due to :    [x]  Next appt: 5 March @ 0     Dietitian: Home Sykes MA, RD    Date: 1/16/2020 Time: 3:26 PM

## 2020-02-06 ENCOUNTER — OFFICE VISIT (OUTPATIENT)
Dept: CARDIOLOGY CLINIC | Age: 60
End: 2020-02-06

## 2020-02-06 VITALS
HEIGHT: 71 IN | HEART RATE: 58 BPM | DIASTOLIC BLOOD PRESSURE: 70 MMHG | BODY MASS INDEX: 29.12 KG/M2 | SYSTOLIC BLOOD PRESSURE: 130 MMHG | WEIGHT: 208 LBS | OXYGEN SATURATION: 98 %

## 2020-02-06 DIAGNOSIS — I50.32 DIASTOLIC CHF, CHRONIC (HCC): ICD-10-CM

## 2020-02-06 DIAGNOSIS — I48.0 INTERMITTENT ATRIAL FIBRILLATION (HCC): Primary | ICD-10-CM

## 2020-02-06 DIAGNOSIS — Z98.890 S/P ABLATION OF ATRIAL FIBRILLATION: ICD-10-CM

## 2020-02-06 DIAGNOSIS — Z86.79 S/P ABLATION OF ATRIAL FIBRILLATION: ICD-10-CM

## 2020-02-06 DIAGNOSIS — R94.6 THYROID FUNCTION STUDY ABNORMALITY: ICD-10-CM

## 2020-02-06 RX ORDER — GLUCOSAMINE HCL 500 MG
200 TABLET ORAL 2 TIMES DAILY
COMMUNITY
End: 2020-02-19

## 2020-02-06 NOTE — PROGRESS NOTES
HPI: A 60-year old female here for follow up. She is doing quite well. She is on Tikosyn. She had some episodes last August in the setting of kidney stones and surgery. She denies any new chest pain, dyspnea, PND, orthopnea, edema. She is going to a [facility] to discuss possible ablation. Impression/Plan:  1. History of kidney stones previously seen at [facility] with surgery, doing well. 2. Paroxysmal symptomatic atrial fibrillation with pulmonary vein isolation 2017 with flaco procedure hypotension, resolved. 3. Current Tikosyn use and referral to a [facility], considering ablation this summer. 4. Intolerance to higher dose of AV blocking agents due to bradycardia and fatigue. 5. Previous intolerance to amiodarone. 6. Sleep apnea on treatment. 7. Nuclear stress test 2017 with normal function, no ischemia. She is doing well on Tikosyn, QT corrected intervals within normal limits. She continues on Eliquis. She stopped her Cardizem due to some low blood pressures. She is planning ablation at the [facility] potentially this summer and she will be maintained on Tikosyn until then. I will tentatively see her back in one year. She can get her cardiac MRI as well as the stress test through her EP physician who is performing the follow up ablation. Past Medical History:   Diagnosis Date    Adverse effect of anesthesia     Atrial fibrillation [facility])     had ablation in 2017    Attention deficit disorder without mention of hyperactivity     DUB (dysfunctional uterine bleeding)     Generalized osteoarthrosis, involving multiple sites     Grief reaction     Mother  recently - Celexa    Kidney stone     Memory changes     Adderall for this    Recurrent UTI     Sleep apnea     cpap    Unspecified tinnitus        Current Outpatient Medications   Medication Sig Dispense Refill    Alpha Lipoic Acid 200 mg tab Take 200 mg by mouth two (2) times a day.       dofetilide (TIKOSYN) 250 mcg capsule Take 250 mcg by mouth.  fluticasone propionate (FLONASE) 50 mcg/actuation nasal spray SPRAY 2 SPRAYS IN EACH NOSTRIL DAILY 1 Bottle 2    ELIQUIS 5 mg tablet TAKE 1 TABLET BY MOUTH TWICE DAILY 180 Tab 3    diclofenac (VOLTAREN) 1 % gel Apply  to affected area four (4) times daily.  thiamine (VITAMIN B-1) 100 mg tablet Take  by mouth daily.  cyanocobalamin (VITAMIN B-12) 1,000 mcg tablet Take 1,000 mcg by mouth daily.  multivitamin (ONE A DAY) tablet Take 1 Tab by mouth daily.  dilTIAZem CD (CARDIZEM CD) 120 mg ER capsule Take 120 mg by mouth. Social History   reports that she has never smoked. She has never used smokeless tobacco.   reports no history of alcohol use. Family History  family history includes Diabetes in her maternal grandmother and mother; Heart Disease in her father and mother; Hypertension in her father and mother. Review of Systems  Except as stated above include:  Constitutional: Negative for fever, chills and malaise/fatigue. HEENT: No congestion or recent URI. Gastrointestinal: No nausea, vomiting, abdominal pain, bloody stools. Pulmonary:  Negative except as stated above. Cardiac:  Negative except as stated above. Musculoskeletal: Negative except as stated above. Neurological:  No localized symptoms. Skin:  Negative except as stated above. Psych:  Negative except as stated above. Endocrine:  Negative except as stated above. PHYSICAL EXAM  BP Readings from Last 3 Encounters:   02/06/20 130/70   12/20/19 130/82   11/11/19 128/84     Pulse Readings from Last 3 Encounters:   02/06/20 (!) 58   12/20/19 64   11/11/19 60     Wt Readings from Last 3 Encounters:   02/06/20 94.3 kg (208 lb)   12/20/19 93.9 kg (207 lb)   11/11/19 90.7 kg (200 lb)     General:   Well developed, well groomed. Head/Neck:   No jugular venous distention     No carotid bruits. No evidence of xanthelasma. Lungs:   No respiratory distress. Clear bilaterally. Heart:    Regular rate and rhythm. Normal S1/S2. Palpation of heart with normal point of maximum impulse. No significant murmurs, rubs or gallops. Abdomen:   Soft and nontender. No palpable abdominal mass or bruits. Extremities:   Intact peripheral pulses. No significant edema. Neurological:   Alert and oriented to person, place, time. No focal neurological deficit visually. Skin:   No obvious rash    Blood Pressure Metric:  Monitor recommended and adjustments stated if needed.

## 2020-02-06 NOTE — PROGRESS NOTES
Wu Amado presents today for   Chief Complaint   Patient presents with    Irregular Heart Beat     1 year follow up     Shortness of Breath     exertion    Palpitations     sometimes        Wu Amado preferred language for health care discussion is english/other. Is someone accompanying this pt? no    Is the patient using any DME equipment during [address]? no    Depression Screening:  3 most recent PHQ Screens 2/6/2020   Little interest or pleasure in doing things Not at all   Feeling down, depressed, irritable, or hopeless Not at all   Total Score PHQ 2 0       Learning Assessment:  Learning Assessment 2/6/2020   PRIMARY LEARNER Patient   HIGHEST LEVEL OF EDUCATION - PRIMARY LEARNER  -   BARRIERS PRIMARY LEARNER -   [facility]    NEED -   LEARNER PREFERENCE PRIMARY DEMONSTRATION   ANSWERED BY Patient   RELATIONSHIP SELF       Abuse Screening:  Abuse Screening Questionnaire 2/6/2020   Do you ever feel afraid of your partner? N   Are you in a relationship with someone who physically or mentally threatens you? N   Is it safe for you to go home? Y       Fall Risk  Fall Risk Assessment, last 12 mths 2/6/2020   Able to walk? Yes   Fall in past 12 months? No       Pt currently taking Anticoagulant therapy? Eliquis     Coordination of Care:  1. Have you been to the ER, urgent care clinic since your last visit? Hospitalized since your last visit? no    2. Have you seen or consulted any other health care providers outside of the [address] [place] [address] since your last visit? Include any pap smears or colon screening.  no

## 2020-02-09 DIAGNOSIS — I48.0 PAROXYSMAL ATRIAL FIBRILLATION (HCC): ICD-10-CM

## 2020-02-09 DIAGNOSIS — E05.90 HYPERTHYROIDISM: ICD-10-CM

## 2020-02-09 DIAGNOSIS — R53.82 CHRONIC FATIGUE: ICD-10-CM

## 2020-03-05 ENCOUNTER — HOSPITAL ENCOUNTER (OUTPATIENT)
Dept: NUTRITION | Age: 60
Discharge: HOME | End: 2020-03-05
Payer: COMMERCIAL

## 2020-03-05 PROCEDURE — 97803 MED NUTRITION INDIV SUBSEQ: CPT

## 2020-03-05 NOTE — PROGRESS NOTES
NUTRITION - FOLLOW-UP TREATMENT NOTE  Patient Name: Dory Wolf         Date: 3/5/2020  : 1960    YES Patient  Verified  Diagnosis: Kidney stones, Hypo/hyper thyroid   In time:   1530            Out time:   1600   Total Treatment Time (min):   30     SUBJECTIVE/ASSESSMENT    Changes in medication or medical history? Any new allergies, surgeries or procedures? YES    If yes, update Summary List   Pt has been taken off all thyroid medication     Cardiologist recommended stopping one BP med to see if this helps with her fatigue. Current Wt: N/A Previous Wt: 207.4 Wt Change: N/A     Achievement of Goals: Pt still has issues with fatigue and meal balance. She was doing well for a few weeks and then discovered she has a kidney infection and it has made her significantly more tired. She has been eating out for the past week and she has not been choosing very good choices. High carbs and high calorie foods. She is also tracking in my fitness pal, but not mindful of portion sizes she is choosing on some items. This gives a false indication of how much she has eaten. She does continue to want to work toward her goals and her health. She is using the [employer] maia typically and she states it has jerardo very helpful (this has just been a bad week) Will continue to work with patient to help her with her weight loss. Patient Education:  [x]  Review current plan with patient   []  Other:    Handouts/  Information Provided: []  Carbohydrates  []  Protein  []  Fiber  []  Serving Sizes  []  Fluids  []  General guidelines []  Diabetes  []  Cholesterol  []  Sodium  []  SBGM  []  Food Journals  []  Others:     New Patient Goals: -Start making better choices (consistent;y) paddy.  When eating out, back off carbs and get more vegetables  -Recognize bad choices come easier when you don't feel well making it MORE important to be vigilant with food choices  - Make sure you are entering the correct portion sizes in My Fitness Pal     PLAN    []  Continue on current plan []  Follow-up PRN   _  Discharge due to :    _  Next appt: 9 April @ [phone]     Dietitian: Hamilton Hernandez MA, RD    Date: 3/5/2020 Time: 3:26 PM

## 2020-03-30 RX ORDER — APIXABAN 5 MG/1
TABLET, FILM COATED ORAL
Qty: 180 TAB | Refills: 3 | Status: SHIPPED | OUTPATIENT
Start: 2020-03-30 | End: 2021-09-29

## 2020-04-09 ENCOUNTER — APPOINTMENT (OUTPATIENT)
Dept: NUTRITION | Age: 60
End: 2020-04-09

## 2020-06-24 ENCOUNTER — TELEPHONE (OUTPATIENT)
Dept: CARDIOLOGY CLINIC | Age: 60
End: 2020-06-24

## 2020-06-24 DIAGNOSIS — R53.83 FATIGUE, UNSPECIFIED TYPE: Primary | ICD-10-CM

## 2020-06-24 NOTE — TELEPHONE ENCOUNTER
Hi Ms. Judy Prater,  We last did a stress test in 2017 from my records. My suspicion is low for blockages but if you are getting another ablation July 10th, it would be a good idea to get a stress test prior to it, just to make sure. I can make arrangements for a stress test next week if you like?   Dr. Manuel Hood    Last read by Amaya Knutson

## 2020-06-25 ENCOUNTER — HOSPITAL ENCOUNTER (OUTPATIENT)
Dept: NUTRITION | Age: 60
Discharge: HOME | End: 2020-06-25
Payer: COMMERCIAL

## 2020-06-25 PROCEDURE — 97803 MED NUTRITION INDIV SUBSEQ: CPT

## 2020-06-25 NOTE — PROGRESS NOTES
NUTRITION  FOLLOW-UP TREATMENT NOTE  Patient Name: Monserrat Ledesma         Date: 2020  : 1960    YES Patient  Verified  Diagnosis: Kidney stones, Hypo/hyper thyroid   In time:   1100           Out time:   1130   Total Treatment Time (min):   30     SUBJECTIVE/ASSESSMENT    Changes in medication or medical history? Any new allergies, surgeries or procedures? NO    If yes, update Summary List   Altered Nutrient Related Lab values related to kidney/cardiac dysfunction as evidenced by chronic kidney stones, constant state of A. Fib, Lower leg edema, uncontrolled weight gain and difficulty with loss     Current Wt: 207.6 Previous Wt: 207.4 Wt Change: N/A     Achievement of Goals: Since being quarantined pt has not been nearly as fatigued as she was previously. She is maintaining her weight, but she is frustrated that she is not losing it. She would like to try intermittent fasting to see if that will make a difference. Discussed all of the guideline to have success as well as established an eating window. Pt has her cardiac ablasion surgery  and is fearful she will respond the same as last time. She is cycling 4 times per week ( hit 100 mils this past week). She uses hammer heed as her nutrition during rides. She is drinking more water and she is very motivated to try IF and see if she has success.           Patient Education:  [x]  Review current plan with patient   []  Other:    Handouts/  Information Provided: []  Carbohydrates  []  Protein  []  Fiber  []  Serving Sizes  []  Fluids  []  General guidelines []  Diabetes  []  Cholesterol  []  Sodium  []  SBGM  []  Food Journals  []  Others:     New Patient Goals: -Follow guidelines for Intermittent Fasting, see handout and diagram for eating window and fasting.  - Use meal timeline to ensure you are getting your calorie need in your eating window  - roast veggies in bulk to have on hand, mix ranch dip with plain greek yogurt   - start tracking in my fitness pal again     PLAN    []  Continue on current plan []  Follow-up PRN   _  Discharge due to :    _  Next appt: 29 July @ [id number]     Dietitian: Chuy Bull MA, RD    Date: 6/25/2020 Time: 3:26 PM

## 2020-07-02 ENCOUNTER — HOSPITAL ENCOUNTER (OUTPATIENT)
Dept: NON INVASIVE DIAGNOSTICS | Age: 60
Discharge: HOME | End: 2020-07-02
Attending: INTERNAL MEDICINE
Payer: COMMERCIAL

## 2020-07-02 VITALS
HEIGHT: 71 IN | DIASTOLIC BLOOD PRESSURE: 80 MMHG | WEIGHT: 206 LBS | BODY MASS INDEX: 28.84 KG/M2 | SYSTOLIC BLOOD PRESSURE: 126 MMHG

## 2020-07-02 DIAGNOSIS — R53.83 FATIGUE, UNSPECIFIED TYPE: ICD-10-CM

## 2020-07-02 LAB
STRESS BASELINE DIAS BP: 80 MMHG
STRESS BASELINE HR: 55 BPM
STRESS BASELINE SYS BP: 126 MMHG
STRESS ESTIMATED WORKLOAD: 6.5 METS
STRESS EXERCISE DUR MIN: NORMAL
STRESS PEAK DIAS BP: 60 MMHG
STRESS PEAK SYS BP: 178 MMHG
STRESS PERCENT HR ACHIEVED: 94 %
STRESS POST PEAK HR: 153 BPM
STRESS RATE PRESSURE PRODUCT: NORMAL BPM*MMHG
STRESS ST DEPRESSION: 0 MM
STRESS ST ELEVATION: 0 MM
STRESS TARGET HR: 162 BPM

## 2020-07-02 PROCEDURE — 93017 CV STRESS TEST TRACING ONLY: CPT

## 2020-07-02 PROCEDURE — 74011250636 HC RX REV CODE- 250/636: Performed by: INTERNAL MEDICINE

## 2020-07-02 RX ORDER — SODIUM CHLORIDE 9 MG/ML
250 INJECTION, SOLUTION INTRAVENOUS ONCE
Status: COMPLETED | OUTPATIENT
Start: 2020-07-02 | End: 2020-07-02

## 2020-07-02 RX ADMIN — SODIUM CHLORIDE 250 ML/HR: 900 INJECTION, SOLUTION INTRAVENOUS at 09:30

## 2020-07-02 NOTE — PROGRESS NOTES
Patient was injected with 21.1 millicuries 90GTM Sestamibi on 7/2/20 at 0810. Patient was injected with 80.7 millicuries 93PDL Sestamibi on 7/2/20 at 0930. Patient's armbands were removed and placed in shred-it box.     Patient had a Nuclear Treadmill Stress Test.

## 2020-07-06 NOTE — PROGRESS NOTES
Sent nuclear report to Dr. Melinda Angelo at [facility] [place] per patient's request.

## 2020-07-29 ENCOUNTER — HOSPITAL ENCOUNTER (OUTPATIENT)
Dept: NUTRITION | Age: 60
Discharge: HOME | End: 2020-07-29
Payer: COMMERCIAL

## 2020-07-29 PROCEDURE — 97803 MED NUTRITION INDIV SUBSEQ: CPT

## 2020-07-29 NOTE — PROGRESS NOTES
NUTRITION  FOLLOW-UP TREATMENT NOTE  Patient Name: Madeline Salinas         Date: 2020  : 1960    YES Patient  Verified  Diagnosis: Kidney stones, Hypo/hyper thyroid   In time:  0830          Out time:   0900   Total Treatment Time (min):   30     SUBJECTIVE/ASSESSMENT    Changes in medication or medical history? Any new allergies, surgeries or procedures? NO    If yes, update Summary List   Altered Nutrient Related Lab values related to kidney/cardiac dysfunction as evidenced by chronic kidney stones, constant state of A. Fib, Lower leg edema, uncontrolled weight gain and difficulty with loss     Current Wt: 210 Previous Wt: 207.4 Wt Change: +3     Achievement of Goals: Pt remains less fatigued, she has been recovering from her Cardiac ablasion surgery and is waiting to see if she can come off her heart meds. She remains off thyroid meds, but is frustrated because she cannot lose weight. Pt is having more issues with her kidneys and noticed increased water retention (anticipate this is where most of her weight is coming from). She is continuing to follow an Intermittent fasting diet, but not on cycling days ( she needs more fuel during her rides). Pt is confused by portion control and the numbers on my fitness pal and how it all goes together. Discussed focusing more on portion control and meal balance, provided a portion handout for reference. 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 Pt is interested in doing follow- up at [facility] as this RD is leaving.           Patient Education:  [x]  Review current plan with patient   []  Other:    Handouts/  Information Provided: []  Carbohydrates  []  Protein  []  Fiber  []  Serving Sizes  []  Fluids  []  General guidelines []  Diabetes  []  Cholesterol  []  Sodium  []  SBGM  []  Food Journals  []  Others:     New Patient Goals: -Follow portion Guidelines for balancing meals, use hands and portion control handout as a reference  -Continue with IF , but do not follow on cycling days ( or expand your eating window to include your ride time)  -Get compression socks or calf compression to help with lower leg edema, stop getting so caught up in the numbers on My Fitness Pal     PLAN    [x]  Continue on current plan []  Follow-up PRN   _  Discharge due to :    _  Next appt: Call [facility]. for next appointemnt     Dietitian: Nimisha Gomez MA, RD    Date: 7/29/2020 Time: 3:26 PM

## 2020-08-19 ENCOUNTER — HOSPITAL ENCOUNTER (OUTPATIENT)
Dept: NUTRITION | Age: 60
Discharge: HOME | End: 2020-08-19
Payer: COMMERCIAL

## 2020-08-19 PROCEDURE — 97803 MED NUTRITION INDIV SUBSEQ: CPT

## 2020-08-31 NOTE — PROGRESS NOTES
NUTRITION  FOLLOW-UP TREATMENT NOTE  Patient Name: Baljinder Gold         Date: 2020  : 1960    YES/NO Patient  Verified  Diagnosis: kidney stones; obesity   In time:  100            Out time:   200   Total Treatment Time (min):   60     SUBJECTIVE/ASSESSMENT    Current Wt: 209 Previous Wt: 210 Wt Change: -1     Initial Wt:  Total Wt change:        Changes in medication or medical history? Any new allergies, surgeries or procedures?    /NO    If yes, update Summary List             Nutrition Monitoring and Evaluation: Pt referred to this RD after her previous RD resigned. Pt reports up to 40 lb weight gain since menopause and the inability to lose and maintain weight. Pt participates in cycling up to 4d/wk for 2-3 hrs each day. Pt formerly following IF diet, but inconsistently. Pt could not provide accurate diet recall. Nutrition Prescription and  Intervention Provided macronutrient education, including optimal times to eat throughout the day and appropriate balance of macronutrients to promote more efficient RMR. Discussed the importance of developing a consistent lifestyle, including eating habits for long term weight loss and management. Provided pt with meal builder and diet plan. Patient Education:  [x]  Review current plan with patient   []  Other:    Handouts/  Information Provided: []  Carbohydrates  []  Protein  []  Fiber  []  Serving Sizes  []  Fluids  []  General guidelines []  Diabetes  []  Cholesterol  []  Sodium  []  SBGM  []  Food Journals  [x]  Others: diet plan and meal builder       Patient Goals 1. Balance carb and protein at every meal/snack  2.  Eat 2-3 hrs after snacks and 4-5 hrs after meals       PLAN    [x]  Continue on current plan []  Follow-up PRN   []  Discharge due to :    [x]  Next appt: 9.21 at 3:30     Dietitian: David Wellington MS, RD    Date: 2020 Time: 3:57 PM

## 2020-09-24 ENCOUNTER — HOSPITAL ENCOUNTER (OUTPATIENT)
Dept: NUTRITION | Age: 60
Discharge: HOME | End: 2020-09-24
Payer: COMMERCIAL

## 2020-09-24 PROCEDURE — 97803 MED NUTRITION INDIV SUBSEQ: CPT

## 2020-09-28 ENCOUNTER — HOSPITAL ENCOUNTER (OUTPATIENT)
Dept: MAMMOGRAPHY | Age: 60
Discharge: HOME | End: 2020-09-28
Attending: INTERNAL MEDICINE
Payer: COMMERCIAL

## 2020-09-28 DIAGNOSIS — Z12.31 VISIT FOR SCREENING MAMMOGRAM: ICD-10-CM

## 2020-09-28 PROCEDURE — 77063 BREAST TOMOSYNTHESIS BI: CPT

## 2020-09-29 NOTE — PROGRESS NOTES
NUTRITION  FOLLOW-UP TREATMENT NOTE  Patient Name: Rosalva Reyna         Date: 2020  : 1960    YES/NO Patient  Verified  Diagnosis: kidney stones   In time:   230             Out time:   300   Total Treatment Time (min):   30     SUBJECTIVE/ASSESSMENT    Current Wt: 212.2 Previous Wt: 209 Wt Change: +3.2       Changes in medication or medical history? Any new allergies, surgeries or procedures? YES/NO    If yes, update Summary List                Nutrition Monitoring and Evaluation: Pt unable to make significant changes in her eating habits or food choices. Her intake remains high in carbohydrate and fat. 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 Nutrition Prescription and  Intervention Provided macronutrient education, including optimal times to eat throughout the day and appropriate balance of macronutrients to promote more efficient RMR. Discussed the importance of developing a consistent lifestyle, including eating habits for long term weight loss and management. Provided pt with meal builder and diet plan. Patient Education:  [x]  Review current plan with patient   []  Other:    Handouts/  Information Provided: []  Carbohydrates  []  Protein  []  Fiber  []  Serving Sizes  []  Fluids  []  General guidelines []  Diabetes  []  Cholesterol  []  Sodium  []  SBGM  []  Food Journals  []  Others:        Patient Goals 1. Choose leaner sources of protein more often  2. Eat more consistent intake of carb each way stay within budget  3.  Scan nutrition label into journal for more accuracy       PLAN    [x]  Continue on current plan []  Follow-up PRN   []  Discharge due to :    [x]  Next appt: 1 month     Dietitian: Jeronimo Yin MS, RD    Date: 2020 Time: 9:52 AM

## 2020-10-06 RX ORDER — DOFETILIDE 0.25 MG/1
250 CAPSULE ORAL 2 TIMES DAILY
Qty: 180 CAP | Refills: 3 | Status: SHIPPED | OUTPATIENT
Start: 2020-10-06 | End: 2020-12-29

## 2020-10-22 ENCOUNTER — HOSPITAL ENCOUNTER (OUTPATIENT)
Dept: NUTRITION | Age: 60
Discharge: HOME | End: 2020-10-22
Payer: COMMERCIAL

## 2020-10-22 PROCEDURE — 97803 MED NUTRITION INDIV SUBSEQ: CPT

## 2020-11-19 ENCOUNTER — HOSPITAL ENCOUNTER (OUTPATIENT)
Dept: NUTRITION | Age: 60
Discharge: HOME | End: 2020-11-19
Payer: COMMERCIAL

## 2020-11-19 PROCEDURE — 97803 MED NUTRITION INDIV SUBSEQ: CPT

## 2020-12-03 ENCOUNTER — HOSPITAL ENCOUNTER (OUTPATIENT)
Dept: CT IMAGING | Age: 60
Discharge: HOME | End: 2020-12-03
Attending: UROLOGY
Payer: COMMERCIAL

## 2020-12-03 DIAGNOSIS — N20.0 KIDNEY STONE: ICD-10-CM

## 2020-12-03 PROCEDURE — 74176 CT ABD & PELVIS W/O CONTRAST: CPT

## 2021-01-20 ENCOUNTER — HOSPITAL ENCOUNTER (OUTPATIENT)
Dept: NUTRITION | Age: 61
Discharge: HOME | End: 2021-01-20
Payer: COMMERCIAL

## 2021-01-20 PROCEDURE — 97803 MED NUTRITION INDIV SUBSEQ: CPT

## 2021-01-21 NOTE — PROGRESS NOTES
NUTRITION  FOLLOW-UP TREATMENT NOTE  Patient Name: Ameena Live         Date: 2021  : 1960    YES/NO Patient  Verified  Diagnosis: kidney stones; obesity   In time:   330             Out time:   430   Total Treatment Time (min):   30     SUBJECTIVE/ASSESSMENT    Current Wt: 217 Previous Wt: 210 Wt Change: +7     Initial Wt:  Total Wt change:        Changes in medication or medical history? Any new allergies, surgeries or procedures? YES    If yes, update Summary List   Ameena Live is a 64 y.o. female being evaluated by a Virtual Visit (video visit) encounter to address concerns as mentioned above. A caregiver was present when appropriate. Due to this being a TeleHealth encounter (During [address] emergency), evaluation of the following organ systems was limited: Vitals/Constitutional/EENT/Resp/CV/GI//MS/Neuro/Skin/Heme-Lymph-Imm. Pursuant to the emergency declaration under the [address] [place] [address] and the [employer] and [employer] Act, this Virtual Visit was conducted with patient's (and/or legal guardian's) consent, to reduce the risk of exposure to COVID-19 and provide necessary medical care. Services were provided through a video synchronous discussion virtually to substitute for in-person encounter. --Phillip Correa RD on 2021 at 12:49 PM    An electronic signature was used to authenticate this note. Pt DC'd Tikosyn (with medical approval) to see if it contributed to significant weight gain. Nutrition Monitoring and Evaluation:  Pt reported significant reduction in exercise since the weather turned cold and is now walking and/or cycling 1d/wk. Pt reports frustration over continued weight gain, paddy since pt has gained another 7 lbs since DC medication she believed to cause weight gain.   Pt provided dietary recs from nephrologist to include: limit sodium to 2300mg/d; Calcium 1200mg/d; water 90oz/d; limit protein 6-8oz/d. Asked pt for specific protein recommendation (g) due to varied protein content in foods. Pt's food journal indicates fluctuations and inconsistencies in overall and macronutrient intake. Nutrition Prescription and  Intervention Keep detailed food journal to help make modifications in diet to allow for weight loss and keep within kidney requirements. Rec pt limit protein to 3-4 oz lunch and dinner (each). Patient Education:  [x]  Review current plan with patient   []  Other:    Handouts/  Information Provided: []  Carbohydrates  []  Protein  []  Fiber  []  Serving Sizes  []  Fluids  []  General guidelines []  Diabetes  []  Cholesterol  []  Sodium  []  SBGM  []  Food Journals  []  Others:        Patient Goals 1.  Keep food journal 5 consistent days       PLAN    [x]  Continue on current plan [x]  Follow-up PRN   []  Discharge due to :    []  Next appt:      Dietitian: Jaye Meza MS, RD    Date: 1/21/2021 Time: 12:47 PM

## 2021-02-10 ENCOUNTER — OFFICE VISIT (OUTPATIENT)
Dept: CARDIOLOGY CLINIC | Age: 61
End: 2021-02-10
Payer: COMMERCIAL

## 2021-02-10 VITALS
BODY MASS INDEX: 31.08 KG/M2 | OXYGEN SATURATION: 97 % | SYSTOLIC BLOOD PRESSURE: 128 MMHG | WEIGHT: 222 LBS | HEART RATE: 63 BPM | HEIGHT: 71 IN | DIASTOLIC BLOOD PRESSURE: 86 MMHG

## 2021-02-10 DIAGNOSIS — Z98.890 S/P ABLATION OF ATRIAL FIBRILLATION: ICD-10-CM

## 2021-02-10 DIAGNOSIS — I50.32 DIASTOLIC CHF, CHRONIC (HCC): ICD-10-CM

## 2021-02-10 DIAGNOSIS — I48.0 INTERMITTENT ATRIAL FIBRILLATION (HCC): Primary | ICD-10-CM

## 2021-02-10 DIAGNOSIS — Z86.79 S/P ABLATION OF ATRIAL FIBRILLATION: ICD-10-CM

## 2021-02-10 DIAGNOSIS — R94.6 THYROID FUNCTION STUDY ABNORMALITY: ICD-10-CM

## 2021-02-10 PROCEDURE — 93000 ELECTROCARDIOGRAM COMPLETE: CPT | Performed by: INTERNAL MEDICINE

## 2021-02-10 PROCEDURE — 99214 OFFICE O/P EST MOD 30 MIN: CPT | Performed by: INTERNAL MEDICINE

## 2021-02-10 NOTE — PROGRESS NOTES
Saumya Stein presents today for   Chief Complaint   Patient presents with    Follow-up     1 year follow up       Saumya Stein preferred language for health care discussion is english/other. Is someone accompanying this pt? no    Is the patient using any DME equipment during [address]? no    Depression Screening:  3 most recent PHQ Screens 2/10/2021   Little interest or pleasure in doing things Not at all   Feeling down, depressed, irritable, or hopeless Not at all   Total Score PHQ 2 0       Learning Assessment:  Learning Assessment 2/10/2021   PRIMARY LEARNER Patient   HIGHEST LEVEL OF EDUCATION - PRIMARY LEARNER  -   BARRIERS PRIMARY LEARNER -   [facility]    NEED -   LEARNER PREFERENCE PRIMARY DEMONSTRATION   ANSWERED BY patient   RELATIONSHIP SELF       Abuse Screening:  Abuse Screening Questionnaire 2/10/2021   Do you ever feel afraid of your partner? N   Are you in a relationship with someone who physically or mentally threatens you? N   Is it safe for you to go home? Y       Fall Risk  Fall Risk Assessment, last 12 mths 2/10/2021   Able to walk? Yes   Fall in past 12 months? 0   Do you feel unsteady? 0   Are you worried about falling 0       Pt currently taking Anticoagulant therapy? Eliquis 5 mg twice a day    Coordination of Care:  1. Have you been to the ER, urgent care clinic since your last visit? Hospitalized since your last visit? no    2. Have you seen or consulted any other health care providers outside of the [address] [place] [address] since your last visit? Include any pap smears or colon screening.  no

## 2021-02-10 NOTE — PROGRESS NOTES
History of Present Illness:  64year old female here for follow up. Last time I saw her was February, 2020. Since then she had gone to [facility] Dr Harry Aguilar and had a redo atrial fibrillation ablation by Dr. Toribio Champion July 10, 2020. She remained on Tikosyn until December, then stopped it. She has required Diltiazem to take to slow her heart down twice, but overall has been doing well. She does have some fatigue, which she is worried about the atrial fibrillation as the cause. She recently just had an event monitor for a week and is in the process of sending it back to [facility] Dr [facility] Aguilar. She does not know the results, it is still in her car. She denies any chest pain, syncope. 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 She has been doing some bike riding in the fall as well. Impression:  1. History of paroxysmal symptomatic atrial fibrillation with attempt at pulmonary vein isolation November, 2017 with redo ablation July 10, 2020 at [facility] Dr Harry Aguilar by Dr. Toribio Champion. 2. Previous Tikosyn use, discontinued December, 2020.  3. Intolerance to higher dose of AV blocking agents due to bradycardia and fatigue. 4. Previous intolerance to Amiodarone. 5. Sleep apnea, on treatment. 6. History of kidney stones, seen at [facility]. 7. Nuclear stress test October, 2017 with normal function, no ischemia. Plan:  She is in the process of sending back her one week event monitor to [facility] to see if she had any residual atrial fibrillation. If she has not, then her fatigue does not appear to be related to atrial fibrillation. She remains on Eliquis for now for stroke prevention. No evidence of decompensated heart failure. She takes Diltiazem short release 30 mg as needed for episodes, but has only had to use it twice since her last ablation. Overall she is doing quite well today, and is in sinus rhythm by EKG. She is going to see the [facility] physicians this July and therefore I will follow up with her in September.       Past Medical History:   Diagnosis Date    Adverse effect of anesthesia     Atrial fibrillation [facility])     had ablation in 2017    Attention deficit disorder without mention of hyperactivity     DUB (dysfunctional uterine bleeding)     Generalized osteoarthrosis, involving multiple sites     Grief reaction     Mother  recently - Celexa    Kidney stone     Memory changes     Adderall for this    Recurrent UTI     Sleep apnea     cpap    Unspecified tinnitus     UTI (urinary tract infection) 2020       Current Outpatient Medications   Medication Sig Dispense Refill    dilTIAZem IR (CARDIZEM) 30 mg tablet TAKE 1 TO 2 TABLETS BY MOUTH FOUR TIMES DAILY AS NEEDED FOR RAPID HEART BEAT      Eliquis 5 mg tablet TAKE 1 TABLET BY MOUTH TWICE DAILY 180 Tab 3    cyanocobalamin (VITAMIN B-12) 2,500 mcg sublingual tablet Take 2,500 mcg by mouth daily.  diclofenac (VOLTAREN) 1 % gel Apply  to affected area four (4) times daily.  thiamine (VITAMIN B-1) 100 mg tablet Take  by mouth daily.  multivitamin (ONE A DAY) tablet Take 1 Tab by mouth daily. Social History   reports that she has never smoked. She has never used smokeless tobacco.   reports no history of alcohol use. Family History  family history includes Diabetes in her maternal grandmother and mother; Heart Disease in her father and mother; Hypertension in her father and mother. Review of Systems  Except as stated above include:  Constitutional: Negative for fever, chills and malaise/fatigue. HEENT: No congestion or recent URI. Gastrointestinal: No nausea, vomiting, abdominal pain, bloody stools. Pulmonary:  Negative except as stated above. Cardiac:  Negative except as stated above. Musculoskeletal: Negative except as stated above. Neurological:  No localized symptoms. Skin:  Negative except as stated above. Psych:  Negative except as stated above. Endocrine:  Negative except as stated above.     PHYSICAL EXAM  BP Readings from Last 3 Encounters:   02/10/21 128/86   07/02/20 126/80   02/19/20 (!) 120/92     Pulse Readings from Last 3 Encounters:   02/10/21 63   02/06/20 (!) 58   12/20/19 64     Wt Readings from Last 3 Encounters:   02/10/21 100.7 kg (222 lb)   12/29/20 96.6 kg (213 lb)   08/12/20 93.4 kg (206 lb)     General:   Well developed, well groomed. Head/Neck:   No obvious jugular venous distention     No obvious carotid pulsations. No evidence of xanthelasma. Lungs:   No respiratory distress. Clear bilaterally. Heart:  Regular rate and rhythm. Normal S1/S2. Palpation grossly normal.    No significant murmurs, rubs or gallops. Abdomen:   Non-acute abdomen. No obvious pulsations. Extremities:   Intact peripheral pulses. No significant edema. Neurological:   Alert and oriented to person, place, time. No focal neurological deficit visually. Skin:   No obvious rash    Blood Pressure Metric:  Monitor recommended and adjustments stated if needed.

## 2021-02-15 PROBLEM — M25.552 BILATERAL HIP PAIN: Status: ACTIVE | Noted: 2020-06-03

## 2021-02-15 PROBLEM — Z51.81 ENCOUNTER FOR MONITORING DOFETILIDE THERAPY: Status: ACTIVE | Noted: 2021-02-15

## 2021-02-15 PROBLEM — M17.31 POST-TRAUMATIC OSTEOARTHRITIS OF RIGHT KNEE: Status: ACTIVE | Noted: 2019-12-03

## 2021-02-15 PROBLEM — Z98.890 HISTORY OF RIGHT KNEE SURGERY: Status: ACTIVE | Noted: 2019-12-03

## 2021-02-15 PROBLEM — M70.62 TROCHANTERIC BURSITIS OF BOTH HIPS: Status: ACTIVE | Noted: 2020-06-03

## 2021-02-15 PROBLEM — M70.61 TROCHANTERIC BURSITIS OF BOTH HIPS: Status: ACTIVE | Noted: 2020-06-03

## 2021-02-15 PROBLEM — G47.33 OSA ON CPAP: Status: ACTIVE | Noted: 2021-02-15

## 2021-02-15 PROBLEM — Z79.899 ENCOUNTER FOR MONITORING DOFETILIDE THERAPY: Status: ACTIVE | Noted: 2021-02-15

## 2021-02-15 PROBLEM — Z79.01 ANTICOAGULATED: Status: ACTIVE | Noted: 2021-02-15

## 2021-02-15 PROBLEM — M25.551 BILATERAL HIP PAIN: Status: ACTIVE | Noted: 2020-06-03

## 2021-02-15 PROBLEM — Z99.89 OSA ON CPAP: Status: ACTIVE | Noted: 2021-02-15

## 2021-02-15 PROBLEM — G89.29 CHRONIC PAIN OF RIGHT KNEE: Status: ACTIVE | Noted: 2019-12-03

## 2021-02-15 PROBLEM — G62.9 POLYNEUROPATHY: Status: ACTIVE | Noted: 2021-02-15

## 2021-02-15 PROBLEM — N20.0 RENAL STONE: Status: ACTIVE | Noted: 2018-12-21

## 2021-02-15 PROBLEM — E05.90 HYPERTHYROIDISM: Status: ACTIVE | Noted: 2021-02-15

## 2021-02-15 PROBLEM — M25.561 CHRONIC PAIN OF RIGHT KNEE: Status: ACTIVE | Noted: 2019-12-03

## 2021-09-10 ENCOUNTER — TRANSCRIBE ORDER (OUTPATIENT)
Dept: SCHEDULING | Age: 61
End: 2021-09-10

## 2021-09-10 DIAGNOSIS — Z12.31 VISIT FOR SCREENING MAMMOGRAM: Primary | ICD-10-CM

## 2021-09-29 ENCOUNTER — OFFICE VISIT (OUTPATIENT)
Dept: CARDIOLOGY CLINIC | Age: 61
End: 2021-09-29
Payer: COMMERCIAL

## 2021-09-29 VITALS
SYSTOLIC BLOOD PRESSURE: 124 MMHG | DIASTOLIC BLOOD PRESSURE: 88 MMHG | WEIGHT: 225 LBS | HEIGHT: 71 IN | HEART RATE: 60 BPM | BODY MASS INDEX: 31.5 KG/M2 | OXYGEN SATURATION: 100 %

## 2021-09-29 DIAGNOSIS — R94.6 THYROID FUNCTION STUDY ABNORMALITY: ICD-10-CM

## 2021-09-29 DIAGNOSIS — I50.32 DIASTOLIC CHF, CHRONIC (HCC): ICD-10-CM

## 2021-09-29 DIAGNOSIS — R53.83 FATIGUE, UNSPECIFIED TYPE: ICD-10-CM

## 2021-09-29 DIAGNOSIS — Z86.79 S/P ABLATION OF ATRIAL FIBRILLATION: ICD-10-CM

## 2021-09-29 DIAGNOSIS — Z98.890 S/P ABLATION OF ATRIAL FIBRILLATION: ICD-10-CM

## 2021-09-29 DIAGNOSIS — I48.0 INTERMITTENT ATRIAL FIBRILLATION (HCC): Primary | ICD-10-CM

## 2021-09-29 PROCEDURE — 99214 OFFICE O/P EST MOD 30 MIN: CPT | Performed by: INTERNAL MEDICINE

## 2021-09-29 PROCEDURE — 93000 ELECTROCARDIOGRAM COMPLETE: CPT | Performed by: INTERNAL MEDICINE

## 2021-09-29 RX ORDER — GLUCOSAMINE SULFATE 1500 MG
1000 POWDER IN PACKET (EA) ORAL DAILY
COMMUNITY

## 2021-09-29 NOTE — PROGRESS NOTES
Wu Klein presents today for   Chief Complaint   Patient presents with    Follow-up     6 month follow up       Wu Klein preferred language for health care discussion is english/other. Is someone accompanying this pt? no    Is the patient using any DME equipment during [address]? no    Depression Screening:  3 most recent PHQ Screens 9/29/2021   Little interest or pleasure in doing things Not at all   Feeling down, depressed, irritable, or hopeless Not at all   Total Score PHQ 2 0       Learning Assessment:  Learning Assessment 9/29/2021   PRIMARY LEARNER Patient   HIGHEST LEVEL OF EDUCATION - PRIMARY LEARNER  -   BARRIERS PRIMARY LEARNER -   [facility]    NEED -   LEARNER PREFERENCE PRIMARY DEMONSTRATION   ANSWERED BY patient   RELATIONSHIP SELF       Abuse Screening:  Abuse Screening Questionnaire 9/29/2021   Do you ever feel afraid of your partner? N   Are you in a relationship with someone who physically or mentally threatens you? N   Is it safe for you to go home? Y       Fall Risk  Fall Risk Assessment, last 12 mths 2/10/2021   Able to walk? Yes   Fall in past 12 months? 0   Do you feel unsteady? 0   Are you worried about falling 0           Pt currently taking Anticoagulant therapy? no    Pt currently taking Antiplatelet therapy ? no      Coordination of Care:  1. Have you been to the ER, urgent care clinic since your last visit? Hospitalized since your last visit? no    2. Have you seen or consulted any other health care providers outside of the [address] [place] [address] since your last visit? Include any pap smears or colon screening.  no

## 2021-09-29 NOTE — PROGRESS NOTES
History of Present Illness:  63year old female here for follow up. She continues to see Dr. Roula Cesar, has been taken off Eliquis. She has been doing relatively well without documented atrial fibrillation episodes since her ablation  at [facility]. She was riding her bike not too long ago, when she felt profound weakness, but is not sure it was an event in regards to an arrhythmia. She still goes about 45 miles on the bike and feels relatively well afterwards. She did have COVID infection this summer, which resolved. No chest pain, syncope, PND, orthopnea or edema. Impression:  1. History of paroxysmal symptomatic a-fib with attempt at [facility] , complicated by severe hypotension. Redo ablation July 10, 2020 by Dr. Roula Cesar, taken off Eliquis. 2. Previous Tikosyn use, discontinued .  3. Intolerance to higher dose of AV blocking agents due to bradycardia and fatigue. 4. Previous intolerance to Amiodarone. 5. Sleep apnea, on treatment. 6. History of kidney stones, seen at [facility]. 7. Nuclear stress test in the past few years with normal function, no ischemia. Plan:  Her functional status appears excellent, able to ride 45 minutes on the bike, minimal to no symptoms. She is off anticoagulation. She has been taking Diltiazem as needed, but last time was in December. I will plan to see back in six months.       Past Medical History:   Diagnosis Date    Adverse effect of anesthesia     Atrial fibrillation [facility])     had ablation in 2017    Attention deficit disorder without mention of hyperactivity     DUB (dysfunctional uterine bleeding)     Generalized osteoarthrosis, involving multiple sites     Grief reaction     Mother  recently - Celexa    Kidney stone     Memory changes     Adderall for this    Recurrent UTI     Sleep apnea     cpap    Unspecified tinnitus     UTI (urinary tract infection) 2020       Current Outpatient Medications Medication Sig Dispense Refill    ZINC PO Take  by mouth daily.  cholecalciferol (Vitamin D3) 25 mcg (1,000 unit) cap Take 1,000 Units by mouth daily.  dilTIAZem IR (CARDIZEM) 30 mg tablet TAKE 1 TO 2 TABLETS BY MOUTH FOUR TIMES DAILY AS NEEDED FOR RAPID HEART BEAT      cyanocobalamin (VITAMIN B-12) 2,500 mcg sublingual tablet Take 2,500 mcg by mouth every other day.  diclofenac (VOLTAREN) 1 % gel Apply  to affected area four (4) times daily.  thiamine (VITAMIN B-1) 100 mg tablet Take  by mouth daily.  multivitamin (ONE A DAY) tablet Take 1 Tab by mouth daily. Social History   reports that she has never smoked. She has never used smokeless tobacco.   reports no history of alcohol use. Family History  family history includes Diabetes in her maternal grandmother and mother; Heart Disease in her father and mother; Hypertension in her father and mother. Review of Systems  Except as stated above include:  Constitutional: Negative for fever, chills and malaise/fatigue. HEENT: No congestion or recent URI. Gastrointestinal: No nausea, vomiting, abdominal pain, bloody stools. Pulmonary:  Negative except as stated above. Cardiac:  Negative except as stated above. Musculoskeletal: Negative except as stated above. Neurological:  No localized symptoms. Skin:  Negative except as stated above. Psych:  Negative except as stated above. Endocrine:  Negative except as stated above. PHYSICAL EXAM  BP Readings from Last 3 Encounters:   09/29/21 124/88   02/10/21 128/86   07/02/20 126/80     Pulse Readings from Last 3 Encounters:   09/29/21 60   02/10/21 63   02/06/20 (!) 58     Wt Readings from Last 3 Encounters:   09/29/21 102.1 kg (225 lb)   02/15/21 100.7 kg (222 lb)   02/10/21 100.7 kg (222 lb)     General:   Well developed, well groomed. Head/Neck:   No obvious jugular venous distention     No obvious carotid pulsations. No evidence of xanthelasma.   Lungs:   No respiratory distress. Clear bilaterally. Heart:  Regular rate and rhythm. Normal S1/S2. Palpation grossly normal.    No significant murmurs, rubs or gallops. Abdomen:   Non-acute abdomen. No obvious pulsations. Extremities:   Intact peripheral pulses. No significant edema. Neurological:   Alert and oriented to person, place, time. No focal neurological deficit visually. Skin:   No obvious rash    Blood Pressure Metric:  Monitor recommended and adjustments stated if needed.

## 2021-10-05 ENCOUNTER — HOSPITAL ENCOUNTER (OUTPATIENT)
Dept: MAMMOGRAPHY | Age: 61
Discharge: HOME | End: 2021-10-05
Attending: INTERNAL MEDICINE
Payer: COMMERCIAL

## 2021-10-05 DIAGNOSIS — Z12.31 VISIT FOR SCREENING MAMMOGRAM: ICD-10-CM

## 2021-10-05 PROCEDURE — 77063 BREAST TOMOSYNTHESIS BI: CPT

## 2021-10-13 NOTE — PROGRESS NOTES
NUTRITION - FOLLOW-UP TREATMENT NOTE  Patient Name: Marcin Kenney         Date: 2019  : 1960    YES Patient  Verified  Diagnosis: Kidney stones, Hypo/hyper thyroid   In time:   1530            Out time:   1600   Total Treatment Time (min):   30     SUBJECTIVE/ASSESSMENT    Changes in medication or medical history? Any new allergies, surgeries or procedures? YES    If yes, update Summary List   Pt has been taken off all thyroid medication - has been sans meds x1 month  Heading to [place] to see cardiologist in December to discuss heart medication and future ablasion         Current Wt: 200 Previous Wt: 199 Wt Change: +1     Achievement of Goals: Pt is no longer on throid meds, but still has severe fatigue. Anticipate this is from heart medication. Her heart rate never goes above 108 and this is even after cycling. Her fatigue is seriously impacting her quality of life. She is not losing weight, she has no energy to cook meals or shop for foods. She uses her weekends to do some of this as during the week, once she is off work, she is too tired. She states that even before she can go ride her bike she has to take a nap in order to have the energy. She is tracking in my fitness pal, and for the most part hitting where she needs to be, still not getting as much protein as she should. Spend the majority of her appointment discussing cardiologist appointment as well as question to ask, she is intimidated by her surgeon and wants to be prepared in case she gets flustered. Hopeful that questions will get answered, meds may be adjusted, and pt will start getting more energy again. Will follow up in January.          Patient Education:  []  Review current plan with patient   []  Other:    Handouts/  Information Provided: []  Carbohydrates  []  Protein  []  Fiber  []  Serving Sizes  []  Fluids  []  General guidelines []  Diabetes  []  Cholesterol  []  Sodium  []  SBGM  []  Food Journals  []  Others:      New Please advise. Thank you.    Patient Goals: -Continue working on balancing meals and calories throughout the day  - when energy returns and less fatigue, start focusing on batch cooking and getting diet right     PLAN    []  Continue on current plan []  Follow-up PRN   []  Discharge due to :    []  Next appt: 16 January @ 0     Dietitian: Aleida Qureshi MA, RD    Date: 11/21/2019 Time: 3:26 PM

## 2022-03-18 PROBLEM — Z51.81 ENCOUNTER FOR MONITORING DOFETILIDE THERAPY: Status: ACTIVE | Noted: 2021-02-15

## 2022-03-18 PROBLEM — Z99.89 OSA ON CPAP: Status: ACTIVE | Noted: 2021-02-15

## 2022-03-18 PROBLEM — G62.9 POLYNEUROPATHY: Status: ACTIVE | Noted: 2021-02-15

## 2022-03-18 PROBLEM — Z79.899 ENCOUNTER FOR MONITORING DOFETILIDE THERAPY: Status: ACTIVE | Noted: 2021-02-15

## 2022-03-18 PROBLEM — G47.33 OSA ON CPAP: Status: ACTIVE | Noted: 2021-02-15

## 2022-03-19 PROBLEM — M17.31 POST-TRAUMATIC OSTEOARTHRITIS OF RIGHT KNEE: Status: ACTIVE | Noted: 2019-12-03

## 2022-03-19 PROBLEM — I48.0 INTERMITTENT ATRIAL FIBRILLATION (HCC): Status: ACTIVE | Noted: 2017-11-04

## 2022-03-19 PROBLEM — E05.90 HYPERTHYROIDISM: Status: ACTIVE | Noted: 2021-02-15

## 2022-03-19 PROBLEM — Z98.890 HISTORY OF RIGHT KNEE SURGERY: Status: ACTIVE | Noted: 2019-12-03

## 2022-03-19 PROBLEM — M25.561 CHRONIC PAIN OF RIGHT KNEE: Status: ACTIVE | Noted: 2019-12-03

## 2022-03-19 PROBLEM — G89.29 CHRONIC PAIN OF RIGHT KNEE: Status: ACTIVE | Noted: 2019-12-03

## 2022-03-19 PROBLEM — Z79.01 ANTICOAGULATED: Status: ACTIVE | Noted: 2021-02-15

## 2022-03-19 PROBLEM — N20.0 RENAL STONE: Status: ACTIVE | Noted: 2018-12-21

## 2022-03-20 PROBLEM — Z98.890 S/P ABLATION OF ATRIAL FIBRILLATION: Status: ACTIVE | Noted: 2017-11-21

## 2022-03-20 PROBLEM — M79.89 LEG SWELLING: Status: ACTIVE | Noted: 2017-08-24

## 2022-03-20 PROBLEM — M70.62 TROCHANTERIC BURSITIS OF BOTH HIPS: Status: ACTIVE | Noted: 2020-06-03

## 2022-03-20 PROBLEM — M25.552 BILATERAL HIP PAIN: Status: ACTIVE | Noted: 2020-06-03

## 2022-03-20 PROBLEM — Z86.79 S/P ABLATION OF ATRIAL FIBRILLATION: Status: ACTIVE | Noted: 2017-11-21

## 2022-03-20 PROBLEM — M70.61 TROCHANTERIC BURSITIS OF BOTH HIPS: Status: ACTIVE | Noted: 2020-06-03

## 2022-03-20 PROBLEM — M25.551 BILATERAL HIP PAIN: Status: ACTIVE | Noted: 2020-06-03

## 2022-03-30 ENCOUNTER — OFFICE VISIT (OUTPATIENT)
Dept: CARDIOLOGY CLINIC | Age: 62
End: 2022-03-30
Payer: COMMERCIAL

## 2022-03-30 VITALS
HEIGHT: 71 IN | OXYGEN SATURATION: 97 % | DIASTOLIC BLOOD PRESSURE: 70 MMHG | BODY MASS INDEX: 31.36 KG/M2 | SYSTOLIC BLOOD PRESSURE: 120 MMHG | HEART RATE: 60 BPM | WEIGHT: 224 LBS

## 2022-03-30 DIAGNOSIS — R94.6 THYROID FUNCTION STUDY ABNORMALITY: ICD-10-CM

## 2022-03-30 DIAGNOSIS — I48.0 INTERMITTENT ATRIAL FIBRILLATION (HCC): Primary | ICD-10-CM

## 2022-03-30 DIAGNOSIS — Z86.79 S/P ABLATION OF ATRIAL FIBRILLATION: ICD-10-CM

## 2022-03-30 DIAGNOSIS — Z98.890 S/P ABLATION OF ATRIAL FIBRILLATION: ICD-10-CM

## 2022-03-30 DIAGNOSIS — I50.32 DIASTOLIC CHF, CHRONIC (HCC): ICD-10-CM

## 2022-03-30 PROCEDURE — 99214 OFFICE O/P EST MOD 30 MIN: CPT | Performed by: INTERNAL MEDICINE

## 2022-03-30 PROCEDURE — 93000 ELECTROCARDIOGRAM COMPLETE: CPT | Performed by: INTERNAL MEDICINE

## 2022-03-30 NOTE — PROGRESS NOTES
Yanni Montesinos presents today for   Chief Complaint   Patient presents with    Follow-up     6 months        Yanni Montesinos preferred language for health care discussion is english/other. Is someone accompanying this pt? no    Is the patient using any DME equipment during [address]? no    Depression Screening:  3 most recent PHQ Screens 3/30/2022   Little interest or pleasure in doing things Not at all   Feeling down, depressed, irritable, or hopeless Not at all   Total Score PHQ 2 0       Learning Assessment:  Learning Assessment 9/29/2021   PRIMARY LEARNER Patient   HIGHEST LEVEL OF EDUCATION - PRIMARY LEARNER  -   BARRIERS PRIMARY LEARNER -   [facility]    NEED -   LEARNER PREFERENCE PRIMARY DEMONSTRATION   ANSWERED BY patient   RELATIONSHIP SELF       Abuse Screening:  Abuse Screening Questionnaire 9/29/2021   Do you ever feel afraid of your partner? N   Are you in a relationship with someone who physically or mentally threatens you? N   Is it safe for you to go home? Y       Fall Risk  Fall Risk Assessment, last 12 mths 2/10/2021   Able to walk? Yes   Fall in past 12 months? 0   Do you feel unsteady? 0   Are you worried about falling 0       Pt currently taking Anticoagulant therapy? no    Coordination of Care:  1. Have you been to the ER, urgent care clinic since your last visit? Hospitalized since your last visit? no    2. Have you seen or consulted any other health care providers outside of the [address] [place] [address] since your last visit? Include any pap smears or colon screening.  no

## 2022-03-30 NOTE — PROGRESS NOTES
History of Present Illness:  63year old female here for follow up. Overall she has been doing well. She is off Tikosyn and Eliquis. No new chest pain, dyspnea, PND, orthopnea or edema. She goes biking for about 40 miles without limitation. 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 Impression:  1. History of paroxysmal symptomatic a-fib with attempted PVI , complicated by severe hypotension. She had redo ablation 07/10/20 by Dr. Stefano Rivera at [facility], has not had recurrence. She is off Eliquis and Tikosyn. 2. Intolerance to higher doses of AV blocking agents due to bradycardia and fatigue. 3. Previous intolerance to Amiodarone. 4. Sleep apnea, on treatment. 5. History of recurrent kidney stones, seen at [facility]. 6. Nuclear stress test a few years ago with normal function, no ischemia. Plan:  Her functional status remains excellent, blood pressure is controlled, no significant symptoms and she is off anticoagulation. Historically she has been very sensitive to her atrial fibrillation. Again, she has not clinically had any events. I will plan to see back in six months and then likely move to an annual basis. Past Medical History:   Diagnosis Date    Adverse effect of anesthesia     Atrial fibrillation [facility])     had ablation in 2017    Attention deficit disorder without mention of hyperactivity     DUB (dysfunctional uterine bleeding)     Generalized osteoarthrosis, involving multiple sites     Grief reaction     Mother  recently - Celexa    Kidney stone     Memory changes     Adderall for this    Recurrent UTI     Sleep apnea     cpap    Unspecified tinnitus     UTI (urinary tract infection) 2020       Current Outpatient Medications   Medication Sig Dispense Refill    ZINC PO Take  by mouth daily.  cholecalciferol (Vitamin D3) 25 mcg (1,000 unit) cap Take 1,000 Units by mouth daily.       dilTIAZem IR (CARDIZEM) 30 mg tablet TAKE 1 TO 2 TABLETS BY MOUTH FOUR TIMES DAILY AS NEEDED FOR RAPID HEART BEAT      cyanocobalamin (VITAMIN B-12) 2,500 mcg sublingual tablet Take 2,500 mcg by mouth every other day.  diclofenac (VOLTAREN) 1 % gel Apply  to affected area four (4) times daily.  multivitamin (ONE A DAY) tablet Take 1 Tab by mouth daily. Social History   reports that she has never smoked. She has never used smokeless tobacco.   reports no history of alcohol use. Family History  family history includes Diabetes in her maternal grandmother and mother; Heart Disease in her father and mother; Hypertension in her father and mother. Review of Systems  Except as stated above include:  Constitutional: Negative for fever, chills and malaise/fatigue. HEENT: No congestion or recent URI. Gastrointestinal: No nausea, vomiting, abdominal pain, bloody stools. Pulmonary:  Negative except as stated above. Cardiac:  Negative except as stated above. Musculoskeletal: Negative except as stated above. Neurological:  No localized symptoms. Skin:  Negative except as stated above. Psych:  Negative except as stated above. Endocrine:  Negative except as stated above. PHYSICAL EXAM  BP Readings from Last 3 Encounters:   03/30/22 120/70   09/29/21 124/88   02/10/21 128/86     Pulse Readings from Last 3 Encounters:   03/30/22 60   09/29/21 60   02/10/21 63     Wt Readings from Last 3 Encounters:   03/30/22 101.6 kg (224 lb)   09/29/21 102.1 kg (225 lb)   02/15/21 100.7 kg (222 lb)     General:   Well developed, well groomed. Head/Neck:   No obvious jugular venous distention     No obvious carotid pulsations. No evidence of xanthelasma. Lungs:   No respiratory distress. Clear bilaterally. Heart:  Regular rate and rhythm. Normal S1/S2. Palpation grossly normal.    No significant murmurs, rubs or gallops. Abdomen:   Non-acute abdomen. No obvious pulsations. Extremities:   Intact peripheral pulses.       No significant edema. Neurological:   Alert and oriented to person, place, time. No focal neurological deficit visually. Skin:   No obvious rash    Blood Pressure Metric:  Monitor recommended and adjustments stated if needed.
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
## 2023-01-25 ENCOUNTER — OFFICE VISIT (OUTPATIENT)
Dept: CARDIOLOGY CLINIC | Age: 63
End: 2023-01-25
Payer: COMMERCIAL

## 2023-01-25 ENCOUNTER — HOSPITAL ENCOUNTER (OUTPATIENT)
Dept: LAB | Age: 63
Discharge: HOME | End: 2023-01-25

## 2023-01-25 VITALS
BODY MASS INDEX: 29.96 KG/M2 | SYSTOLIC BLOOD PRESSURE: 130 MMHG | HEART RATE: 72 BPM | DIASTOLIC BLOOD PRESSURE: 76 MMHG | HEIGHT: 71 IN | OXYGEN SATURATION: 97 % | WEIGHT: 214 LBS

## 2023-01-25 DIAGNOSIS — R53.83 FATIGUE, UNSPECIFIED TYPE: ICD-10-CM

## 2023-01-25 DIAGNOSIS — R42 DIZZY: Primary | ICD-10-CM

## 2023-01-25 DIAGNOSIS — I48.91 ATRIAL FIBRILLATION, UNSPECIFIED TYPE (HCC): ICD-10-CM

## 2023-01-25 DIAGNOSIS — I10 HYPERTENSION, UNSPECIFIED TYPE: ICD-10-CM

## 2023-01-25 LAB — SENTARA SPECIMEN COL,SENBCF: NORMAL

## 2023-01-25 PROCEDURE — 99001 SPECIMEN HANDLING PT-LAB: CPT

## 2023-01-25 PROCEDURE — 3075F SYST BP GE 130 - 139MM HG: CPT | Performed by: INTERNAL MEDICINE

## 2023-01-25 PROCEDURE — 3078F DIAST BP <80 MM HG: CPT | Performed by: INTERNAL MEDICINE

## 2023-01-25 PROCEDURE — 93000 ELECTROCARDIOGRAM COMPLETE: CPT | Performed by: INTERNAL MEDICINE

## 2023-01-25 PROCEDURE — 99215 OFFICE O/P EST HI 40 MIN: CPT | Performed by: INTERNAL MEDICINE

## 2023-01-25 NOTE — PROGRESS NOTES
HPI:  63 y.o. female here with concerns for exertional dizziness. When she exercises once or twice per week, she will feel dizzy during the exercise and then she stops she feels better. No significant chest pain or shortness of breath and no syncope. It has been doing for for a number of months. She had an event monitor late November showing PAC's without any sustained SVT or atrial fibrillation. Impression:   Recent exertional dizziness of unclear etiology. Going on for a number of months. Recent event monitor November 2022 with PAC's but no sustained atrial fibrillation. History of paroxysmal symptomatic atrial fibrillation with attempted PVI November 3744 complicated by severe hypotension. She had ablation last 2021 by Dr. Winnie Laura at [facility]. She was taken off Eliquis and Tikosyn. Intolerance to higher dose AV blocking agents due to bradycardia and fatigue. Previous amiodarone intolerance. Sleep apnea in treatment. History of current kidney stones seen by [facility]. Nuclear stress test a few years ago with normal function, no ischemia. Blood pressure is borderline today. The exertional dizziness is unclear as far as the etiology. The event monitor reviewed showed only PAC's but no sustained atrial fibrillation. Historically she did have issues with bradycardia and hypotension around the time of her ablation. I am going to obtain an echocardiogram, blood work including magnesium as well as an exercise treadmill stress test to monitor for heart rate response and specially chronotropic intolerance as well as blood pressure. I will see her back after testing.       Wt Readings from Last 3 Encounters:   01/25/23 97.1 kg (214 lb)   07/18/22 101.6 kg (224 lb)   04/18/22 101.6 kg (224 lb)     Past Medical History:   Diagnosis Date    Adverse effect of anesthesia     Atrial fibrillation ([address]     had ablation in Nov 2017    Attention deficit disorder without mention of hyperactivity DUB (dysfunctional uterine bleeding)     Generalized osteoarthrosis, involving multiple sites     Grief reaction     Mother  recently - Celexa    Kidney stone     Memory changes     Adderall for this    Recurrent UTI     Sleep apnea     cpap    Thyroid disease 2019    Dr. Nevaeh Ruelas \"fixed\" my hyperthyroid    Unspecified tinnitus     UTI (urinary tract infection) 2020       Current Outpatient Medications   Medication Sig Dispense Refill    terbinafine HCL (LAMISIL) 250 mg tablet TAKE 1 TABLET BY MOUTH DAILY FOR 7 CONSECUTIVE DAYS PER MONTH FOR 6 MONTHS      cholecalciferol (VITAMIN D3) 25 mcg (1,000 unit) cap Take 1,000 Units by mouth daily. diclofenac (VOLTAREN) 1 % gel Apply  to affected area four (4) times daily. multivitamin (ONE A DAY) tablet Take 1 Tab by mouth daily. Social History   reports that she has never smoked. She has never used smokeless tobacco.   reports no history of alcohol use. Family History  family history includes Cancer in her brother, father, and maternal grandfather; Diabetes in her maternal grandmother and mother; Heart Disease in her father and mother; Hypertension in her father and mother; Lung Disease in her father; Stroke in her father. Review of Systems  Except as stated above include:  Constitutional: Negative for fever, chills and malaise/fatigue. HEENT: No congestion or recent URI. Gastrointestinal: No nausea, vomiting, abdominal pain, bloody stools. Pulmonary:  Negative except as stated above. Cardiac:  Negative except as stated above. Musculoskeletal: Negative except as stated above. Neurological:  No localized symptoms. Skin:  Negative except as stated above. Psych:  Negative except as stated above. Endocrine:  Negative except as stated above.     PHYSICAL EXAM  BP Readings from Last 3 Encounters:   23 130/76   22 120/70   21 124/88     Pulse Readings from Last 3 Encounters:   23 72   22 60 09/29/21 60       General:   Well developed, well groomed. Head/Neck:   No obvious jugular venous distention     No obvious carotid pulsations. No evidence of xanthelasma. Lungs:   No respiratory distress. Clear bilaterally. Heart:  Regular rate and rhythm. Normal S1/S2. Palpation grossly normal.    No significant murmurs, rubs or gallops. Abdomen:   Non-acute abdomen. No obvious pulsations. Extremities:   Intact peripheral pulses. No significant edema. Neurological:   Alert and oriented to person, place, time. No focal neurological deficit visually. Skin:   No obvious rash    Blood Pressure Metric:  Monitor recommended and adjustments stated if needed.

## 2023-01-25 NOTE — PROGRESS NOTES
Saumya Stein presents today for   Chief Complaint   Patient presents with    Follow-up     6 month         Saumya Stein preferred language for health care discussion is english/other. Is someone accompanying this pt? no    Is the patient using any DME equipment during [address]? no    Depression Screening:  3 most recent PHQ Screens 1/25/2023   Little interest or pleasure in doing things Not at all   Feeling down, depressed, irritable, or hopeless Not at all   Total Score PHQ 2 0       Learning Assessment:  Learning Assessment 9/29/2021   PRIMARY LEARNER Patient   HIGHEST LEVEL OF EDUCATION - PRIMARY LEARNER  -   BARRIERS PRIMARY LEARNER -   [address] [facility]    NEED -   LEARNER PREFERENCE PRIMARY DEMONSTRATION   ANSWERED BY patient   RELATIONSHIP SELF       Abuse Screening:  Abuse Screening Questionnaire 9/29/2021   Do you ever feel afraid of your partner? N   Are you in a relationship with someone who physically or mentally threatens you? N   Is it safe for you to go home? Y       Fall Risk  Fall Risk Assessment, last 12 mths 2/10/2021   Able to walk? Yes   Fall in past 12 months? 0   Do you feel unsteady? 0   Are you worried about falling 0       Pt currently taking Anticoagulant therapy? no    Coordination of Care:  1. Have you been to the ER, urgent care clinic since your last visit? Hospitalized since your last visit? no    2. Have you seen or consulted any other health care providers outside of the [address] [place] [address] since your last visit? Include any pap smears or colon screening.  no

## 2023-01-26 LAB
A-G RATIO,AGRAT: 1.6 RATIO (ref 1.1–2.6)
ABSOLUTE LYMPHOCYTE COUNT, 10803: 1.7 K/UL (ref 1–4.8)
ALBUMIN SERPL-MCNC: 4.3 G/DL (ref 3.5–5)
ALP SERPL-CCNC: 89 U/L (ref 40–120)
ALT SERPL-CCNC: 11 U/L (ref 5–40)
ANION GAP SERPL CALC-SCNC: 12 MMOL/L (ref 3–15)
AST SERPL W P-5'-P-CCNC: 18 U/L (ref 10–37)
BASOPHILS # BLD: 0 K/UL (ref 0–0.2)
BASOPHILS NFR BLD: 1 % (ref 0–2)
BILIRUB SERPL-MCNC: 0.3 MG/DL (ref 0.2–1.2)
BUN SERPL-MCNC: 21 MG/DL (ref 6–22)
CALCIUM SERPL-MCNC: 9.8 MG/DL (ref 8.4–10.5)
CHLORIDE SERPL-SCNC: 100 MMOL/L (ref 98–110)
CO2 SERPL-SCNC: 30 MMOL/L (ref 20–32)
CREAT SERPL-MCNC: 1.2 MG/DL (ref 0.8–1.4)
EOSINOPHIL # BLD: 0.1 K/UL (ref 0–0.5)
EOSINOPHIL NFR BLD: 1 % (ref 0–6)
ERYTHROCYTE [DISTWIDTH] IN BLOOD BY AUTOMATED COUNT: 13 % (ref 10–15.5)
GLOBULIN,GLOB: 2.7 G/DL (ref 2–4)
GLOMERULAR FILTRATION RATE: 49.8 ML/MIN/1.73 SQ.M.
GLUCOSE SERPL-MCNC: 86 MG/DL (ref 70–99)
GRANULOCYTES,GRANS: 67 % (ref 40–75)
HCT VFR BLD AUTO: 43.7 % (ref 35.1–48)
HGB BLD-MCNC: 13.3 G/DL (ref 11.7–16)
LYMPHOCYTES, LYMLT: 22 % (ref 20–45)
MAGNESIUM SERPL-MCNC: 2.2 MG/DL (ref 1.6–2.5)
MCH RBC QN AUTO: 30 PG (ref 26–34)
MCHC RBC AUTO-ENTMCNC: 30 G/DL (ref 31–36)
MCV RBC AUTO: 98 FL (ref 80–99)
MONOCYTES # BLD: 0.7 K/UL (ref 0.1–1)
MONOCYTES NFR BLD: 10 % (ref 3–12)
NEUTROPHILS # BLD AUTO: 5.2 K/UL (ref 1.8–7.7)
PLATELET # BLD AUTO: 244 K/UL (ref 140–440)
PMV BLD AUTO: 11.5 FL (ref 9–13)
POTASSIUM SERPL-SCNC: 4.3 MMOL/L (ref 3.5–5.5)
PROT SERPL-MCNC: 7 G/DL (ref 6.2–8.1)
RBC # BLD AUTO: 4.47 M/UL (ref 3.8–5.2)
SODIUM SERPL-SCNC: 142 MMOL/L (ref 133–145)
WBC # BLD AUTO: 7.7 K/UL (ref 4–11)

## 2023-02-13 DIAGNOSIS — I50.32 CHRONIC DIASTOLIC (CONGESTIVE) HEART FAILURE (HCC): Primary | ICD-10-CM

## 2023-03-27 DIAGNOSIS — R42 DIZZINESS AND GIDDINESS: ICD-10-CM

## 2023-03-27 DIAGNOSIS — I50.32 CHRONIC DIASTOLIC (CONGESTIVE) HEART FAILURE (HCC): ICD-10-CM

## 2023-03-27 DIAGNOSIS — I48.91 ATRIAL FIBRILLATION, UNSPECIFIED TYPE (HCC): Primary | ICD-10-CM

## 2023-04-27 ENCOUNTER — HOSPITAL ENCOUNTER (OUTPATIENT)
Facility: HOSPITAL | Age: 63
Discharge: HOME | End: 2023-04-27
Payer: COMMERCIAL

## 2023-04-27 DIAGNOSIS — N20.0 CALCULUS OF KIDNEY: ICD-10-CM

## 2023-04-27 PROCEDURE — 76770 US EXAM ABDO BACK WALL COMP: CPT

## 2023-05-04 ENCOUNTER — HOSPITAL ENCOUNTER (OUTPATIENT)
Facility: HOSPITAL | Age: 63
Discharge: HOME | End: 2023-05-04
Payer: COMMERCIAL

## 2023-05-04 DIAGNOSIS — Z12.31 VISIT FOR SCREENING MAMMOGRAM: ICD-10-CM

## 2023-05-04 PROCEDURE — 77063 BREAST TOMOSYNTHESIS BI: CPT

## 2023-05-25 ENCOUNTER — HOSPITAL ENCOUNTER (OUTPATIENT)
Facility: HOSPITAL | Age: 63
Discharge: HOME | End: 2023-05-27
Payer: COMMERCIAL

## 2023-05-25 DIAGNOSIS — I65.29 STENOSIS OF CAROTID ARTERY, UNSPECIFIED LATERALITY: ICD-10-CM

## 2023-05-25 PROCEDURE — 93880 EXTRACRANIAL BILAT STUDY: CPT

## 2023-05-25 PROCEDURE — 93880 EXTRACRANIAL BILAT STUDY: CPT | Performed by: SURGERY

## 2023-05-26 LAB
VAS LEFT BULB EDV: 28 CM/S
VAS LEFT BULB PSV: 70.7 CM/S
VAS LEFT CCA DIST EDV: 31.9 CM/S
VAS LEFT CCA DIST PSV: 97.9 CM/S
VAS LEFT CCA MID EDV: 31.84 CM/S
VAS LEFT CCA MID PSV: 93.96 CM/S
VAS LEFT CCA PROX EDV: 28.8 CM/S
VAS LEFT CCA PROX PSV: 111.6 CM/S
VAS LEFT ECA EDV: 5.76 CM/S
VAS LEFT ECA PSV: 53.7 CM/S
VAS LEFT ICA DIST EDV: 28.5 CM/S
VAS LEFT ICA DIST PSV: 67.8 CM/S
VAS LEFT ICA MID EDV: 36.5 CM/S
VAS LEFT ICA MID PSV: 79.8 CM/S
VAS LEFT ICA PROX EDV: 29.3 CM/S
VAS LEFT ICA PROX PSV: 87.5 CM/S
VAS LEFT ICA/CCA PSV: 0.89 NO UNITS
VAS LEFT SUBCLAVIAN PROX EDV: 2.2 CM/S
VAS LEFT SUBCLAVIAN PROX PSV: 116.1 CM/S
VAS LEFT VERTEBRAL EDV: 30.54 CM/S
VAS LEFT VERTEBRAL PSV: 79.7 CM/S
VAS RIGHT BULB EDV: 13.7 CM/S
VAS RIGHT BULB PSV: 55.1 CM/S
VAS RIGHT CCA DIST EDV: 25.4 CM/S
VAS RIGHT CCA DIST PSV: 65.5 CM/S
VAS RIGHT CCA MID EDV: 20.19 CM/S
VAS RIGHT CCA MID PSV: 69.37 CM/S
VAS RIGHT CCA PROX EDV: 30.5 CM/S
VAS RIGHT CCA PROX PSV: 108.2 CM/S
VAS RIGHT ECA EDV: 7.25 CM/S
VAS RIGHT ECA PSV: 64.2 CM/S
VAS RIGHT ICA DIST EDV: 35.6 CM/S
VAS RIGHT ICA DIST PSV: 75.3 CM/S
VAS RIGHT ICA MID EDV: 42.2 CM/S
VAS RIGHT ICA MID PSV: 84.1 CM/S
VAS RIGHT ICA PROX EDV: 28.4 CM/S
VAS RIGHT ICA PROX PSV: 65.1 CM/S
VAS RIGHT ICA/CCA PSV: 1.3 NO UNITS
VAS RIGHT SUBCLAVIAN PROX EDV: 0 CM/S
VAS RIGHT SUBCLAVIAN PROX PSV: 136.8 CM/S
VAS RIGHT VERTEBRAL EDV: 20.16 CM/S
VAS RIGHT VERTEBRAL PSV: 43.3 CM/S

## 2024-04-25 ENCOUNTER — OFFICE VISIT (OUTPATIENT)
Age: 64
End: 2024-04-25

## 2024-04-25 VITALS
HEART RATE: 59 BPM | DIASTOLIC BLOOD PRESSURE: 74 MMHG | HEIGHT: 71 IN | WEIGHT: 207 LBS | SYSTOLIC BLOOD PRESSURE: 106 MMHG | BODY MASS INDEX: 28.98 KG/M2 | OXYGEN SATURATION: 97 %

## 2024-04-25 DIAGNOSIS — I48.91 ATRIAL FIBRILLATION, UNSPECIFIED TYPE (HCC): ICD-10-CM

## 2024-04-25 DIAGNOSIS — R42 DIZZY: Primary | ICD-10-CM

## 2024-04-25 PROCEDURE — 93000 ELECTROCARDIOGRAM COMPLETE: CPT | Performed by: INTERNAL MEDICINE

## 2024-04-25 PROCEDURE — 99214 OFFICE O/P EST MOD 30 MIN: CPT | Performed by: INTERNAL MEDICINE

## 2024-04-25 NOTE — PROGRESS NOTES
History of Present Illness:  64 year-old female here for followup.  Overall, she is doing relatively well.  She used to bicycle regularly up to 100 miles, but now goes 60 miles.  She feels like she may get a little bit more tired than she had been in the past, but denies any significant palpitations or chest pain.      Impression:   History of paroxysmal symptomatic atrial fibrillation with pulmonary vein isolation 2017 complicated by severe hypotension, ultimate ablation in  at [facility].  She has been taken off Eliquis and Tikosyn without any recurrence.    Event monitor 2022 with PACs, but no sustained atrial fibrillation.    Intermittent fatigue, not able to ride her bike quite as long as usual, echocardiogram 2023 with normal function.   Exercise treadmill stress test 2023 for 9 minutes and 49 seconds with appropriate heart rate response and blood pressure response.      Plan:  Given some general fatigue when she is riding her bike and history of atrial fibrillation and two ablations, I am going to obtain a follow-up echocardiogram to make sure there is no change in heart function or any valve disease.  As long as this is stable, I will see her back annually.        Wt Readings from Last 3 Encounters:   24 93.9 kg (207 lb)   23 99.3 kg (219 lb)   23 97.1 kg (214 lb)     Past Medical History:   Diagnosis Date    Adverse effect of anesthesia     Atrial fibrillation (H[facility])     had ablation in 2017    Attention deficit disorder without mention of hyperactivity     DUB (dysfunctional uterine bleeding)     Generalized osteoarthrosis, involving multiple sites     Grief reaction     Mother  recently - Celexa    Kidney stone     Memory changes     Adderall for this    Recurrent UTI     Sleep apnea     cpap    Thyroid disease 2019    Dr. Yonis Hayes \"fixed\" my hyperthyroid    Unspecified tinnitus     UTI (urinary tract infection) 2020       Current Outpatient Medications

## 2024-08-01 ENCOUNTER — TRANSCRIBE ORDERS (OUTPATIENT)
Facility: HOSPITAL | Age: 64
End: 2024-08-01

## 2024-08-01 DIAGNOSIS — Z12.31 SCREENING MAMMOGRAM FOR HIGH-RISK PATIENT: Primary | ICD-10-CM

## 2024-10-03 ENCOUNTER — HOSPITAL ENCOUNTER (OUTPATIENT)
Facility: HOSPITAL | Age: 64
Discharge: HOME | End: 2024-10-06
Payer: COMMERCIAL

## 2024-10-03 VITALS — BODY MASS INDEX: 28.7 KG/M2 | WEIGHT: 205 LBS | HEIGHT: 71 IN

## 2024-10-03 DIAGNOSIS — Z12.31 ENCOUNTER FOR SCREENING MAMMOGRAM FOR HIGH-RISK PATIENT: ICD-10-CM

## 2024-10-03 PROCEDURE — 77063 BREAST TOMOSYNTHESIS BI: CPT

## 2024-12-12 ENCOUNTER — HOSPITAL ENCOUNTER (OUTPATIENT)
Facility: HOSPITAL | Age: 64
Setting detail: RECURRING SERIES
Discharge: HOME | End: 2024-12-15
Payer: COMMERCIAL

## 2024-12-12 PROCEDURE — 97162 PT EVAL MOD COMPLEX 30 MIN: CPT

## 2024-12-12 NOTE — PROGRESS NOTES
PT DAILY TREATMENT NOTE/KNEE EVAL       Patient Name: Liane Redmond    Date: 2024    : 1960  Insurance: Payor: MIKE / Plan: [employer] / Product Type: *No Product type* /      Patient  verified yes     Visit #   Current / Total 1 12   Time   In / Out 4:05 4:47   Pain   In / Out 0 0   Subjective Functional Status/Changes: See Subjective Summary     Treatment Area: Right knee pain [M25.561]    SUBJECTIVE    Subjective functional status/changes:     Chief Complaint: right knee pain  History/Mechanism of Injury: chronic right knee pain,  Current Symptoms/Deficits: reduced squatting/lifting, pain/fear with stairs, increased fear of falling, reduced ability to perform heavy household work  Aggravating factors: prolonged straightening  Alleviating factors:   Pain-  Best: 0/10   Current: 0/10   Worst: 6/10   Previous Treatment/Compliance: chiropractic care for pain - limited benefit for the knee, personal training  Mobility Devices: none reported  PMHx/Surgical Hx: asthma, sleep apnea with CPAP, Afib with hx 2 ablations, OA, neuropathy, hx kidney stones, current skin CA nose/scalp, thyroid disease, tinnitus, hx right knee ACL/meniscus repair, hx nephrolithotomy and nephrostomy    Diagnostic Tests: [] Lab work [] X-rays    [] CT [] MRI     [] Other:  Results:    Work Hx: Works full-time as Business &  for [employer]  Living Situation: Lives alone in a 3 story home  Household Modifications: none reported  Hobbies: recreational exercising with , cycling on  up to 40 miles,  PLOF: Previously independent with all ADLs, participated in recreational cycling  Pt Goals: \"to be able to straighten my knee\"    OBJECTIVE/EXAMINATION        18 min [x]Eval  - untimed                   Therapeutic Procedures:  Tx Min Billable or 1:1 Min (if diff from Tx Min) Procedure, Rationale, Specifics   [phone] [id number] Therapeutic Exercise (timed):  increase ROM, strength, 
Signature:_________________________   DATE:_________   TIME:________                           Erik Kincaid MD    ** Signature, Date and Time must be completed for valid certification **  [facility] or you may fax the signed copy to [phone].  Thank you.

## 2024-12-24 ENCOUNTER — HOSPITAL ENCOUNTER (OUTPATIENT)
Facility: HOSPITAL | Age: 64
Setting detail: RECURRING SERIES
End: 2024-12-24
Payer: COMMERCIAL

## 2024-12-26 ENCOUNTER — HOSPITAL ENCOUNTER (OUTPATIENT)
Facility: HOSPITAL | Age: 64
Setting detail: RECURRING SERIES
Discharge: HOME | End: 2024-12-29
Payer: COMMERCIAL

## 2024-12-26 PROCEDURE — 97113 AQUATIC THERAPY/EXERCISES: CPT

## 2024-12-31 ENCOUNTER — HOSPITAL ENCOUNTER (OUTPATIENT)
Facility: HOSPITAL | Age: 64
Setting detail: RECURRING SERIES
Discharge: HOME | End: 2025-01-03
Payer: COMMERCIAL

## 2024-12-31 PROCEDURE — 97113 AQUATIC THERAPY/EXERCISES: CPT

## 2025-01-02 ENCOUNTER — HOSPITAL ENCOUNTER (OUTPATIENT)
Facility: HOSPITAL | Age: 65
Setting detail: RECURRING SERIES
Discharge: HOME | End: 2025-01-05
Payer: COMMERCIAL

## 2025-01-02 PROCEDURE — 97113 AQUATIC THERAPY/EXERCISES: CPT

## 2025-01-07 ENCOUNTER — HOSPITAL ENCOUNTER (OUTPATIENT)
Facility: HOSPITAL | Age: 65
Setting detail: RECURRING SERIES
Discharge: HOME | End: 2025-01-10
Payer: COMMERCIAL

## 2025-01-07 PROCEDURE — 97113 AQUATIC THERAPY/EXERCISES: CPT

## 2025-01-07 PROCEDURE — 97530 THERAPEUTIC ACTIVITIES: CPT

## 2025-01-07 NOTE — PROGRESS NOTES
Current:   3- Goal: Pt to report < 3/10 pain at worst to increase ease with ADLs.  Status at last note/certification: progressin/10 pain at worst. (2025)  Current:   4- Goal: Pt to report LEFS score of 71 or greater to show improved function and quality of life.  Status at last note/certification: regressin/80. Pt denies any decline in function. (2025)  Current:   5- Goal: Pt to improve performance of 30 second sit to stand to > 14 reps without UE support, equal weightbearing, and good eccentric control to improve ease with transfers.  Status at last note/certification: progressin repetitions without UE assistance, good eccentric control, equal weight bearing. (2025)  Current:     Frequency / Duration:   Patient to be seen   1-2   times per week for   6    WEEKS    RECOMMENDATIONS  Patient would benefit from the continuation of skilled rehab interventions for functional progress to achieving above stated clinically significant goals.  Continue per initial Plan of Care.    If you have any questions/comments please contact us directly.  Thank you for allowing us to assist in the care of your patient.    Gómez Llanos, PTA       2025       3:53 PM         Therapist Signature: Sakshi Cole, PT Date: 2025 Time:  5:19 PM

## 2025-01-09 ENCOUNTER — APPOINTMENT (OUTPATIENT)
Facility: HOSPITAL | Age: 65
End: 2025-01-09
Payer: COMMERCIAL

## 2025-01-14 ENCOUNTER — APPOINTMENT (OUTPATIENT)
Facility: HOSPITAL | Age: 65
End: 2025-01-14
Payer: COMMERCIAL

## 2025-01-16 ENCOUNTER — APPOINTMENT (OUTPATIENT)
Facility: HOSPITAL | Age: 65
End: 2025-01-16
Payer: COMMERCIAL

## 2025-01-21 ENCOUNTER — HOSPITAL ENCOUNTER (OUTPATIENT)
Facility: HOSPITAL | Age: 65
Setting detail: RECURRING SERIES
Discharge: HOME | End: 2025-01-24
Payer: COMMERCIAL

## 2025-01-21 PROCEDURE — 97113 AQUATIC THERAPY/EXERCISES: CPT

## 2025-01-23 ENCOUNTER — APPOINTMENT (OUTPATIENT)
Facility: HOSPITAL | Age: 65
End: 2025-01-23
Payer: COMMERCIAL

## 2025-01-28 ENCOUNTER — HOSPITAL ENCOUNTER (OUTPATIENT)
Facility: HOSPITAL | Age: 65
Setting detail: RECURRING SERIES
Discharge: HOME | End: 2025-01-31
Payer: COMMERCIAL

## 2025-01-28 PROCEDURE — 97113 AQUATIC THERAPY/EXERCISES: CPT

## 2025-01-30 ENCOUNTER — HOSPITAL ENCOUNTER (OUTPATIENT)
Facility: HOSPITAL | Age: 65
Setting detail: RECURRING SERIES
End: 2025-01-30
Payer: COMMERCIAL

## 2025-01-30 PROCEDURE — 97113 AQUATIC THERAPY/EXERCISES: CPT

## 2025-02-04 ENCOUNTER — HOSPITAL ENCOUNTER (OUTPATIENT)
Facility: HOSPITAL | Age: 65
Setting detail: RECURRING SERIES
Discharge: HOME | End: 2025-02-07
Payer: COMMERCIAL

## 2025-02-04 PROCEDURE — 97530 THERAPEUTIC ACTIVITIES: CPT

## 2025-02-04 NOTE — PROGRESS NOTES
[facility]   [address] [place] [address]  Phone: [phone] Fax [phone]  DISCHARGE SUMMARY  Patient Name: Liane Redmond : 1960   Treatment/Medical Diagnosis: Right knee pain [M25.561]   Referral Source: Erik Kincaid MD     Date of Initial Visit: 2024 Attended Visits: 9 Missed Visits: 0     CURRENT GOAL STATUS  Long Term Goals: To be accomplished in 12 treatments:  1- Goal: Pt to improve right knee AROM by 15 degrees in both directions to reduce gait deviations and increase ease with stairs in the home.  Status at last note/certification: progressin- 117. (2025)  Current: progressing: - degrees. (2025)  2- Goal: Pt to improve bilateral knee strength to 5/5 to increase ease with lifting tasks.  Status at last note/certification: progressing: (2025)   Strength: Right (/5) Left (/5)   Knee   Extension 4+ 4+              Flexion 4 4   Current: progressing:(2025)   Strength: Right (/5) Left (/5)   Knee   Extension 5 5              Flexion 4+ 4+     3- Goal: Pt to report < 3/10 pain at worst to increase ease with ADLs.  Status at last note/certification: progressin/10 pain at worst. (2025)  Current: not met: 7-8/10 pain with prolonged sitting. Averages 3-4/10 pain. (2025)  4- Goal: Pt to report LEFS score of 71 or greater to show improved function and quality of life.  Status at last note/certification: regressin/80. Pt denies any decline in function. (2025)  Current: regressin/80 pts. Pt denies any decline in function. (2025)  5- Goal: Pt to improve performance of 30 second sit to stand to > 14 reps without UE support, equal weightbearing, and good eccentric control to improve ease with transfers.  Status at last note/certification: progressin repetitions without UE assistance, good eccentric control, equal weight bearing. (2025)  Current: not

## 2025-02-04 NOTE — PROGRESS NOTES
Physical Therapy Discharge Instructions      [facility]  [address] [place] [address] [phone] fax      Patient: Liane Redmond  : 1960      Continue Home Exercise Program twice per day for 6 week.       Continue with    [x] Ice  as needed 2 times per day     [x] Heat           Follow up with MD:     [] Upon completion of therapy     [x] As needed      Recommendations:     [x]   Return to activity with home program    []   Return to activity with the following modifications:       [x]Post Rehab Program    []Join Independent aquatic program     []Return to/join local gym        Additional Comments: good luck!!!!      Gómez Llanos, PTA 2025 4:13 PM    If an interpreting service was utilized for treatment of this patient, the contents of this document represent the material reviewed with the patient via the .

## 2025-02-20 ENCOUNTER — APPOINTMENT (OUTPATIENT)
Facility: HOSPITAL | Age: 65
End: 2025-02-20
Payer: COMMERCIAL

## 2025-02-25 ENCOUNTER — APPOINTMENT (OUTPATIENT)
Facility: HOSPITAL | Age: 65
End: 2025-02-25
Payer: COMMERCIAL

## 2025-05-01 ENCOUNTER — OFFICE VISIT (OUTPATIENT)
Age: 65
End: 2025-05-01
Payer: COMMERCIAL

## 2025-05-01 VITALS
BODY MASS INDEX: 29.26 KG/M2 | DIASTOLIC BLOOD PRESSURE: 80 MMHG | HEART RATE: 58 BPM | SYSTOLIC BLOOD PRESSURE: 110 MMHG | WEIGHT: 209 LBS | HEIGHT: 71 IN

## 2025-05-01 DIAGNOSIS — R42 DIZZY: ICD-10-CM

## 2025-05-01 DIAGNOSIS — I48.91 ATRIAL FIBRILLATION, UNSPECIFIED TYPE (HCC): Primary | ICD-10-CM

## 2025-05-01 DIAGNOSIS — R07.9 CHEST PAIN, UNSPECIFIED TYPE: ICD-10-CM

## 2025-05-01 DIAGNOSIS — R94.31 ABNORMAL EKG: ICD-10-CM

## 2025-05-01 PROCEDURE — 99214 OFFICE O/P EST MOD 30 MIN: CPT | Performed by: INTERNAL MEDICINE

## 2025-05-01 PROCEDURE — 93000 ELECTROCARDIOGRAM COMPLETE: CPT | Performed by: INTERNAL MEDICINE

## 2025-05-01 NOTE — PROGRESS NOTES
HPI:  A 65-year-old female here for followup.  She had one episode of atrial fibrillation when she found out her brother had to have an amputation of his leg, lasted for about 5 to 10 minutes.  She has had episode of chest pain when she was exercising but is able to bicycle regularly with minimal to no limitation.  Last stress test was treadmill back in 2023 that was low risk.  Echocardiogram last year showed normal EF.  She also had another brother who recently had a heart attack and she has been a little anxious given the chest pain as well.    Impression:  Recent chest pain during exertion but still able to bicycle regularly.  History of paroxysmal symptomatic atrial fibrillation with pulmonary vein isolation in 2017, complicated by severe hypertension, ultimate ablation in 2021 at [place].  She has been off Eliquis and Tikosyn.  She had one recurrence during stress for 5 to 10 minutes when she found out her brother has leg amputated.  Event monitor in 2022 with PACs but no sustained atrial fibrillation.  Intermittent fatigue but able to ride a bike.  Exercise treadmill stress test in March 2023 for 9 minutes and 49 seconds.    Plan:  She has had episode of atrial fibrillation, one isolated event since her ablation in 2021 and we talked about options of additional ablation but for now we will continue monitor.  She is worried about the chest pain she has had with her now strong family history.  Given her mildly abnormal EKG with poor R-wave progression, one cannot exclude old anterior infarct.  I am going to have her exercise and obtain a nuclear stress test.  If she is unable to complete this, my next step would be to consider CT calcium score.      Wt Readings from Last 3 Encounters:   05/01/25 94.8 kg (209 lb)   10/03/24 93 kg (205 lb)   06/06/24 93.9 kg (207 lb)     Past Medical History:   Diagnosis Date    Adverse effect of anesthesia     Asthma     Atrial fibrillation (HCC)     had ablation in Nov 2017 and

## 2025-06-18 ENCOUNTER — PATIENT MESSAGE (OUTPATIENT)
Age: 65
End: 2025-06-18

## 2025-07-23 ENCOUNTER — TELEPHONE (OUTPATIENT)
Age: 65
End: 2025-07-23

## (undated) DEVICE — KENDALL SCD EXPRESS SLEEVES, KNEE LENGTH, MEDIUM: Brand: KENDALL SCD

## (undated) DEVICE — SHEATH URET 36CM OD15FR ID13FR HYDRPHLC 2 TAPR NAVIGATOR

## (undated) DEVICE — 3M™ BAIR PAWS FLEX™ WARMING GOWN, STANDARD, 20 PER CASE 81003: Brand: BAIR PAWS™

## (undated) DEVICE — 3M™ TEGADERM™ HP TRANSPARENT FILM DRESSING FRAME STYLE, 9534HP, 2-3/8 X 2-3/4 IN (6 CM X 7 CM), 100/CT 4CT/CASE: Brand: 3M™ TEGADERM™

## (undated) DEVICE — SOLUTION IRRIG 3000ML 0.9% SOD CHL FLX CONT 0797208] ICU MEDICAL INC]

## (undated) DEVICE — GOWN,PREVENTION PLUS,XLN/XL,ST,24/CS: Brand: MEDLINE

## (undated) DEVICE — Z DISCONTINUED BY MEDLINE USE 2711682 TRAY SKIN PREP DRY W/ PREM GLV

## (undated) DEVICE — Y-TYPE TUR/BLADDER IRRIGATION SET, REGULATING CLAMP

## (undated) DEVICE — Z DUP USE 2565107 PACK SURG PROC LEG CYSTO T-DRAPE REINF TBL CVR HND TWL

## (undated) DEVICE — IRRIGATION KT PMP SGL ACT SAPS -- BX/5

## (undated) DEVICE — 4-PORT MANIFOLD: Brand: NEPTUNE 2

## (undated) DEVICE — CATHETER URETH 18FR BLLN 5CC SIL HYDRGEL 2 W F LUBRICIOUS

## (undated) DEVICE — OPEN-END FLEXI-TIP URETERAL CATHETER: Brand: FLEXI-TIP

## (undated) DEVICE — CATH URETH INTMIT ROB 12FR FUN -- CONVERT TO ITEM 151713

## (undated) DEVICE — SOLUTION IV 1000ML 0.9% SOD CHL

## (undated) DEVICE — KIT CLN UP BON SECOURS MARYV

## (undated) DEVICE — STER SINGLE BASIN SET W/BOWLS: Brand: CARDINAL HEALTH

## (undated) DEVICE — SEAL INSTR CYSTO ADJ BX PRT SEAL FOR ACC UP TO 6FR

## (undated) DEVICE — NDL PRT INJ NSAF BLNT 18GX1.5 --

## (undated) DEVICE — GAUZE SPONGES,16 PLY: Brand: CURITY

## (undated) DEVICE — BAG DRAINAGE CUST DISP

## (undated) DEVICE — LUB SURG MEDC STRL 2OZ TUBE MC -- MEDICHOICE

## (undated) DEVICE — ENDOSCOPIC VALVE WITH ADAPTER.: Brand: SURSEAL® II

## (undated) DEVICE — BALLOON DILATATION CATHETER: Brand: UROMAX ULTRA

## (undated) DEVICE — GDWIRE URET STR STD .038X150 -- ZIPWIRE STD

## (undated) DEVICE — SYR 10ML CTRL LR LCK NSAF LF --

## (undated) DEVICE — STERILE POLYISOPRENE POWDER-FREE SURGICAL GLOVES: Brand: PROTEXIS

## (undated) DEVICE — MEDI-VAC NON-CONDUCTIVE SUCTION TUBING: Brand: CARDINAL HEALTH